# Patient Record
Sex: FEMALE | Race: WHITE | NOT HISPANIC OR LATINO | Employment: FULL TIME | URBAN - METROPOLITAN AREA
[De-identification: names, ages, dates, MRNs, and addresses within clinical notes are randomized per-mention and may not be internally consistent; named-entity substitution may affect disease eponyms.]

---

## 2022-07-26 DIAGNOSIS — E87.6 HYPOKALEMIA: Primary | ICD-10-CM

## 2022-07-26 DIAGNOSIS — R09.89 LABILE HYPERTENSION: ICD-10-CM

## 2022-07-26 DIAGNOSIS — I10 ESSENTIAL HYPERTENSION, BENIGN: ICD-10-CM

## 2022-08-10 PROBLEM — I77.4 CELIAC ARTERY STENOSIS (HCC): Status: ACTIVE | Noted: 2022-08-10

## 2022-10-13 DIAGNOSIS — M79.642 PAIN IN BOTH HANDS: ICD-10-CM

## 2022-10-13 DIAGNOSIS — M45.0 ANKYLOSING SPONDYLITIS OF MULTIPLE SITES IN SPINE (HCC): Primary | ICD-10-CM

## 2022-10-13 DIAGNOSIS — M79.641 PAIN IN BOTH HANDS: ICD-10-CM

## 2022-12-02 PROBLEM — M79.89 MASS OF SOFT TISSUE OF LOWER LEG: Status: ACTIVE | Noted: 2022-12-02

## 2022-12-20 DIAGNOSIS — M25.571 CHRONIC PAIN OF BOTH ANKLES: Primary | ICD-10-CM

## 2022-12-20 DIAGNOSIS — M25.562 CHRONIC PAIN OF BOTH KNEES: ICD-10-CM

## 2022-12-20 DIAGNOSIS — M25.561 CHRONIC PAIN OF BOTH KNEES: ICD-10-CM

## 2022-12-20 DIAGNOSIS — G89.29 CHRONIC PAIN OF BOTH ANKLES: Primary | ICD-10-CM

## 2022-12-20 DIAGNOSIS — G89.29 CHRONIC PAIN OF BOTH KNEES: ICD-10-CM

## 2022-12-20 DIAGNOSIS — M25.572 CHRONIC PAIN OF BOTH ANKLES: Primary | ICD-10-CM

## 2022-12-30 PROBLEM — M25.572 CHRONIC PAIN OF BOTH ANKLES: Status: ACTIVE | Noted: 2022-12-30

## 2022-12-30 PROBLEM — Q68.2 PATELLA ALTA: Status: ACTIVE | Noted: 2022-12-30

## 2022-12-30 PROBLEM — M22.2X1 PATELLOFEMORAL DISORDER OF RIGHT KNEE: Status: ACTIVE | Noted: 2022-12-30

## 2022-12-30 PROBLEM — G89.29 CHRONIC PAIN OF BOTH ANKLES: Status: ACTIVE | Noted: 2022-12-30

## 2022-12-30 PROBLEM — M25.571 CHRONIC PAIN OF BOTH ANKLES: Status: ACTIVE | Noted: 2022-12-30

## 2023-02-06 ENCOUNTER — OFFICE VISIT (OUTPATIENT)
Dept: PHYSICAL THERAPY | Facility: REHABILITATION | Age: 28
End: 2023-02-06

## 2023-02-06 DIAGNOSIS — S83.004D PATELLAR DISLOCATION, RIGHT, SUBSEQUENT ENCOUNTER: Primary | ICD-10-CM

## 2023-02-06 DIAGNOSIS — M22.2X1 PATELLOFEMORAL DISORDER OF RIGHT KNEE: ICD-10-CM

## 2023-02-06 DIAGNOSIS — Q68.2 PATELLA ALTA: ICD-10-CM

## 2023-02-06 NOTE — PROGRESS NOTES
Daily Note     Today's date: 2023  Patient name: Leonard Fields  : 1995  MRN: [id number]  Referring provider: Marisa Lopes  Dx:   Encounter Diagnosis     ICD-10-CM    1  Patellar dislocation, right, subsequent encounter  [id number] 004D       2  Patellofemoral disorder of right knee  M22 2X1       3  Patella ayesha  Q68 2           Start Time: 5420  Stop Time: 7869  Total time in clinic (min): 50 minutes    Subjective: Pt reports R knee pain, edema, and bruising that has worsened since last visit  Pt reports pain increases in extension and is back to having pain with ambulation, sit to stands, and pivoting type movement        Objective: See treatment diary below    Precautions: hx POTS, anxiety, HTN,         Manuals 1/3 1/6 1/9 1/13 1/16 1/20 1/23 1/27  1/30  2/6   theragun R quad               5 min       Smith taping patella    15 min 15 min 15 min 10 min 5 min patellar tendon only    5 min patellar tendon only    5 min patellar tendon only     5 min patellar tendon only  np   Measurements/RE         15 min/ MC    IASTM distal quad           5 min 5 min 5 min  5 min  np   Neuro Re-Ed                       SLS foam   3x30" R 30"x3 R Foam 30"x2 ea Foam 30"x3 ea Foam 3x 30" kevin  30"x3 bilat 3x30" kevin    np   Elevated sit to stand   15x chair and foam x15 chair and foam x15 chair and foam x15 chair and foam 15x chair and foam 15x chair and foam np    np   Lateral step up/down         0R x10 ea 0R 10x kevin 0R 10x kevin 0R 10x kevin    np                                                                                                   Ther Ex                       treadmill   6 min  3 0 mph  7 min 3 0 mph  7 min 2 5 mph  7 min 2 mph 7 min 2 5 mph 7 min 2 5 mph 10 min  2 5 mph 11 min  np   SLR   5" 15x R 5"x15 R 5"x15 R 5"x15 R np 5"x15 R np    np   sidelying hip abduction   5" 15x R 5"x15 R 5"x15 R 5"x15 R np 5"x15 R np   5"x10   SL press (seat 3, plate 6)   NV 17# H15 R 45# x15 R 45# x20 R 55 lbs 20x R 55 lbs 20x R 65 lbs 15x R    np   bridge   5" 15x 5"x15 5"x15 np          5"x10   Retro-rocking   NV 5"x10 5"x15 10" x10             Prone quad stretch           3x30"R 3x30"R 3x30" R  3x 30" R  30"x3 R   Quad set          5"x10   SAQ          5"x10   Heel slide          x15                           Ther Activity                                                                       Gait Training                                                                       Modalities                       PRN                                                      Assessment: Pt presents to PT with increased edema, tenderness to palpation and bruising along medial and lateral joint lines and patellar tendon  Edema and bruising also present along anterior lower leg with  Patient was assessed by Ham Díaz, DPT, negative ant/post drawer testing, negative Thessaly, and negative varus/valgus testing  TE performed for ROM and basic strengthening within pain free tolerance  Pt is most uncomfortable in TKE  Held manual therapy and taping this visit and was educated in reaching out to physician regarding change of status  Tolerated treatment fair  Patient would benefit from continued PT      Plan: Continue per plan of care

## 2023-02-07 DIAGNOSIS — M22.2X1 PATELLOFEMORAL DISORDER OF RIGHT KNEE: Primary | ICD-10-CM

## 2023-02-08 DIAGNOSIS — S83.004S PATELLAR DISLOCATION, RIGHT, SEQUELA: Primary | ICD-10-CM

## 2023-02-08 RX ORDER — METHYLPREDNISOLONE 4 MG/1
TABLET ORAL
Qty: 21 TABLET | Refills: 0 | Status: SHIPPED | OUTPATIENT
Start: 2023-02-08 | End: 2023-02-08

## 2023-02-09 ENCOUNTER — TELEPHONE (OUTPATIENT)
Dept: OBGYN CLINIC | Facility: HOSPITAL | Age: 28
End: 2023-02-09

## 2023-02-09 ENCOUNTER — OFFICE VISIT (OUTPATIENT)
Dept: OBGYN CLINIC | Facility: CLINIC | Age: 28
End: 2023-02-09

## 2023-02-09 ENCOUNTER — APPOINTMENT (OUTPATIENT)
Dept: PHYSICAL THERAPY | Facility: REHABILITATION | Age: 28
End: 2023-02-09

## 2023-02-09 VITALS
SYSTOLIC BLOOD PRESSURE: 143 MMHG | HEART RATE: 132 BPM | DIASTOLIC BLOOD PRESSURE: 88 MMHG | HEIGHT: 65 IN | BODY MASS INDEX: 26.99 KG/M2 | WEIGHT: 162 LBS

## 2023-02-09 DIAGNOSIS — G89.29 CHRONIC PAIN OF RIGHT KNEE: ICD-10-CM

## 2023-02-09 DIAGNOSIS — M22.2X1 PATELLOFEMORAL DISORDER OF RIGHT KNEE: Primary | ICD-10-CM

## 2023-02-09 DIAGNOSIS — M70.50 PES ANSERINE BURSITIS: ICD-10-CM

## 2023-02-09 DIAGNOSIS — M25.561 CHRONIC PAIN OF RIGHT KNEE: ICD-10-CM

## 2023-02-09 NOTE — PROGRESS NOTES
Assessment:  1  Patellofemoral disorder of right knee        2  Pes anserine bursitis        3  Chronic pain of right knee          Patient Active Problem List   Diagnosis   • Essential hypertension, benign   • POTS (postural orthostatic tachycardia syndrome)   • Hypokalemia   • Hypomagnesemia   • Vitamin D deficiency   • Anxiety   • Acid reflux   • Ankylosing spondylitis (HCC)   • Gastroparesis   • Irritable bowel syndrome with diarrhea   • Histone antibody positive   • Dysmenorrhea   • Eczema   • Tremor   • Undifferentiated connective tissue disease (HCC)   • RIYA (generalized anxiety disorder)   • Pre-conception counseling   • Heart palpitations   • Hypophosphatemia   • Celiac artery stenosis (HCC)   • Mass of soft tissue of lower leg   • Patellar dislocation, right, sequela   • Patellofemoral disorder of right knee   • Patella ayesha   • Chronic pain of both ankles   • Pes anserine bursitis           Plan      Patient will get a J brace help with the knee Temporarily until she is able to get the MRI  She will follow-up after the MRI and possibly be referred to [facility] for a second opinion and possible surgical invention regarding patellofemoral dysfunction  Whether or not MPFL can be reconstructed or imbricated  As for her pes anserine bursitis is most likely due to the rehab program that she is doing trying to emphasize the VMO putting more stress on the medial side the Medrol Dosepak that was prescribed should help but she has not started that yet  I also recommend that she use Voltaren gel to help with this as well  We did offer her cortisone injection of the pes bursa but she declined  Subjective:     Patient ID:    Chief Complaint:Julita ANTONIO Berry 28 y o  female      HPI    Patient presents today for follow-up evaluation of chronic right knee pain  Patient had a patellar dislocation in 2013 for which she notes chronic pain   She has been attending physical therapy to strengthen the knee and address this chronic pain  She reports a recent injury when her dog ran into her knee causing an increase in pain, she denies repeat dislocation  DOI 1/29/2023  She has not been able to perform physical therapy with out significant pain since time of recent injury  She indicates the medial joint space as the primary location of her pain  The following portions of the patient's history were reviewed and updated as appropriate: allergies, current medications, past family history, past social history, past surgical history and problem list         Social History     Socioeconomic History   • Marital status: /Civil Union     Spouse name: Not on file   • Number of children: Not on file   • Years of education: Not on file   • Highest education level: Not on file   Occupational History   • Occupation: medical assistant with nephrology    Tobacco Use   • Smoking status: Never   • Smokeless tobacco: Never   Vaping Use   • Vaping Use: Never used   Substance and Sexual Activity   • Alcohol use:  Yes     Alcohol/week: 1 0 standard drink     Types: 1 Standard drinks or equivalent per week     Comment: socially   • Drug use: No   • Sexual activity: Not Currently     Partners: Male     Birth control/protection: Condom Male   Other Topics Concern   • Not on file   Social History Narrative    Activities - horseback riding    Caffeine use    Drinks coffee- 1 a wk    Exercise - walking    Exercising regularly     Social Determinants of Health     Financial Resource Strain: Not on file   Food Insecurity: Not on file   Transportation Needs: Not on file   Physical Activity: Not on file   Stress: Not on file   Social Connections: Not on file   Intimate Partner Violence: Not on file   Housing Stability: Not on file     Past Medical History:   Diagnosis Date   • Abnormal blood chemistry    • Anxiety    • Connective tissue disease ([facility] [address] )    • Gastroparesis    • Hypertension     last assessed 3/12/14   • Hypokalemia    • Lymphadenopathy     last assessed 10/8/14   • Mass of right breast    • Myalgia     last assessed 10/8/14   • Myositis     last assessed 10/8/14   • Nerve pain     arms legs   • Palpitations    • POTS (postural orthostatic tachycardia syndrome)    • Rheumatoid arthritis (HCC)    • Sinus tachycardia     related to dehydration   • Tachycardia     last assessed 6/9/17   • Vitamin D deficiency      Past Surgical History:   Procedure Laterality Date   • PA EGD TRANSORAL BIOPSY SINGLE/MULTIPLE N/A 5/4/2016    Procedure: ESOPHAGOGASTRODUODENOSCOPY (EGD); Surgeon: Rylee Peacock MD;  Location: [facility];   Service: Gastroenterology   • UPPER GASTROINTESTINAL ENDOSCOPY     • WISDOM TOOTH EXTRACTION       Allergies   Allergen Reactions   • Prednisone Hyperactivity     Medrol dose samantha only   • Serotonin Reuptake Inhibitors (Ssris) Anaphylaxis and Hives   • Augmentin [Amoxicillin-Pot Clavulanate] Hives and Rash   • Clindamycin Rash   • Penicillins Rash   • Sulfa Antibiotics Rash   • Azithromycin Rash     Current Outpatient Medications on File Prior to Visit   Medication Sig Dispense Refill   • AMILoride 5 mg tablet Take 2 tablets (10 mg total) by mouth 2 (two) times a day 180 tablet 3   • busPIRone (BUSPAR) 5 mg tablet Take 1 tablet (5 mg total) by mouth 2 (two) times a day (Patient taking differently: Take 5 mg by mouth if needed) 180 tablet 3   • clobetasol (TEMOVATE) 0 05 % ointment Apply topically 2 (two) times a day 60 g 2   • dicyclomine (BENTYL) 20 mg tablet Take 1 tablet (20 mg total) by mouth every 6 (six) hours (Patient taking differently: Take 20 mg by mouth as needed) 120 tablet 4   • hydroxychloroquine (PLAQUENIL) 200 mg tablet Take 1 5 tablets (300 mg total) by mouth in the morning 45 tablet 6   • hydrOXYzine HCL (ATARAX) 10 mg tablet Take 1 tablet (10 mg total) by mouth 2 (two) times a day as needed for anxiety 60 tablet 1   • methylPREDNISolone 4 MG tablet therapy pack Use as directed on package 1 each 0   • Multiple Vitamins-Minerals (MULTIVITAL) CHEW Chew 1 tablet daily     • potassium chloride (K-DUR,KLOR-CON) 20 mEq tablet Take 1 tablet (20 mEq total) by mouth 3 (three) times a day 270 tablet 3   • propranolol (INDERAL LA) 120 mg 24 hr capsule Take 1 capsule (120 mg total) by mouth daily 90 capsule 3   • propranolol (INDERAL) 20 mg tablet Take 1 tablet (20 mg total) by mouth every 8 (eight) hours (Patient taking differently: Take 20 mg by mouth as needed UP to 3 times in a day) 90 tablet 2   • tacrolimus (PROTOPIC) 0 1 % ointment Apply topically 2 (two) times a day 100 g 0   • triamcinolone (KENALOG) 0 025 % ointment Apply topically twice day for 14 days straight as needed for flares to thinner skin 80 g 3   • triamcinolone (KENALOG) 0 1 % ointment Apply topically twice day for 14 days straight as needed for flares 453 6 g 3     No current facility-administered medications on file prior to visit  Objective:    Review of Systems   Constitutional: Negative for chills and fever  HENT: Negative for ear pain and sore throat  Eyes: Negative for pain and visual disturbance  Respiratory: Negative for cough and shortness of breath  Cardiovascular: Negative for chest pain and palpitations  Gastrointestinal: Negative for abdominal pain and vomiting  Genitourinary: Negative for dysuria and hematuria  Musculoskeletal: Negative for arthralgias and back pain  Skin: Negative for color change and rash  Neurological: Negative for seizures and syncope  All other systems reviewed and are negative  Left Knee Exam     Tenderness   The patient is experiencing tenderness in the pes anserinus and patella  Range of Motion   Extension: 0   Flexion: 120     Tests   Varus: negative Valgus: negative    Other   Erythema: absent  Sensation: normal  Pulse: present  Swelling: none  Effusion: no effusion present            Physical Exam  Vitals and nursing note reviewed  HENT:      Head: Normocephalic  Eyes:      Extraocular Movements: Extraocular movements intact  Cardiovascular:      Rate and Rhythm: Normal rate  Pulses: Normal pulses  Pulmonary:      Effort: Pulmonary effort is normal    Musculoskeletal:         General: Normal range of motion  Cervical back: Normal range of motion  Left knee: No effusion  Skin:     General: Skin is warm and dry  Neurological:      General: No focal deficit present  Mental Status: She is alert  Psychiatric:         Behavior: Behavior normal          Procedures  No Procedures performed today    I have personally reviewed pertinent films in PACS  Scribe Attestation    I,:   am acting as a scribe while in the presence of the attending physician :       I,:   personally performed the services described in this documentation    as scribed in my presence :           Portions of the record may have been created with voice recognition software   Occasional wrong word or "sound a like" substitutions may have occurred due to the inherent limitations of voice recognition software   Read the chart carefully and recognize, using context, where substitutions have occurred

## 2023-02-09 NOTE — TELEPHONE ENCOUNTER
DR Cayla Reno called stating   MRI does not require prior authorization via [facility]  [facility]AR is unaware if prior is needed from patients insurance   [facility]ANKAR recommenced our team call patients insurance directly       Tracking #- [id number]   # [phone] ([facility])

## 2023-02-10 ENCOUNTER — TELEPHONE (OUTPATIENT)
Dept: OBGYN CLINIC | Facility: HOSPITAL | Age: 28
End: 2023-02-10

## 2023-02-10 DIAGNOSIS — M22.2X1 PATELLOFEMORAL DISORDER OF RIGHT KNEE: Primary | ICD-10-CM

## 2023-02-10 NOTE — TELEPHONE ENCOUNTER
Caller: Patient     Doctor: Desean Da Silva    Reason for call: Patient would like to schedule a fitting for a J brace       Call back#: [phone]

## 2023-02-13 ENCOUNTER — APPOINTMENT (OUTPATIENT)
Dept: PHYSICAL THERAPY | Facility: REHABILITATION | Age: 28
End: 2023-02-13

## 2023-02-13 DIAGNOSIS — M22.2X1 PATELLOFEMORAL DISORDER OF RIGHT KNEE: Primary | ICD-10-CM

## 2023-02-14 ENCOUNTER — TELEPHONE (OUTPATIENT)
Dept: OBGYN CLINIC | Facility: CLINIC | Age: 28
End: 2023-02-14

## 2023-02-15 ENCOUNTER — HOSPITAL ENCOUNTER (OUTPATIENT)
Dept: MRI IMAGING | Facility: HOSPITAL | Age: 28
Discharge: HOME | End: 2023-02-15
Attending: ORTHOPAEDIC SURGERY

## 2023-02-15 DIAGNOSIS — M22.2X1 PATELLOFEMORAL DISORDER OF RIGHT KNEE: ICD-10-CM

## 2023-02-16 ENCOUNTER — APPOINTMENT (OUTPATIENT)
Dept: PHYSICAL THERAPY | Facility: REHABILITATION | Age: 28
End: 2023-02-16

## 2023-02-20 ENCOUNTER — OFFICE VISIT (OUTPATIENT)
Dept: OBGYN CLINIC | Facility: MEDICAL CENTER | Age: 28
End: 2023-02-20

## 2023-02-20 VITALS
SYSTOLIC BLOOD PRESSURE: 167 MMHG | DIASTOLIC BLOOD PRESSURE: 84 MMHG | HEIGHT: 65 IN | WEIGHT: 162 LBS | BODY MASS INDEX: 26.99 KG/M2 | HEART RATE: 58 BPM

## 2023-02-20 DIAGNOSIS — M22.2X1 PATELLOFEMORAL DISORDER OF RIGHT KNEE: ICD-10-CM

## 2023-02-20 DIAGNOSIS — S83.004A PATELLAR DISLOCATION, RIGHT, INITIAL ENCOUNTER: ICD-10-CM

## 2023-02-20 DIAGNOSIS — Z01.818 PRE-OP TESTING: ICD-10-CM

## 2023-02-20 DIAGNOSIS — M25.361 PATELLAR INSTABILITY OF RIGHT KNEE: Primary | ICD-10-CM

## 2023-02-20 RX ORDER — CEFAZOLIN SODIUM 2 G/50ML
2000 SOLUTION INTRAVENOUS ONCE
OUTPATIENT
Start: 2023-02-20 | End: 2023-02-20

## 2023-02-20 RX ORDER — TRAMADOL HYDROCHLORIDE 50 MG/1
50 TABLET ORAL EVERY 6 HOURS PRN
OUTPATIENT
Start: 2023-02-20

## 2023-02-20 RX ORDER — ACETAMINOPHEN 325 MG/1
650 TABLET ORAL EVERY 6 HOURS PRN
OUTPATIENT
Start: 2023-02-20

## 2023-02-20 NOTE — PROGRESS NOTES
Ortho Sports Medicine Knee New Patient Visit     Assesment:   28 y o  female right knee 1 patellar dislocation in 2013 with continued patellar instability/ subluxations    Plan:    Conservative treatment:    Ice to knee for 20 minutes at least 1-2 times daily  Post-op PT written  Crutches given  TROM fitted       Imaging: All imaging from today was reviewed by myself and explained to the patient  Injection:    No Injection planned at this time  Surgery: All of the risks and benefits of operative treatment were explained to the patient, as well as the risks and benefits of any alternative treatment options, including nonoperative care  The risks of surgical treatement include, but are not limited to, infection, bleeding, blood clot, neurovascular damage, need for further surgery, continued pain, cardiovascular risk, and anesthesia risk  The patient understood this and elects to proceed forward with surgical intervention  We will proceed forward with surgical arthroscopy of the knee with lateral release and open MPFL reconstruction with allograft  Patient Denies history of blood clot  Patient denies personal or family history of bleeding or clotting disorder  Patient does not take any blood thinners  Patient has cardiac history including HTN and POTS, this is managed by her Cardiologist  Patient Denies  current history of diabetes  Patient has allergies to antibiotics, penicillin, clindamycin, sulfa, and azithromycin, Patient will receive Ancef intra-op  Patient denies history of MRSA infection  Patient Denies current tobacco use  Discussed with patient use of narcotics for post-op pain  Patient denies history of opioid abuse  Patient does have history of RA and on plaquenil, she will stop plaquenil 2 weeks prior to surgery due to increased risk of infection at time of surgery  Cardiac and Nephrology clearance will be obtained  Bloodwork will be obtained   EKG will be obtained  Follow up:    Return for 1 week post-op   Chief Complaint   Patient presents with   • Right Knee - Pain       History of Present Illness: The patient is a 28 y o  female whose occupation is [employer], referred to me by [facility] regarding Right knee previous patellar dislocation with continued pain and patellar instability  Pain is located anterior, posterior, medial   The patient rates the pain as a 9/10  The pain has been present for 9 years but worse over the last few months  The patient initially sustained an injury in 2013 while horseback riding  Patient states that during the event her foot got caught in the stirrup she was dragged across the ground and eventually was able to get her foot out of the stirrup  Upon landing on the ground patient dislocated her kneecap out laterally  Patient states that paramedics came and had to relocate the kneecap at that time  Patient states that following that injury she immediately did bracing and physical therapy for treatment  Patient denies ever having full stability back to the kneecap following this  Patient states that since then until current she continues to have sensations of her kneecap slipping out laterally  She denies another true full dislocation  But she states that daily she will get sensations as though the patella is going to dislocate or at least looks to the side  Patient states whenever she gets a sensation as when she is in a slightly flexed position and goes to quick turn or pivot  Most recently in January of this year the patient's dog ran into her and had a direct impact with the right knee  Patient states that since this injury she has had significant increased pain about the kneecap  Patient states that she has pain in the anterior posterior and medial aspect of the knee    After patient sustained her injury from her great Tony she initially followed up with Dr Harjinder Keating prescribed bracing and physical therapy  Patient completed over 6 weeks of physical therapy without improvement and stabilization of the knee Or resolution of pain  Patient was then referred to Dr Denzel Alvarado for consideration of MPFL reconstruction  The pain is characterized as sharp, stabbing, dull, achy  The pain is present at all times  Pain is improved by rest   Pain is aggravated by stairs, squatting, weight bearing, walking and pivoting on a planted foot  Symptoms include clicking, catching, popping and swelling  The patient has tried rest, ice, NSAIDS, physical therapy and bracing  Knee Surgical History:  None    Past Medical, Social and Family History:  Past Medical History:   Diagnosis Date   • Abnormal blood chemistry    • Anxiety    • Connective tissue disease ([facility] [address] )    • Gastroparesis    • Hypertension     last assessed 3/12/14   • Hypokalemia    • Lymphadenopathy     last assessed 10/8/14   • Mass of right breast    • Myalgia     last assessed 10/8/14   • Myositis     last assessed 10/8/14   • Nerve pain     arms legs   • Palpitations    • POTS (postural orthostatic tachycardia syndrome)    • Rheumatoid arthritis (HCC)    • Sinus tachycardia     related to dehydration   • Tachycardia     last assessed 6/9/17   • Vitamin D deficiency      Past Surgical History:   Procedure Laterality Date   • WA EGD TRANSORAL BIOPSY SINGLE/MULTIPLE N/A 5/4/2016    Procedure: ESOPHAGOGASTRODUODENOSCOPY (EGD); Surgeon: Ramakrishna Melendez MD;  Location: [facility];   Service: Gastroenterology   • UPPER GASTROINTESTINAL ENDOSCOPY     • WISDOM TOOTH EXTRACTION       Allergies   Allergen Reactions   • Prednisone Hyperactivity     Medrol dose samantha only   • Serotonin Reuptake Inhibitors (Ssris) Anaphylaxis and Hives   • Augmentin [Amoxicillin-Pot Clavulanate] Hives and Rash   • Clindamycin Rash   • Penicillins Rash   • Sulfa Antibiotics Rash   • Azithromycin Rash     Current Outpatient Medications on File Prior to Visit   Medication Sig Dispense Refill   • AMILoride 5 mg tablet Take 2 tablets (10 mg total) by mouth 2 (two) times a day 180 tablet 3   • busPIRone (BUSPAR) 5 mg tablet Take 1 tablet (5 mg total) by mouth 2 (two) times a day (Patient taking differently: Take 5 mg by mouth if needed) 180 tablet 3   • clobetasol (TEMOVATE) 0 05 % ointment Apply topically 2 (two) times a day 60 g 2   • dicyclomine (BENTYL) 20 mg tablet Take 1 tablet (20 mg total) by mouth every 6 (six) hours (Patient taking differently: Take 20 mg by mouth as needed) 120 tablet 4   • hydroxychloroquine (PLAQUENIL) 200 mg tablet Take 1 5 tablets (300 mg total) by mouth in the morning 45 tablet 6   • hydrOXYzine HCL (ATARAX) 10 mg tablet Take 1 tablet (10 mg total) by mouth 2 (two) times a day as needed for anxiety 60 tablet 1   • Multiple Vitamins-Minerals (MULTIVITAL) CHEW Chew 1 tablet daily     • potassium chloride (K-DUR,KLOR-CON) 20 mEq tablet Take 1 tablet (20 mEq total) by mouth 3 (three) times a day 270 tablet 3   • propranolol (INDERAL LA) 120 mg 24 hr capsule Take 1 capsule (120 mg total) by mouth daily 90 capsule 3   • propranolol (INDERAL) 20 mg tablet Take 1 tablet (20 mg total) by mouth every 8 (eight) hours (Patient taking differently: Take 20 mg by mouth as needed UP to 3 times in a day) 90 tablet 2   • tacrolimus (PROTOPIC) 0 1 % ointment Apply topically 2 (two) times a day 100 g 0   • triamcinolone (KENALOG) 0 025 % ointment Apply topically twice day for 14 days straight as needed for flares to thinner skin 80 g 3   • triamcinolone (KENALOG) 0 1 % ointment Apply topically twice day for 14 days straight as needed for flares 453 6 g 3   • [DISCONTINUED] methylPREDNISolone 4 MG tablet therapy pack Use as directed on package 1 each 0     No current facility-administered medications on file prior to visit       Social History     Socioeconomic History   • Marital status: /Civil Union     Spouse name: Not on file   • Number of children: Not on file   • Years of education: Not on file   • Highest education level: Not on file   Occupational History   • Occupation: medical assistant with nephrology    Tobacco Use   • Smoking status: Never   • Smokeless tobacco: Never   Vaping Use   • Vaping Use: Never used   Substance and Sexual Activity   • Alcohol use: Yes     Alcohol/week: 1 0 standard drink     Types: 1 Standard drinks or equivalent per week     Comment: socially   • Drug use: No   • Sexual activity: Not Currently     Partners: Male     Birth control/protection: Condom Male   Other Topics Concern   • Not on file   Social History Narrative    Activities - horseback riding    Caffeine use    Drinks coffee- 1 a wk    Exercise - walking    Exercising regularly     Social Determinants of Health     Financial Resource Strain: Not on file   Food Insecurity: Not on file   Transportation Needs: Not on file   Physical Activity: Not on file   Stress: Not on file   Social Connections: Not on file   Intimate Partner Violence: Not on file   Housing Stability: Not on file         I have reviewed the past medical, surgical, social and family history, medications and allergies as documented in the EMR  Review of systems: ROS is negative other than that noted in the HPI  Constitutional: Negative for fatigue and fever  HENT: Negative for sore throat  Respiratory: Negative for shortness of breath  Cardiovascular: Negative for chest pain  Gastrointestinal: Negative for abdominal pain  Endocrine: Negative for cold intolerance and heat intolerance  Genitourinary: Negative for flank pain  Musculoskeletal: Negative for back pain  Skin: Negative for rash  Allergic/Immunologic: Negative for immunocompromised state  Neurological: Negative for dizziness  Psychiatric/Behavioral: Negative for agitation  Physical Exam:    Blood pressure 167/84, pulse 58, height 5' 5" (1 651 m), weight 73 5 kg (162 lb)      General/Constitutional: NAD, well developed, well nourished  HENT: Normocephalic, atraumatic  CV: Intact distal pulses, regular rate  Resp: No respiratory distress or labored breathing  GI: Soft and non-tender   Lymphatic: No lymphadenopathy palpated  Neuro: Alert and Oriented x 3, no focal deficits  Psych: Normal mood, normal affect, normal judgement, normal behavior  Skin: Warm, dry, no rashes, no erythema      Knee Exam (focused): RIGHT LEFT   ROM:   0-130 0-130   Palpation: Effusion minimal negative     MJL tenderness Positive Negative     LJL tenderness Positive Negative   Meniscus: Bobbi Negative Negative    Apley's Compression Negative Negative   Instability: Varus stable stable     Valgus stable stable   Special Tests: Lachman Negative Negative     Posterior drawer Negative Negative     Anterior drawer Negative Negative     Pivot shift not tested not tested     Dial not tested not tested   Patella: Palpation medial facet ttp no tenderness     Mobility 3/4 1/4     Apprehension Positive Negative   Other: Single leg 1/4 squat not tested not tested      LE NV Exam: +2 DP/PT pulses bilaterally  Sensation intact to light touch L2-S1 bilaterally     Bilateral hip ROM demonstrates no pain actively or passively    No calf tenderness to palpation bilaterally    Knee Imaging    X-rays of the right knee were reviewed, which demonstrate no acute fracture with effusion  I have reviewed the radiology report and agree with their impression  MRI of the right knee were reviewed, which demonstrate a normal MRI  There is no ligamentous or meniscus injury  No bone confusion pattern to suggest recently patellar dislocation   I have reviewed the radiology report and agree with their impression

## 2023-02-23 ENCOUNTER — TELEPHONE (OUTPATIENT)
Dept: NEPHROLOGY | Facility: CLINIC | Age: 28
End: 2023-02-23

## 2023-02-23 NOTE — TELEPHONE ENCOUNTER
Call from Elliott from [facility]  Patient scheduled for surgery 4/13/13 and needs surgery clearence  Is patient able to be cleared for surgery or does she need a follow up visit

## 2023-03-03 ENCOUNTER — APPOINTMENT (OUTPATIENT)
Dept: LAB | Facility: CLINIC | Age: 28
End: 2023-03-03

## 2023-03-03 DIAGNOSIS — Z01.818 PRE-OP TESTING: ICD-10-CM

## 2023-03-03 DIAGNOSIS — M25.361 PATELLAR INSTABILITY OF RIGHT KNEE: ICD-10-CM

## 2023-03-03 DIAGNOSIS — E87.1 HYPONATREMIA: ICD-10-CM

## 2023-03-03 DIAGNOSIS — E87.6 HYPOKALEMIA: Chronic | ICD-10-CM

## 2023-03-03 DIAGNOSIS — E55.9 VITAMIN D DEFICIENCY: ICD-10-CM

## 2023-03-03 DIAGNOSIS — S83.004A PATELLAR DISLOCATION, RIGHT, INITIAL ENCOUNTER: ICD-10-CM

## 2023-03-03 DIAGNOSIS — E83.39 HYPOPHOSPHATEMIA: ICD-10-CM

## 2023-03-03 DIAGNOSIS — I77.1 CELIAC ARTERY STENOSIS (HCC): ICD-10-CM

## 2023-03-03 DIAGNOSIS — G90.A POTS (POSTURAL ORTHOSTATIC TACHYCARDIA SYNDROME): ICD-10-CM

## 2023-03-03 DIAGNOSIS — I10 ESSENTIAL HYPERTENSION, BENIGN: ICD-10-CM

## 2023-03-03 DIAGNOSIS — E83.42 HYPOMAGNESEMIA: ICD-10-CM

## 2023-03-03 LAB
ANION GAP SERPL CALCULATED.3IONS-SCNC: 3 MMOL/L (ref 4–13)
BUN SERPL-MCNC: 13 MG/DL (ref 5–25)
CALCIUM SERPL-MCNC: 10.1 MG/DL (ref 8.3–10.1)
CHLORIDE SERPL-SCNC: 104 MMOL/L (ref 96–108)
CO2 SERPL-SCNC: 28 MMOL/L (ref 21–32)
CREAT SERPL-MCNC: 0.82 MG/DL (ref 0.6–1.3)
ERYTHROCYTE [DISTWIDTH] IN BLOOD BY AUTOMATED COUNT: 12 % (ref 11.6–15.1)
GFR SERPL CREATININE-BSD FRML MDRD: 97 ML/MIN/1.73SQ M
GLUCOSE P FAST SERPL-MCNC: 81 MG/DL (ref 65–99)
HCT VFR BLD AUTO: 43.3 % (ref 34.8–46.1)
HGB BLD-MCNC: 15 G/DL (ref 11.5–15.4)
MAGNESIUM SERPL-MCNC: 2.6 MG/DL (ref 1.6–2.6)
MCH RBC QN AUTO: 30.2 PG (ref 26.8–34.3)
MCHC RBC AUTO-ENTMCNC: 34.6 G/DL (ref 31.4–37.4)
MCV RBC AUTO: 87 FL (ref 82–98)
PLATELET # BLD AUTO: 257 THOUSANDS/UL (ref 149–390)
PMV BLD AUTO: 11.7 FL (ref 8.9–12.7)
POTASSIUM SERPL-SCNC: 3.8 MMOL/L (ref 3.5–5.3)
RBC # BLD AUTO: 4.96 MILLION/UL (ref 3.81–5.12)
SODIUM SERPL-SCNC: 135 MMOL/L (ref 135–147)
WBC # BLD AUTO: 6.64 THOUSAND/UL (ref 4.31–10.16)

## 2023-03-10 ENCOUNTER — OFFICE VISIT (OUTPATIENT)
Dept: LAB | Facility: CLINIC | Age: 28
End: 2023-03-10

## 2023-03-10 DIAGNOSIS — S83.004A PATELLAR DISLOCATION, RIGHT, INITIAL ENCOUNTER: ICD-10-CM

## 2023-03-10 DIAGNOSIS — M25.361 PATELLAR INSTABILITY OF RIGHT KNEE: ICD-10-CM

## 2023-03-10 DIAGNOSIS — Z01.818 PRE-OP TESTING: ICD-10-CM

## 2023-03-10 LAB
ATRIAL RATE: 83 BPM
P AXIS: 65 DEGREES
PR INTERVAL: 138 MS
QRS AXIS: 60 DEGREES
QRSD INTERVAL: 78 MS
QT INTERVAL: 386 MS
QTC INTERVAL: 453 MS
T WAVE AXIS: 51 DEGREES
VENTRICULAR RATE: 83 BPM

## 2023-03-17 ENCOUNTER — OFFICE VISIT (OUTPATIENT)
Dept: NEPHROLOGY | Facility: CLINIC | Age: 28
End: 2023-03-17

## 2023-03-17 VITALS
SYSTOLIC BLOOD PRESSURE: 140 MMHG | HEIGHT: 65 IN | BODY MASS INDEX: 27.39 KG/M2 | DIASTOLIC BLOOD PRESSURE: 80 MMHG | WEIGHT: 164.4 LBS | HEART RATE: 128 BPM

## 2023-03-17 DIAGNOSIS — I10 ESSENTIAL HYPERTENSION, BENIGN: Primary | Chronic | ICD-10-CM

## 2023-03-17 DIAGNOSIS — G90.A POTS (POSTURAL ORTHOSTATIC TACHYCARDIA SYNDROME): Chronic | ICD-10-CM

## 2023-03-17 NOTE — PROGRESS NOTES
NEPHROLOGY OFFICE VISIT   Desean Benito 33 y o  female MRN: [id number]  3/17/2023    Reason for Visit: Follow up    INTERVAL HISTORY and SUBJECTIVE:    I had the pleasure of seeing Karey Patiño today in the renal clinic  She is a 19-year-old female who follows with Dr Bon Gauthier for management of hypertension and electrolyte disorders: Hypokalemia and hyponatremia  Scheduled for arthroscopic realignment of patella 4/13/2023  Presents for renal clearance  Karey Patiño has been doing quite well since propanolol dose was adjusted  She has occasional POTS symptoms which resolved with rest/recumbent position  She remains well-hydrated and uses liquid IV in her water  If she requires additional fluids cardiology provides IV isotonic saline  ASSESSMENT AND PLAN:    Hypertension/hypokalemia/hyponatremia:  · Evaluated and investigated by Dr Bon Gauthier and was also seen by Dr Roni Ríos as a second opinion  · Secondary work-up negative, genetic work-up negative  · Goal blood pressure less than 135/80  · Home readings averaged: AM sitting 126/79, standing 131/75, p m  sitting 123/77, standing 132/77  Pulse sitting 77, standing 121  · Home readings similar to office evaluation today  · Potassium supplement: K  Dur 20 mill equivalents 3 times a day  · High potassium diet  · Potassium acceptable, 3 8  · Magnesium at the high end of normal, 2 6  · In association with POTS metoprolol or propranolol could be used  She is using propanolol 20 mg 3 times a day in addition to 120 mg daily (doing much better on higher dose of propanolol)  · Blood pressure acceptable  No changes      POTS: Questionable POTS syndrome  · Occasional symptoms with position change  · Follows with cardiology  · Good hydration, recommendations for 3 L/day  · Adding one packet of liquid IV to one liter of water 500 mg sodium  · Adequate salt intake recommendations for 8 g daily: Sources table salt, sports tablets, sports beverages, oral hydration salts, canned soup  · Exercise encouraged/prevent deconditioning  Recumbent/semirecumbent exercises to focus on lower extremity resistance strength training to help reduce venous pooling  · Leg crosses lower extremity muscle tensing to improve venous return  · Lower extremity compressive garments may be helpful -discussed using a light compression such as Dr  Motion stockings    Mixed connective tissue disease:   · Follows with rheumatology on Plaquenil  No longer on ibuprofen    GI: GERD's/IBS/idiopathic gastroparesis followed by Dr Ashanti Dejesus (GI)    Celiac artery stenosis:   · Detected on arterial duplex for hypertension work-up  Asymptomatic  Monitor  No intervention at this time unless symptoms occur after eating    Prior injury to knee: Thrown from her horse  Scheduled for arthroscopic realignment of patella 4/13/2023  I have personally discussed the risks and benefits of the surgery from a renal standpoint with the patient in depth, and advised the patient about the risk of KRISHNA and the course of KRISHNA if it occurs with the small probability of the need for renal replacement therapy in the worse case scenario  Patient voiced understanding  From a renal standpoint the patient is renally optimized  for surgery with the following recommendations:  Fluids to administer: Other - IV fluids per protocol  Recommend adequate hydration pre-, intraoperatively and postoperatively  Medication Recommendations:  Minimize any IV contrast use (If IV contrast is used, please check BMP POD # 1)  Hold NSAIDs for at least 10 days prior to surgery  Hemodynamic Recommendations:  Ideally, target MAPs greater than 65 mmHg in the perioperative period, and minimize operative time with MAPs < 65 mmHg  Avoid intraop hemodynamic instability to decrease risk for KRISHNA occurrence  Other Recommendations:  Please see above for issues with POTS    Monitor closely for  Positional tachycardia which at times is symptomatic with dizziness and weakness  Please call with any questions or concerns      PATIENT INSTRUCTIONS:    Patient Instructions   You were seen today for preoperative clearance  At this time stable cleared for procedure  Plan/recommendations:  • Follow-up in 4 to 6 months  • Blood work prior to the next appointment  • Continue with leg exercises prior to standing as discussed  • Salt intake 8 g    • If blood pressure remains consistently greater than 135/80  • Continue with good hydration, liquid IV        Orders Placed This Encounter   Procedures   • Comprehensive metabolic panel     This is a patient instruction: Patient fasting for 8 hours or longer recommended  Standing Status:   Future     Standing Expiration Date:   12/1/2023   • Magnesium     Standing Status:   Future     Standing Expiration Date:   12/1/2023       OBJECTIVE:  Current Weight: Weight - Scale: 74 6 kg (164 lb 6 4 oz)  Vitals:    03/17/23 1323 03/17/23 1358 03/17/23 1400   BP:  120/78 140/80   BP Location:  Left arm Left arm   Patient Position:  Sitting Standing   Cuff Size:  Standard Standard   Pulse:  88 (!) 128   Weight: 74 6 kg (164 lb 6 4 oz)     Height: 5' 5" (1 651 m)      Body mass index is 27 36 kg/m²  REVIEW OF SYSTEMS:    Review of Systems   Constitutional: Negative for activity change, appetite change, chills, fever and unexpected weight change  HENT: Negative for congestion, ear pain and sore throat  Eyes: Negative for pain and visual disturbance  Respiratory: Negative for cough and shortness of breath  Cardiovascular: Negative for chest pain, palpitations and leg swelling  Gastrointestinal: Negative for abdominal pain, constipation, diarrhea, nausea and vomiting  Genitourinary: Negative for difficulty urinating, dysuria and hematuria  Musculoskeletal: Positive for arthralgias and gait problem  Negative for back pain  Skin: Negative for color change and rash  Neurological: Positive for dizziness and weakness   Negative for seizures and syncope  Hematological: Does not bruise/bleed easily  Psychiatric/Behavioral: The patient is not nervous/anxious  All other systems reviewed and are negative  PHYSICAL EXAM:      Physical Exam  Constitutional:       General: She is not in acute distress  Appearance: Normal appearance  She is well-developed  She is not ill-appearing, toxic-appearing or diaphoretic  HENT:      Head: Normocephalic and atraumatic  Nose: No congestion  Mouth/Throat:      Mouth: Mucous membranes are moist       Pharynx: No oropharyngeal exudate  Eyes:      General: No scleral icterus  Right eye: No discharge  Left eye: No discharge  Extraocular Movements: Extraocular movements intact  Conjunctiva/sclera: Conjunctivae normal    Neck:      Thyroid: No thyromegaly  Vascular: No carotid bruit or JVD  Trachea: No tracheal deviation  Cardiovascular:      Rate and Rhythm: Regular rhythm  Tachycardia present  Heart sounds: No murmur heard  No friction rub  No gallop  Comments: Tachycardia only with standing  Normal rate with sitting  Pulmonary:      Effort: Pulmonary effort is normal       Breath sounds: Normal breath sounds  Abdominal:      General: Bowel sounds are normal  There is no distension  Palpations: Abdomen is soft  There is no mass  Tenderness: There is no abdominal tenderness  There is no guarding or rebound  Musculoskeletal:         General: No swelling, tenderness or signs of injury  Normal range of motion  Cervical back: Normal range of motion and neck supple  No rigidity or tenderness  Right lower leg: No edema  Left lower leg: No edema  Skin:     General: Skin is warm and dry  Capillary Refill: Capillary refill takes less than 2 seconds  Coloration: Skin is not jaundiced or pale  Findings: No erythema or rash  Neurological:      General: No focal deficit present        Mental Status: She is alert and oriented to person, place, and time  Psychiatric:         Mood and Affect: Mood normal          Behavior: Behavior normal          Thought Content:  Thought content normal          Judgment: Judgment normal          Medications:    Current Outpatient Medications:   •  AMILoride 5 mg tablet, Take 2 tablets (10 mg total) by mouth 2 (two) times a day, Disp: 180 tablet, Rfl: 3  •  busPIRone (BUSPAR) 5 mg tablet, Take 1 tablet (5 mg total) by mouth 2 (two) times a day (Patient taking differently: Take 5 mg by mouth if needed), Disp: 180 tablet, Rfl: 3  •  clobetasol (TEMOVATE) 0 05 % ointment, Apply topically 2 (two) times a day, Disp: 60 g, Rfl: 2  •  dicyclomine (BENTYL) 20 mg tablet, Take 1 tablet (20 mg total) by mouth every 6 (six) hours (Patient taking differently: Take 20 mg by mouth as needed), Disp: 120 tablet, Rfl: 4  •  hydroxychloroquine (PLAQUENIL) 200 mg tablet, Take 1 5 tablets (300 mg total) by mouth in the morning, Disp: 45 tablet, Rfl: 6  •  hydrOXYzine HCL (ATARAX) 10 mg tablet, Take 1 tablet (10 mg total) by mouth 2 (two) times a day as needed for anxiety, Disp: 60 tablet, Rfl: 1  •  Multiple Vitamins-Minerals (MULTIVITAL) CHEW, Chew 1 tablet daily, Disp: , Rfl:   •  potassium chloride (K-DUR,KLOR-CON) 20 mEq tablet, Take 1 tablet (20 mEq total) by mouth 3 (three) times a day, Disp: 270 tablet, Rfl: 3  •  propranolol (INDERAL LA) 120 mg 24 hr capsule, Take 1 capsule (120 mg total) by mouth daily, Disp: 90 capsule, Rfl: 3  •  propranolol (INDERAL) 20 mg tablet, Take 1 tablet (20 mg total) by mouth every 8 (eight) hours (Patient taking differently: Take 20 mg by mouth as needed UP to 3 times in a day), Disp: 90 tablet, Rfl: 2  •  tacrolimus (PROTOPIC) 0 1 % ointment, Apply topically 2 (two) times a day, Disp: 100 g, Rfl: 0  •  triamcinolone (KENALOG) 0 025 % ointment, Apply topically twice day for 14 days straight as needed for flares to thinner skin, Disp: 80 g, Rfl: 3  • triamcinolone (KENALOG) 0 1 % ointment, Apply topically twice day for 14 days straight as needed for flares, Disp: 453 6 g, Rfl: 3    Laboratory Results:        Invalid input(s): ALBUMIN    Results for orders placed or performed in visit on 03/10/23   ECG 12 lead   Result Value Ref Range    Ventricular Rate 83 BPM    Atrial Rate 83 BPM    NJ Interval 138 ms    QRSD Interval 78 ms    QT Interval 386 ms    QTC Interval 453 ms    P Axis 65 degrees    QRS Axis 60 degrees    T Wave Axis 51 degrees

## 2023-03-17 NOTE — PATIENT INSTRUCTIONS
You were seen today for preoperative clearance  At this time stable cleared for procedure  Plan/recommendations: Follow-up in 4 to 6 months  Blood work prior to the next appointment  Continue with leg exercises prior to standing as discussed  Salt intake 8 g     If blood pressure remains consistently greater than 135/80  Continue with good hydration, liquid IV

## 2023-04-03 ENCOUNTER — DOCUMENTATION (OUTPATIENT)
Dept: NEPHROLOGY | Facility: CLINIC | Age: 28
End: 2023-04-03

## 2023-04-03 DIAGNOSIS — E87.6 HYPOKALEMIA: Primary | Chronic | ICD-10-CM

## 2023-04-03 NOTE — PROGRESS NOTES
Per conversation with Dr Aydee Saleh, hydration recommendations for knee surgery is isolyte 75-100ml/hour as needed  Patient also to have a BMP done about a week before and after surgery to watch for hypokalemia  Patient aware of recommendations and understands instructions

## 2023-04-03 NOTE — PROGRESS NOTES
Electrophysiology Follow Up    [address] 1939  [place] [address]  : 1995  MRN: [id number]    Date of Visit: 2023       Assessment/Plan     1  Heart palpitations  POCT ECG      2  POTS (postural orthostatic tachycardia syndrome)            ASSESSMENT:  1  POTS, currently maintained on propanolol   A )  Initially diagnosed 2016    - symptoms initially improved with increased hydration, exercise regimen, compression stockings    - metoprolol changed to propranolol   B )  Propanolol initially discontinued in  after symptomatic improvement, more recently restarted 3/2022 due to recurrent symptoms  2  Normal LV systolic function with EF 55% per echo in   3  Hypertension with prior hypokalemia and hyponatremia, managed by nephrology as an outpatient  4  Mixed connective tissue disorder, followed by rheumatology  5  Right patellar dislocation, scheduled for upcoming surgery       DISCUSSION/SUMMARY:  She is doing well from a POTS standpoint, and is currently maintained on propranolol 120 mg daily  She also has short acting propranolol which she takes as needed, but has only needed this several times in the recent past   She understands her triggers, including dehydration and low potassium, and is very regimented about adequate daily hydration and routine exercise  She has been utilizing Liquid IV daily as per Dr Erwin Farr prior and recommendations, which seems to have helped with her symptoms  At this time, she will continue her current regimen with no changes made  Ongoing lifestyle modifications were reinforced  Based on [facility] [place] Criteria), she is at low cardiovascular risk for her upcoming surgery and thus she is cleared to proceed  Recommend that she continue propranolol in the perioperative setting  No need for further testing at this time      She will return in 6 months for routine follow-up, but she knows to contact us sooner with any questions or concerns  I explained that stress from her upcoming surgery may be a trigger for recurrent symptoms, and she (or her orthopedic team) can reach out to us with for any assistance  History of Present Illness     CHIEF COMPLAINT: Cardiac clearance, 6 month follow up    HPI/INTERVAL HISTORY: Carrie Soriano is a 33 y o  female with known history of POTS currently maintained on propranolol, preserved LV systolic function, hypertension with hypokalemia and hyponatremia followed by nephrology, and mixed connective tissue disorder followed by rheumatology  She has been seen by Dr Shagufta Freeman in the past, and more recently has been followed by Dr Basilio Dacosta for her history of POTS  She was initially diagnosed back in 2016, however responded very well to increase hydration, a specific exercise regimen, and compression stockings  She had already been on metoprolol, and this was changed to propranolol  She had a subsequent tilt table study, and per reports this was already negative with the help of this regimen  Her propranolol was eventually discontinued sometime in 2018, as she was doing well from that standpoint  It was restarted 3/2022 due to worsening symptoms  She was last seen by Dr Basilio Dacosta 11/2022, at which time her propranolol was increased to 120 mg daily after she had a recent ER visit for recurrent symptoms earlier that month  Increase fluid hydration as well as continuous compression stocking use was also recommended  She is scheduled to undergo arthroscopic realignment of her patella 4/13/2023, and is being seen today for cardiac clearance prior to this procedure  She is currently very busy, as she is working and is finishing up nursing school  Despite this, she remains relatively stable from a POTS standpoint    She acknowledges that she has good days and bad, but she is very regimented about maintaining adequate daily hydration and undergoing physical exercise every day  She has been utilizing liquid IV daily since her last office visit, and she reports that this has helped  She knows her triggers are dehydration and low potassium, and she works hard to avoid the situations  She has had no issues on propranolol, and has only needed several doses of short acting propranolol due to recurrent symptoms  She has not had recent visits to the emergency room  She already has accepted a position in 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 nursing school  Review of Systems   Constitutional: Negative for malaise/fatigue and night sweats  Cardiovascular: Positive for palpitations  Negative for chest pain, dyspnea on exertion, orthopnea, leg swelling, syncope, PND and near-syncope  Neurological: Positive for dizziness (Occasionally in the setting of POTS symptoms/tachycardia) and light-headedness (Occasionally in the setting of POTS symptoms/tachycardia)  ROS as noted above, otherwise 12 point review of systems was performed and is negative  Historical Information  - I have personally reviewed and updated this information, including social history, medical history, and family history  Social History     Socioeconomic History   • Marital status: /Civil Union     Spouse name: Not on file   • Number of children: Not on file   • Years of education: Not on file   • Highest education level: Not on file   Occupational History   • Occupation: medical assistant with nephrology    Tobacco Use   • Smoking status: Never   • Smokeless tobacco: Never   Vaping Use   • Vaping Use: Never used   Substance and Sexual Activity   • Alcohol use:  Yes     Alcohol/week: 1 0 standard drink     Types: 1 Standard drinks or equivalent per week     Comment: socially   • Drug use: No   • Sexual activity: Not Currently     Partners: Male     Birth control/protection: Condom Male   Other Topics Concern   • Not on file   Social History Narrative    Activities - horseback riding    Caffeine use    Drinks coffee- 1 a wk    Exercise - walking    Exercising regularly     Social Determinants of Health     Financial Resource Strain: Not on file   Food Insecurity: Not on file   Transportation Needs: Not on file   Physical Activity: Not on file   Stress: Not on file   Social Connections: Not on file   Intimate Partner Violence: Not on file   Housing Stability: Not on file     Past Medical History:   Diagnosis Date   • Abnormal blood chemistry    • Anxiety    • Connective tissue disease ([facility] [address] )    • Gastroparesis    • Hypertension     last assessed 3/12/14   • Hypokalemia    • Lymphadenopathy     last assessed 10/8/14   • Mass of right breast    • Myalgia     last assessed 10/8/14   • Myositis     last assessed 10/8/14   • Nerve pain     arms legs   • Palpitations    • POTS (postural orthostatic tachycardia syndrome)    • Rheumatoid arthritis (HCC)    • Sinus tachycardia     related to dehydration   • Tachycardia     last assessed 6/9/17   • Vitamin D deficiency      Past Surgical History:   Procedure Laterality Date   • MO EGD TRANSORAL BIOPSY SINGLE/MULTIPLE N/A 5/4/2016    Procedure: ESOPHAGOGASTRODUODENOSCOPY (EGD); Surgeon: Xavier Raza MD;  Location: [facility];   Service: Gastroenterology   • UPPER GASTROINTESTINAL ENDOSCOPY     • WISDOM TOOTH EXTRACTION       Social History     Substance and Sexual Activity   Alcohol Use Yes   • Alcohol/week: 1 0 standard drink   • Types: 1 Standard drinks or equivalent per week    Comment: socially     Social History     Substance and Sexual Activity   Drug Use No     Social History     Tobacco Use   Smoking Status Never   Smokeless Tobacco Never     Family History   Problem Relation Age of Onset   • Hypokalemia Mother    • Hypotension Mother    • Anxiety disorder Mother         NOS   • Lung cancer Father    • Skin cancer Father    • Crohn's disease Father    • No Known Problems Sister    • No Known Problems Sister • No Known Problems Brother    • Coronary artery disease Maternal Grandmother    • Heart disease Maternal Grandmother    • Alzheimer's disease Maternal Grandmother    • Arthritis Paternal Grandmother    • Rheum arthritis Paternal Grandmother    • Hypertension Paternal Grandmother    • COPD Paternal Grandfather        Meds/Allergies       Current Outpatient Medications:   •  AMILoride 5 mg tablet, Take 2 tablets (10 mg total) by mouth 2 (two) times a day, Disp: 180 tablet, Rfl: 3  •  busPIRone (BUSPAR) 5 mg tablet, Take 1 tablet (5 mg total) by mouth 2 (two) times a day (Patient taking differently: Take 5 mg by mouth if needed), Disp: 180 tablet, Rfl: 3  •  clobetasol (TEMOVATE) 0 05 % ointment, Apply topically 2 (two) times a day, Disp: 60 g, Rfl: 2  •  dicyclomine (BENTYL) 20 mg tablet, Take 1 tablet (20 mg total) by mouth every 6 (six) hours (Patient taking differently: Take 20 mg by mouth as needed), Disp: 120 tablet, Rfl: 4  •  hydrOXYzine HCL (ATARAX) 10 mg tablet, Take 1 tablet (10 mg total) by mouth 2 (two) times a day as needed for anxiety, Disp: 60 tablet, Rfl: 1  •  Multiple Vitamins-Minerals (MULTIVITAL) CHEW, Chew 1 tablet daily, Disp: , Rfl:   •  potassium chloride (K-DUR,KLOR-CON) 20 mEq tablet, Take 1 tablet (20 mEq total) by mouth 3 (three) times a day, Disp: 270 tablet, Rfl: 3  •  propranolol (INDERAL LA) 120 mg 24 hr capsule, Take 1 capsule (120 mg total) by mouth daily, Disp: 90 capsule, Rfl: 3  •  propranolol (INDERAL) 20 mg tablet, Take 1 tablet (20 mg total) by mouth every 8 (eight) hours (Patient taking differently: Take 20 mg by mouth as needed UP to 3 times in a day), Disp: 90 tablet, Rfl: 2  •  tacrolimus (PROTOPIC) 0 1 % ointment, Apply topically 2 (two) times a day, Disp: 100 g, Rfl: 0  •  triamcinolone (KENALOG) 0 025 % ointment, Apply topically twice day for 14 days straight as needed for flares to thinner skin, Disp: 80 g, Rfl: 3  •  triamcinolone (KENALOG) 0 1 % ointment, Apply "topically twice day for 14 days straight as needed for flares, Disp: 453 6 g, Rfl: 3  •  hydroxychloroquine (PLAQUENIL) 200 mg tablet, Take 1 5 tablets (300 mg total) by mouth in the morning (Patient not taking: Reported on 4/5/2023), Disp: 45 tablet, Rfl: 6    Allergies   Allergen Reactions   • Prednisone Hyperactivity     Medrol dose samantha only   • Serotonin Reuptake Inhibitors (Ssris) Anaphylaxis and Hives   • Augmentin [Amoxicillin-Pot Clavulanate] Hives and Rash   • Clindamycin Rash   • Penicillins Rash   • Sulfa Antibiotics Rash   • Azithromycin Rash       Objective   Vitals: Blood pressure 126/78, pulse 82, height 5' 5\" (1 651 m), weight 72 7 kg (160 lb 4 8 oz)  Review of Systems   Constitutional: Negative for malaise/fatigue and night sweats  Cardiovascular: Positive for palpitations  Negative for chest pain, dyspnea on exertion, irregular heartbeat, leg swelling, near-syncope, orthopnea, paroxysmal nocturnal dyspnea and syncope  Neurological: Positive for dizziness (Occasionally in the setting of POTS symptoms/tachycardia) and light-headedness (Occasionally in the setting of POTS symptoms/tachycardia)             Physical Exam  GEN: NAD, alert and oriented x 3, well appearing  SKIN: dry without significant lesions or rashes  HEENT: NCAT, PERRL, EOMs intact  NECK: No JVD appreciated  CARDIOVASCULAR: RRR, normal S1, S2 without murmurs, rubs, or gallops appreciated  LUNGS: Clear to auscultation bilaterally without wheezes, rhonchi, or rales  ABDOMEN: Soft, nontender, nondistended  EXTREMITIES/VASCULAR: perfused without clubbing, cyanosis, or LE edema b/l  PSYCH: Normal mood and affect  NEURO: CN ll-Xll grossly intact        Labs:  Office Visit on 03/10/2023   Component Date Value   • Ventricular Rate 03/10/2023 83    • Atrial Rate 03/10/2023 83    • IA Interval 03/10/2023 138    • QRSD Interval 03/10/2023 78    • QT Interval 03/10/2023 386    • QTC Interval 03/10/2023 453    • P Axis 03/10/2023 65    • " QRS Axis 03/10/2023 60    • T Wave [place] 03/10/2023 51    Appointment on 03/03/2023   Component Date Value   • Sodium 03/03/2023 135    • Potassium 03/03/2023 3 8    • Chloride 03/03/2023 104    • CO2 03/03/2023 28    • ANION GAP 03/03/2023 3 (L)    • BUN 03/03/2023 13    • Creatinine 03/03/2023 0 82    • Glucose, Fasting 03/03/2023 81    • Calcium 03/03/2023 10 1    • eGFR 03/03/2023 97    • Magnesium 03/03/2023 2 6    • WBC 03/03/2023 6 64    • RBC 03/03/2023 4 96    • Hemoglobin 03/03/2023 15 0    • Hematocrit 03/03/2023 43 3    • MCV 03/03/2023 87    • MCH 03/03/2023 30 2    • MCHC 03/03/2023 34 6    • RDW 03/03/2023 12 0    • Platelets 17/82/4719 257    • MPV 03/03/2023 11 7    Appointment on 01/25/2023   Component Date Value   • Potassium 01/25/2023 3 8    • HCG, Quant 01/25/2023 <2    • ABO Grouping 01/25/2023 O    • Rh Factor 01/25/2023 Positive    • Antibody Screen 01/25/2023 Negative    Office Visit on 12/02/2022   Component Date Value   • URINE HCG 12/04/2022 neg    Admission on 11/08/2022, Discharged on 11/08/2022   Component Date Value   • Ventricular Rate 11/08/2022 90    • Atrial Rate 11/08/2022 90    • TX Interval 11/08/2022 148    • QRSD Interval 11/08/2022 86    • QT Interval 11/08/2022 390    • QTC Interval 11/08/2022 477    • P Axis 11/08/2022 72    • QRS Axis 11/08/2022 69    • T Wave Axis 11/08/2022 18    • EXT PREG TEST UR (Ref: N* 11/08/2022 Negative    • Control 11/08/2022 Valid    • WBC 11/08/2022 13 23 (H)    • RBC 11/08/2022 5 01    • Hemoglobin 11/08/2022 15 6 (H)    • Hematocrit 11/08/2022 43 8    • MCV 11/08/2022 87    • MCH 11/08/2022 31 1    • MCHC 11/08/2022 35 6    • RDW 11/08/2022 12 0    • MPV 11/08/2022 11 6    • Platelets 07/66/7094 240    • nRBC 11/08/2022 0    • Neutrophils Relative 11/08/2022 80 (H)    • Immat GRANS % 11/08/2022 0    • Lymphocytes Relative 11/08/2022 14    • Monocytes Relative 11/08/2022 6    • Eosinophils Relative 11/08/2022 0    • Basophils Relative 11/08/2022 0    • Neutrophils Absolute 11/08/2022 10 46 (H)    • Immature Grans Absolute 11/08/2022 0 04    • Lymphocytes Absolute 11/08/2022 1 90    • Monocytes Absolute 11/08/2022 0 76    • Eosinophils Absolute 11/08/2022 0 02    • Basophils Absolute 11/08/2022 0 05    • Sodium 11/08/2022 141    • Potassium 11/08/2022 3 8    • Chloride 11/08/2022 100    • CO2 11/08/2022 33 (H)    • ANION GAP 11/08/2022 8    • BUN 11/08/2022 11    • Creatinine 11/08/2022 0 66    • Glucose 11/08/2022 88    • Calcium 11/08/2022 9 8    • AST 11/08/2022     • ALT 11/08/2022 21    • Alkaline Phosphatase 11/08/2022 113    • Total Protein 11/08/2022 8 8 (H)    • Albumin 11/08/2022 4 3    • Total Bilirubin 11/08/2022 0 43    • eGFR 11/08/2022 120    • TSH 3RD GENERATON 11/08/2022 3 519    • Phosphorus 11/08/2022 3 5    • Magnesium 11/08/2022 1 9    • Ventricular Rate 11/08/2022 126    • Atrial Rate 11/08/2022 126    • IA Interval 11/08/2022 138    • QRSD Interval 11/08/2022 76    • QT Interval 11/08/2022 306    • QTC Interval 11/08/2022 443    • P Axis 11/08/2022 72    • QRS Axis 11/08/2022 66    • T Wave Milam 11/08/2022 13    Appointment on 11/07/2022   Component Date Value   • Sodium 11/07/2022 136    • Potassium 11/07/2022 3 6    • Chloride 11/07/2022 103    • CO2 11/07/2022 27    • ANION GAP 11/07/2022 6    • BUN 11/07/2022 10    • Creatinine 11/07/2022 0 75    • Glucose 11/07/2022 155 (H)    • Calcium 11/07/2022 9 3    • eGFR 11/07/2022 108    • Magnesium 11/07/2022 1 5 (L)    Orders Only on 10/19/2022   Component Date Value   • Hepatitis B Surface Ag 10/21/2022 Non-reactive    • Hepatitis C Ab 10/21/2022 Non-reactive    • Hep B C IgM 10/21/2022 Non-reactive    • Hep B Core Total Ab 10/21/2022 Non-reactive    • C3 Complement 10/21/2022 108 0    • C4, COMPLEMENT 10/21/2022 31 0    • Cryoglobulin, Ql, Serum,* 10/21/2022 Comment    Office Visit on 10/14/2022   Component Date Value   • Sed Rate 87/01/3845 12    • Cyclic Citrullinated Pep* 10/14/2022 1 1    • Antinuclear Antibodies, * 10/14/2022 Negative    • Lyme Total Antibodies 10/14/2022 <0 2    • Rheumatoid Factor 10/14/2022 Positive (A)    • SS-A (RO) Ab 10/14/2022 <0 2    • SS-B (LA) Ab 10/14/2022 <0 2    • RF Quantitation 10/14/2022 1024 IU/mL (A)    Appointment on 10/12/2022   Component Date Value   • Magnesium 10/12/2022 1 9    • Phosphorus 10/12/2022 2 9    • Vit D, 25-Hydroxy 10/12/2022 38 8    • Cholesterol 10/12/2022 175    • Triglycerides 10/12/2022 100    • HDL, Direct 10/12/2022 60    • LDL Calculated 10/12/2022 95    • Sodium 10/12/2022 136    • Potassium 10/12/2022 3 5    • Chloride 10/12/2022 105    • CO2 10/12/2022 26    • ANION GAP 10/12/2022 5    • BUN 10/12/2022 14    • Creatinine 10/12/2022 0 81    • Glucose, Fasting 10/12/2022 110 (H)    • Calcium 10/12/2022 9 4    • AST 10/12/2022 11    • ALT 10/12/2022 21    • Alkaline Phosphatase 10/12/2022 92    • Total Protein 10/12/2022 7 8    • Albumin 10/12/2022 3 7    • Total Bilirubin 10/12/2022 0 69    • eGFR 10/12/2022 99    • WBC 10/12/2022 5 56    • RBC 10/12/2022 4 95    • Hemoglobin 10/12/2022 14 6    • Hematocrit 10/12/2022 43 6    • MCV 10/12/2022 88    • MCH 10/12/2022 29 5    • MCHC 10/12/2022 33 5    • RDW 10/12/2022 11 7    • Platelets 95/93/4570 210    • MPV 10/12/2022 11 9    • Creatinine, Ur 10/12/2022 151 0    • Protein Urine Random 10/12/2022 9    • Prot/Creat Ratio, Ur 10/12/2022 0 06    • CRP 10/12/2022 <3 0          Imaging: I have personally reviewed pertinent reports        ECHO: From 2010-                CATH/STRESS TEST: no recent studies noted      EKG: Normal sinus rhythm, heart rate 82 bpm, no significant abnormalities noted, rhythm strip shows normal sinus rhythm without significant arrhythmias or ectopy noted

## 2023-04-05 ENCOUNTER — OFFICE VISIT (OUTPATIENT)
Dept: CARDIOLOGY CLINIC | Facility: CLINIC | Age: 28
End: 2023-04-05

## 2023-04-05 VITALS
HEIGHT: 65 IN | DIASTOLIC BLOOD PRESSURE: 78 MMHG | WEIGHT: 160.3 LBS | HEART RATE: 82 BPM | BODY MASS INDEX: 26.71 KG/M2 | SYSTOLIC BLOOD PRESSURE: 126 MMHG

## 2023-04-05 DIAGNOSIS — R00.2 HEART PALPITATIONS: Primary | ICD-10-CM

## 2023-04-05 DIAGNOSIS — G90.A POTS (POSTURAL ORTHOSTATIC TACHYCARDIA SYNDROME): Chronic | ICD-10-CM

## 2023-04-07 ENCOUNTER — APPOINTMENT (OUTPATIENT)
Dept: LAB | Facility: CLINIC | Age: 28
End: 2023-04-07

## 2023-04-07 DIAGNOSIS — E87.6 HYPOKALEMIA: Chronic | ICD-10-CM

## 2023-04-07 LAB
ANION GAP SERPL CALCULATED.3IONS-SCNC: 6 MMOL/L (ref 4–13)
BUN SERPL-MCNC: 12 MG/DL (ref 5–25)
CALCIUM SERPL-MCNC: 9.8 MG/DL (ref 8.3–10.1)
CHLORIDE SERPL-SCNC: 106 MMOL/L (ref 96–108)
CO2 SERPL-SCNC: 25 MMOL/L (ref 21–32)
CREAT SERPL-MCNC: 0.82 MG/DL (ref 0.6–1.3)
GFR SERPL CREATININE-BSD FRML MDRD: 97 ML/MIN/1.73SQ M
GLUCOSE P FAST SERPL-MCNC: 74 MG/DL (ref 65–99)
POTASSIUM SERPL-SCNC: 3.7 MMOL/L (ref 3.5–5.3)
SODIUM SERPL-SCNC: 137 MMOL/L (ref 135–147)

## 2023-04-25 ENCOUNTER — EVALUATION (OUTPATIENT)
Dept: PHYSICAL THERAPY | Facility: REHABILITATION | Age: 28
End: 2023-04-25

## 2023-04-25 DIAGNOSIS — Z98.890 S/P RIGHT KNEE SURGERY: ICD-10-CM

## 2023-04-25 DIAGNOSIS — M25.561 RIGHT KNEE PAIN, UNSPECIFIED CHRONICITY: Primary | ICD-10-CM
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
## 2023-04-27 ENCOUNTER — OFFICE VISIT (OUTPATIENT)
Dept: PHYSICAL THERAPY | Facility: REHABILITATION | Age: 28
End: 2023-04-27

## 2023-04-27 DIAGNOSIS — Z98.890 S/P RIGHT KNEE SURGERY: Primary | ICD-10-CM

## 2023-04-27 DIAGNOSIS — M25.561 RIGHT KNEE PAIN, UNSPECIFIED CHRONICITY: ICD-10-CM

## 2023-04-27 NOTE — PROGRESS NOTES
"Daily Note     Today's date: 2023  Patient name: Segundo Fuentes  : 1995  MRN: [id number]  Referring provider: Alexsandra Grant DO  Dx:   Encounter Diagnosis     ICD-10-CM    1  S/P right knee surgery  Z98 890       2  Right knee pain, unspecified chronicity  M25 561                      Subjective: Pt reports good compliance with HEP  Reports pain with movement but not when sitting or laying down  Objective: See treatment diary below      Assessment: Tolerated treatment fair  Moderate fear avoidance noted within allowed ROM  Improved stretch on bike this session with assistance to 42 degrees flexion   Patient demonstrated fatigue post treatment and would benefit from continued PT      Plan: Continue per plan of care  Progress treament per protocol        Precautions: hx POTS, HTN, follow protocol       Manuals            Knee PROM 5 min 5 min           Light patellar mobility                                       Neuro Re-Ed             Quad set issued W/ NMES           Weight shifting bar brace locked in ext issued x10                                                                            Ther Ex             Recumbent bike (AROM 0-90)  5 min           Heel slide knee flexion (0-90) issued 5\"x15           LLLD knee extension stretch issued 5 min           Ankle pump issued 2x10           Ankle 4 way band  RTB  X15 ea                                                  Ther Activity                                       Gait Training                                       Modalities             Ice PRN             NMES  10 min                "

## 2023-04-28 ENCOUNTER — TELEPHONE (OUTPATIENT)
Dept: OBGYN CLINIC | Facility: HOSPITAL | Age: 28
End: 2023-04-28

## 2023-04-28 NOTE — TELEPHONE ENCOUNTER
"Pt had arthroscopic realignment of right patella on 4/13/23  Pt states that started w/a low grade fever this am of 99 9  She feels fine  States incision looks fine-no redness or drainage, still \"poofy\"  Pt has a little increase in pain after starting PT this week  Pt states her heart rate is up a bit and she hx of POTS  She will send picture of incision through [facility]  Still has steri strips on     "

## 2023-04-28 NOTE — TELEPHONE ENCOUNTER
Caller: Patient     Doctor: Keyona Hyde    Reason for call: Patient is calling stating she feels very hot today and has a low grade fever of 99 9  She stated she doesn't feel sick at all so she was concerned that this could be from surgery   Please advise    Call back#: [phone]

## 2023-04-28 NOTE — TELEPHONE ENCOUNTER
I called and spoke with patient  Patient states that today she did feel a little hot and sweaty and took her temperature and it was 99 9 °F   Patient states that she has been feeling a little tacky today compared to previous days, she has known history of POTS  Patient denies having a sore throat, cough, nausea vomiting or diarrhea  Patient does not believe that she is sick and therefore is concerned about the right knee  Patient denies the right knee being red hot and swollen  She states that the incisions are well-healed  She denies any drainage  Patient states that she can tolerate gentle range of motion without significant increase of pain  I counseled the patient that I do not believe that she has infection within the right knee  I instructed that if at any point over the weekend if she develops significant redness, warmth, pain and increased fever that she should call us  I instructed that I will follow-up with her next week to see how she is feeling

## 2023-05-01 ENCOUNTER — OFFICE VISIT (OUTPATIENT)
Dept: PHYSICAL THERAPY | Facility: REHABILITATION | Age: 28
End: 2023-05-01

## 2023-05-01 DIAGNOSIS — M25.561 RIGHT KNEE PAIN, UNSPECIFIED CHRONICITY: ICD-10-CM

## 2023-05-01 DIAGNOSIS — Z98.890 S/P RIGHT KNEE SURGERY: Primary | ICD-10-CM

## 2023-05-01 NOTE — PROGRESS NOTES
"Daily Note     Today's date: 2023  Patient name: Larry Mccarthy  : 1995  MRN: [id number]  Referring provider: Devaughn Hughes DO  Dx:   Encounter Diagnosis     ICD-10-CM    1  S/P right knee surgery  Z98 890       2  Right knee pain, unspecified chronicity  M25 561           Start Time: 174  Stop Time: 0  Total time in clinic (min): 55 minutes    Subjective: Pt reports pain that is primarily localized to medial aspect of R knee  Pt notes compliance with HEP regularly multiple times a day  Pt notes sleeping is difficult at this time  Objective: See treatment diary below    Precautions: hx POTS, HTN, follow protocol       Manuals           Knee PROM 5 min 5 min 5 min          Light patellar mobility   5 min                                    Neuro Re-Ed             Quad set issued W/ NMES W/ NMES          Weight shifting bar brace locked in ext issued x10 x10                                                                           Ther Ex             Recumbent bike (AROM 0-90)  5 min 6 min          Heel slide knee flexion (0-90) issued 5\"x15 5\"x15          LLLD knee extension stretch issued 5 min 5 min          Ankle pump issued 2x10           Ankle 4 way band  RTB  X15 ea Red x15 ea                                                 Ther Activity                                       Gait Training                                       Modalities             Ice PRN             NMES  10 min 10 min              Assessment: Tolerated treatment fair  Patient achieves 62 degrees of knee flex on bike  Pt is challenged with quad control and is unable to obtain quad set independently and fatigues with 4-way ankle  Concluded session with NMES to improve quad strength  Pt reaches 3 weeks post op on Thursday  Assess response to treatment NV  Patient demonstrated fatigue post treatment and would benefit from continued PT      Plan: Continue per plan of care    Progress treament per " protocol

## 2023-05-02 NOTE — TELEPHONE ENCOUNTER
I called the patient to follow-up on how her knee has been feeling over the last few days given her recent elevated temperature on 4/28/23  Patient states that she hasn't had a fever or elevated temp since that one time occurrence  She states that the knee has been feeling fine as well  She denies any concerns with the knee at this time  I instructed that she should give us a call if anything pops up with the knee such as fevers, redness, or warmth  She verbalized understanding      Nila Holter, [place]

## 2023-05-05 ENCOUNTER — OFFICE VISIT (OUTPATIENT)
Dept: PHYSICAL THERAPY | Facility: REHABILITATION | Age: 28
End: 2023-05-05

## 2023-05-05 DIAGNOSIS — Z98.890 S/P RIGHT KNEE SURGERY: Primary | ICD-10-CM

## 2023-05-05 DIAGNOSIS — M25.561 RIGHT KNEE PAIN, UNSPECIFIED CHRONICITY: ICD-10-CM

## 2023-05-05 NOTE — PROGRESS NOTES
"Daily Note     Today's date: 2023  Patient name: Moris Adler  : 1995  MRN: [id number]  Referring provider: Elvis Kraft DO  Dx:   Encounter Diagnosis     ICD-10-CM    1  S/P right knee surgery  Z98 890       2  Right knee pain, unspecified chronicity  M25 561           Start Time: 0900  Stop Time: 0955  Total time in clinic (min): 55 minutes    Subjective: Pt reports sleeping without brace for the first time last night per protocol and notes sleeping better  Pt reports stiffness in the morning that improves with exercises and notes stiffness continues to improve throughout the day  Pt notes compliance with HEP and has been attempting to place more weight through R LE when ambulating  Objective: See treatment diary below    Precautions: hx POTS, HTN, follow protocol       Manuals  5         Knee PROM 5 min 5 min 5 min 5 min         Light patellar mobility   5 min 5 min                                   Neuro Re-Ed             Quad set issued W/ NMES W/ NMES 5\"x10         Weight shifting bar brace locked in ext issued x10 x10 x10                                                                          Ther Ex             Recumbent bike (AROM 0-90)  5 min 6 min 6 min         Heel slide knee flexion (0-90) issued 5\"x15 5\"x15 5\"x15         LLLD knee extension stretch issued 5 min 5 min 5 min         Ankle pump issued 2x10           Ankle 4 way band  RTB  X15 ea Red x15 ea Red x15 ea                                                Ther Activity                                       Gait Training                                       Modalities             Ice PRN             NMES  10 min 10 min 10 min             Assessment: Tolerated treatment well  Pt improves knee flex ROM to 91 degrees following bike w/u and heel slides  Pt cont to present with poor quad control, however is improved from last session    Pt demonstrates improved gait with less reliance on axillary " crutches  Assess response to treatment NV  Patient demonstrated fatigue post treatment and would benefit from continued PT      Plan: Continue per plan of care  Progress treament per protocol  Pt was supervised 1:1 by Iqra Carranza DPT from 9:45 - 9:55 am and was 1:1 with treating clinician for rest of session

## 2023-05-08 ENCOUNTER — OFFICE VISIT (OUTPATIENT)
Dept: PHYSICAL THERAPY | Facility: REHABILITATION | Age: 28
End: 2023-05-08

## 2023-05-08 DIAGNOSIS — M25.561 RIGHT KNEE PAIN, UNSPECIFIED CHRONICITY: ICD-10-CM

## 2023-05-08 DIAGNOSIS — Z98.890 S/P RIGHT KNEE SURGERY: Primary | ICD-10-CM

## 2023-05-08 NOTE — PROGRESS NOTES
"Daily Note     Today's date: 2023  Patient name: Henri Haas  : 1995  MRN: [id number]  Referring provider: Kayla Miller DO  Dx:   Encounter Diagnosis     ICD-10-CM    1  S/P right knee surgery  Z98 890       2  Right knee pain, unspecified chronicity  M25 561           Start Time: 1700  Stop Time: 1755  Total time in clinic (min): 55 minutes    Subjective: Patient reports that she has been pain free  Patient reports continued compliance with HEP  She has been trying to increase WB with ambulation and will walk at times with brace locked without crutches  Objective: See treatment diary below    Precautions: hx POTS, HTN, follow protocol       Manuals         Knee PROM 5 min 5 min 5 min 5 min 5 min        Light patellar mobility   5 min 5 min 5 min                                  Neuro Re-Ed             Quad set issued W/ NMES W/ NMES 5\"x10 5\" 10x with NMES        Weight shifting bar brace locked in ext issued x10 x10 x10 np        SAQ with PT     5\" 10x with PT                                                            Ther Ex             Recumbent bike (AROM 0-90)  5 min 6 min 6 min 10 min AROM        Heel slide knee flexion issued 5\"x15 5\"x15 5\"x15 5\" 10x R        LLLD knee extension stretch issued 5 min 5 min 5 min 3 min        Ankle pump issued 2x10           Ankle 4 way band  RTB  X15 ea Red x15 ea Red x15 ea np        sidelying hip abduction     5\" 10x R        Prone hip extension     5\" 10x R                     Ther Activity                                       Gait Training                                       Modalities             Ice PRN             NMES  10 min 10 min 10 min 10 min            Assessment: Tolerated treatment well  Patient presents with knee flexion PROM to 108 degrees and extension WNL  Significant quad lag with SAQ  Unable to initiate SLR with brace locked in full extension    Will reduce to one crutch on left with brace locked " this week as she has significant quad lag at this time  Will look to unlock brace if able at next visit  Educated on importance of continued improvement with quad control  Patient demonstrated fatigue post treatment and would benefit from continued PT      Plan: Continue per plan of care  Progress treament per protocol

## 2023-05-12 ENCOUNTER — OFFICE VISIT (OUTPATIENT)
Dept: PHYSICAL THERAPY | Facility: REHABILITATION | Age: 28
End: 2023-05-12

## 2023-05-12 DIAGNOSIS — Z98.890 S/P RIGHT KNEE SURGERY: Primary | ICD-10-CM

## 2023-05-12 DIAGNOSIS — M25.561 RIGHT KNEE PAIN, UNSPECIFIED CHRONICITY: ICD-10-CM

## 2023-05-12 NOTE — PROGRESS NOTES
"Daily Note     Today's date: 2023  Patient name: Natalee Marks  : 1995  MRN: [id number]  Referring provider: Andres Manuel DO  Dx:   Encounter Diagnosis     ICD-10-CM    1  S/P right knee surgery  Z98 890       2  Right knee pain, unspecified chronicity  M25 561           Start Time: 0900  Stop Time: 09  Total time in clinic (min): 55 minutes    Subjective: Patient reports going to a baseball game yesterday which she notes was first major outing since surgery, but experienced increased soreness and edema after the fact  Pt reports ambulating with brace locked without crutch around the home, but notes ambulating with 1 axillary crutch when out in the community  Objective: See treatment diary below    Precautions: hx POTS, HTN, follow protocol       Manuals        Knee PROM 5 min 5 min 5 min 5 min 5 min 5 min       Light patellar mobility   5 min 5 min 5 min 5 min                                 Neuro Re-Ed             Quad set issued W/ NMES W/ NMES 5\"x10 5\" 10x with NMES 5\"x10 w/ NMES       Weight shifting bar brace locked in ext issued x10 x10 x10 np np       SAQ with PT     5\" 10x with PT 5\"x10 w/ PT                                                           Ther Ex             Recumbent bike (AROM 0-90)  5 min 6 min 6 min 10 min AROM 10 min        Heel slide knee flexion issued 5\"x15 5\"x15 5\"x15 5\" 10x R 5\"x15        LLLD knee extension stretch issued 5 min 5 min 5 min 3 min 3 min       Ankle pump issued 2x10           Ankle 4 way band  RTB  X15 ea Red x15 ea Red x15 ea np        sidelying hip abduction     5\" 10x R 5\"x12 R       Prone hip extension     5\" 10x R 5\"x10 R                    Ther Activity                                       Gait Training                                       Modalities             Ice PRN             NMES  10 min 10 min 10 min 10 min 10 min           Assessment: Tolerated treatment well    Patient demonstrates improved " strength with SAQ requiring assistance through last few degrees of ext, but remaining portion of exercise was performed independently  Patients brace was unlocked and was educated in ambulation with one axillary crutch on L with brace unlocked  Provided cueing for heel strike and push off to assist in normalizing gait cycle  Assess NV  Patient demonstrated fatigue post treatment and would benefit from continued PT      Plan: Continue per plan of care  Progress treament per protocol

## 2023-05-15 ENCOUNTER — OFFICE VISIT (OUTPATIENT)
Dept: PHYSICAL THERAPY | Facility: REHABILITATION | Age: 28
End: 2023-05-15

## 2023-05-15 DIAGNOSIS — Z98.890 S/P RIGHT KNEE SURGERY: Primary | ICD-10-CM

## 2023-05-15 DIAGNOSIS — M25.561 RIGHT KNEE PAIN, UNSPECIFIED CHRONICITY: ICD-10-CM

## 2023-05-15 NOTE — PROGRESS NOTES
"Daily Note     Today's date: 5/15/2023  Patient name: Ulysses Snow  : 1995  MRN: [id number]  Referring provider: Keila Caro DO  Dx:   Encounter Diagnosis     ICD-10-CM    1  S/P right knee surgery  Z98 890       2  Right knee pain, unspecified chronicity  M25 561           Start Time: 1700  Stop Time: 1755  Total time in clinic (min): 55 minutes    Subjective: Patient reports she is doing well  Pt notes ambulating with brace unlocked with one crutch around the community and when fatigued, but without AD to get around her home  Pt reports compliance with HEP  Objective: See treatment diary below    Precautions: hx POTS, HTN, follow protocol       Manuals 4/25 4/27 5/1 5/5 5/8 5/12 5/15      Knee PROM 5 min 5 min 5 min 5 min 5 min 5 min 5 min      Light patellar mobility   5 min 5 min 5 min 5 min 5 min                                Neuro Re-Ed             Quad set issued W/ NMES W/ NMES 5\"x10 5\" 10x with NMES 5\"x10 w/ NMES 5\"x10 w/ NMES      Weight shifting bar brace locked in ext issued x10 x10 x10 np np       SAQ with PT     5\" 10x with PT 5\"x10 w/ PT 5\"x12                                                          Ther Ex             Recumbent bike (AROM 0-90)  5 min 6 min 6 min 10 min AROM 10 min  10 min      Heel slide knee flexion issued 5\"x15 5\"x15 5\"x15 5\" 10x R 5\"x15  5\"x15       LLLD knee extension stretch issued 5 min 5 min 5 min 3 min 3 min 3 min      Ankle pump issued 2x10           Ankle 4 way band  RTB  X15 ea Red x15 ea Red x15 ea np        sidelying hip abduction     5\" 10x R 5\"x12 R 5\"x12 R      Prone hip extension     5\" 10x R 5\"x10 R 5\"x12 R                   Ther Activity                                       Gait Training                                       Modalities             Ice PRN             NMES  10 min 10 min 10 min 10 min 10 min 10 min          Assessment: Tolerated treatment well   Pt is able to perform full retro revolution on bike for the first time this " visit  Improved quad strength demonstrated with ability to perform SAQ independently, is most challenged through last few degrees of ext  Patient is able to safely ambulate with good heel strike and gait mechanics with brace unlocked without AD  Patient demonstrated fatigue post treatment and would benefit from continued PT      Plan: Continue per plan of care  Progress treament per protocol

## 2023-05-19 ENCOUNTER — OFFICE VISIT (OUTPATIENT)
Dept: PHYSICAL THERAPY | Facility: REHABILITATION | Age: 28
End: 2023-05-19

## 2023-05-19 DIAGNOSIS — Z98.890 S/P RIGHT KNEE SURGERY: Primary | ICD-10-CM

## 2023-05-19 DIAGNOSIS — M25.561 RIGHT KNEE PAIN, UNSPECIFIED CHRONICITY: ICD-10-CM

## 2023-05-19 NOTE — PROGRESS NOTES
"Daily Note     Today's date: 2023  Patient name: Nanette Cordova  : 1995  MRN: [id number]  Referring provider: Morelia Peralta DO  Dx:   Encounter Diagnosis     ICD-10-CM    1  S/P right knee surgery  Z98 890       2  Right knee pain, unspecified chronicity  M25 561           Start Time: 0900  Stop Time: 09  Total time in clinic (min): 55 minutes    Subjective: Patient denies soreness following last visit and reports continued improvement in strength and function  Pt reports ambulating with brace unlocked without AD most of the time and notes compliance with HEP          Objective: See treatment diary below    Precautions: hx POTS, HTN, follow protocol       Manuals 4/25 4/27 5/1 5/5 5/8 5/12 5/15 5/19     Knee PROM 5 min 5 min 5 min 5 min 5 min 5 min 5 min 5 min     Light patellar mobility   5 min 5 min 5 min 5 min 5 min 5 min                               Neuro Re-Ed             Quad set issued W/ NMES W/ NMES 5\"x10 5\" 10x with NMES 5\"x10 w/ NMES 5\"x10 w/ NMES 5\"x10     Weight shifting bar brace locked in ext issued x10 x10 x10 np np       SAQ with PT     5\" 10x with PT 5\"x10 w/ PT 5\"x12 5\"x12                                                         Ther Ex             Recumbent bike (AROM 0-90)  5 min 6 min 6 min 10 min AROM 10 min  10 min 10 min     Heel slide knee flexion issued 5\"x15 5\"x15 5\"x15 5\" 10x R 5\"x15  5\"x15  5\"x15     LLLD knee extension stretch issued 5 min 5 min 5 min 3 min 3 min 3 min 2 min     Ankle pump issued 2x10           Ankle 4 way band  RTB  X15 ea Red x15 ea Red x15 ea np        sidelying hip abduction     5\" 10x R 5\"x12 R 5\"x12 R 5\"x15 R     Prone hip extension     5\" 10x R 5\"x10 R 5\"x12 R 5\"x15 R     SLR        Brace locked 5\"x10     Ther Activity                                       Gait Training                                       Modalities             Ice PRN             NMES  10 min 10 min 10 min 10 min 10 min 10 min 10 min         Assessment: Tolerated " treatment well  Visible quad contraction achieved this visit, however fatigues quickly  SLR performed with brace locked secondary to moderate lag without  Pt is progressing well with knee flex/ext ROM at this time  Pt reaches 6 weeks post-op in one week  Pt was educated to add SLR into home program   Patient demonstrated fatigue post treatment and would benefit from continued PT      Plan: Progress treament per protocol

## 2023-05-22 ENCOUNTER — OFFICE VISIT (OUTPATIENT)
Dept: PHYSICAL THERAPY | Facility: REHABILITATION | Age: 28
End: 2023-05-22

## 2023-05-22 DIAGNOSIS — Z98.890 S/P RIGHT KNEE SURGERY: Primary | ICD-10-CM

## 2023-05-22 DIAGNOSIS — M25.561 RIGHT KNEE PAIN, UNSPECIFIED CHRONICITY: ICD-10-CM

## 2023-05-22 NOTE — PROGRESS NOTES
"Daily Note     Today's date: 2023  Patient name: Filemon Aragon  : 1995  MRN: [id number]  Referring provider: Alysha Mckeon DO  Dx:   Encounter Diagnosis     ICD-10-CM    1  S/P right knee surgery  Z98 890       2  Right knee pain, unspecified chronicity  M25 561           Start Time: 1700  Stop Time: 1755  Total time in clinic (min): 55 minutes    Subjective: Patient reports continued improvement in ambulation and is no longer utilizing axillary crutch  Pt notes quad strength feels like it is improving and notes compliance with HEP            Objective: See treatment diary below    Precautions: hx POTS, HTN, follow protocol       Manuals 4/25 4/27 5/1 5/5 5/8 5/12 5/15 5/19 5/22    Knee PROM 5 min 5 min 5 min 5 min 5 min 5 min 5 min 5 min 5 min    Light patellar mobility   5 min 5 min 5 min 5 min 5 min 5 min 5 min                              Neuro Re-Ed             Quad set issued W/ NMES W/ NMES 5\"x10 5\" 10x with NMES 5\"x10 w/ NMES 5\"x10 w/ NMES 5\"x10 5\"x10    Weight shifting bar brace locked in ext issued x10 x10 x10 np np       SAQ with PT     5\" 10x with PT 5\"x10 w/ PT 5\"x12 5\"x12 5\"x12                                                        Ther Ex             Recumbent bike (AROM 0-90)  5 min 6 min 6 min 10 min AROM 10 min  10 min 10 min 10 min    Heel slide knee flexion issued 5\"x15 5\"x15 5\"x15 5\" 10x R 5\"x15  5\"x15  5\"x15 5\"x15    LLLD knee extension stretch issued 5 min 5 min 5 min 3 min 3 min 3 min 2 min     Ankle pump issued 2x10           Ankle 4 way band  RTB  X15 ea Red x15 ea Red x15 ea np        sidelying hip abduction     5\" 10x R 5\"x12 R 5\"x12 R 5\"x15 R 5\"x15 R    Prone hip extension     5\" 10x R 5\"x10 R 5\"x12 R 5\"x15 R 5\"x15 R    SLR        Brace locked 5\"x10 Brace locked 5\"x10    Ther Activity                                       Gait Training                                       Modalities             Ice PRN             NMES  10 min 10 min 10 min 10 min 10 min 10 min 10 " min 10 min        Assessment: Tolerated treatment well  Good patellar mobility in all direct and is progressing well with knee flex/ext ROM  Full revolution performed on bike immediately upon beginning w/u  Mild lag with SLR, however demonstrates improved quad control compared to last session  Patient demonstrated fatigue post treatment and would benefit from continued PT  Plan: Progress treament per protocol  Plan to RE next visit  Pt was supervised 1:1 by Yeimy Arita DPT from 5:50 - 5:55 pm and was 1:1 with treating clinician for rest of session

## 2023-05-26 ENCOUNTER — EVALUATION (OUTPATIENT)
Dept: PHYSICAL THERAPY | Facility: REHABILITATION | Age: 28
End: 2023-05-26

## 2023-05-26 DIAGNOSIS — Z98.890 S/P RIGHT KNEE SURGERY: Primary | ICD-10-CM

## 2023-05-26 DIAGNOSIS — M25.561 RIGHT KNEE PAIN, UNSPECIFIED CHRONICITY: ICD-10-CM
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
## 2023-06-02 ENCOUNTER — OFFICE VISIT (OUTPATIENT)
Dept: OBGYN CLINIC | Facility: MEDICAL CENTER | Age: 28
End: 2023-06-02

## 2023-06-02 ENCOUNTER — OFFICE VISIT (OUTPATIENT)
Dept: PHYSICAL THERAPY | Facility: REHABILITATION | Age: 28
End: 2023-06-02

## 2023-06-02 VITALS
DIASTOLIC BLOOD PRESSURE: 86 MMHG | HEART RATE: 89 BPM | HEIGHT: 64 IN | SYSTOLIC BLOOD PRESSURE: 130 MMHG | BODY MASS INDEX: 27.66 KG/M2 | WEIGHT: 162 LBS

## 2023-06-02 DIAGNOSIS — M22.2X1 PATELLOFEMORAL DISORDER OF RIGHT KNEE: Primary | ICD-10-CM

## 2023-06-02 DIAGNOSIS — Z98.890 S/P RIGHT KNEE SURGERY: Primary | ICD-10-CM

## 2023-06-02 DIAGNOSIS — M25.561 RIGHT KNEE PAIN, UNSPECIFIED CHRONICITY: ICD-10-CM

## 2023-06-02 NOTE — PROGRESS NOTES
"Knee Post Operative Visit     Assesment:     33 y o  female 7 weeks s/p surgical arthroscopy of the right knee with patellar realignment and retinacular release, DOS: 4/13/2023  Plan:    Post-Operative treatment:    Ice to knee for 20 minutes at least 1-2 times daily  PT for ROM/strengthening to knee, hip and core  OTC NSAIDS prn for pain  Tylenol for pain  Let pain guide gradual return activities  Imaging:    No images reviewed    Weight bearing:  as tolerated     Brace: May D/C brace    DVT Prophylaxis:  Ambulation    Follow up:   8 weeks    Patient was advised that if they have any fevers, chills, chest pain, shortness of breath, redness or drainage from the incision, please let our office know immediately  Chief Complaint   Patient presents with   • Right Knee - Follow-up       History of Present Illness: The patient is a 33 y o  female who is being evaluated post operatively 7 weeks s/p surgical arthroscopy of the right knee with patellar realignment and retinacular release, DOS: 4/13/2023  Since the prior visit, She reports significant improvement  Pain is well controlled  The patient is using ice to control swelling  They have started physical therapy  The patient has been ambulating with a brace  The patient denies any fevers, chills, calf pain, chest pain/shortness of breath, redness or drainage from the incision  I have reviewed the past medical, surgical, social and family history, medications and allergies as documented in the EMR  Review of systems: ROS is negative other than that noted in the HPI  Constitutional: Negative for fatigue and fever  Physical Exam:    Blood pressure 130/86, pulse 89, height 5' 4\" (1 626 m), weight 73 5 kg (162 lb), not currently breastfeeding      General/Constitutional: NAD, well developed, well nourished  HENT: Normocephalic, atraumatic  CV: Intact distal pulses, regular rate  Resp: No respiratory distress or labored " breathing  Lymphatic: No lymphadenopathy palpated  Neuro: Alert and Oriented x 3, no focal deficits  Psych: Normal mood, normal affect, normal judgement, normal behavior  Skin: Warm, dry, no rashes, no erythema      Knee Exam (focused):                   RIGHT LEFT   ROM:   0-130 0-130   Palpation: Effusion mild negative     MJL tenderness Negative Negative     LJL tenderness Negative Negative   Instability: Varus stable stable     Valgus stable stable   Special Tests: Lachman Negative Negative     Posterior drawer Negative Negative     Anterior drawer Negative Negative     Pivot shift not tested not tested     Dial not tested not tested   Patella: Palpation no tenderness no tenderness     Mobility 1/4 1/4     Apprehension Negative Negative   Other: Single leg 1/4 squat not tested not tested      Incisions show no erythema, no drainage    LE NV Exam: +2 DP/PT pulses bilaterally  Sensation intact to light touch L2-S1 bilaterally     Bilateral hip ROM demonstrates no pain actively or passively    No calf tenderness to palpation bilaterally    Scribe Attestation    I,:  Clerance Apley am acting as a scribe while in the presence of the attending physician :       I,:  Moreno Brizuela DO personally performed the services described in this documentation    as scribed in my presence :

## 2023-06-02 NOTE — LETTER
June 2, 2023     Patient: Janae Mercer  YOB: 1995  Date of Visit: 6/2/2023      To Whom it May Concern:    Beatriz Paredes is under my professional care  Radha Varghese was seen in my office on 6/2/2023  Radha Varghese may return to work with the following restrictions: She may perform 4 hours of full duty and 4 hours of light duty     If you have any questions or concerns, please don't hesitate to call           Sincerely,          Brea Gruber, DO

## 2023-06-02 NOTE — PROGRESS NOTES
"Daily Note     Today's date: 2023  Patient name: Janae Mercer  : 1995  MRN: [id number]  Referring provider: Krista Galicia DO  Dx:   Encounter Diagnosis     ICD-10-CM    1  S/P right knee surgery  Z98 890       2  Right knee pain, unspecified chronicity  M25 561           Start Time: 09  Stop Time: 0950  Total time in clinic (min): 45 minutes    Subjective: Pt reports having a difficult time with prone quad stretch issued last visit noting a deep pull at distal quad  Pt reports all other exercises have been going well and feels she is progressing appropriately  Pt would like to be cleared to return to work and follows up with surgeon later today        Objective: See treatment diary below    Precautions: hx POTS, HTN, follow protocol         Manuals 6/2 4/27 5/1 5/5 5/8 5/12 5/15 5/19 5/22  5/26   Knee PROM 5 min 5 min 5 min 5 min 5 min 5 min 5 min 5 min 5 min  5 min   Light patellar mobility 5 min   5 min 5 min 5 min 5 min 5 min 5 min 5 min  5 min   Measurements/ RE                    15 min/ MC                           Neuro Re-Ed                       Quad set 5\"x15 W/ NMES W/ NMES 5\"x10 5\" 10x with NMES 5\"x10 w/ NMES 5\"x10 w/ NMES 5\"x10 5\"x10     Weight shifting bar brace locked in ext  x10 x10 x10 np np           SAQ with PT  5\"x15        5\" 10x with PT 5\"x10 w/ PT 5\"x12 8\"A58 5\"x12                                                                                                     Ther Ex                       Recumbent bike (ROM) 10 min bike on 5 min 6 min 6 min 10 min AROM 10 min  10 min 10 min 10 min  10 min (5 min bike on)   Heel slide knee flexion 5\"x15 5\"x15 5\"x15 5\"x15 5\" 10x R 5\"x15  5\"x15  5\"x15 5\"x15  5\" 15x   LLLD knee extension stretch  5 min 5 min 5 min 3 min 3 min 3 min 2 min    d/c   Prone quad stretch 15\"x5                    Ankle 4 way band   RTB  X15 ea Red x15 ea Red x15 ea np             sidelying hip abduction  5\"x17 R       5\" 10x R 5\"x12 R 5\"x12 R 5\"x15 R 5\"x15 R " "    Prone hip extension  5\"x15 R       5\" 10x R 5\"x10 R 5\"x12 R 5\"x15 R 5\"x15 R     Heel raise  x10            SLR  5\"x10 R             Brace locked 5\"x10 Brace locked 5\"x10  5\" 5x   Ther Activity                                                                       Gait Training                                                                       Modalities                       Ice PRN                       NMES   10 min 10 min 10 min 10 min 10 min 10 min 10 min 10 min  np        Assessment: Tolerated treatment well  Patient demonstrates improved quad strength with ability to perform SAQ through available ROM and SLR without quad lag, however fatigues quickly with SLR  Pt is limited to about 90 deg of knee flex in prone  Good patellar mobility in all directions and knee ROM in supine wfl's  Patient demonstrated fatigue post treatment and would benefit from continued PT      Plan: Progress treatment as tolerated                "

## 2023-06-05 ENCOUNTER — APPOINTMENT (OUTPATIENT)
Dept: LAB | Facility: CLINIC | Age: 28
End: 2023-06-05
Payer: COMMERCIAL

## 2023-06-05 ENCOUNTER — OFFICE VISIT (OUTPATIENT)
Dept: PHYSICAL THERAPY | Facility: REHABILITATION | Age: 28
End: 2023-06-05
Payer: COMMERCIAL

## 2023-06-05 ENCOUNTER — TELEPHONE (OUTPATIENT)
Dept: NEPHROLOGY | Facility: CLINIC | Age: 28
End: 2023-06-05

## 2023-06-05 DIAGNOSIS — Z98.890 S/P RIGHT KNEE SURGERY: Primary | ICD-10-CM

## 2023-06-05 DIAGNOSIS — I10 ESSENTIAL HYPERTENSION, BENIGN: Chronic | ICD-10-CM

## 2023-06-05 DIAGNOSIS — Z00.00 ENCOUNTER FOR GENERAL HEALTH EXAMINATION: ICD-10-CM

## 2023-06-05 DIAGNOSIS — E87.6 HYPOKALEMIA: Chronic | ICD-10-CM

## 2023-06-05 DIAGNOSIS — M25.561 RIGHT KNEE PAIN, UNSPECIFIED CHRONICITY: ICD-10-CM

## 2023-06-05 DIAGNOSIS — G90.A POTS (POSTURAL ORTHOSTATIC TACHYCARDIA SYNDROME): Chronic | ICD-10-CM

## 2023-06-05 LAB
ANION GAP SERPL CALCULATED.3IONS-SCNC: 6 MMOL/L (ref 4–13)
BUN SERPL-MCNC: 12 MG/DL (ref 5–25)
CALCIUM SERPL-MCNC: 9.2 MG/DL (ref 8.4–10.2)
CHLORIDE SERPL-SCNC: 105 MMOL/L (ref 96–108)
CHOLEST SERPL-MCNC: 165 MG/DL
CO2 SERPL-SCNC: 27 MMOL/L (ref 21–32)
CREAT SERPL-MCNC: 0.72 MG/DL (ref 0.6–1.3)
EST. AVERAGE GLUCOSE BLD GHB EST-MCNC: 80 MG/DL
GFR SERPL CREATININE-BSD FRML MDRD: 113 ML/MIN/1.73SQ M
GLUCOSE P FAST SERPL-MCNC: 86 MG/DL (ref 65–99)
HBA1C MFR BLD: 4.4 %
HDLC SERPL-MCNC: 52 MG/DL
LDLC SERPL CALC-MCNC: 89 MG/DL (ref 0–100)
MAGNESIUM SERPL-MCNC: 1.8 MG/DL (ref 1.9–2.7)
NONHDLC SERPL-MCNC: 113 MG/DL
POTASSIUM SERPL-SCNC: 3.6 MMOL/L (ref 3.5–5.3)
SODIUM SERPL-SCNC: 138 MMOL/L (ref 135–147)
TRIGL SERPL-MCNC: 122 MG/DL

## 2023-06-05 PROCEDURE — 36415 COLL VENOUS BLD VENIPUNCTURE: CPT

## 2023-06-05 PROCEDURE — 83735 ASSAY OF MAGNESIUM: CPT

## 2023-06-05 PROCEDURE — 97112 NEUROMUSCULAR REEDUCATION: CPT

## 2023-06-05 PROCEDURE — 97110 THERAPEUTIC EXERCISES: CPT

## 2023-06-05 PROCEDURE — 83036 HEMOGLOBIN GLYCOSYLATED A1C: CPT

## 2023-06-05 PROCEDURE — 80048 BASIC METABOLIC PNL TOTAL CA: CPT

## 2023-06-05 PROCEDURE — 80061 LIPID PANEL: CPT

## 2023-06-05 NOTE — TELEPHONE ENCOUNTER
Patient aware     ----- Message from Shaye Cody MD sent at 6/5/2023  9:42 AM EDT -----  [place] could just continue to push high potassium diet  Thank you  ----- Message -----  From: Lab, Background User  Sent: 6/5/2023   9:25 AM EDT  To: Shaye Cody MD

## 2023-06-05 NOTE — PROGRESS NOTES
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, notes experiencing stiffness throughout the day from sitting and presents to PT this visit noting increased fatigue and soreness          Objective: See treatment diary below    Precautions: hx POTS, HTN, follow protocol         Manuals 6/2 6/5 5/1 5/5 5/8 5/12 5/15 5/19 5/22  5/26   Knee PROM 5 min 5 min 5 min 5 min 5 min 5 min 5 min 5 min 5 min  5 min   Light patellar mobility 5 min 5 min 5 min 5 min 5 min 5 min 5 min 5 min 5 min  5 min   Measurements/ RE                    15 min/ MC                           Neuro Re-Ed                       Quad set 5\"x15 5\"x15 W/ NMES 5\"x10 5\" 10x with NMES 5\"x10 w/ NMES 5\"x10 w/ NMES 5\"x10 5\"x10     Weight shifting bar brace locked in ext   x10 x10 np np           SAQ with PT  5\"x15   5\"x15      5\" 10x with PT 5\"x10 w/ PT 5\"x12 5\"x12 5\"x12                                                                                                     Ther Ex                       Recumbent bike (ROM) 10 min bike on 10 min bike on 6 min 6 min 10 min AROM 10 min  10 min 10 min 10 min  10 min (5 min bike on)   Heel slide knee flexion 5\"x15 5\"x15 5\"x15 5\"x15 5\" 10x R 5\"x15  5\"x15  5\"x15 5\"x15  5\" 15x   LLLD knee extension stretch   5 min 5 min 3 min 3 min 3 min 2 min    d/c   Prone quad stretch 15\"x5 30\"x3                 " "  Ankle 4 way band    Red x15 ea Red x15 ea np             sidelying hip abduction  5\"x17 R  5\"x17 R     5\" 10x R 5\"x12 R 5\"x12 R 5\"x15 R 5\"x15 R     Prone hip extension  5\"x15 R  5\"x15 R     5\" 10x R 5\"x10 R 5\"x12 R 5\"x15 R 5\"x15 R     Heel raise  x10 x20           SLR  5\"x10 R  5\"x10 R           Brace locked 5\"x10 Brace locked 5\"x10  5\" 5x   Ther Activity                                                                       Gait Training                                                                       Modalities                       Ice PRN                       NMES    10 min 10 min 10 min 10 min 10 min 10 min 10 min  np        Assessment: Tolerated treatment well  Patient is more challenged with quad strength this visit demonstrating mild lag  Increased fatigue levels likely due to brace dc and return to work today  Improved tolerance to prone flex stretch  Minimal progressions made this visit secondary to fatigue levels  Progress as able NV  Patient demonstrated fatigue post treatment and would benefit from continued PT      Plan: Progress treatment as tolerated                "

## 2023-06-08 ENCOUNTER — TELEPHONE (OUTPATIENT)
Dept: OTHER | Facility: HOSPITAL | Age: 28
End: 2023-06-08

## 2023-06-08 NOTE — TELEPHONE ENCOUNTER
I spoke with Zonia Dunn regarding recent labs  Everything appears stable except magnesium level is slightly low 1 8  Her prior level was 2 6  I instructed her instead of taking an over-the-counter supplement to increase her intake of high magnesium foods which I reviewed with her  Left a detailed message regarding:  Typically women need approximately 300 mg of magnesium per day  Foods high in magnesium are nuts and seeds such as almonds, cashews, flaxseed, peanuts, pumpkin seeds  Legumes are also high in magnesium such as black beans, lima beans, [place],  Other sources of magnesium shredded wheat, milk, yogurt, spinach, dark chocolate

## 2023-06-09 ENCOUNTER — ANNUAL EXAM (OUTPATIENT)
Dept: OBGYN CLINIC | Facility: CLINIC | Age: 28
End: 2023-06-09
Payer: COMMERCIAL

## 2023-06-09 ENCOUNTER — OFFICE VISIT (OUTPATIENT)
Dept: PHYSICAL THERAPY | Facility: REHABILITATION | Age: 28
End: 2023-06-09
Payer: COMMERCIAL

## 2023-06-09 VITALS
BODY MASS INDEX: 27.06 KG/M2 | HEIGHT: 65 IN | SYSTOLIC BLOOD PRESSURE: 150 MMHG | WEIGHT: 162.4 LBS | DIASTOLIC BLOOD PRESSURE: 88 MMHG

## 2023-06-09 DIAGNOSIS — Z01.419 ENCOUNTER FOR GYNECOLOGICAL EXAMINATION WITHOUT ABNORMAL FINDING: Primary | ICD-10-CM

## 2023-06-09 DIAGNOSIS — Z98.890 S/P RIGHT KNEE SURGERY: Primary | ICD-10-CM

## 2023-06-09 DIAGNOSIS — M25.561 RIGHT KNEE PAIN, UNSPECIFIED CHRONICITY: ICD-10-CM

## 2023-06-09 PROCEDURE — S0612 ANNUAL GYNECOLOGICAL EXAMINA: HCPCS | Performed by: OBSTETRICS & GYNECOLOGY

## 2023-06-09 PROCEDURE — 97110 THERAPEUTIC EXERCISES: CPT | Performed by: PHYSICAL THERAPIST

## 2023-06-09 PROCEDURE — 97112 NEUROMUSCULAR REEDUCATION: CPT | Performed by: PHYSICAL THERAPIST

## 2023-06-09 NOTE — PROGRESS NOTES
Cliff Cline  1995    CC:  Yearly exam    S:  33 y o  female here for yearly exam  Her cycles are regular, not heavy or crampy  She is now   She is finishing nursing school and then will start trying for pregnancy in August   Already on a gummy PNV  Encourage iron or an alternate PNV with iron if she can tolerate it  She is s/p preconception consultation with MFM last year  Sexual activity: She is sexually active without pain, bleeding or dryness  Contraception:  She uses condoms for contraception  We reviewed [facility] guidelines for Pap testing       Last Pap 2022 - normal      Current Outpatient Medications:   •  AMILoride 5 mg tablet, Take 2 tablets (10 mg total) by mouth 2 (two) times a day, Disp: 180 tablet, Rfl: 3  •  busPIRone (BUSPAR) 5 mg tablet, Take 1 tablet (5 mg total) by mouth 2 (two) times a day, Disp: 180 tablet, Rfl: 3  •  clobetasol (TEMOVATE) 0 05 % ointment, Apply topically 2 (two) times a day, Disp: 60 g, Rfl: 2  •  dicyclomine (BENTYL) 20 mg tablet, Take 1 tablet (20 mg total) by mouth every 6 (six) hours, Disp: 120 tablet, Rfl: 4  •  hydroxychloroquine (PLAQUENIL) 200 mg tablet, Take 1 5 tablets (300 mg total) by mouth in the morning, Disp: 45 tablet, Rfl: 6  •  hydrOXYzine HCL (ATARAX) 10 mg tablet, Take 1 tablet (10 mg total) by mouth 2 (two) times a day as needed for anxiety, Disp: 60 tablet, Rfl: 1  •  potassium chloride (K-DUR,KLOR-CON) 20 mEq tablet, Take 1 tablet (20 mEq total) by mouth 3 (three) times a day, Disp: 270 tablet, Rfl: 3  •  Prenatal Multivit-Min-Fe-FA (PRE-GORDON PO), Take by mouth, Disp: , Rfl:   •  propranolol (INDERAL LA) 120 mg 24 hr capsule, Take 1 capsule (120 mg total) by mouth daily, Disp: 90 capsule, Rfl: 3  •  propranolol (INDERAL) 20 mg tablet, Take 1 tablet (20 mg total) by mouth every 8 (eight) hours, Disp: 90 tablet, Rfl: 2  •  tacrolimus (PROTOPIC) 0 1 % ointment, Apply topically 2 (two) times a day, Disp: 100 g, Rfl: 0  •  triamcinolone (KENALOG) 0 025 % ointment, Apply topically twice day for 14 days straight as needed for flares to thinner skin, Disp: 80 g, Rfl: 3  •  triamcinolone (KENALOG) 0 1 % ointment, Apply topically twice day for 14 days straight as needed for flares, Disp: 453 6 g, Rfl: 3  •  aspirin (ECOTRIN LOW STRENGTH) 81 mg EC tablet, Take 1 tablet (81 mg total) by mouth 2 (two) times a day, Disp: 84 tablet, Rfl: 0  •  Multiple Vitamins-Minerals (MULTIVITAL) CHEW, Chew 1 tablet daily (Patient not taking: Reported on 6/2/2023), Disp: , Rfl:   •  oxyCODONE (ROXICODONE) 5 immediate release tablet, Take 1 tablet (5 mg total) by mouth every 4 (four) hours as needed for moderate pain for up to 15 doses Max Daily Amount: 30 mg (Patient not taking: Reported on 4/25/2023), Disp: 15 tablet, Rfl: 0  Social History     Socioeconomic History   • Marital status: /Civil Union     Spouse name: Not on file   • Number of children: Not on file   • Years of education: Not on file   • Highest education level: Not on file   Occupational History   • Occupation: medical assistant with nephrology    Tobacco Use   • Smoking status: Never   • Smokeless tobacco: Never   Vaping Use   • Vaping Use: Never used   Substance and Sexual Activity   • Alcohol use:  Yes     Alcohol/week: 1 0 standard drink of alcohol     Types: 1 Standard drinks or equivalent per week     Comment: socially   • Drug use: No   • Sexual activity: Yes     Partners: Male     Birth control/protection: Condom     Comment: defer   Other Topics Concern   • Not on file   Social History Narrative    Activities - horseback riding    Caffeine use    Drinks coffee- 1 a wk    Exercise - walking    Exercising regularly     Social Determinants of Health     Financial Resource Strain: Not on file   Food Insecurity: Not on file   Transportation Needs: Not on file   Physical Activity: Not on file   Stress: Not on file   Social Connections: Not on file   Intimate Partner Violence: "Not on file   Housing Stability: Not on file     Family History   Problem Relation Age of Onset   • Hypokalemia Mother    • Hypotension Mother    • Anxiety disorder Mother         NOS   • Lung cancer Father    • Skin cancer Father    • Crohn's disease Father    • No Known Problems Sister    • No Known Problems Sister    • No Known Problems Brother    • Coronary artery disease Maternal Grandmother    • Heart disease Maternal Grandmother    • Alzheimer's disease Maternal Grandmother    • Arthritis Paternal Grandmother    • Rheum arthritis Paternal Grandmother    • Hypertension Paternal Grandmother    • COPD Paternal Grandfather      Past Medical History:   Diagnosis Date   • Abnormal blood chemistry    • Anesthesia     per pt \" Panic attack right before a procedure-has High preop anxiety and wakes up nasty\"   • Anxiety    • Eczema    • Exercises 5 to 6 times per week     per pt prior to knee issue -enjoyed running and horseback riding   • Family history of reaction to anesthesia     per pt--\"Mom also gets high anxiety with surgeries and wakes up nasty from anesthesia\"   • Gastroparesis    • History of vitamin D deficiency    • Hypertension     last assessed 3/12/14   • Hypokalemia     per pt per doctors possibly related renal problem   • Irritable bowel syndrome     with diarrhea   • Lymphadenopathy     last assessed 10/8/14-no current issue   • Myalgia     last assessed 10/8/14   • Myositis     last assessed 10/8/14   • Palpitations    • POTS (postural orthostatic tachycardia syndrome)    • RA (rheumatoid arthritis) ([facility])    • [facility]    • Sinus tachycardia     related to dehydration   • Tachycardia     last assessed 6/9/17   • Tooth missing     front upper tooth retainer-   • Wears glasses     for computer use       Review of Systems   Respiratory: Negative  Cardiovascular: Negative  Gastrointestinal: Negative for constipation and diarrhea     Genitourinary: Negative for difficulty urinating, " "pelvic pain, vaginal bleeding, vaginal discharge, itching or odor  O:  Blood pressure 150/88, height 5' 5\" (1 651 m), weight 73 7 kg (162 lb 6 4 oz), last menstrual period 05/16/2023, not currently breastfeeding  Patient appears well and is not in distress  Neck is supple without masses  Breasts are symmetrical without mass, tenderness, nipple discharge, skin changes or adenopathy  Abdomen is soft and nontender without masses  External genitals are normal without lesions or rashes  Urethra and urethral meatus are normal  Bladder is normal to palpation  Vagina is normal without discharge or bleeding  Cervix is normal without discharge or lesion  Uterus is normal, mobile, nontender without palpable mass  Adnexa are normal, nontender, without palpable mass  A:   Yearly exam      P:   Pap up to date   Call with pregnancy or PRN  RTO one year for yearly exam or sooner as needed      "

## 2023-06-09 NOTE — PROGRESS NOTES
"Daily Note     Today's date: 2023  Patient name: Trupti Cohn  : 1995  MRN: [id number]  Referring provider: Alva Onofre DO  Dx:   Encounter Diagnosis     ICD-10-CM    1  S/P right knee surgery  Z98 890       2  Right knee pain, unspecified chronicity  M25 561           Start Time: 820  Stop Time: 905  Total time in clinic (min): 45 minutes    Subjective: Patient reports that she is doing well overall  She is now back at work and has been having some increased swelling and discomfort during the day that will dissipate by the next morning  Patient reports medial knee pain that was sharper in nature on Wednesday, but it did dissipate            Objective: See treatment diary below    Precautions: hx POTS, HTN, follow protocol         Manuals 6/2 6/5 6/9   5/12 5/15 5/19 5/22  5/26   Knee PROM 5 min 5 min 5 min   5 min 5 min 5 min 5 min  5 min   Light patellar mobility 5 min 5 min 5 min   5 min 5 min 5 min 5 min  5 min   Measurements/ RE                  15 min/ MC                         Neuro Re-Ed                     Quad set 5\"x15 5\"x15    5\"x10 w/ NMES 5\"x10 w/ NMES 5\"x10 5\"x10     Sit to stand elevated surface   10x sq foam              SAQ with PT  5\"x15   5\"x15      5\"x10 w/ PT 5\"x12 5\"x12 5\"x12      tandem stance     2x30\" kevin                modified tandem stance foam      2x30\" kevin                sidestepping     NV                                      Ther Ex                     Recumbent bike (ROM) 10 min bike on 10 min bike on 10 min bike on   10 min  10 min 10 min 10 min  10 min (5 min bike on)   Heel slide knee flexion 5\"x15 5\"x15 5\" 15x   5\"x15  5\"x15  5\"x15 5\"x15  5\" 15x                Prone quad stretch 15\"x5 30\"x3  3x30\" with PT                                  sidelying hip abduction  5\"x17 R  5\"x17 R  5\" 15x   5\"x12 R 5\"x12 R 5\"x15 R 5\"x15 R     Prone hip extension  5\"x15 R  5\"x15 R  5\" 15x   5\"x10 R 5\"x12 R 5\"x15 R 5\"x15 R     Heel raise  x10 x20 20x          SLR  5\"x10 R " " 5\"x10 R  5\" 10x R       Brace locked 5\"x10 Brace locked 5\"x10  5\" 5x   Ther Activity                                                                 Gait Training                                                                 Modalities                     Ice PRN                     NMES       10 min 10 min 10 min 10 min  np        Assessment: Tolerated treatment well  Patient is progressed with addition of sit to stand from elevated surface  Patient requires cuing for equal weight bearing as she will weight shift to the left with sit to stand and stand to sit  Knee flexion to 137 degrees this visit  Improving prone knee flexion ROM  Progressed with tandem stance with good tolerance  Continue to progress per protocol  Patient demonstrated fatigue post treatment and would benefit from continued PT  Mild quad lag with SLR this visit  Plan: Progress treatment as tolerated                "

## 2023-06-12 ENCOUNTER — OFFICE VISIT (OUTPATIENT)
Dept: PHYSICAL THERAPY | Facility: REHABILITATION | Age: 28
End: 2023-06-12
Payer: COMMERCIAL

## 2023-06-12 DIAGNOSIS — M25.561 RIGHT KNEE PAIN, UNSPECIFIED CHRONICITY: ICD-10-CM

## 2023-06-12 DIAGNOSIS — Z98.890 S/P RIGHT KNEE SURGERY: Primary | ICD-10-CM

## 2023-06-12 PROCEDURE — 97112 NEUROMUSCULAR REEDUCATION: CPT

## 2023-06-12 PROCEDURE — 97110 THERAPEUTIC EXERCISES: CPT

## 2023-06-12 NOTE — PROGRESS NOTES
"Daily Note     Today's date: 2023  Patient name: Michael Wiseman  : 1995  MRN: [id number]  Referring provider: Madelyn Boucher DO  Dx:   Encounter Diagnosis     ICD-10-CM    1  S/P right knee surgery  Z98 890       2  Right knee pain, unspecified chronicity  M25 561           Start Time: 1745  Stop Time: 1830  Total time in clinic (min): 45 minutes    Subjective: Patient denies soreness following addition of new exercises last visit  Pt reports noticing R hip pain while laying in bed at night that reduces utilizing pillows between the knees  Pt reports stair negotiation is tough and is performing with step to gait pattern at this time           Objective: See treatment diary below    Precautions: hx POTS, HTN, follow protocol         Manuals 6/2 6/5 6/9 6/12  5/12 5/15 5/19 5/22  5/26   Knee PROM 5 min 5 min 5 min 5 min  5 min 5 min 5 min 5 min  5 min   Light patellar mobility 5 min 5 min 5 min 5 min  5 min 5 min 5 min 5 min  5 min   Measurements/ RE                  15 min/ MC                         Neuro Re-Ed                     Quad set 5\"x15 5\"x15    5\"x10 w/ NMES 5\"x10 w/ NMES 5\"x10 5\"x10     Sit to stand elevated surface   10x sq foam x10 square foam             SAQ with PT  5\"x15   5\"x15      5\"x10 w/ PT 5\"x12 5\"x12 5\"x12     tandem stance     2x30\" kevin 30\"x2 ea              modified tandem stance foam      2x30\" kevin 30\"x2 ea               sidestepping     NV  3 laps                                    Ther Ex                     Recumbent bike (ROM) 10 min bike on 10 min bike on 10 min bike on 10 min bike on  10 min  10 min 10 min 10 min  10 min (5 min bike on)   Heel slide knee flexion 5\"x15 5\"x15 5\" 15x 5\"x15  5\"x15  5\"x15  5\"x15 5\"x15  5\" 15x                Prone quad stretch 15\"x5 30\"x3  3x30\" with PT 30\"x3 w/ PT                                 sidelying hip abduction  5\"x17 R  5\"x17 R  5\" 15x 5\"x15  5\"x12 R 5\"x12 R 5\"x15 R 5\"x15 R     Prone hip extension  5\"x15 R  5\"x15 R  5\" 15x " "5\"x15  5\"x10 R 5\"x12 R 5\"x15 R 5\"x15 R     Heel raise  x10 x20 20x x20         SLR  5\"x10 R  5\"x10 R  5\" 10x R 5\"x10      Brace locked 5\"x10 Brace locked 5\"x10  5\" 5x   Ther Activity                                                                 Gait Training                                                                 Modalities                     Ice PRN                     NMES       10 min 10 min 10 min 10 min  np        Assessment: Tolerated treatment well  Pt demonstrates improved form with sit to stands with more equal weight bearing between LE's  Good balance in tandem stance on hard surface, but presents with more instability and swaying in modified tandem stance on foam   Mild quad lag with onset of fatigue during SLR  Patient demonstrated fatigue post treatment and would benefit from continued PT  Plan: Progress treatment as tolerated                "

## 2023-06-14 ENCOUNTER — TELEPHONE (OUTPATIENT)
Dept: NEPHROLOGY | Facility: CLINIC | Age: 28
End: 2023-06-14

## 2023-06-14 DIAGNOSIS — E87.6 HYPOKALEMIA: Primary | Chronic | ICD-10-CM

## 2023-06-14 NOTE — TELEPHONE ENCOUNTER
Patient reached out because she feels like her potassium is a little low  She is wondering if she can take an extra potassium chloride  Patient's blood pressure is also elevated at 158/88

## 2023-06-14 NOTE — TELEPHONE ENCOUNTER
Patient expressed understanding of instructions  She is hesitant to start amlodipine as she is trying to get pregnant and she's worried how it will effect the baby    She will send in blood pressure readings and if we need to add a new medication, she just asks that it is safe for pregnancy:    1   I would start her on amlodipine 5 mg daily for blood pressure   2   She can take 1 extra potassium pill but I would recommend obtaining a potassium level in a couple of days at the latest   3   In 2 to 3 weeks have her check a week of blood pressure readings

## 2023-06-16 ENCOUNTER — OFFICE VISIT (OUTPATIENT)
Dept: PHYSICAL THERAPY | Facility: REHABILITATION | Age: 28
End: 2023-06-16
Payer: COMMERCIAL

## 2023-06-16 ENCOUNTER — APPOINTMENT (OUTPATIENT)
Dept: LAB | Facility: CLINIC | Age: 28
End: 2023-06-16
Payer: COMMERCIAL

## 2023-06-16 DIAGNOSIS — G90.A POTS (POSTURAL ORTHOSTATIC TACHYCARDIA SYNDROME): Chronic | ICD-10-CM

## 2023-06-16 DIAGNOSIS — I10 ESSENTIAL HYPERTENSION, BENIGN: Chronic | ICD-10-CM

## 2023-06-16 DIAGNOSIS — M25.561 RIGHT KNEE PAIN, UNSPECIFIED CHRONICITY: ICD-10-CM

## 2023-06-16 DIAGNOSIS — Z98.890 S/P RIGHT KNEE SURGERY: Primary | ICD-10-CM

## 2023-06-16 DIAGNOSIS — E87.6 HYPOKALEMIA: Chronic | ICD-10-CM

## 2023-06-16 LAB
ALBUMIN SERPL BCP-MCNC: 4.1 G/DL (ref 3.5–5)
ALP SERPL-CCNC: 104 U/L (ref 46–116)
ALT SERPL W P-5'-P-CCNC: 29 U/L (ref 12–78)
ANION GAP SERPL CALCULATED.3IONS-SCNC: 6 MMOL/L (ref 4–13)
AST SERPL W P-5'-P-CCNC: 23 U/L (ref 5–45)
BILIRUB SERPL-MCNC: 0.54 MG/DL (ref 0.2–1)
BUN SERPL-MCNC: 13 MG/DL (ref 5–25)
CALCIUM SERPL-MCNC: 9.6 MG/DL (ref 8.3–10.1)
CHLORIDE SERPL-SCNC: 106 MMOL/L (ref 96–108)
CO2 SERPL-SCNC: 26 MMOL/L (ref 21–32)
CREAT SERPL-MCNC: 0.81 MG/DL (ref 0.6–1.3)
GFR SERPL CREATININE-BSD FRML MDRD: 98 ML/MIN/1.73SQ M
GLUCOSE SERPL-MCNC: 73 MG/DL (ref 65–140)
POTASSIUM SERPL-SCNC: 3.6 MMOL/L (ref 3.5–5.3)
PROT SERPL-MCNC: 8 G/DL (ref 6.4–8.4)
SODIUM SERPL-SCNC: 138 MMOL/L (ref 135–147)

## 2023-06-16 PROCEDURE — 97110 THERAPEUTIC EXERCISES: CPT

## 2023-06-16 PROCEDURE — 80053 COMPREHEN METABOLIC PANEL: CPT

## 2023-06-16 PROCEDURE — 36415 COLL VENOUS BLD VENIPUNCTURE: CPT

## 2023-06-16 PROCEDURE — 97112 NEUROMUSCULAR REEDUCATION: CPT

## 2023-06-16 NOTE — PROGRESS NOTES
"Daily Note     Today's date: 2023  Patient name: Marko Manuel  : 1995  MRN: [id number]  Referring provider: Bry Lagos DO  Dx:   Encounter Diagnosis     ICD-10-CM    1  S/P right knee surgery  Z98 890       2  Right knee pain, unspecified chronicity  M25 561           Start Time: 0900  Stop Time: 0945  Total time in clinic (min): 45 minutes    Subjective: Patient denies soreness after last visit  Pt reports minimal limitations, but reports having tonegotiate stairs with step to gait pattern  Pt questions use of treadmill at home              Objective: See treatment diary below    Precautions: hx POTS, HTN, follow protocol         Manuals 6/2 6/5 6/9 6/12 6/16 5/12 5/15 5/19 5/22  5/26   Knee PROM 5 min 5 min 5 min 5 min 5 min 5 min 5 min 5 min 5 min  5 min   Light patellar mobility 5 min 5 min 5 min 5 min 5 min 5 min 5 min 5 min 5 min  5 min   Measurements/ RE                  15 min/ MC                         Neuro Re-Ed                     Quad set 5\"x15 5\"x15    5\"x10 w/ NMES 5\"x10 w/ NMES 5\"x10 5\"x10     Sit to stand elevated surface   10x sq foam x10 square foam x12 round foam            SAQ with PT  5\"x15   5\"x15      5\"x10 w/ PT 5\"x12 5\"x12 5\"x12     tandem stance     2x30\" kevin 30\"x2 ea 30\"x2 ea             modified tandem stance foam      2x30\" kevin 30\"x2 ea 30\"x2 ea              sidestepping     NV  3 laps 3 laps                                   Ther Ex                     Recumbent bike (ROM) 10 min bike on 10 min bike on 10 min bike on 10 min bike on 5 min bike on 10 min  10 min 10 min 10 min  10 min (5 min bike on)   Heel slide knee flexion 5\"x15 5\"x15 5\" 15x 5\"x15  5\"x15  5\"x15  5\"x15 5\"x15  5\" 15x   Treadmill     1 7 mph 5 min        Prone quad stretch 15\"x5 30\"x3  3x30\" with PT 30\"x3 w/ PT 30\"x3 w/ PT                                sidelying hip abduction  5\"x17 R  5\"x17 R  5\" 15x 5\"x15 5\"x15 5\"x12 R 5\"x12 R 5\"x15 R 5\"x15 R     Prone hip extension  5\"x15 R  5\"x15 R  5\" 15x " "5\"x15 5\"x15 5\"x10 R 5\"x12 R 5\"x15 R 5\"x15 R     Heel raise  x10 x20 20x x20 x20        SLR  5\"x10 R  5\"x10 R  5\" 10x R 5\"x10 5\"x12     Brace locked 5\"x10 Brace locked 5\"x10  5\" 5x   Ther Activity                                                                 Gait Training                                                                 Modalities                     Ice PRN                     NMES       10 min 10 min 10 min 10 min  np        Assessment: Tolerated treatment well  Initiated treadmill walking with good step length and mechanics demonstrated  Sit to stands performed from round foam secondary to square being unavailable  Pt fatigues with SLR and has difficulty maintaining TKE with onset of fatigue  Improved ROM demonstrated with prone knee flex stretch  Patient demonstrated fatigue post treatment and would benefit from continued PT  Plan: Progress treatment as tolerated                "

## 2023-06-19 ENCOUNTER — TELEPHONE (OUTPATIENT)
Dept: NEPHROLOGY | Facility: CLINIC | Age: 28
End: 2023-06-19

## 2023-06-19 ENCOUNTER — APPOINTMENT (OUTPATIENT)
Dept: PHYSICAL THERAPY | Facility: REHABILITATION | Age: 28
End: 2023-06-19
Payer: COMMERCIAL

## 2023-06-19 NOTE — TELEPHONE ENCOUNTER
Patient aware     ----- Message from Jamie Hill MD sent at 6/18/2023  9:13 AM EDT -----  Labs look good continue with a high potassium diet  ----- Message -----  From: Lab, Background User  Sent: 6/16/2023   9:40 PM EDT  To: Jamie Hill MD

## 2023-06-20 ENCOUNTER — APPOINTMENT (OUTPATIENT)
Dept: PHYSICAL THERAPY | Facility: REHABILITATION | Age: 28
End: 2023-06-20
Payer: COMMERCIAL

## 2023-06-23 ENCOUNTER — APPOINTMENT (OUTPATIENT)
Dept: PHYSICAL THERAPY | Facility: REHABILITATION | Age: 28
End: 2023-06-23
Payer: COMMERCIAL

## 2023-06-26 ENCOUNTER — OFFICE VISIT (OUTPATIENT)
Dept: PHYSICAL THERAPY | Facility: REHABILITATION | Age: 28
End: 2023-06-26
Payer: COMMERCIAL

## 2023-06-26 DIAGNOSIS — Z98.890 S/P RIGHT KNEE SURGERY: Primary | ICD-10-CM

## 2023-06-26 DIAGNOSIS — M25.561 RIGHT KNEE PAIN, UNSPECIFIED CHRONICITY: ICD-10-CM

## 2023-06-26 PROCEDURE — 97140 MANUAL THERAPY 1/> REGIONS: CPT | Performed by: PHYSICAL THERAPIST

## 2023-06-26 PROCEDURE — 97112 NEUROMUSCULAR REEDUCATION: CPT | Performed by: PHYSICAL THERAPIST

## 2023-06-26 PROCEDURE — 97110 THERAPEUTIC EXERCISES: CPT | Performed by: PHYSICAL THERAPIST

## 2023-06-26 NOTE — PROGRESS NOTES
"Daily Note     Today's date: 2023  Patient name: Yunior Dean  : 1995  MRN: [id number]  Referring provider: Saint Golas, DO  Dx:   Encounter Diagnosis     ICD-10-CM    1  S/P right knee surgery  Z98 890       2  Right knee pain, unspecified chronicity  M25 561                      Subjective: Patient stated no significant pain prior to treatment session          Objective: See treatment diary below    Precautions: hx POTS, HTN, follow protocol         Manuals 6/2 6/5 6/9 6/12 6/16 6/26 5/15 5/19 5/22  5/26   Knee PROM 5 min 5 min 5 min 5 min 5 min KK 5 min 5 min 5 min  5 min   Light patellar mobility 5 min 5 min 5 min 5 min 5 min KK 5 min 5 min 5 min  5 min   Measurements/ RE                  15 min/ MC                         Neuro Re-Ed                     Quad set 5\"x15 5\"x15     5\"x10 w/ NMES 5\"x10 5\"x10     Sit to stand elevated surface   10x sq foam x10 square foam x12 round foam x15 round foam           SAQ with PT  5\"x15   5\"x15       5\"x12 5\"x12 5\"x12     tandem stance     2x30\" kevin 30\"x2 ea 30\"x2 ea  30\"x2 ea           modified tandem stance foam      2x30\" kevin 30\"x2 ea 30\"x2 ea  30\"x2 ea           sidestepping     NV  3 laps 3 laps  3 laps                                 Ther Ex                     Recumbent bike (ROM) 10 min bike on 10 min bike on 10 min bike on 10 min bike on 5 min bike on  10 min 10 min 10 min  10 min (5 min bike on)   Heel slide knee flexion 5\"x15 5\"x15 5\" 15x 5\"x15   5\"x15  5\"x15 5\"x15  5\" 15x   Treadmill     1 7 mph 5 min 1 7 mph        Prone quad stretch 15\"x5 30\"x3  3x30\" with PT 30\"x3 w/ PT 30\"x3 w/ PT 30\"x3 w/ PT                              sidelying hip abduction  5\"x17 R  5\"x17 R  5\" 15x 5\"x15 5\"x15 5\"x15 5\"x12 R 5\"x15 R 5\"x15 R     Prone hip extension  5\"x15 R  5\"x15 R  5\" 15x 5\"x15 5\"x15 5\"x15 5\"x12 R 5\"x15 R 5\"x15 R     Heel raise  x10 x20 20x x20 x20 x20       SLR  5\"x10 R  5\"x10 R  5\" 10x R 5\"x10 5\"x12  5\"x15   Brace locked 5\"x10 Brace locked 5\"x10 " " 5\" 5x   Ther Activity                                                                 Gait Training                                                                 Modalities                     Ice PRN                     NMES        10 min 10 min 10 min  np        Assessment: Patient demonstrated minimal pain with prone quad stretch; demonstrated moderate challenge with tandem stance on foam       Plan: Progress treatment as tolerated                "

## 2023-06-30 ENCOUNTER — APPOINTMENT (OUTPATIENT)
Dept: PHYSICAL THERAPY | Facility: REHABILITATION | Age: 28
End: 2023-06-30
Payer: COMMERCIAL

## 2023-07-03 ENCOUNTER — APPOINTMENT (OUTPATIENT)
Dept: PHYSICAL THERAPY | Facility: REHABILITATION | Age: 28
End: 2023-07-03
Payer: COMMERCIAL

## 2023-07-11 ENCOUNTER — EVALUATION (OUTPATIENT)
Dept: PHYSICAL THERAPY | Facility: REHABILITATION | Age: 28
End: 2023-07-11
Payer: COMMERCIAL

## 2023-07-11 DIAGNOSIS — M25.561 RIGHT KNEE PAIN, UNSPECIFIED CHRONICITY: ICD-10-CM

## 2023-07-11 DIAGNOSIS — Z98.890 S/P RIGHT KNEE SURGERY: Primary | ICD-10-CM

## 2023-07-11 PROCEDURE — 97112 NEUROMUSCULAR REEDUCATION: CPT

## 2023-07-11 PROCEDURE — 97140 MANUAL THERAPY 1/> REGIONS: CPT | Performed by: PHYSICAL THERAPIST

## 2023-07-11 PROCEDURE — 97110 THERAPEUTIC EXERCISES: CPT

## 2023-07-11 PROCEDURE — 97164 PT RE-EVAL EST PLAN CARE: CPT | Performed by: PHYSICAL THERAPIST
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
## 2023-07-13 ENCOUNTER — APPOINTMENT (EMERGENCY)
Dept: RADIOLOGY | Facility: HOSPITAL | Age: 28
End: 2023-07-13
Payer: COMMERCIAL

## 2023-07-13 ENCOUNTER — HOSPITAL ENCOUNTER (EMERGENCY)
Facility: HOSPITAL | Age: 28
Discharge: HOME | End: 2023-07-13
Attending: EMERGENCY MEDICINE
Payer: COMMERCIAL

## 2023-07-13 VITALS
DIASTOLIC BLOOD PRESSURE: 74 MMHG | TEMPERATURE: 98.4 F | OXYGEN SATURATION: 99 % | HEART RATE: 133 BPM | SYSTOLIC BLOOD PRESSURE: 151 MMHG | RESPIRATION RATE: 18 BRPM

## 2023-07-13 DIAGNOSIS — R00.2 PALPITATIONS: ICD-10-CM

## 2023-07-13 DIAGNOSIS — I47.1 SVT (SUPRAVENTRICULAR TACHYCARDIA) (HCC): Primary | ICD-10-CM

## 2023-07-13 LAB
ALBUMIN SERPL BCP-MCNC: 4 G/DL (ref 3.5–5)
ALP SERPL-CCNC: 118 U/L (ref 46–116)
ALT SERPL W P-5'-P-CCNC: 22 U/L (ref 12–78)
ANION GAP SERPL CALCULATED.3IONS-SCNC: 9 MMOL/L
AST SERPL W P-5'-P-CCNC: 15 U/L (ref 5–45)
BASOPHILS # BLD AUTO: 0.07 THOUSANDS/ÂΜL (ref 0–0.1)
BASOPHILS NFR BLD AUTO: 1 % (ref 0–1)
BILIRUB SERPL-MCNC: 0.31 MG/DL (ref 0.2–1)
BUN SERPL-MCNC: 13 MG/DL (ref 5–25)
CALCIUM SERPL-MCNC: 9.5 MG/DL (ref 8.3–10.1)
CARDIAC TROPONIN I PNL SERPL HS: <2 NG/L
CHLORIDE SERPL-SCNC: 107 MMOL/L (ref 96–108)
CO2 SERPL-SCNC: 24 MMOL/L (ref 21–32)
CREAT SERPL-MCNC: 0.98 MG/DL (ref 0.6–1.3)
EOSINOPHIL # BLD AUTO: 0.06 THOUSAND/ÂΜL (ref 0–0.61)
EOSINOPHIL NFR BLD AUTO: 1 % (ref 0–6)
ERYTHROCYTE [DISTWIDTH] IN BLOOD BY AUTOMATED COUNT: 11.9 % (ref 11.6–15.1)
EXT PREGNANCY TEST URINE: NEGATIVE
EXT. CONTROL: NORMAL
GFR SERPL CREATININE-BSD FRML MDRD: 78 ML/MIN/1.73SQ M
GLUCOSE SERPL-MCNC: 138 MG/DL (ref 65–140)
HCT VFR BLD AUTO: 40.7 % (ref 34.8–46.1)
HGB BLD-MCNC: 14.3 G/DL (ref 11.5–15.4)
IMM GRANULOCYTES # BLD AUTO: 0.02 THOUSAND/UL (ref 0–0.2)
IMM GRANULOCYTES NFR BLD AUTO: 0 % (ref 0–2)
LYMPHOCYTES # BLD AUTO: 1.92 THOUSANDS/ÂΜL (ref 0.6–4.47)
LYMPHOCYTES NFR BLD AUTO: 20 % (ref 14–44)
MAGNESIUM SERPL-MCNC: 2 MG/DL (ref 1.6–2.6)
MCH RBC QN AUTO: 30.6 PG (ref 26.8–34.3)
MCHC RBC AUTO-ENTMCNC: 35.1 G/DL (ref 31.4–37.4)
MCV RBC AUTO: 87 FL (ref 82–98)
MONOCYTES # BLD AUTO: 0.85 THOUSAND/ÂΜL (ref 0.17–1.22)
MONOCYTES NFR BLD AUTO: 9 % (ref 4–12)
NEUTROPHILS # BLD AUTO: 6.92 THOUSANDS/ÂΜL (ref 1.85–7.62)
NEUTS SEG NFR BLD AUTO: 69 % (ref 43–75)
NRBC BLD AUTO-RTO: 0 /100 WBCS
PLATELET # BLD AUTO: 221 THOUSANDS/UL (ref 149–390)
PMV BLD AUTO: 11.4 FL (ref 8.9–12.7)
POTASSIUM SERPL-SCNC: 3.2 MMOL/L (ref 3.5–5.3)
PROT SERPL-MCNC: 7.8 G/DL (ref 6.4–8.4)
RBC # BLD AUTO: 4.68 MILLION/UL (ref 3.81–5.12)
SODIUM SERPL-SCNC: 140 MMOL/L (ref 135–147)
TSH SERPL DL<=0.05 MIU/L-ACNC: 3.54 UIU/ML (ref 0.45–4.5)
WBC # BLD AUTO: 9.84 THOUSAND/UL (ref 4.31–10.16)

## 2023-07-13 PROCEDURE — 84484 ASSAY OF TROPONIN QUANT: CPT

## 2023-07-13 PROCEDURE — 81025 URINE PREGNANCY TEST: CPT

## 2023-07-13 PROCEDURE — 80053 COMPREHEN METABOLIC PANEL: CPT

## 2023-07-13 PROCEDURE — 36415 COLL VENOUS BLD VENIPUNCTURE: CPT

## 2023-07-13 PROCEDURE — 96375 TX/PRO/DX INJ NEW DRUG ADDON: CPT

## 2023-07-13 PROCEDURE — 99285 EMERGENCY DEPT VISIT HI MDM: CPT

## 2023-07-13 PROCEDURE — 96361 HYDRATE IV INFUSION ADD-ON: CPT

## 2023-07-13 PROCEDURE — 83735 ASSAY OF MAGNESIUM: CPT

## 2023-07-13 PROCEDURE — 85025 COMPLETE CBC W/AUTO DIFF WBC: CPT

## 2023-07-13 PROCEDURE — 71045 X-RAY EXAM CHEST 1 VIEW: CPT

## 2023-07-13 PROCEDURE — 99291 CRITICAL CARE FIRST HOUR: CPT | Performed by: EMERGENCY MEDICINE

## 2023-07-13 PROCEDURE — 96374 THER/PROPH/DIAG INJ IV PUSH: CPT

## 2023-07-13 PROCEDURE — 84443 ASSAY THYROID STIM HORMONE: CPT

## 2023-07-13 RX ORDER — ADENOSINE 3 MG/ML
12 INJECTION INTRAVENOUS ONCE
Status: COMPLETED | OUTPATIENT
Start: 2023-07-13 | End: 2023-07-13

## 2023-07-13 RX ORDER — METOPROLOL TARTRATE 5 MG/5ML
5 INJECTION INTRAVENOUS ONCE
Status: COMPLETED | OUTPATIENT
Start: 2023-07-13 | End: 2023-07-13

## 2023-07-13 RX ORDER — DIAZEPAM 5 MG/ML
2.5 INJECTION, SOLUTION INTRAMUSCULAR; INTRAVENOUS ONCE
Status: COMPLETED | OUTPATIENT
Start: 2023-07-13 | End: 2023-07-13

## 2023-07-13 RX ORDER — ADENOSINE 3 MG/ML
INJECTION, SOLUTION INTRAVENOUS
Status: COMPLETED
Start: 2023-07-13 | End: 2023-07-13

## 2023-07-13 RX ORDER — ADENOSINE 3 MG/ML
6 INJECTION INTRAVENOUS ONCE
Status: COMPLETED | OUTPATIENT
Start: 2023-07-13 | End: 2023-07-13

## 2023-07-13 RX ADMIN — METOROPROLOL TARTRATE 5 MG: 5 INJECTION, SOLUTION INTRAVENOUS at 22:01

## 2023-07-13 RX ADMIN — DIAZEPAM 2.5 MG: 10 INJECTION, SOLUTION INTRAMUSCULAR; INTRAVENOUS at 20:52

## 2023-07-13 RX ADMIN — ADENOSINE 6 MG: 3 INJECTION INTRAVENOUS at 20:21

## 2023-07-13 RX ADMIN — ADENOSINE 12 MG: 3 INJECTION INTRAVENOUS at 20:29

## 2023-07-13 RX ADMIN — ADENOSINE 12 MG: 3 INJECTION INTRAVENOUS at 20:30

## 2023-07-13 RX ADMIN — SODIUM CHLORIDE 1000 ML: 0.9 INJECTION, SOLUTION INTRAVENOUS at 20:51

## 2023-07-13 NOTE — ED PROVIDER NOTES
History  Chief Complaint   Patient presents with   • Palpitations     Pt has been feeling chest tightness with palpitations since 1300, pt claims she feels this way when her potassium is low and has cardiac hx      Patient is a 58-year-old female presenting to the emergency department for evaluation of palpitations. Patient has past medical history significant for POTS as well as hypokalemia and has experienced palpitations in the past but never this consistent. Patient states that occurs at rest as well. Started last week. She denies any viral illnesses. She denies fevers, chills, chest pain, headache dizziness tinnitus vision change neck pain back pain numbness tingling down her extremities she denies any nausea vomiting diarrhea or constipation denies any dysuria hematuria. Prior to Admission Medications   Prescriptions Last Dose Informant Patient Reported? Taking?    AMILoride 5 mg tablet  Self No No   Sig: Take 2 tablets (10 mg total) by mouth 2 (two) times a day   Multiple Vitamins-Minerals (MULTIVITAL) CHEW  Self Yes No   Sig: Chew 1 tablet daily   Patient not taking: Reported on 2023   Prenatal Multivit-Min-Fe-FA (PRE-GORDON PO)   Yes No   Sig: Take by mouth   aspirin (ECOTRIN LOW STRENGTH) 81 mg EC tablet   No No   Sig: Take 1 tablet (81 mg total) by mouth 2 (two) times a day   busPIRone (BUSPAR) 5 mg tablet  Self No No   Sig: Take 1 tablet (5 mg total) by mouth 2 (two) times a day   clobetasol (TEMOVATE) 0.05 % ointment  Self No No   Sig: Apply topically 2 (two) times a day   dicyclomine (BENTYL) 20 mg tablet  Self No No   Sig: Take 1 tablet (20 mg total) by mouth every 6 (six) hours   hydrOXYzine HCL (ATARAX) 10 mg tablet  Self No No   Sig: Take 1 tablet (10 mg total) by mouth 2 (two) times a day as needed for anxiety   hydroxychloroquine (PLAQUENIL) 200 mg tablet  Self No No   Sig: Take 1.5 tablets (300 mg total) by mouth in the morning   oxyCODONE (ROXICODONE) 5 immediate release tablet No No   Sig: Take 1 tablet (5 mg total) by mouth every 4 (four) hours as needed for moderate pain for up to 15 doses Max Daily Amount: 30 mg   Patient not taking: Reported on 4/25/2023   potassium chloride (K-DUR,KLOR-CON) 20 mEq tablet  Self No No   Sig: Take 1 tablet (20 mEq total) by mouth 3 (three) times a day   propranolol (INDERAL LA) 120 mg 24 hr capsule  Self No No   Sig: Take 1 capsule (120 mg total) by mouth daily   propranolol (INDERAL) 20 mg tablet  Self No No   Sig: Take 1 tablet (20 mg total) by mouth every 8 (eight) hours   tacrolimus (PROTOPIC) 0.1 % ointment  Self No No   Sig: Apply topically 2 (two) times a day   triamcinolone (KENALOG) 0.025 % ointment  Self No No   Sig: Apply topically twice day for 14 days straight as needed for flares to thinner skin   triamcinolone (KENALOG) 0.1 % ointment  Self No No   Sig: Apply topically twice day for 14 days straight as needed for flares      Facility-Administered Medications: None       Past Medical History:   Diagnosis Date   • Abnormal blood chemistry    • Anesthesia     per pt " Panic attack right before a procedure-has High preop anxiety and wakes up nasty"   • Anxiety    • Eczema    • Exercises 5 to 6 times per week     per pt prior to knee issue -enjoyed running and horseback riding   • Family history of reaction to anesthesia     per pt--"Mom also gets high anxiety with surgeries and wakes up nasty from anesthesia"   • Gastroparesis    • History of vitamin D deficiency    • Hypertension     last assessed 3/12/14   • Hypokalemia     per pt per doctors possibly related renal problem   • Irritable bowel syndrome     with diarrhea   • Lymphadenopathy     last assessed 10/8/14-no current issue   • Myalgia     last assessed 10/8/14   • Myositis     last assessed 10/8/14   • Palpitations    • POTS (postural orthostatic tachycardia syndrome)    • RA (rheumatoid arthritis) ([facility])    • Rheumatoid arthritis ([facility])    • Sinus tachycardia     related to dehydration   • Tachycardia     last assessed 6/9/17   • Tooth missing     front upper tooth retainer-   • Wears glasses     for computer use       Past Surgical History:   Procedure Laterality Date   • NV ARTHROSCOPY KNEE LATERAL RELEASE Right 4/13/2023    Procedure: ARTHROSCOPIC RELEASE RETINACULAR;  Surgeon: Elliot Sandy DO;  Location: [address] [place] [address];  Service: Orthopedics   • NV EGD TRANSORAL BIOPSY SINGLE/MULTIPLE N/A 5/4/2016    Procedure: ESOPHAGOGASTRODUODENOSCOPY (EGD); Surgeon: Felicia Adams MD;  Location:  GI LAB; Service: Gastroenterology   • NV RCNSTJ [address] W/XTNSR RELIGNMT&/MUSC RL Right 4/13/2023    Procedure: ARTHROSCOPIC REALIGNMENT PATELLA;  Surgeon: Elliot Sandy DO;  Location: [address] [place] [address];  Service: Orthopedics   • UPPER GASTROINTESTINAL ENDOSCOPY     • WISDOM TOOTH EXTRACTION         Family History   Problem Relation Age of Onset   • Hypokalemia Mother    • Hypotension Mother    • Anxiety disorder Mother         NOS   • Lung cancer Father    • Skin cancer Father    • Crohn's disease Father    • No Known Problems Sister    • No Known Problems Sister    • No Known Problems Brother    • Coronary artery disease Maternal Grandmother    • Heart disease Maternal Grandmother    • Alzheimer's disease Maternal Grandmother    • Arthritis Paternal Grandmother    • Rheum arthritis Paternal Grandmother    • Hypertension Paternal Grandmother    • COPD Paternal Grandfather      I have reviewed and agree with the history as documented. E-Cigarette/Vaping   • E-Cigarette Use Never User      E-Cigarette/Vaping Substances   • Nicotine No    • THC No    • CBD No    • Flavoring No    • Other No    • Unknown No      Social History     Tobacco Use   • Smoking status: Never   • Smokeless tobacco: Never   Vaping Use   • Vaping Use: Never used   Substance Use Topics   • Alcohol use:  Yes     Alcohol/week: 1.0 standard drink of alcohol     Types: 1 Standard drinks or equivalent per week     Comment: socially • Drug use: No        Review of Systems   Constitutional: Positive for activity change. Negative for chills, fatigue and fever. HENT: Negative for congestion, ear pain and sore throat. Eyes: Negative for pain and visual disturbance. Respiratory: Positive for chest tightness. Negative for cough and shortness of breath. Cardiovascular: Positive for chest pain. Negative for palpitations. Gastrointestinal: Negative for abdominal pain, constipation, diarrhea, nausea and vomiting. Genitourinary: Negative for dysuria and hematuria. Musculoskeletal: Negative for arthralgias, back pain and neck pain. Skin: Negative for color change and rash. Neurological: Negative for dizziness, seizures, syncope, weakness, light-headedness, numbness and headaches. Psychiatric/Behavioral: Negative for agitation and behavioral problems. The patient is nervous/anxious. All other systems reviewed and are negative. Physical Exam  ED Triage Vitals [07/13/23 1953]   Temperature Pulse Respirations Blood Pressure SpO2   98.4 °F (36.9 °C) (!) 133 18 151/74 99 %      Temp Source Heart Rate Source Patient Position - Orthostatic VS BP Location FiO2 (%)   Oral Monitor Lying Right arm --      Pain Score       7             Orthostatic Vital Signs  Vitals:    07/13/23 1953   BP: 151/74   Pulse: (!) 133   Patient Position - Orthostatic VS: Lying       Physical Exam  Vitals and nursing note reviewed. Constitutional:       General: She is not in acute distress. Appearance: She is well-developed. HENT:      Head: Normocephalic and atraumatic. Right Ear: External ear normal.      Left Ear: External ear normal.      Nose: Nose normal.      Mouth/Throat:      Mouth: Mucous membranes are moist.   Eyes:      Extraocular Movements: Extraocular movements intact. Conjunctiva/sclera: Conjunctivae normal.   Cardiovascular:      Rate and Rhythm: Regular rhythm. Tachycardia present.       Heart sounds: No murmur heard.  Pulmonary:      Effort: Pulmonary effort is normal. No respiratory distress. Breath sounds: Normal breath sounds. Abdominal:      General: Abdomen is flat. Palpations: Abdomen is soft. Tenderness: There is no abdominal tenderness. Musculoskeletal:         General: No swelling. Cervical back: Normal range of motion and neck supple. Skin:     General: Skin is warm and dry. Capillary Refill: Capillary refill takes less than 2 seconds. Neurological:      General: No focal deficit present. Mental Status: She is alert and oriented to person, place, and time.    Psychiatric:         Mood and Affect: Mood normal.         Behavior: Behavior normal.         ED Medications  Medications   metoprolol (LOPRESSOR) injection 5 mg (has no administration in time range)   sodium chloride 0.9 % bolus 1,000 mL (has no administration in time range)   adenosine (ADENOCARD) injection 6 mg (6 mg Intravenous Given 7/13/23 2021)   adenosine (ADENOCARD) injection 12 mg (12 mg Intravenous Given 7/13/23 2030)   diazepam (VALIUM) injection 2.5 mg (2.5 mg Intravenous Given 7/13/23 2052)   sodium chloride 0.9 % bolus 1,000 mL (1,000 mL Intravenous New Bag 7/13/23 2051)       Diagnostic Studies  Results Reviewed     Procedure Component Value Units Date/Time    TSH, 3rd generation with Free T4 reflex [[id number]]  (Normal) Collected: 07/13/23 2010    Lab Status: Final result Specimen: Blood from Arm, Right Updated: 07/13/23 2104     TSH 3RD [place] 3.540 uIU/mL     POCT pregnancy, urine [[id number]]  (Normal) Resulted: 07/13/23 2058    Lab Status: Final result Updated: 07/13/23 2058     EXT Preg Test, Ur Negative     Control Valid    HS Troponin 0hr (reflex protocol) [[id number]]  (Normal) Collected: 07/13/23 2010    Lab Status: Final result Specimen: Blood from Arm, Right Updated: 07/13/23 2057     hs TnI 0hr <2 ng/L     CBC and differential [[id number]] Collected: 07/13/23 2010    Lab Status: Final result Specimen: Blood from Arm, Right Updated: 07/13/23 2027     WBC 9.84 Thousand/uL      RBC 4.68 Million/uL      Hemoglobin 14.3 g/dL      Hematocrit 40.7 %      MCV 87 fL      MCH 30.6 pg      MCHC 35.1 g/dL      RDW 11.9 %      MPV 11.4 fL      Platelets 246 Thousands/uL      nRBC 0 /100 WBCs      Neutrophils Relative 69 %      Immat GRANS % 0 %      Lymphocytes Relative 20 %      Monocytes Relative 9 %      Eosinophils Relative 1 %      Basophils Relative 1 %      Neutrophils Absolute 6.92 Thousands/µL      Immature Grans Absolute 0.02 Thousand/uL      Lymphocytes Absolute 1.92 Thousands/µL      Monocytes Absolute 0.85 Thousand/µL      Eosinophils Absolute 0.06 Thousand/µL      Basophils Absolute 0.07 Thousands/µL     Comprehensive metabolic panel [[id number]] Collected: 07/13/23 2010    Lab Status: In process Specimen: Blood from Arm, Right Updated: 07/13/23 2019    Magnesium [[id number]] Collected: 07/13/23 2010    Lab Status: In process Specimen: Blood from Arm, Right Updated: 07/13/23 2019                 XR chest 1 view portable    (Results Pending)         Procedures  Procedures      ED Course  ED Course as of 07/13/23 2137   Thu Jul 13, 2023 2057 hs TnI 0hr: <2   2058 PREGNANCY TEST URINE: Negative   2130 Patient signed out prior to disposition to Dr. Meri Espana. Pending labs and second liter fluid. Medical Decision Making  Patient is a 25-year-old female presenting to the emergency department for evaluation of palpitations. EKG showed SVT; patient with with heart rates in the 130-150's, evaluated patient for electrolyte abnormalities with a CMP evaluated for thyroid issue with TSH with reflex T4 evaluate for ACS with troponin as well as EKG. Evaluated patient for pregnancy with point-of-care pregnancy. Patient was cardioverted using 6 of adenosine hooked up to the crash cart as well, followed by a 12 bolus of adenosine patient slowed down to sinus tach in the 120s. Treated patient's anxiety with Valium and will give Lopressor. Will give patient 2 L of fluids. Patient ultimately signed out to Dr. Ana Paula Roman prior to disposition. I spoke to patient about her laboratory findings so far and that she is to follow-up with cardiology patient is aware she has no questions or concerns. Amount and/or Complexity of Data Reviewed  Labs: ordered. Decision-making details documented in ED Course. Radiology: ordered. Risk  Prescription drug management.             Disposition  Final diagnoses:   SVT (supraventricular tachycardia) ([address])   Palpitations     Time reflects when diagnosis was documented in both MDM as applicable and the Disposition within this note     Time User Action Codes Description Comment    7/13/2023  9:36 PM Palladino, Zeb Grist Add [I47.1] SVT (supraventricular tachycardia) ([address])     7/13/2023  9:36 PM Yelitza Rodrigez Add [R00.2] Palpitations       ED Disposition     None      Follow-up Information     Follow up With Specialties Details Why Contact Info Additional Information    Lori Robertson, [address] [place] [address], Internal Medicine, Nurse Practitioner Schedule an appointment as soon as possible for a visit  for follow up [address] [facility] [id number]       [address] [place] [address] Emergency Department Emergency Medicine Go to  As needed, If symptoms worsen [address] [phone]  [facility] Emergency Department, [address] Cardiology Schedule an appointment as soon as possible for a visit  for follow up [facility] [address] [facility] Alyx Salas Cardiology [place], [address], [place], [address] [place] [address]          Patient's Medications   Discharge Prescriptions    No medications on file     No discharge procedures on file. PDMP Review     None           ED Provider  Attending physically available and evaluated Hector. I managed the patient along with the ED Attending.     Electronically Signed by         Jessica Barrios DO  07/13/23 9275

## 2023-07-14 ENCOUNTER — TELEPHONE (OUTPATIENT)
Dept: CARDIOLOGY CLINIC | Facility: CLINIC | Age: 28
End: 2023-07-14

## 2023-07-14 ENCOUNTER — TELEPHONE (OUTPATIENT)
Dept: NEPHROLOGY | Facility: CLINIC | Age: 28
End: 2023-07-14

## 2023-07-14 DIAGNOSIS — E83.42 HYPOMAGNESEMIA: ICD-10-CM

## 2023-07-14 DIAGNOSIS — E87.6 HYPOKALEMIA: Primary | Chronic | ICD-10-CM

## 2023-07-14 NOTE — TELEPHONE ENCOUNTER
Patient aware of the following:    I would have her take Kdur 20 mill equivalents 4 times a day for the next 3 days then go back to Kdur 20 mill equivalents 3 times a day which I believe is her usual regimen then have her go for labs on Monday statin the morning BMP and magnesium

## 2023-07-14 NOTE — TELEPHONE ENCOUNTER
Patient left message to follow-up with you. Wanted to discuss with you as no available appointments. She presented to ER yesterday and found to have SVT which required Adenosine. Her K was low at 3.2. Her nephrologist did increase her K and is rechecking labs on Monday. Could the low K have caused her SVT? Do you want her to wear a Zio or do you need a PA to see her sooner?

## 2023-07-14 NOTE — TELEPHONE ENCOUNTER
Patient called because she was in the ER last night for POTS related issues but she is reaching out due to her potassium was 3.2. Please advise.

## 2023-07-17 NOTE — ED ATTENDING ATTESTATION
7/13/2023  Mack LEON MD, saw and evaluated the patient. I have discussed the patient with the resident and agree with the resident's findings, Plan of Care, and MDM as documented in the resident's note, except where noted. All available labs and Radiology studies were reviewed. I was present for key portions of any procedure(s) performed by the resident and I was immediately available to provide assistance. At this point I agree with the current assessment done in the Emergency Department. I have conducted an independent evaluation of this patient a history and physical is as follows:    34 yo female with a history of HTN, POTS, rheumatoid arthritis, eczema, anxiety, and IBS presents to the ED complaining of chest tightness and palpitations x 1 week. The patient reports occasional palpitations x "years" but has never had them last this long before. She usually attributes the palpitations to hypokalemia. No recent illnesses. She denies shortness of breath, dizziness, headache, and visual disturbance. No nausea, vomiting, or diarrhea. No back pain. No other specific complaints. ROS: per resident physician note    Gen: anxious appearing, AA&Ox3  HEENT: PERRL, EOMI  Neck: supple  CV: (+) tachycardia  Lungs: CTA B/L  Abdomen: soft, NT/ND  Ext: no swelling or deformity  Neuro: 5/5 strength all extremities, sensation grossly intact  Skin: no rash    ED Course  The patient is obviously anxious but otherwise well appearing. Initial rhythm was consistent with SVT. She converted to a sinus tachycardia after vagal maneuvers and Adenosine x 2. Will check CXR, basic labs, TSH, and troponin. Lopressor, Valium, and IVFs administered. Will continue to monitor closely in the ED. Dispostin per workup and reassessment.     Critical Care Time  CriticalCare Time    Date/Time: 7/13/2023 9:34 PM    Performed by: Mack Wilson MD  Authorized by: Mack Wilson MD    Critical care provider statement:     Critical care time (minutes):  35    Critical care start time:  7/13/2023 8:20 PM    Critical care end time:  7/13/2023 8:55 PM    Critical care time was exclusive of:  Separately billable procedures and treating other patients and teaching time    Critical care was necessary to treat or prevent imminent or life-threatening deterioration of the following conditions:  Cardiac failure and circulatory failure    Critical care was time spent personally by me on the following activities:  Obtaining history from patient or surrogate, development of treatment plan with patient or surrogate, discussions with consultants, discussions with primary provider, evaluation of patient's response to treatment, examination of patient, interpretation of cardiac output measurements, ordering and performing treatments and interventions, ordering and review of laboratory studies, ordering and review of radiographic studies, re-evaluation of patient's condition and review of old charts    I assumed direction of critical care for this patient from another provider in my specialty: no

## 2023-07-19 ENCOUNTER — OFFICE VISIT (OUTPATIENT)
Dept: URGENT CARE | Facility: CLINIC | Age: 28
End: 2023-07-19
Payer: COMMERCIAL

## 2023-07-19 ENCOUNTER — HOSPITAL ENCOUNTER (INPATIENT)
Facility: HOSPITAL | Age: 28
LOS: 2 days | Discharge: HOME | DRG: 310 | End: 2023-07-21
Attending: EMERGENCY MEDICINE | Admitting: INTERNAL MEDICINE
Payer: COMMERCIAL

## 2023-07-19 VITALS
BODY MASS INDEX: 27.66 KG/M2 | HEART RATE: 142 BPM | OXYGEN SATURATION: 98 % | SYSTOLIC BLOOD PRESSURE: 153 MMHG | HEIGHT: 64 IN | WEIGHT: 162 LBS | DIASTOLIC BLOOD PRESSURE: 106 MMHG

## 2023-07-19 DIAGNOSIS — R94.31 ABNORMAL EKG: ICD-10-CM

## 2023-07-19 DIAGNOSIS — G90.A POTS (POSTURAL ORTHOSTATIC TACHYCARDIA SYNDROME): Chronic | ICD-10-CM

## 2023-07-19 DIAGNOSIS — I47.1 SVT (SUPRAVENTRICULAR TACHYCARDIA) (HCC): ICD-10-CM

## 2023-07-19 DIAGNOSIS — E87.6 HYPOKALEMIA: Chronic | ICD-10-CM

## 2023-07-19 DIAGNOSIS — R00.2 PALPITATIONS: Primary | ICD-10-CM

## 2023-07-19 DIAGNOSIS — R06.02 SHORTNESS OF BREATH: Primary | ICD-10-CM

## 2023-07-19 PROBLEM — I47.10 SVT (SUPRAVENTRICULAR TACHYCARDIA): Status: ACTIVE | Noted: 2023-07-19

## 2023-07-19 LAB
2HR DELTA HS TROPONIN: >1 NG/L
ALBUMIN SERPL BCP-MCNC: 4.4 G/DL (ref 3.5–5)
ALP SERPL-CCNC: 97 U/L (ref 34–104)
ALT SERPL W P-5'-P-CCNC: 12 U/L (ref 7–52)
ANION GAP SERPL CALCULATED.3IONS-SCNC: 10 MMOL/L
AST SERPL W P-5'-P-CCNC: 14 U/L (ref 13–39)
ATRIAL RATE: 104 BPM
ATRIAL RATE: 107 BPM
ATRIAL RATE: 150 BPM
BASOPHILS # BLD AUTO: 0.05 THOUSANDS/ÂΜL (ref 0–0.1)
BASOPHILS NFR BLD AUTO: 1 % (ref 0–1)
BILIRUB SERPL-MCNC: 0.58 MG/DL (ref 0.2–1)
BUN SERPL-MCNC: 9 MG/DL (ref 5–25)
CALCIUM SERPL-MCNC: 9.6 MG/DL (ref 8.4–10.2)
CARDIAC TROPONIN I PNL SERPL HS: 3 NG/L
CARDIAC TROPONIN I PNL SERPL HS: <2 NG/L
CHLORIDE SERPL-SCNC: 104 MMOL/L (ref 96–108)
CO2 SERPL-SCNC: 23 MMOL/L (ref 21–32)
CREAT SERPL-MCNC: 0.83 MG/DL (ref 0.6–1.3)
EOSINOPHIL # BLD AUTO: 0.02 THOUSAND/ÂΜL (ref 0–0.61)
EOSINOPHIL NFR BLD AUTO: 0 % (ref 0–6)
ERYTHROCYTE [DISTWIDTH] IN BLOOD BY AUTOMATED COUNT: 11.7 % (ref 11.6–15.1)
GFR SERPL CREATININE-BSD FRML MDRD: 95 ML/MIN/1.73SQ M
GLUCOSE SERPL-MCNC: 104 MG/DL (ref 65–140)
HCT VFR BLD AUTO: 42.8 % (ref 34.8–46.1)
HGB BLD-MCNC: 14.9 G/DL (ref 11.5–15.4)
IMM GRANULOCYTES # BLD AUTO: 0.01 THOUSAND/UL (ref 0–0.2)
IMM GRANULOCYTES NFR BLD AUTO: 0 % (ref 0–2)
LYMPHOCYTES # BLD AUTO: 1.38 THOUSANDS/ÂΜL (ref 0.6–4.47)
LYMPHOCYTES NFR BLD AUTO: 18 % (ref 14–44)
MAGNESIUM SERPL-MCNC: 1.8 MG/DL (ref 1.9–2.7)
MCH RBC QN AUTO: 30.1 PG (ref 26.8–34.3)
MCHC RBC AUTO-ENTMCNC: 34.8 G/DL (ref 31.4–37.4)
MCV RBC AUTO: 87 FL (ref 82–98)
MONOCYTES # BLD AUTO: 0.61 THOUSAND/ÂΜL (ref 0.17–1.22)
MONOCYTES NFR BLD AUTO: 8 % (ref 4–12)
NEUTROPHILS # BLD AUTO: 5.79 THOUSANDS/ÂΜL (ref 1.85–7.62)
NEUTS SEG NFR BLD AUTO: 73 % (ref 43–75)
NRBC BLD AUTO-RTO: 0 /100 WBCS
P AXIS: 64 DEGREES
P AXIS: 65 DEGREES
P AXIS: 74 DEGREES
PLATELET # BLD AUTO: 236 THOUSANDS/UL (ref 149–390)
PMV BLD AUTO: 11.3 FL (ref 8.9–12.7)
POTASSIUM SERPL-SCNC: 3.2 MMOL/L (ref 3.5–5.3)
PR INTERVAL: 130 MS
PR INTERVAL: 138 MS
PR INTERVAL: 150 MS
PROT SERPL-MCNC: 7.5 G/DL (ref 6.4–8.4)
QRS AXIS: 48 DEGREES
QRS AXIS: 48 DEGREES
QRS AXIS: 62 DEGREES
QRSD INTERVAL: 68 MS
QRSD INTERVAL: 72 MS
QRSD INTERVAL: 74 MS
QT INTERVAL: 338 MS
QT INTERVAL: 348 MS
QT INTERVAL: 386 MS
QTC INTERVAL: 464 MS
QTC INTERVAL: 507 MS
QTC INTERVAL: 534 MS
RBC # BLD AUTO: 4.95 MILLION/UL (ref 3.81–5.12)
SODIUM SERPL-SCNC: 137 MMOL/L (ref 135–147)
T WAVE AXIS: 38 DEGREES
T WAVE AXIS: 38 DEGREES
T WAVE AXIS: 63 DEGREES
VENTRICULAR RATE: 104 BPM
VENTRICULAR RATE: 107 BPM
VENTRICULAR RATE: 150 BPM
WBC # BLD AUTO: 7.86 THOUSAND/UL (ref 4.31–10.16)

## 2023-07-19 PROCEDURE — 84484 ASSAY OF TROPONIN QUANT: CPT

## 2023-07-19 PROCEDURE — 93005 ELECTROCARDIOGRAM TRACING: CPT

## 2023-07-19 PROCEDURE — 83735 ASSAY OF MAGNESIUM: CPT

## 2023-07-19 PROCEDURE — 85025 COMPLETE CBC W/AUTO DIFF WBC: CPT

## 2023-07-19 PROCEDURE — 99223 1ST HOSP IP/OBS HIGH 75: CPT

## 2023-07-19 PROCEDURE — 99285 EMERGENCY DEPT VISIT HI MDM: CPT

## 2023-07-19 PROCEDURE — 99213 OFFICE O/P EST LOW 20 MIN: CPT

## 2023-07-19 PROCEDURE — 93010 ELECTROCARDIOGRAM REPORT: CPT

## 2023-07-19 PROCEDURE — 93010 ELECTROCARDIOGRAM REPORT: CPT | Performed by: INTERNAL MEDICINE

## 2023-07-19 PROCEDURE — 80053 COMPREHEN METABOLIC PANEL: CPT

## 2023-07-19 PROCEDURE — 36415 COLL VENOUS BLD VENIPUNCTURE: CPT

## 2023-07-19 PROCEDURE — 99223 1ST HOSP IP/OBS HIGH 75: CPT | Performed by: INTERNAL MEDICINE

## 2023-07-19 PROCEDURE — 96365 THER/PROPH/DIAG IV INF INIT: CPT

## 2023-07-19 PROCEDURE — 96366 THER/PROPH/DIAG IV INF ADDON: CPT

## 2023-07-19 PROCEDURE — 96361 HYDRATE IV INFUSION ADD-ON: CPT

## 2023-07-19 RX ORDER — FERROUS SULFATE 325(65) MG
325 TABLET ORAL
Status: DISCONTINUED | OUTPATIENT
Start: 2023-07-20 | End: 2023-07-21 | Stop reason: HOSPADM

## 2023-07-19 RX ORDER — AMILORIDE HYDROCHLORIDE 5 MG/1
10 TABLET ORAL
Status: DISCONTINUED | OUTPATIENT
Start: 2023-07-19 | End: 2023-07-21 | Stop reason: HOSPADM

## 2023-07-19 RX ORDER — HYDROXYZINE HYDROCHLORIDE 10 MG/1
10 TABLET, FILM COATED ORAL 2 TIMES DAILY PRN
Status: DISCONTINUED | OUTPATIENT
Start: 2023-07-19 | End: 2023-07-21 | Stop reason: HOSPADM

## 2023-07-19 RX ORDER — FERROUS SULFATE 325(65) MG
325 TABLET ORAL
COMMUNITY

## 2023-07-19 RX ORDER — POTASSIUM CHLORIDE 20 MEQ/1
40 TABLET, EXTENDED RELEASE ORAL ONCE
Status: COMPLETED | OUTPATIENT
Start: 2023-07-19 | End: 2023-07-19

## 2023-07-19 RX ORDER — HYDROXYCHLOROQUINE SULFATE 200 MG/1
300 TABLET, FILM COATED ORAL
Status: DISCONTINUED | OUTPATIENT
Start: 2023-07-20 | End: 2023-07-21 | Stop reason: HOSPADM

## 2023-07-19 RX ORDER — MAGNESIUM SULFATE HEPTAHYDRATE 40 MG/ML
2 INJECTION, SOLUTION INTRAVENOUS ONCE
Status: COMPLETED | OUTPATIENT
Start: 2023-07-19 | End: 2023-07-19

## 2023-07-19 RX ORDER — PROPRANOLOL HCL 60 MG
120 CAPSULE, EXTENDED RELEASE 24HR ORAL ONCE
Status: COMPLETED | OUTPATIENT
Start: 2023-07-19 | End: 2023-07-19

## 2023-07-19 RX ORDER — POTASSIUM CHLORIDE 20 MEQ/1
40 TABLET, EXTENDED RELEASE ORAL 2 TIMES DAILY
Status: DISCONTINUED | OUTPATIENT
Start: 2023-07-19 | End: 2023-07-21 | Stop reason: HOSPADM

## 2023-07-19 RX ORDER — DIPHENOXYLATE HYDROCHLORIDE AND ATROPINE SULFATE 2.5; .025 MG/1; MG/1
1 TABLET ORAL DAILY
COMMUNITY
End: 2023-07-21

## 2023-07-19 RX ORDER — ACETAMINOPHEN 325 MG/1
650 TABLET ORAL EVERY 6 HOURS PRN
Status: DISCONTINUED | OUTPATIENT
Start: 2023-07-19 | End: 2023-07-21 | Stop reason: HOSPADM

## 2023-07-19 RX ORDER — PROPRANOLOL HYDROCHLORIDE 80 MG/1
240 CAPSULE, EXTENDED RELEASE ORAL DAILY
Status: DISCONTINUED | OUTPATIENT
Start: 2023-07-20 | End: 2023-07-20

## 2023-07-19 RX ORDER — PROPRANOLOL HYDROCHLORIDE 10 MG/1
20 TABLET ORAL EVERY 8 HOURS PRN
Status: DISCONTINUED | OUTPATIENT
Start: 2023-07-19 | End: 2023-07-21 | Stop reason: HOSPADM

## 2023-07-19 RX ADMIN — POTASSIUM CHLORIDE 40 MEQ: 1500 TABLET, EXTENDED RELEASE ORAL at 11:17

## 2023-07-19 RX ADMIN — AMILORIDE HYDROCLORIDE 10 MG: 5 TABLET ORAL at 20:13

## 2023-07-19 RX ADMIN — SODIUM CHLORIDE 1000 ML: 0.9 INJECTION, SOLUTION INTRAVENOUS at 10:43

## 2023-07-19 RX ADMIN — MAGNESIUM SULFATE IN WATER 2 G: 40 INJECTION, SOLUTION INTRAVENOUS at 11:42

## 2023-07-19 RX ADMIN — PROPRANOLOL HYDROCHLORIDE 120 MG: 60 CAPSULE, EXTENDED RELEASE ORAL at 20:13

## 2023-07-19 RX ADMIN — POTASSIUM CHLORIDE 40 MEQ: 1500 TABLET, EXTENDED RELEASE ORAL at 20:13

## 2023-07-19 NOTE — ED PROVIDER NOTES
History  Chief Complaint   Patient presents with   • Chest Pain     Chest tightness - seen at urgent care for chest tightness and palpitations. Had abnormal EKG. Seen at [facility] last week given adenosine. This is a 76-year-old female with past medical history of hypokalemia and POTS presents today with chest tightness and palpitations that started yesterday. Patient reports that last week she had an episode of sustained palpitations and went to the ER on  and found to be in SVT. According to the note she was given 6 of adenosine followed by 12 of adenosine which slowed patient down to sinus tach in the 120s. Was then given Valium and Lopressor with an additional 2 L of fluids and discharged with cardiology follow-up. Patient reports that she has been trying to figure out a plan with cardiology however has not heard back. States that yesterday she was just sitting at work and started to feel the chest tightness and the palpitations again. Episodes have started to become longer and closer together. States that she went to urgent care today and they did an EKG which showed patient in sinus tachycardia with PVCs and they recommended that she come to the ER. Reports that she is on propranolol for POTS and there was no medication change recently. After the discharge from ER last week her potassium supplementation was increased for a few days. She denies any viral illnesses. Denies any fever, chills, headache, dizziness, changes in vision, nausea, vomiting or diarrhea. Prior to Admission Medications   Prescriptions Last Dose Informant Patient Reported? Taking?    AMILoride 5 mg tablet 2023 Self No Yes   Sig: Take 2 tablets (10 mg total) by mouth 2 (two) times a day   Prenatal Multivit-Min-Fe-FA (PRE- PO)   Yes No   Sig: Take by mouth   aspirin (ECOTRIN LOW STRENGTH) 81 mg EC tablet   No No   Sig: Take 1 tablet (81 mg total) by mouth 2 (two) times a day   busPIRone (BUSPAR) 5 mg tablet Past Month Self No Yes   Sig: Take 1 tablet (5 mg total) by mouth 2 (two) times a day   clobetasol (TEMOVATE) 0.05 % ointment  Self No No   Sig: Apply topically 2 (two) times a day   dicyclomine (BENTYL) 20 mg tablet Past Month Self No Yes   Sig: Take 1 tablet (20 mg total) by mouth every 6 (six) hours   ferrous sulfate 325 (65 Fe) mg tablet  Self Yes Yes   Sig: Take 325 mg by mouth daily with breakfast   hydrOXYzine HCL (ATARAX) 10 mg tablet Past Week Self No Yes   Sig: Take 1 tablet (10 mg total) by mouth 2 (two) times a day as needed for anxiety   hydroxychloroquine (PLAQUENIL) 200 mg tablet  Self No No   Sig: Take 1.5 tablets (300 mg total) by mouth in the morning   multivitamin (THERAGRAN) TABS  Self Yes Yes   Sig: Take 1 tablet by mouth daily   potassium chloride (K-DUR,KLOR-CON) 20 mEq tablet 7/19/2023 Self No Yes   Sig: Take 1 tablet (20 mEq total) by mouth 3 (three) times a day   propranolol (INDERAL LA) 120 mg 24 hr capsule 7/19/2023 Self No Yes   Sig: Take 1 capsule (120 mg total) by mouth daily   propranolol (INDERAL) 20 mg tablet 7/18/2023 Self No Yes   Sig: Take 1 tablet (20 mg total) by mouth every 8 (eight) hours   tacrolimus (PROTOPIC) 0.1 % ointment  Self No No   Sig: Apply topically 2 (two) times a day   triamcinolone (KENALOG) 0.025 % ointment  Self No No   Sig: Apply topically twice day for 14 days straight as needed for flares to thinner skin   triamcinolone (KENALOG) 0.1 % ointment  Self No No   Sig: Apply topically twice day for 14 days straight as needed for flares      Facility-Administered Medications: None       Past Medical History:   Diagnosis Date   • Abnormal blood chemistry    • Anesthesia     per pt " Panic attack right before a procedure-has High preop anxiety and wakes up nasty"   • Anxiety    • Eczema    • Exercises 5 to 6 times per week     per pt prior to knee issue -enjoyed running and horseback riding   • Family history of reaction to anesthesia     per pt--"Mom also gets high anxiety with surgeries and wakes up nasty from anesthesia"   • Gastroparesis    • History of vitamin D deficiency    • Hypertension     last assessed 3/12/14   • Hypokalemia     per pt per doctors possibly related renal problem   • Irritable bowel syndrome     with diarrhea   • Lymphadenopathy     last assessed 10/8/14-no current issue   • Myalgia     last assessed 10/8/14   • Myositis     last assessed 10/8/14   • Palpitations    • POTS (postural orthostatic tachycardia syndrome)    • RA (rheumatoid arthritis) ([facility])    • Rheumatoid arthritis ([address])    • Sinus tachycardia     related to dehydration   • Tachycardia     last assessed 6/9/17   • Tooth missing     front upper tooth retainer-   • Wears glasses     for computer use       Past Surgical History:   Procedure Laterality Date   • NV ARTHROSCOPY KNEE LATERAL RELEASE Right 4/13/2023    Procedure: ARTHROSCOPIC RELEASE RETINACULAR;  Surgeon: Scarlet Teixeira DO;  Location: [address] [place] [address];  Service: Orthopedics   • NV EGD TRANSORAL BIOPSY SINGLE/MULTIPLE N/A 5/4/2016    Procedure: ESOPHAGOGASTRODUODENOSCOPY (EGD); Surgeon: Leo Zimmerman MD;  Location: [facility];   Service: Gastroenterology   • NV RCNSTJ [address] W/XTNSR RELIGNMT&/MUSC RL Right 4/13/2023    Procedure: ARTHROSCOPIC REALIGNMENT PATELLA;  Surgeon: Scarlet Teixeira DO;  Location: [address] [place] [address];  Service: Orthopedics   • UPPER GASTROINTESTINAL ENDOSCOPY     • WISDOM TOOTH EXTRACTION         Family History   Problem Relation Age of Onset   • Hypokalemia Mother    • Hypotension Mother    • Anxiety disorder Mother         NOS   • Lung cancer Father    • Skin cancer Father    • Crohn's disease Father    • No Known Problems Sister    • No Known Problems Sister    • No Known Problems Brother    • Coronary artery disease Maternal Grandmother    • Heart disease Maternal Grandmother    • Alzheimer's disease Maternal Grandmother    • Arthritis Paternal Grandmother    • Rheum arthritis Paternal Grandmother    • Hypertension Paternal Grandmother    • COPD Paternal Grandfather      I have reviewed and agree with the history as documented. E-Cigarette/Vaping   • E-Cigarette Use Never User      E-Cigarette/Vaping Substances   • Nicotine No    • THC No    • CBD No    • Flavoring No    • Other No    • Unknown No      Social History     Tobacco Use   • Smoking status: Never   • Smokeless tobacco: Never   Vaping Use   • Vaping Use: Never used   Substance Use Topics   • Alcohol use: Yes     Alcohol/week: 1.0 standard drink of alcohol     Types: 1 Standard drinks or equivalent per week     Comment: socially   • Drug use: No       Review of Systems   Constitutional: Negative for chills and fever. Respiratory: Positive for chest tightness. Cardiovascular: Positive for palpitations. All other systems reviewed and are negative. Physical Exam  Physical Exam  Vitals and nursing note reviewed. Constitutional:       General: She is not in acute distress. Appearance: She is well-developed. She is not ill-appearing. HENT:      Head: Normocephalic and atraumatic. Eyes:      Conjunctiva/sclera: Conjunctivae normal.   Cardiovascular:      Rate and Rhythm: Regular rhythm. Tachycardia present. Heart sounds: No murmur heard. Pulmonary:      Effort: Pulmonary effort is normal. No respiratory distress. Breath sounds: Normal breath sounds. Abdominal:      Palpations: Abdomen is soft. Tenderness: There is no abdominal tenderness. Musculoskeletal:         General: No swelling. Cervical back: Neck supple. Skin:     General: Skin is warm and dry. Capillary Refill: Capillary refill takes less than 2 seconds. Neurological:      Mental Status: She is alert.    Psychiatric:         Mood and Affect: Mood normal.         Vital Signs  ED Triage Vitals   Temperature Pulse Respirations Blood Pressure SpO2   07/19/23 1034 07/19/23 1034 07/19/23 1034 07/19/23 1034 07/19/23 1034   98.9 °F (37.2 °C) (!) 108 20 156/76 100 %      Temp Source Heart Rate Source Patient Position - Orthostatic VS BP Location FiO2 (%)   07/19/23 1034 07/19/23 1034 07/19/23 1034 07/19/23 1034 --   Oral Monitor Sitting Right arm       Pain Score       07/19/23 1330       No Pain           Vitals:    07/19/23 1315 07/19/23 1329 07/19/23 1330 07/19/23 1345   BP:  161/80 149/70    Pulse: (!) 107 (!) 125 (!) 125 (!) 106   Patient Position - Orthostatic VS:             Visual Acuity      ED Medications  Medications   sodium chloride 0.9 % bolus 1,000 mL (0 mL Intravenous Stopped 7/19/23 1228)   potassium chloride (K-DUR,KLOR-CON) CR tablet 40 mEq (40 mEq Oral Given 7/19/23 1117)   magnesium sulfate 2 g/50 mL IVPB (premix) 2 g (2 g Intravenous New Bag 7/19/23 1142)       Diagnostic Studies  Results Reviewed     Procedure Component Value Units Date/Time    HS Troponin I 2hr [[id number]]  (Normal) Collected: 07/19/23 1231    Lab Status: Final result Specimen: Blood from Arm, Right Updated: 07/19/23 1301     hs TnI 2hr 3 ng/L      Delta 2hr hsTnI >1 ng/L     HS Troponin 0hr (reflex protocol) [[id number]]  (Normal) Collected: 07/19/23 1038    Lab Status: Final result Specimen: Blood from Arm, Right Updated: 07/19/23 1116     hs TnI 0hr <2 ng/L     Magnesium [[id number]]  (Abnormal) Collected: 07/19/23 1038    Lab Status: Final result Specimen: Blood from Arm, Right Updated: 07/19/23 1108     Magnesium 1.8 mg/dL     Comprehensive metabolic panel [[id number]]  (Abnormal) Collected: 07/19/23 1038    Lab Status: Final result Specimen: Blood from Arm, Right Updated: 07/19/23 1107     Sodium 137 mmol/L      Potassium 3.2 mmol/L      Chloride 104 mmol/L      CO2 23 mmol/L      ANION GAP 10 mmol/L      BUN 9 mg/dL      Creatinine 0.83 mg/dL      Glucose 104 mg/dL      Calcium 9.6 mg/dL      AST 14 U/L      ALT 12 U/L      Alkaline Phosphatase 97 U/L      Total Protein 7.5 g/dL      Albumin 4.4 g/dL      Total Bilirubin 0.58 mg/dL      eGFR 95 ml/min/1.73sq m Narrative:      National Kidney Disease Foundation guidelines for Chronic Kidney Disease (CKD):   •  Stage 1 with normal or high GFR (GFR > 90 mL/min/1.73 square meters)  •  Stage 2 Mild CKD (GFR = 60-89 mL/min/1.73 square meters)  •  Stage 3A Moderate CKD (GFR = 45-59 mL/min/1.73 square meters)  •  Stage 3B Moderate CKD (GFR = 30-44 mL/min/1.73 square meters)  •  Stage 4 Severe CKD (GFR = 15-29 mL/min/1.73 square meters)  •  Stage 5 End Stage CKD (GFR <15 mL/min/1.73 square meters)  Note: GFR calculation is accurate only with a steady state creatinine    CBC and differential [[id number]] Collected: 07/19/23 1038    Lab Status: Final result Specimen: Blood from Arm, Right Updated: 07/19/23 1051     WBC 7.86 Thousand/uL      RBC 4.95 Million/uL      Hemoglobin 14.9 g/dL      Hematocrit 42.8 %      MCV 87 fL      MCH 30.1 pg      MCHC 34.8 g/dL      RDW 11.7 %      MPV 11.3 fL      Platelets 444 Thousands/uL      nRBC 0 /100 WBCs      Neutrophils Relative 73 %      Immat GRANS % 0 %      Lymphocytes Relative 18 %      Monocytes Relative 8 %      Eosinophils Relative 0 %      Basophils Relative 1 %      Neutrophils Absolute 5.79 Thousands/µL      Immature Grans Absolute 0.01 Thousand/uL      Lymphocytes Absolute 1.38 Thousands/µL      Monocytes Absolute 0.61 Thousand/µL      Eosinophils Absolute 0.02 Thousand/µL      Basophils Absolute 0.05 Thousands/µL                  No orders to display              Procedures  ECG 12 Lead Documentation Only    Date/Time: 7/19/2023 11:12 AM    Performed by: Lonnie Bennett PA-C  Authorized by: Lonnie Bennett PA-C    Indications / Diagnosis:  Palpitations, chest tightness  ECG reviewed by me, the ED Provider: yes    Patient location:  ED  Previous ECG:     Previous ECG:  Compared to current (3/10/23)    Comparison ECG info:  3/10/23    Similarity:  Changes noted  Interpretation:     Interpretation: abnormal    Rate:     ECG rate:  107    ECG rate assessment: tachycardic    Rhythm:     Rhythm: sinus rhythm    Ectopy:     Ectopy: PVCs    QRS:     QRS axis:  Normal    QRS intervals:  Normal  Conduction:     Conduction: normal    ST segments:     ST segments:  Normal  T waves:     T waves: normal               ED Course  ED Course as of 07/19/23 1417   Wed Jul 19, 2023   1108 Magnesium(!): 1.8   1109 Potassium(!): 3.2   1110 Review of chart- no ECGs from [facility] visit. None documented in the note, in media, cardiology tab or MUSE.    1209 ECG 12 lead  Sinus tachycardia with Premature ventricular complexes  Possible Left atrial enlargement  Borderline ECG  When compared with ECG of 10-MAR-2023 08:15,  Premature supraventricular complexes are now Present   1239 ECG 12 lead  Sinus tachycardia  Possible Left atrial enlargement  Borderline ECG  When compared with ECG of 19-JUL-2023 10:38,  Premature ventricular complexes are no longer Present   1304 Delta 2hr hsTnI: >1   1331 Dr. Glenis Sharpe (cardiology fellow) saw and evaluated pt and [facility]   1332 Informed by RN that pt's HR went up to 160s and pt felt flushed and light headed, otherwise stable. By the time ECG was performed pt's HR went back down to 100s. ECG showed 150 BPM with PVCs. 1341 Discussed recommendation for admission with pt- she is agreeable    1358 TT to [facility] for admission                               SBIRT 20yo+    Flowsheet Row Most Recent Value   Initial Alcohol Screen: US AUDIT-C     1. How often do you have a drink containing alcohol? 0 Filed at: 07/19/2023 1035   2. How many drinks containing alcohol do you have on a typical day you are drinking? 0 Filed at: 07/19/2023 1035   3a. Male UNDER 65: How often do you have five or more drinks on one occasion? 0 Filed at: 07/19/2023 1035   3b. FEMALE Any Age, or MALE 65+: How often do you have 4 or more drinks on one occassion?  0 Filed at: 07/19/2023 1035   Audit-C Score 0 Filed at: 07/19/2023 1035   RAZ: How many times in the past year have you. .. Used an illegal drug or used a prescription medication for non-medical reasons? Never Filed at: 07/19/2023 1035                    Medical Decision Making  This is a 19-year-old female with past medical history of hypokalemia and POTS with propranolol presents today with chest tightness and palpitations that started yesterday. Patient was seen at [facility] last week and apparently had SVT and underwent cardioversion with 2 rounds of adenosine- no ECGs were recorded for this encounter (reviewed notes, MUSE, media and cardiology tabs). Patient went to have repeat labs at urgent care and EKG was performed and was told that she was having sinus tachycardia with frequent PVCs and was at risk of going back into SVT so she presented here. On arrival patient was tachycardic 100-110s with frequent PVCs. Blood work revealed some mild hypokalemia and hypomagnesemia. Discussion had with cardiology who did see the pt as a consult and recommended pt be admitted to [facility] and beta blocker be uptitrated. Pt agreed to this. Was admitted to [facility]. Remained stable while under my care. Hypokalemia: acute illness or injury  Palpitations: acute illness or injury  POTS (postural orthostatic tachycardia syndrome): chronic illness or injury  SVT (supraventricular tachycardia) ([address]): acute illness or injury  Amount and/or Complexity of Data Reviewed  Labs: ordered. Decision-making details documented in ED Course. ECG/medicine tests: ordered. Decision-making details documented in ED Course. Risk  Prescription drug management. Decision regarding hospitalization.           Disposition  Final diagnoses:   POTS (postural orthostatic tachycardia syndrome)   Hypokalemia   Palpitations   SVT (supraventricular tachycardia) ([address])     Time reflects when diagnosis was documented in both MDM as applicable and the Disposition within this note     Time User Action Codes Description Comment    7/19/2023 11:10 AM Rosa Octavio Add [G90. A] POTS (postural orthostatic tachycardia syndrome)     7/19/2023 11:10 AM Rosa Octavio Add [E87.6] Hypokalemia     7/19/2023 11:10 AM Rosa Octavio Add [R00.2] Palpitations     7/19/2023 11:10 AM Rosa Octavio Add [I47.1] SVT (supraventricular tachycardia) ([address])     7/19/2023  2:04 PM Rosa Octavio Modify [G90. A] POTS (postural orthostatic tachycardia syndrome)     7/19/2023  2:04 PM Rosa Octavio Modify [R00.2] Palpitations       ED Disposition     ED Disposition   Admit    Condition   Stable    Date/Time   Wed Jul 19, 2023  2:05 PM    Comment   Case was discussed with [facility] and the patient's admission status was agreed to be Admission Status: inpatient status to the service of Dr. Waylon Anders . Follow-up Information    None         Patient's Medications   Discharge Prescriptions    No medications on file       No discharge procedures on file.     PDMP Review     None          ED Provider  Electronically Signed by           Trish Vera PA-C  07/19/23 7527

## 2023-07-19 NOTE — CONSULTS
Consultation - Cardiology   Linn Apley 33 y.o. female MRN: [id number]  Unit/Bed#: ED-27 Encounter: [id number]    Inpatient consult to Cardiology  Consult performed by: Jaja Hercules MD  Consult ordered by: Cayla Castañeda PA-C          History of Present Illness   Physician Requesting Consult: [employer], DO  Reason for Consult / Principal Problem: Palpitations, tachycardia, history of SVT, POTS      Assessment:  Active Problems: There are no active Hospital Problems. Assessment/ Plan:    Sinus tachycardia  Currently sinus tachycardia with PVCs, however with significant variation of rate  ?atrial tachycardia, closer to lennie terminalis given normal P axis  TSH within normal limit  Does not appear dehydrated, creatinine normal limits, keeping well-hydrated  No fever/chills/leukocytosis  Potassium 3.2, magnesium 1.8  On propanolol 120 mg long-acting daily    History of SVT  Noted on 7/13/2023, however EKGs available documenting SVT  Status post adenosine 6, 12, 12    POTS  Diagnosed in 2016 with tilt table test  On propranolol 120 mg long-acting  Follows with Dr. 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 Discussed with Dr. Cale Acevedo on further management, recommend admitting patient given this is his second ED visit  2. Increase long-acting propranolol to 240 mg daily (was on metoprolol in the past, transitioned to propanolol as she is planning a pregnancy in the near future per patient)  3. Continue short acting propranolol 20 mg as needed every 8 hours   4. Maintain potassium above 4, magnesium above 2. May need higher maintenance potassium on discharge  5. Amiloride 5 mg daily for hypokalemia  6.  Outpatient Zio patch  7. Will need follow-up with electrophysiology on discharge    HPI: Linn Apley is a 33y.o. 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 states that this is different from her POTS symptoms, and has been persistent with no change with position. She had a similar episode on 7/13/2023 with an ED visit. Per chart review, she was in SVT with return to normal sinus rhythm post IV adenosine 6, 12, 12. No EKGs available from 7/13. She had another ED visit November 2022 which retrospectively again, feels like she had SVT. She was seen at [facility] now, with an EKG showing sinus tachycardia with PACs, she was sent to the emergency department for further evaluation. On presentation to the emergency department, she was in sinus tachycardia with a heart rate ranging between 100-120. Blood pressure has been stable. EKG shows sinus tachycardia. Labs are significant for a potassium of 3.2, magnesium 1.8. Normal renal functions. No anemia. TSH within normal limits. No fever. She stays well-hydrated due to her POTS. Review of Systems   Constitutional: Negative. HENT: Negative. Eyes: Negative. Cardiovascular: Positive for irregular heartbeat and palpitations. Negative for orthopnea and paroxysmal nocturnal dyspnea. Respiratory: Negative for cough, shortness of breath, sleep disturbances due to breathing and snoring. Endocrine: Negative. Skin: Negative. Musculoskeletal: Negative. Genitourinary: Negative. Negative for dysuria and frequency. Neurological: Negative. Psychiatric/Behavioral: Negative. Allergic/Immunologic: Negative. All other systems reviewed and are negative.     Historical Information   Past Medical History:   Diagnosis Date   • Abnormal blood chemistry    • Anesthesia per pt " Panic attack right before a procedure-has High preop anxiety and wakes up nasty"   • Anxiety    • Eczema    • Exercises 5 to 6 times per week     per pt prior to knee issue -enjoyed running and horseback riding   • Family history of reaction to anesthesia     per pt--"Mom also gets high anxiety with surgeries and wakes up nasty from anesthesia"   • Gastroparesis    • History of vitamin D deficiency    • Hypertension     last assessed 3/12/14   • Hypokalemia     per pt per doctors possibly related renal problem   • Irritable bowel syndrome     with diarrhea   • Lymphadenopathy     last assessed 10/8/14-no current issue   • Myalgia     last assessed 10/8/14   • Myositis     last assessed 10/8/14   • Palpitations    • POTS (postural orthostatic tachycardia syndrome)    • RA (rheumatoid arthritis) ([facility])    • Rheumatoid arthritis ([address])    • Sinus tachycardia     related to dehydration   • Tachycardia     last assessed 6/9/17   • Tooth missing     front upper tooth retainer-   • Wears glasses     for computer use     Past Surgical History:   Procedure Laterality Date   • NE ARTHROSCOPY KNEE LATERAL RELEASE Right 4/13/2023    Procedure: ARTHROSCOPIC RELEASE RETINACULAR;  Surgeon: Celine An DO;  Location: [facility] MAIN OR;  Service: Orthopedics   • NE EGD TRANSORAL BIOPSY SINGLE/MULTIPLE N/A 5/4/2016    Procedure: ESOPHAGOGASTRODUODENOSCOPY (EGD); Surgeon: Joseph Saucedo MD;  Location:  GI LAB;   Service: Gastroenterology   • NE RCNSTJ [address] W/XTNSR RELIGNMT&/MUSC RL Right 4/13/2023    Procedure: ARTHROSCOPIC REALIGNMENT PATELLA;  Surgeon: Celine An DO;  Location: [facility] MAIN OR;  Service: Orthopedics   • UPPER GASTROINTESTINAL ENDOSCOPY     • WISDOM TOOTH EXTRACTION       Social History     Substance and Sexual Activity   Alcohol Use Yes   • Alcohol/week: 1.0 standard drink of alcohol   • Types: 1 Standard drinks or equivalent per week    Comment: socially     Social History     Substance and Sexual Activity   Drug Use No     Social History     Tobacco Use   Smoking Status Never   Smokeless Tobacco Never     Family History:   Family History   Problem Relation Age of Onset   • Hypokalemia Mother    • Hypotension Mother    • Anxiety disorder Mother         NOS   • Lung cancer Father    • Skin cancer Father    • Crohn's disease Father    • No Known Problems Sister    • No Known Problems Sister    • No Known Problems Brother    • Coronary artery disease Maternal Grandmother    • Heart disease Maternal Grandmother    • Alzheimer's disease Maternal Grandmother    • Arthritis Paternal Grandmother    • Rheum arthritis Paternal Grandmother    • Hypertension Paternal Grandmother    • COPD Paternal Grandfather        Meds/Allergies   all current active meds have been reviewed, current meds:   No current facility-administered medications for this encounter. and PTA meds:   Prior to Admission Medications   Prescriptions Last Dose Informant Patient Reported? Taking?    AMILoride 5 mg tablet 2023 Self No Yes   Sig: Take 2 tablets (10 mg total) by mouth 2 (two) times a day   Prenatal Multivit-Min-Fe-FA (PRE- PO)   Yes No   Sig: Take by mouth   aspirin (ECOTRIN LOW STRENGTH) 81 mg EC tablet   No No   Sig: Take 1 tablet (81 mg total) by mouth 2 (two) times a day   busPIRone (BUSPAR) 5 mg tablet Past Month Self No Yes   Sig: Take 1 tablet (5 mg total) by mouth 2 (two) times a day   clobetasol (TEMOVATE) 0.05 % ointment  Self No No   Sig: Apply topically 2 (two) times a day   dicyclomine (BENTYL) 20 mg tablet Past Month Self No Yes   Sig: Take 1 tablet (20 mg total) by mouth every 6 (six) hours   ferrous sulfate 325 (65 Fe) mg tablet  Self Yes Yes   Sig: Take 325 mg by mouth daily with breakfast   hydrOXYzine HCL (ATARAX) 10 mg tablet Past Week Self No Yes   Sig: Take 1 tablet (10 mg total) by mouth 2 (two) times a day as needed for anxiety   hydroxychloroquine (PLAQUENIL) 200 mg tablet  Self No No   Sig: Take 1.5 tablets (300 mg total) by mouth in the morning   multivitamin (THERAGRAN) TABS  Self Yes Yes   Sig: Take 1 tablet by mouth daily   potassium chloride (K-DUR,KLOR-CON) 20 mEq tablet 7/19/2023 Self No Yes   Sig: Take 1 tablet (20 mEq total) by mouth 3 (three) times a day   propranolol (INDERAL LA) 120 mg 24 hr capsule 7/19/2023 Self No Yes   Sig: Take 1 capsule (120 mg total) by mouth daily   propranolol (INDERAL) 20 mg tablet 7/18/2023 Self No Yes   Sig: Take 1 tablet (20 mg total) by mouth every 8 (eight) hours   tacrolimus (PROTOPIC) 0.1 % ointment  Self No No   Sig: Apply topically 2 (two) times a day   triamcinolone (KENALOG) 0.025 % ointment  Self No No   Sig: Apply topically twice day for 14 days straight as needed for flares to thinner skin   triamcinolone (KENALOG) 0.1 % ointment  Self No No   Sig: Apply topically twice day for 14 days straight as needed for flares      Facility-Administered Medications: None          Allergies   Allergen Reactions   • Prednisone Hyperactivity     Medrol dose samantha only   • Serotonin Reuptake Inhibitors (Ssris) Anaphylaxis and Hives   • Augmentin [Amoxicillin-Pot Clavulanate] Hives and Rash   • Clindamycin Rash   • Penicillins Rash   • Sulfa Antibiotics Rash   • Azithromycin Rash       Objective   Vitals: Blood pressure 124/63, pulse 97, temperature 98.9 °F (37.2 °C), temperature source Oral, resp. rate 20, SpO2 99 %, not currently breastfeeding. , There is no height or weight on file to calculate BMI.,   Orthostatic Blood Pressures    Flowsheet Row Most Recent Value   Blood Pressure 124/63 filed at 07/19/2023 1230   Patient Position - Orthostatic VS Sitting filed at 07/19/2023 9011        Systolic (05JFV), FOU:040 , Min:124 , RJD:369     Diastolic (40QBF), GFS:90, Min:63, Max:106      Intake/Output Summary (Last 24 hours) at 7/19/2023 1328  Last data filed at 7/19/2023 1228  Gross per 24 hour   Intake 1150 ml   Output --   Net 1150 ml       Weight (last 2 days)     None Invasive Devices     Peripheral Intravenous Line  Duration           Peripheral IV 07/19/23 Right Antecubital <1 day                    Physical Exam: /70   Pulse (!) 106   Temp 98.9 °F (37.2 °C) (Oral)   Resp 18   SpO2 98%     General Appearance:    Alert, cooperative, no distress, appears stated age   Head:    Normocephalic, without obvious abnormality, atraumatic   Eyes:    PERRL, conjunctiva/corneas clear, EOM's intact, fundi     benign, both eyes   Neck:   Supple, symmetrical, trachea midline, no adenopathy;     thyroid:  no enlargement/tenderness/nodules; no carotid    bruit or JVD   Back:     Symmetric, no curvature, ROM normal, no CVA tenderness   Lungs:     Clear to auscultation bilaterally, respirations unlabored   Chest Wall:    No tenderness or deformity    Heart:    Tachycardia, irregular rhythm, S1 and S2 normal, no murmur, rub   or gallop   Abdomen:     Soft, non-tender, bowel sounds active all four quadrants,     no masses, no organomegaly   Extremities:   Extremities normal, atraumatic, no cyanosis or edema   Pulses:   2+ and symmetric all extremities   Skin:   Skin color, texture, turgor normal, no rashes or lesions   Neurologic:   CNII-XII intact, normal strength, sensation and reflexes     throughout           Laboratory Results:        CBC with diff:   Results from last 7 days   Lab Units 07/19/23  1038 07/13/23 2010   WBC Thousand/uL 7.86 9.84   HEMOGLOBIN g/dL 14.9 14.3   HEMATOCRIT % 42.8 40.7   MCV fL 87 87   PLATELETS Thousands/uL 236 221   RBC Million/uL 4.95 4.68   MCH pg 30.1 30.6   MCHC g/dL 34.8 35.1   RDW % 11.7 11.9   MPV fL 11.3 11.4   NRBC AUTO /100 WBCs 0 0         CMP:  Results from last 7 days   Lab Units 07/19/23  1038 07/13/23 2010   POTASSIUM mmol/L 3.2* 3.2*   CHLORIDE mmol/L 104 107   CO2 mmol/L 23 24   BUN mg/dL 9 13   CREATININE mg/dL 0.83 0.98   CALCIUM mg/dL 9.6 9.5   AST U/L 14 15   ALT U/L 12 22   ALK PHOS U/L 97 118*   EGFR ml/min/1.73sq m 95 78 BMP:  Results from last 7 days   Lab Units 07/19/23  1038 07/13/23 2010   POTASSIUM mmol/L 3.2* 3.2*   CHLORIDE mmol/L 104 107   CO2 mmol/L 23 24   BUN mg/dL 9 13   CREATININE mg/dL 0.83 0.98   CALCIUM mg/dL 9.6 9.5       BNP:  No results for input(s): "BNP" in the last 72 hours. Magnesium:   Results from last 7 days   Lab Units 07/19/23  1038 07/13/23 2010   MAGNESIUM mg/dL 1.8* 2.0       Coags:       TSH:       Hemoglobin A1C       Lipid Profile:         Cardiac testing:   No results found for this or any previous visit. No results found for this or any previous visit. No results found for this or any previous visit. No results found for this or any previous visit. Imaging: I have personally reviewed pertinent reports. XR chest 1 view portable    Result Date: 7/14/2023  Narrative: CHEST INDICATION:   cp. COMPARISON: 12/4/2017 EXAM PERFORMED/VIEWS:  XR CHEST PORTABLE FINDINGS: Cardiomediastinal silhouette appears unremarkable. The lungs are clear. No pneumothorax or pleural effusion. Osseous structures appear within normal limits for patient age. Impression: No acute cardiopulmonary disease. Workstation performed: [id number]     [address] bedside procedure    Result Date: 7/14/2023  Narrative: [id number]. [id number]      EKG reviewed personally: Sinus tachycardia, PVC  Telemetry reviewed personally: Sinus tachycardia, PVCs      Code Status: Prior

## 2023-07-19 NOTE — H&P
[facility]  H&P  Name: Alyse Guzman 33 y.o. female I MRN: [id number]  Unit/Bed#: ED-27 I Date of Admission: 7/19/2023   Date of Service: 7/19/2023 I Hospital Day: 0      Assessment/Plan   * SVT (supraventricular tachycardia) (HCC)  Assessment & Plan  · Patient is a 14-year-old female with a past medical history of POTS, hypokalemia, mixed connective tissue disease who presented to the hospital with palpitation, SVT  · She was seen in the ED 7/13 she was in SVT which broke with adenosine  · She follows EP, she is on long-acting propranolol 120 mg daily and short-acting propranolol 20 mg every 8 as needed  · TSH wnl  · Presented with chest tightness and palpitation today, EKG showed sinus tachycardia rate 150  · Evaluated by cardiology, appreciate recommendations  · Increase long-acting propranolol to 240 mg  · Patient took propranolol 120 mg in the morning we will give an additional dose of 120 mg today  · Continue short acting propranolol 20 mg every 8 hours as needed for persistent heart rate above 120 or if patient is symptomatic  · Magnesium above 2  · Potassium above 4  · Telemetry    POTS (postural orthostatic tachycardia syndrome)  Assessment & Plan  · History of POTS currently maintained on propranolol, initially diagnosed in 2016  · Follows EP    Undifferentiated connective tissue disease ([address])  Assessment & Plan  · Follows rheumatology  · Continue Plaquenil    Anxiety  Assessment & Plan  · On BuSpar and hydroxyzine as needed    Hypomagnesemia  Assessment & Plan  · Magnesium 1.8  · Magnesium sulfate 2 g IV  · Recheck magnesium tomorrow, magnesium level should be above 2    Hypokalemia  Assessment & Plan  · Managed by nephrology  · Continue amiloride 10 mg twice daily  · Potassium is 3.2 today  · Home dose is potassium chloride 20 mEq 3 times daily  · We will give potassium chloride 40 mEq twice daily  · Recheck BMP tomorrow and keep potassium above 4       VTE Pharmacologic Prophylaxis: VTE Score: 1 Low Risk (Score 0-2) - Encourage Ambulation. Code Status: Level 1 - Full Code   Discussion with family: Patient    Anticipated Length of Stay: Patient will be admitted on an inpatient basis with an anticipated length of stay of greater than 2 midnights secondary to SVT. Total Time Spent on Date of Encounter in care of patient: 55 minutes This time was spent on one or more of the following: performing physical exam; counseling and coordination of care; obtaining or reviewing history; documenting in the medical record; reviewing/ordering tests, medications or procedures; communicating with other healthcare professionals and discussing with patient's family/caregivers. Chief Complaint: Palpitation, chest tightness    History of Present Illness:  Mike Ramos is a 33 y.o. female with a PMH of POTS, hypokalemia, hyponatremia, connective tissue disease who presents with palpitation and chest tightness. Patient has history of POTS, follows EP, she is on propranolol 120 mg long-acting daily, and heart acting propranolol 20 mg every 8 hours as needed. She was seen in the ED 7/13, prior to that for few days she had episodes of chest tightness and palpitation. In the ED she was given adenosine for SVT and then it broke. For the past couple of days patient felt palpitations which was more prolonged than it usually is. She took an additional dose of propranolol yesterday. Today she had a prolonged episode and came to the ED. She also has a history of hypokalemia and hyponatremia followed by nephrology     Review of Systems:  Review of Systems   Constitutional: Negative for chills and fever. HENT: Negative. Respiratory: Negative for shortness of breath and wheezing. Cardiovascular: Positive for palpitations. Chest tightness   Gastrointestinal: Negative for abdominal pain, constipation, diarrhea, nausea and vomiting. Genitourinary: Negative for dysuria. Musculoskeletal: Negative. Skin: Negative. Neurological: Negative for dizziness. Hematological: Negative. Psychiatric/Behavioral: Negative. Past Medical and Surgical History:   Past Medical History:   Diagnosis Date   • Abnormal blood chemistry    • Anesthesia     per pt " Panic attack right before a procedure-has High preop anxiety and wakes up nasty"   • Anxiety    • Eczema    • Exercises 5 to 6 times per week     per pt prior to knee issue -enjoyed running and horseback riding   • Family history of reaction to anesthesia     per pt--"Mom also gets high anxiety with surgeries and wakes up nasty from anesthesia"   • Gastroparesis    • History of vitamin D deficiency    • Hypertension     last assessed 3/12/14   • Hypokalemia     per pt per doctors possibly related renal problem   • Irritable bowel syndrome     with diarrhea   • Lymphadenopathy     last assessed 10/8/14-no current issue   • Myalgia     last assessed 10/8/14   • Myositis     last assessed 10/8/14   • Palpitations    • POTS (postural orthostatic tachycardia syndrome)    • RA (rheumatoid arthritis) ([facility])    • Rheumatoid arthritis ([address])    • Sinus tachycardia     related to dehydration   • Tachycardia     last assessed 6/9/17   • Tooth missing     front upper tooth retainer-   • Wears glasses     for computer use       Past Surgical History:   Procedure Laterality Date   • DC ARTHROSCOPY KNEE LATERAL RELEASE Right 4/13/2023    Procedure: ARTHROSCOPIC RELEASE RETINACULAR;  Surgeon: Ines Hernandez DO;  Location: [address] [place] [address] MAIN OR;  Service: Orthopedics   • DC EGD TRANSORAL BIOPSY SINGLE/MULTIPLE N/A 5/4/2016    Procedure: ESOPHAGOGASTRODUODENOSCOPY (EGD); Surgeon: Jassi Stack MD;  Location:  GI LAB;   Service: Gastroenterology   • DC RCNSTJ [address] [place] [address] W/XTNSR RELIGNMT&/MUSC RL Right 4/13/2023    Procedure: ARTHROSCOPIC REALIGNMENT PATELLA;  Surgeon: Ines Hernandez DO;  Location:  MAIN OR;  Service: Orthopedics   • [facility] GASTROINTESTINAL ENDOSCOPY     • WISDOM TOOTH EXTRACTION         Meds/Allergies:  Prior to Admission medications    Medication Sig Start Date End Date Taking?  Authorizing Provider   AMILoride 5 mg tablet Take 2 tablets (10 mg total) by mouth 2 (two) times a day 3/18/22  Yes Lora Britton MD   busPIRone (BUSPAR) 5 mg tablet Take 1 tablet (5 mg total) by mouth 2 (two) times a day 22  Yes ENMA Graham   dicyclomine (BENTYL) 20 mg tablet Take 1 tablet (20 mg total) by mouth every 6 (six) hours 21  Yes [facility]   ferrous sulfate 325 (65 Fe) mg tablet Take 325 mg by mouth daily with breakfast   Yes Historical Provider, MD   hydrOXYzine HCL (ATARAX) 10 mg tablet Take 1 tablet (10 mg total) by mouth 2 (two) times a day as needed for anxiety 21  Yes Edna Sandoval MD   multivitamin (THERAGRAN) TABS Take 1 tablet by mouth daily   Yes Historical Provider, MD   potassium chloride (K-DUR,KLOR-CON) 20 mEq tablet Take 1 tablet (20 mEq total) by mouth 3 (three) times a day 22  Yes Lora Britton MD   propranolol (INDERAL LA) 120 mg 24 hr capsule Take 1 capsule (120 mg total) by mouth daily 11/10/22  Yes [facility] DO Roz   propranolol (INDERAL) 20 mg tablet Take 1 tablet (20 mg total) by mouth every 8 (eight) hours 3/11/22  Yes Nayana Oneill MD   aspirin (ECOTRIN LOW STRENGTH) 81 mg EC tablet Take 1 tablet (81 mg total) by mouth 2 (two) times a day 23  Drea Almeida PA-C   clobetasol (TEMOVATE) 0.05 % ointment Apply topically 2 (two) times a day 5/10/22   Windy Livingston MD   hydroxychloroquine (PLAQUENIL) 200 mg tablet Take 1.5 tablets (300 mg total) by mouth in the morning 22  Aston Griffin MD   Prenatal Multivit-Min-Fe-FA (PRE-GORDON PO) Take by mouth    Historical Provider, MD   tacrolimus (PROTOPIC) 0.1 % ointment Apply topically 2 (two) times a day 10/5/22   Mora Mendoza MD   triamcinolone (KENALOG) 0.025 % ointment Apply topically twice day for 14 days straight as needed for flares to thinner skin 12/29/20   Liya Sher MD   triamcinolone (KENALOG) 0.1 % ointment Apply topically twice day for 14 days straight as needed for flares 12/29/20   Liya Sher MD   Multiple Vitamins-Minerals (MULTIVITAL) CHEW Chew 1 tablet daily  Patient not taking: Reported on 6/2/2023 7/19/23  Historical Provider, MD   oxyCODONE (ROXICODONE) 5 immediate release tablet Take 1 tablet (5 mg total) by mouth every 4 (four) hours as needed for moderate pain for up to 15 doses Max Daily Amount: 30 mg  Patient not taking: Reported on 4/25/2023 4/13/23 7/19/23  Leonard Thakkar PA-C     I have reviewed home medications with patient personally. Allergies:    Allergies   Allergen Reactions   • Prednisone Hyperactivity     Medrol dose samantha only   • Serotonin Reuptake Inhibitors (Ssris) Anaphylaxis and Hives   • Augmentin [Amoxicillin-Pot Clavulanate] Hives and Rash   • Clindamycin Rash   • Penicillins Rash   • Sulfa Antibiotics Rash   • Azithromycin Rash       Social History:  Marital Status: /Civil Union   Substance Use History:   Social History     Substance and Sexual Activity   Alcohol Use Yes   • Alcohol/week: 1.0 standard drink of alcohol   • Types: 1 Standard drinks or equivalent per week    Comment: socially     Social History     Tobacco Use   Smoking Status Never   Smokeless Tobacco Never     Social History     Substance and Sexual Activity   Drug Use No       Family History:  Family History   Problem Relation Age of Onset   • Hypokalemia Mother    • Hypotension Mother    • Anxiety disorder Mother         NOS   • Lung cancer Father    • Skin cancer Father    • Crohn's disease Father    • No Known Problems Sister    • No Known Problems Sister    • No Known Problems Brother    • Coronary artery disease Maternal Grandmother    • Heart disease Maternal Grandmother    • Alzheimer's disease Maternal Grandmother    • Arthritis Paternal Grandmother    • Rheum arthritis Paternal Grandmother    • Hypertension Paternal Grandmother    • COPD Paternal Grandfather        Physical Exam:     Vitals:   Blood Pressure: 149/70 (07/19/23 1330)  Pulse: (!) 106 (07/19/23 1345)  Temperature: 98.9 °F (37.2 °C) (07/19/23 1034)  Temp Source: Oral (07/19/23 1034)  Respirations: 18 (07/19/23 1345)  SpO2: 98 % (07/19/23 1345)    Physical Exam  Constitutional:       General: She is not in acute distress. HENT:      Head: Atraumatic. Cardiovascular:      Rate and Rhythm: Regular rhythm. Tachycardia present. Heart sounds: No murmur heard. No friction rub. No gallop. Pulmonary:      Effort: Pulmonary effort is normal. No respiratory distress. Breath sounds: Normal breath sounds. No wheezing. Abdominal:      General: Bowel sounds are normal. There is no distension. Palpations: Abdomen is soft. Tenderness: There is no abdominal tenderness. Musculoskeletal:         General: No swelling. Cervical back: Neck supple. Skin:     General: Skin is warm and dry. Neurological:      General: No focal deficit present. Mental Status: She is alert.    Psychiatric:         Mood and Affect: Mood normal.          Additional Data:     Lab Results:  Results from last 7 days   Lab Units 07/19/23  1038   WBC Thousand/uL 7.86   HEMOGLOBIN g/dL 14.9   HEMATOCRIT % 42.8   PLATELETS Thousands/uL 236   NEUTROS PCT % 73   LYMPHS PCT % 18   MONOS PCT % 8   EOS PCT % 0     Results from last 7 days   Lab Units 07/19/23  1038   SODIUM mmol/L 137   POTASSIUM mmol/L 3.2*   CHLORIDE mmol/L 104   CO2 mmol/L 23   BUN mg/dL 9   CREATININE mg/dL 0.83   ANION GAP mmol/L 10   CALCIUM mg/dL 9.6   ALBUMIN g/dL 4.4   TOTAL BILIRUBIN mg/dL 0.58   ALK PHOS U/L 97   ALT U/L 12   AST U/L 14   GLUCOSE RANDOM mg/dL 104                       Lines/Drains:  Invasive Devices     Peripheral Intravenous Line  Duration           Peripheral IV 07/19/23 Right Antecubital <1 day                    Imaging: no Imaging done  No orders to display       EKG and Other Studies Reviewed on Admission:   · EKG: SVT. Rate  150    ** Please Note: This note has been constructed using a voice recognition system.  **

## 2023-07-19 NOTE — ASSESSMENT & PLAN NOTE
· Patient is a 58-year-old female with a past medical history of POTS, hypokalemia, mixed connective tissue disease who presented to the hospital with palpitation, SVT  · She was seen in the ED 7/13 she was in SVT which broke with adenosine  · She follows EP, she is on long-acting propranolol 120 mg daily and short-acting propranolol 20 mg every 8 as needed  · TSH wnl  · Presented with chest tightness and palpitation today, EKG showed sinus tachycardia rate 150  · Evaluated by cardiology, appreciate recommendations  · Increase long-acting propranolol to 240 mg  · Patient took propranolol 120 mg in the morning we will give an additional dose of 120 mg today  · Continue short acting propranolol 20 mg every 8 hours as needed for persistent heart rate above 120 or if patient is symptomatic  · Magnesium above 2  · Potassium above 4  · Telemetry

## 2023-07-19 NOTE — ASSESSMENT & PLAN NOTE
· Magnesium 1.8  · Magnesium sulfate 2 g IV  · Recheck magnesium tomorrow, magnesium level should be above 2

## 2023-07-19 NOTE — ED NOTES
Pt rang call bell and feeling sensation of lightheaded/palpitations. Pts HR noted to be 160. EKG obtained, along with VS as documented. Provider notified. Pts HR returning to baseline and states sensation resolving.        Rigo Nurse, RN  07/19/23 [id number]

## 2023-07-19 NOTE — PROGRESS NOTES
[facility]        NAME: Estella He is a 33 y.o. female  : 1995    MRN: [id number]  DATE: 2023  TIME: 9:40 AM    Assessment and Plan   Shortness of breath [R06.02]  1. Shortness of breath        2. Abnormal EKG          - First ECG shows sinus tach   - 30 seconds later ECG shows Sinus tach with premature supraventricular complexes. - Pt will report to the ED via EMS for further care    Patient Instructions   - Please report to ED for further eval    Follow up with PCP in 3-5 days. Proceed to  ER if symptoms worsen. Chief Complaint     Chief Complaint   Patient presents with   • Chest Pain     Chest pain, tightness. History of Present Illness       34 y/o F with PMHx of hypokalemia, POTs, and HTN, presents for chest tightness, SOB, and palpatations x 2 days. Pt admits she was seen in the ED  for the same issue. Was in SVT which required cardioversion. Pt admits since Monday symptoms have worsened. Had not heard from Cardiology until checking in to urgent care. Last potasium in ED was 3.2. Review of Systems   Review of Systems   Respiratory: Positive for chest tightness and shortness of breath. Cardiovascular: Positive for palpitations. Negative for chest pain.          Current Medications       Current Outpatient Medications:   •  AMILoride 5 mg tablet, Take 2 tablets (10 mg total) by mouth 2 (two) times a day, Disp: 180 tablet, Rfl: 3  •  aspirin (ECOTRIN LOW STRENGTH) 81 mg EC tablet, Take 1 tablet (81 mg total) by mouth 2 (two) times a day, Disp: 84 tablet, Rfl: 0  •  busPIRone (BUSPAR) 5 mg tablet, Take 1 tablet (5 mg total) by mouth 2 (two) times a day, Disp: 180 tablet, Rfl: 3  •  clobetasol (TEMOVATE) 0.05 % ointment, Apply topically 2 (two) times a day, Disp: 60 g, Rfl: 2  •  dicyclomine (BENTYL) 20 mg tablet, Take 1 tablet (20 mg total) by mouth every 6 (six) hours, Disp: 120 tablet, Rfl: 4  •  hydroxychloroquine (PLAQUENIL) 200 mg tablet, Take 1.5 tablets (300 mg total) by mouth in the morning, Disp: 45 tablet, Rfl: 6  •  hydrOXYzine HCL (ATARAX) 10 mg tablet, Take 1 tablet (10 mg total) by mouth 2 (two) times a day as needed for anxiety, Disp: 60 tablet, Rfl: 1  •  Multiple Vitamins-Minerals (MULTIVITAL) CHEW, Chew 1 tablet daily (Patient not taking: Reported on 2023), Disp: , Rfl:   •  oxyCODONE (ROXICODONE) 5 immediate release tablet, Take 1 tablet (5 mg total) by mouth every 4 (four) hours as needed for moderate pain for up to 15 doses Max Daily Amount: 30 mg (Patient not taking: Reported on 2023), Disp: 15 tablet, Rfl: 0  •  potassium chloride (K-DUR,KLOR-CON) 20 mEq tablet, Take 1 tablet (20 mEq total) by mouth 3 (three) times a day, Disp: 270 tablet, Rfl: 3  •  Prenatal Multivit-Min-Fe-FA (PRE-GORDON PO), Take by mouth, Disp: , Rfl:   •  propranolol (INDERAL LA) 120 mg 24 hr capsule, Take 1 capsule (120 mg total) by mouth daily, Disp: 90 capsule, Rfl: 3  •  propranolol (INDERAL) 20 mg tablet, Take 1 tablet (20 mg total) by mouth every 8 (eight) hours, Disp: 90 tablet, Rfl: 2  •  tacrolimus (PROTOPIC) 0.1 % ointment, Apply topically 2 (two) times a day, Disp: 100 g, Rfl: 0  •  triamcinolone (KENALOG) 0.025 % ointment, Apply topically twice day for 14 days straight as needed for flares to thinner skin, Disp: 80 g, Rfl: 3  •  triamcinolone (KENALOG) 0.1 % ointment, Apply topically twice day for 14 days straight as needed for flares, Disp: 453.6 g, Rfl: 3    Current Allergies     Allergies as of 2023 - Reviewed 2023   Allergen Reaction Noted   • Prednisone Hyperactivity 2016   • Serotonin reuptake inhibitors (ssris) Anaphylaxis and Hives 2016   • Augmentin [amoxicillin-pot clavulanate] Hives and Rash 2016   • Clindamycin Rash 12/15/2016   • Penicillins Rash 2016   • Sulfa antibiotics Rash 2016   • Azithromycin Rash 2018            The following portions of the patient's history were reviewed and updated as appropriate: allergies, current medications, past family history, past medical history, past social history, past surgical history and problem list.     Past Medical History:   Diagnosis Date   • Abnormal blood chemistry    • Anesthesia     per pt " Panic attack right before a procedure-has High preop anxiety and wakes up nasty"   • Anxiety    • Eczema    • Exercises 5 to 6 times per week     per pt prior to knee issue -enjoyed running and horseback riding   • Family history of reaction to anesthesia     per pt--"Mom also gets high anxiety with surgeries and wakes up nasty from anesthesia"   • Gastroparesis    • History of vitamin D deficiency    • Hypertension     last assessed 3/12/14   • Hypokalemia     per pt per doctors possibly related renal problem   • Irritable bowel syndrome     with diarrhea   • Lymphadenopathy     last assessed 10/8/14-no current issue   • Myalgia     last assessed 10/8/14   • Myositis     last assessed 10/8/14   • Palpitations    • POTS (postural orthostatic tachycardia syndrome)    • RA (rheumatoid arthritis) ([address])    • Rheumatoid arthritis ([address])    • Sinus tachycardia     related to dehydration   • Tachycardia     last assessed 6/9/17   • Tooth missing     front upper tooth retainer-   • Wears glasses     for computer use       Past Surgical History:   Procedure Laterality Date   • NH ARTHROSCOPY KNEE LATERAL RELEASE Right 4/13/2023    Procedure: ARTHROSCOPIC RELEASE RETINACULAR;  Surgeon: Joe Leon DO;  Location: [address] [place] [address] MAIN OR;  Service: Orthopedics   • NH EGD TRANSORAL BIOPSY SINGLE/MULTIPLE N/A 5/4/2016    Procedure: ESOPHAGOGASTRODUODENOSCOPY (EGD); Surgeon: Eduardo Chowdary MD;  Location:  GI LAB;   Service: Gastroenterology   • NH RCNSTJ [address] [place] [address] W/XTNSR RELIGNMT&/MUSC RL Right 4/13/2023    Procedure: ARTHROSCOPIC REALIGNMENT PATELLA;  Surgeon: Joe Leon DO;  Location:  MAIN OR;  Service: Orthopedics   • UPPER GASTROINTESTINAL ENDOSCOPY     • WISDOM TOOTH EXTRACTION         Family History   Problem Relation Age of Onset   • Hypokalemia Mother    • Hypotension Mother    • Anxiety disorder Mother         NOS   • Lung cancer Father    • Skin cancer Father    • Crohn's disease Father    • No Known Problems Sister    • No Known Problems Sister    • No Known Problems Brother    • Coronary artery disease Maternal Grandmother    • Heart disease Maternal Grandmother    • Alzheimer's disease Maternal Grandmother    • Arthritis Paternal Grandmother    • Rheum arthritis Paternal Grandmother    • Hypertension Paternal Grandmother    • COPD Paternal Grandfather          Medications have been verified. Objective   BP (!) 153/106   Pulse (!) 142   Ht 5' 4" (1.626 m)   Wt 73.5 kg (162 lb)   SpO2 98%   BMI 27.81 kg/m²   No LMP recorded. Physical Exam     Physical Exam  Vitals and nursing note reviewed. Constitutional:       General: She is not in acute distress. Appearance: She is not toxic-appearing. HENT:      Head: Normocephalic and atraumatic. Cardiovascular:      Rate and Rhythm: Tachycardia present. Rhythm irregular. Pulmonary:      Effort: Pulmonary effort is normal.   Neurological:      Mental Status: She is alert.    Psychiatric:         Mood and Affect: Mood normal.         Behavior: Behavior normal.

## 2023-07-19 NOTE — ASSESSMENT & PLAN NOTE
· Managed by nephrology  · Continue amiloride 10 mg twice daily  · Potassium is 3.2 today  · Home dose is potassium chloride 20 mEq 3 times daily  · We will give potassium chloride 40 mEq twice daily  · Recheck BMP tomorrow and keep potassium above 4

## 2023-07-20 PROBLEM — R19.7 NAUSEA VOMITING AND DIARRHEA: Status: ACTIVE | Noted: 2023-07-20

## 2023-07-20 PROBLEM — R11.2 NAUSEA VOMITING AND DIARRHEA: Status: ACTIVE | Noted: 2023-07-20

## 2023-07-20 LAB
ANION GAP SERPL CALCULATED.3IONS-SCNC: 6 MMOL/L
ANION GAP SERPL CALCULATED.3IONS-SCNC: 6 MMOL/L
ATRIAL RATE: 145 BPM
BUN SERPL-MCNC: 8 MG/DL (ref 5–25)
BUN SERPL-MCNC: 8 MG/DL (ref 5–25)
CALCIUM SERPL-MCNC: 9.3 MG/DL (ref 8.4–10.2)
CALCIUM SERPL-MCNC: 9.9 MG/DL (ref 8.4–10.2)
CHLORIDE SERPL-SCNC: 106 MMOL/L (ref 96–108)
CHLORIDE SERPL-SCNC: 109 MMOL/L (ref 96–108)
CO2 SERPL-SCNC: 22 MMOL/L (ref 21–32)
CO2 SERPL-SCNC: 23 MMOL/L (ref 21–32)
CREAT SERPL-MCNC: 0.68 MG/DL (ref 0.6–1.3)
CREAT SERPL-MCNC: 0.78 MG/DL (ref 0.6–1.3)
GFR SERPL CREATININE-BSD FRML MDRD: 103 ML/MIN/1.73SQ M
GFR SERPL CREATININE-BSD FRML MDRD: 118 ML/MIN/1.73SQ M
GLUCOSE SERPL-MCNC: 121 MG/DL (ref 65–140)
GLUCOSE SERPL-MCNC: 89 MG/DL (ref 65–140)
MAGNESIUM SERPL-MCNC: 2.4 MG/DL (ref 1.9–2.7)
MAGNESIUM SERPL-MCNC: 2.6 MG/DL (ref 1.9–2.7)
P AXIS: -10 DEGREES
POTASSIUM SERPL-SCNC: 4.2 MMOL/L (ref 3.5–5.3)
POTASSIUM SERPL-SCNC: 4.8 MMOL/L (ref 3.5–5.3)
PR INTERVAL: 128 MS
QRS AXIS: 6 DEGREES
QRSD INTERVAL: 72 MS
QT INTERVAL: 274 MS
QTC INTERVAL: 425 MS
SODIUM SERPL-SCNC: 134 MMOL/L (ref 135–147)
SODIUM SERPL-SCNC: 138 MMOL/L (ref 135–147)
T WAVE AXIS: 24 DEGREES
VENTRICULAR RATE: 145 BPM

## 2023-07-20 PROCEDURE — 83735 ASSAY OF MAGNESIUM: CPT | Performed by: INTERNAL MEDICINE

## 2023-07-20 PROCEDURE — 99232 SBSQ HOSP IP/OBS MODERATE 35: CPT | Performed by: STUDENT IN AN ORGANIZED HEALTH CARE EDUCATION/TRAINING PROGRAM

## 2023-07-20 PROCEDURE — 99232 SBSQ HOSP IP/OBS MODERATE 35: CPT | Performed by: INTERNAL MEDICINE

## 2023-07-20 PROCEDURE — 80048 BASIC METABOLIC PNL TOTAL CA: CPT | Performed by: INTERNAL MEDICINE

## 2023-07-20 PROCEDURE — 93010 ELECTROCARDIOGRAM REPORT: CPT | Performed by: INTERNAL MEDICINE

## 2023-07-20 RX ORDER — PROPRANOLOL HCL 60 MG
120 CAPSULE, EXTENDED RELEASE 24HR ORAL EVERY 12 HOURS
Status: DISCONTINUED | OUTPATIENT
Start: 2023-07-21 | End: 2023-07-21

## 2023-07-20 RX ORDER — KETOROLAC TROMETHAMINE 30 MG/ML
30 INJECTION, SOLUTION INTRAMUSCULAR; INTRAVENOUS ONCE
Status: COMPLETED | OUTPATIENT
Start: 2023-07-20 | End: 2023-07-20

## 2023-07-20 RX ORDER — ENOXAPARIN SODIUM 100 MG/ML
40 INJECTION SUBCUTANEOUS
Status: DISCONTINUED | OUTPATIENT
Start: 2023-07-20 | End: 2023-07-21 | Stop reason: HOSPADM

## 2023-07-20 RX ORDER — ONDANSETRON 2 MG/ML
4 INJECTION INTRAMUSCULAR; INTRAVENOUS EVERY 4 HOURS PRN
Status: DISCONTINUED | OUTPATIENT
Start: 2023-07-20 | End: 2023-07-21 | Stop reason: HOSPADM

## 2023-07-20 RX ORDER — IBUPROFEN 600 MG/1
600 TABLET ORAL ONCE
Status: COMPLETED | OUTPATIENT
Start: 2023-07-20 | End: 2023-07-20

## 2023-07-20 RX ORDER — SODIUM CHLORIDE 9 MG/ML
75 INJECTION, SOLUTION INTRAVENOUS CONTINUOUS
Status: DISCONTINUED | OUTPATIENT
Start: 2023-07-20 | End: 2023-07-21 | Stop reason: HOSPADM

## 2023-07-20 RX ORDER — LACTOBACILLUS ACIDOPHILUS / LACTOBACILLUS BULGARICUS 100 MILLION CFU STRENGTH
1 GRANULES ORAL
Status: DISCONTINUED | OUTPATIENT
Start: 2023-07-20 | End: 2023-07-21 | Stop reason: HOSPADM

## 2023-07-20 RX ADMIN — PROPRANOLOL HYDROCHLORIDE 240 MG: 80 CAPSULE, EXTENDED RELEASE ORAL at 09:37

## 2023-07-20 RX ADMIN — ONDANSETRON 4 MG: 2 INJECTION INTRAMUSCULAR; INTRAVENOUS at 09:41

## 2023-07-20 RX ADMIN — Medication 1 PACKET: at 19:25

## 2023-07-20 RX ADMIN — ACETAMINOPHEN 650 MG: 325 TABLET ORAL at 09:35

## 2023-07-20 RX ADMIN — SODIUM CHLORIDE 75 ML/HR: 0.9 INJECTION, SOLUTION INTRAVENOUS at 23:22

## 2023-07-20 RX ADMIN — HYDROXYCHLOROQUINE SULFATE 300 MG: 200 TABLET, FILM COATED ORAL at 09:37

## 2023-07-20 RX ADMIN — POTASSIUM CHLORIDE 40 MEQ: 1500 TABLET, EXTENDED RELEASE ORAL at 09:35

## 2023-07-20 RX ADMIN — KETOROLAC TROMETHAMINE 30 MG: 30 INJECTION, SOLUTION INTRAMUSCULAR; INTRAVENOUS at 23:31

## 2023-07-20 RX ADMIN — AMILORIDE HYDROCLORIDE 10 MG: 5 TABLET ORAL at 09:38

## 2023-07-20 RX ADMIN — IBUPROFEN 600 MG: 600 TABLET ORAL at 13:36

## 2023-07-20 RX ADMIN — FERROUS SULFATE TAB 325 MG (65 MG ELEMENTAL FE) 325 MG: 325 (65 FE) TAB at 09:35

## 2023-07-20 RX ADMIN — POTASSIUM CHLORIDE 40 MEQ: 1500 TABLET, EXTENDED RELEASE ORAL at 17:57

## 2023-07-20 RX ADMIN — IBUPROFEN 600 MG: 600 TABLET, FILM COATED ORAL at 19:25

## 2023-07-20 RX ADMIN — AMILORIDE HYDROCLORIDE 10 MG: 5 TABLET ORAL at 17:57

## 2023-07-20 RX ADMIN — SODIUM CHLORIDE 75 ML/HR: 0.9 INJECTION, SOLUTION INTRAVENOUS at 09:35

## 2023-07-20 NOTE — PROGRESS NOTES
[address] [facility]  Progress Note  Name: Henry Conroy  MRN: [id number]  Unit/Bed#: E4 -01 I Date of Admission: 7/19/2023   Date of Service: 7/20/2023 I Hospital Day: 1    Assessment/Plan   * SVT (supraventricular tachycardia) ([address])  Assessment & Plan  · Patient is a 65-year-old female with a past medical history of POTS, hypokalemia, mixed connective tissue disease who presented to the hospital with palpitation, SVT  ·   · She was seen in the ED 7/13 and diagnosed with SVT, treated with adenosine 6mg, 12mg and iv metoprolol  · She follows EP, she is on long-acting propranolol 120 mg daily and short-acting propranolol 20 mg every 8 as needed  · TSH wnl  · Evaluated by cardiology, appreciate recommendations  · Increased long-acting propranolol to 240 mg:  Pt had subsequent low bp:  Dose changed to 120mg bid which she tolerated day of admission:  Cont this dose and fu with EP as outpt for consideration of ablation. · Continue short acting propranolol 20 mg every 8 hours as needed for persistent heart rate above 120 or if patient is symptomatic  · Magnesium above 2  · Potassium above 4  · No additional episodes since admission  · 2D Echo pending    POTS (postural orthostatic tachycardia syndrome)  Assessment & Plan  · History of POTS, initially diagnosed on tilt table currently maintained on propranolol, hydration, compression stockings and liquid IV  · Follows with EP plus nephrology  · Continue to encourage hydration: recommendation for 3L/d  · Sodium recommendations 8g/d  · Cont exercise regimen  · will give IV fluids due to patient's current nausea and diarrhea    Nausea vomiting and diarrhea  Assessment & Plan  · Patient developed diarrhea after admission  · She attributes this to the magnesium supplement. She notes she has a history of IBS however does not typically have diarrhea  · No current abdominal pain or cramping.   Abdominal exam benign  · Patient had nausea and vomiting this morning: She notes her symptoms improved after Zofran  · Continue IV fluids, antiemetics and monitor    Undifferentiated connective tissue disease ([address])  Assessment & Plan  · Follows rheumatology  · Continue Plaquenil    Irritable bowel syndrome with diarrhea  Assessment & Plan  · Pt has a h/o GERD, IBS, idiopathic gastroparesis  · Follows with GI    Hypomagnesemia  Assessment & Plan  · repleted and normalized  · Keep mag >2.0    Hypokalemia  Assessment & Plan  · Patient has a history of chronic hypokalemia, managed by nephrology  · Continue amiloride 10 mg twice daily  · Was noted to have hypokalemia, and her episodes of SVT in the ER 7/13, and this admission, with potassium of 3.2   · Home dose is potassium chloride 20 mEq 3 times daily  · repleted hypokalemia and normalized  · Cont to monitor with current nausea/vomiting/diarrhea           Family:  Called  Ed Stapleton and gave update    dw pts nurse  amdi   [facility] Cardiology: Dr Rosa Gambino    VTE Pharmacologic Prophylaxis: Enoxaparin (Lovenox)  VTE Mechanical Prophylaxis: sequential compression device        Certification Statement: The patient will continue to require additional inpatient hospital stay due to need for further acute intervention for svt, n/d    Status: inpatient     =======================================================    Subjective:  Patient notes that she has developed a headache. She attributes it to the higher dose of propranolol. Notes her blood pressure is lower since that 240 mg tablet. She denies any dizziness or lightheadedness. Tylenol did not improve her headache, she requests trial of ibuprofen. She denies any chest pain or palpitations. Denies any shortness of breath. Patient notes she had nausea and vomiting earlier today. She also developed diarrhea after arriving in the hospital.  She suspects the diarrhea is secondary to the magnesium supplements.   She denies any abdominal pain or cramping. Notes her nausea has improved and she was able to tolerate lunch. Physical Exam:   Temp:  [96.9 °F (36.1 °C)-98.4 °F (36.9 °C)] 96.9 °F (36.1 °C)  HR:  [] 65  Resp:  [16-20] 16  BP: ()/(57-85) 97/66    Gen:  Pleasant, non-tachypnic, non-dyspnic. Conversant. Sitting up in bed. Smiling and interactive. No lethargy or confusion. Heart: regular rate and rhythm, S1S2 present, no murmur, rub or gallop  Lungs: clear to ausculatation bilaterally. No wheezing, crackles, or rhonchi. No accessory muscle use or respiratory distress. Abd: soft, non-tender, non-distended. NABS, no guarding, rebound or peritoneal signs. Extremities: no clubbing, cyanosis or edema. 2+pedal pulses bilaterally. Full range of motion  Neuro: awake, alert and oriented. Fluent and goal-directed speech. Moving all 4 extremities. Muscles of facial expression intact  Skin: warm and dry: no petechiae, purpura and rash. LABS:   Results from last 7 days   Lab Units 07/19/23  1038 07/13/23 2010   WBC Thousand/uL 7.86 9.84   HEMOGLOBIN g/dL 14.9 14.3   HEMATOCRIT % 42.8 40.7   PLATELETS Thousands/uL 236 221     Results from last 7 days   Lab Units 07/20/23  0842 07/20/23  0430 07/19/23  1038   POTASSIUM mmol/L 4.8 4.2 3.2*   CHLORIDE mmol/L 106 109* 104   CO2 mmol/L 22 23 23   BUN mg/dL 8 8 9   CREATININE mg/dL 0.78 0.68 0.83   CALCIUM mg/dL 9.9 9.3 9.6       Hospital Data:    7/13 CXR: NAD        ---------------------------------------------------------------------------------------------------------------  This note has been constructed using a voice recognition system. Prednisone Pregnancy And Lactation Text: This medication is Pregnancy Category C and it isn't know if it is safe during pregnancy. This medication is excreted in breast milk.

## 2023-07-20 NOTE — UTILIZATION REVIEW
Initial Clinical Review    Admission: Date/Time/Statement:   Admission Orders (From admission, onward)     Ordered        07/19/23 1405  INPATIENT ADMISSION  Once                      Orders Placed This Encounter   Procedures   • INPATIENT ADMISSION     Standing Status:   Standing     Number of Occurrences:   1     Order Specific Question:   Level of Care     Answer:   Med Surg [16]     Order Specific Question:   Estimated length of stay     Answer:   More than 2 Midnights     Order Specific Question:   Certification     Answer:   I certify that inpatient services are medically necessary for this patient for a duration of greater than two midnights. See H&P and MD Progress Notes for additional information about the patient's course of treatment. ED Arrival Information     Expected   -    Arrival   7/19/2023 10:26    Acuity   Emergent            Means of arrival   Ambulance    Escorted by   [address] [place] [address] Ambulance    Service   Hospitalist    Admission type   Emergency            Arrival complaint   rapid heart rate           Chief Complaint   Patient presents with   • Chest Pain     Chest tightness - seen at urgent care for chest tightness and palpitations. Had abnormal EKG. Seen at [facility] last week given adenosine. Initial Presentation: 33 y.o. female presents to the ED via EMS from Urgent care with c/o palpitations x several days and lasting longer w/ chest tightness. Was seen in the ED on 7/13 for chest tightness, palpitation and had SVT  - tx with Adenosine which broke rhythm. She has been taking long-acting Propranolol 120 mg daily PRN Propranolol 20 mg q 8 hr PRN. PMH: POTS, hypokalemia, hyponatremia, connective tissue disease on Plaquenil, anxiety. In the ED she was tachycardic. ECG - ST w/ occas PVCs, poss LA enlargement. Labs - low K, low Mag. Treated with IV fluids, PO KDur, IV Mag. On exam she was tachycardic.   She is admitted to INPATIENT status with SVT - increase long acting Propranolol to 240 mg daily, continue short acting Propranolol 20 mg q 8 hr PRN with parameters, keep electrolytes stable, tele. Hypomagnesemia , Hypokalemia - replete and trend. 7/19 Cardio Consult - ST, SVT, POTS, anxiety, electrolyte imbalance (Mag, K) - recurrent palpitations in ST, increase Propanolol 240 mg daily and PRN Propranolol q 8 hr, IV fluids, water intake of 3-4 L daily, aggressive repletion of lytes, Continue Amiloride, check OP Zio patch. Date: 7/20   Day 2:   No c/o palpitations, tachycardia since yesterday. Did have diarrhea, no c/o CP, orthopnea, N/V. Will get TTE. Mag and K WNL, monitor d/t diarrhea. Remains on IV fluids. HR has been SR overnight. K and Mag replenished and stable today. Used 1 dose IV Zofran today.       ED Triage Vitals   Temperature Pulse Respirations Blood Pressure SpO2   07/19/23 1034 07/19/23 1034 07/19/23 1034 07/19/23 1034 07/19/23 1034   98.9 °F (37.2 °C) (!) 108 20 156/76 100 %      Temp Source Heart Rate Source Patient Position - Orthostatic VS BP Location FiO2 (%)   07/19/23 1034 07/19/23 1034 07/19/23 1034 07/19/23 1034 --   Oral Monitor Sitting Right arm       Pain Score       07/19/23 1330       No Pain          Wt Readings from Last 1 Encounters:   07/19/23 74.4 kg (164 lb 0.4 oz)     Additional Vital Signs:   07/20/23 0733 97.2 °F (36.2 °C) Abnormal  73 18 127/85 92 100 % None (Room air) Lying   07/20/23 0340 97.5 °F (36.4 °C) 67 18 117/82 99 99 % -- Sitting   07/19/23 2314 98.4 °F (36.9 °C) 74 18 109/57 76 98 % None (Room air) Lying   07/19/23 1949 97.6 °F (36.4 °C) 92 18 134/77 100 100 % None (Room air) Lying   07/19/23 1626 97.3 °F (36.3 °C) Abnormal  93 18 127/83 95 97 % None (Room air) Lying   07/19/23 1345 -- 106 Abnormal  18 -- -- 98 % -- --   07/19/23 1330 -- 125 Abnormal  18 149/70 102 100 % None (Room air) --   07/19/23 1329 -- 125 Abnormal  18 161/80 -- 100 % None (Room air) --   07/19/23 1315 -- 107 Abnormal  20 -- -- 99 % None (Room air) -- 07/19/23 1230 -- 97 20 124/63 88 99 % None (Room air) Sitting   07/19/23 1130 -- 99 18 128/65 92 99 % -- --   07/19/23 1100 -- 110 Abnormal  19 124/65 89 100 % None (Room air) Sitting     Pertinent Labs/Diagnostic Test Results:     7/20 Echo - P     7/19 ECG - Sinus tachycardia with Premature ventricular complexes  Possible Left atrial enlargement  Borderline ECG  7/19 ECG - Sinus tachycardia  Possible Left atrial enlargement  Borderline ECG  7/19 ECG - Sinus tachycardia with occasional Premature ventricular complexes  Possible Left atrial enlargement  Borderline ECG      Results from last 7 days   Lab Units 07/19/23  1038 07/13/23 2010   WBC Thousand/uL 7.86 9.84   HEMOGLOBIN g/dL 14.9 14.3   HEMATOCRIT % 42.8 40.7   PLATELETS Thousands/uL 236 221   NEUTROS ABS Thousands/µL 5.79 6.92         Results from last 7 days   Lab Units 07/20/23  0842 07/20/23  0430 07/19/23 1038 07/13/23 2010   SODIUM mmol/L 134* 138 137 140   POTASSIUM mmol/L 4.8 4.2 3.2* 3.2*   CHLORIDE mmol/L 106 109* 104 107   CO2 mmol/L 22 23 23 24   ANION GAP mmol/L 6 6 10 9   BUN mg/dL 8 8 9 13   CREATININE mg/dL 0.78 0.68 0.83 0.98   EGFR ml/min/1.73sq m 103 118 95 78   CALCIUM mg/dL 9.9 9.3 9.6 9.5   MAGNESIUM mg/dL 2.6 2.4 1.8* 2.0     Results from last 7 days   Lab Units 07/19/23 1038 07/13/23 2010   AST U/L 14 15   ALT U/L 12 22   ALK PHOS U/L 97 118*   TOTAL PROTEIN g/dL 7.5 7.8   ALBUMIN g/dL 4.4 4.0   TOTAL BILIRUBIN mg/dL 0.58 0.31         Results from last 7 days   Lab Units 07/20/23  0842 07/20/23  0430 07/19/23  1038 07/13/23 2010   GLUCOSE RANDOM mg/dL 121 89 104 138     Results from last 7 days   Lab Units 07/19/23  1231 07/19/23  1038 07/13/23 2010   HS TNI 0HR ng/L  --  <2 <2   HS TNI 2HR ng/L 3  --   --    HSTNI D2 ng/L >1  --   --              Results from last 7 days   Lab Units 07/13/23 2010   TSH 3RD GENERATON uIU/mL 3.540     ED Treatment:   Medication Administration from 07/19/2023 1026 to 07/19/2023 2888 Date/Time Order Dose Route Action     07/19/2023 1043 EDT sodium chloride 0.9 % bolus 1,000 mL 1,000 mL Intravenous New Bag     07/19/2023 1117 EDT potassium chloride (K-DUR,KLOR-CON) CR tablet 40 mEq 40 mEq Oral Given     07/19/2023 1142 EDT magnesium sulfate 2 g/50 mL IVPB (premix) 2 g 2 g Intravenous New Bag        Past Medical History:   Diagnosis Date   • Abnormal blood chemistry    • Anesthesia     per pt " Panic attack right before a procedure-has High preop anxiety and wakes up nasty"   • Anxiety    • Eczema    • Exercises 5 to 6 times per week     per pt prior to knee issue -enjoyed running and horseback riding   • Family history of reaction to anesthesia     per pt--"Mom also gets high anxiety with surgeries and wakes up nasty from anesthesia"   • Gastroparesis    • History of vitamin D deficiency    • Hypertension     last assessed 3/12/14   • Hypokalemia     per pt per doctors possibly related renal problem   • Irritable bowel syndrome     with diarrhea   • Lymphadenopathy     last assessed 10/8/14-no current issue   • Myalgia     last assessed 10/8/14   • Myositis     last assessed 10/8/14   • Palpitations    • POTS (postural orthostatic tachycardia syndrome)    • RA (rheumatoid arthritis) ([facility])    • Rheumatoid arthritis ([address])    • Sinus tachycardia     related to dehydration   • Tachycardia     last assessed 6/9/17   • Tooth missing     front upper tooth retainer-   • Wears glasses     for computer use     Present on Admission:  • Hypokalemia  • POTS (postural orthostatic tachycardia syndrome)  • Hypomagnesemia  • Anxiety  • Undifferentiated connective tissue disease ([address])  • Irritable bowel syndrome with diarrhea      Admitting Diagnosis: Palpitations [R00.2]  Hypokalemia [E87.6]  SVT (supraventricular tachycardia) ([facility]) [I47.1]  Rapid heart rate [R00.0]  POTS (postural orthostatic tachycardia syndrome) [G90. A]  Age/Sex: 33 y.o. female  Admission Orders:  Scheduled Medications:  AMILoride, 10 mg, Oral, BID (diuretic)  enoxaparin, 40 mg, Subcutaneous, Q24H LUCA  ferrous sulfate, 325 mg, Oral, Daily With Breakfast  hydroxychloroquine, 300 mg, Oral, Daily With Breakfast  potassium chloride, 40 mEq, Oral, BID  [START ON 7/21/2023] propranolol, 120 mg, Oral, Q12H      Continuous IV Infusions:  sodium chloride, 75 mL/hr, Intravenous, Continuous      PRN Meds:  acetaminophen, 650 mg, Oral, Q6H PRN - x 1 7/20  hydrOXYzine HCL, 10 mg, Oral, BID PRN  propranolol, 20 mg, Oral, Q8H PRN  IV Zofran 4 mg PRN q 4 hr - x 1 7/20    Mag  Tele  Activity as zahra   IP CONSULT TO CARDIOLOGY    Network Utilization Review Department  ATTENTION: Please call with any questions or concerns to [phone] and carefully listen to the prompts so that you are directed to the right person. All voicemails are confidential.  Maryan Gitelman all requests for admission clinical reviews, approved or denied determinations and any other requests to dedicated fax number below belonging to the campus where the patient is receiving treatment.  List of dedicated fax numbers for the Facilities:  [place] DENIALS (Administrative/Medical Necessity) [phone]   [address] (Maternity/NICU/Pediatrics) [phone]   [address] [place] [address] [phone]   [facility] [address] [facility] [phone] [address] [facility] [address] [facility] [id number] [place] [address] [phone]   [address] [address] [facility] [phone]

## 2023-07-20 NOTE — PROGRESS NOTES
Cardiology Progress Note - Amalia Quigley 33 y.o. 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  - Continue with amiloride 10 mg twice daily and potassium 40 mEq twice daily    Hypomagnesemia    Anxiety    Undifferentiated connective tissue disease ([address])  - On hydroxychloroquine    Summary:  - No recurrent episodes of SVT overnight on higher dose of propranolol, continue 240 mg daily  - No prior echo in the system, will check a TTE to evaluate LV structure and function  - Otherwise stable from a cardiac standpoint  - She is having diarrhea this morning and given her electrolyte problems, reasonable to watch 1 more day in the hospital to replete electrolytes-> repeat BMP with magnesium in the morning    Subjective:   No significant events overnight. Patient reports having 1 slight episodes of palpitations yesterday afternoon with heart rates up to the 120s. Otherwise she reports feeling well. No recurrent palpitations this morning. She did start having diarrhea this morning though. Denies chest pain, shortness of breath, orthopnea, abdominal pain, nausea, vomiting, fever, or chills. Objective:     Vitals: Blood pressure 127/85, pulse 73, temperature (!) 97.2 °F (36.2 °C), temperature source Temporal, resp.  rate 18, height 5' 4" (1.626 m), weight 74.4 kg (164 lb 0.4 oz), last menstrual period 07/09/2023, SpO2 100 %, not currently breastfeeding., Body mass index is 28.15 kg/m².,   Orthostatic Blood Pressures    Flowsheet Row Most Recent Value   Blood Pressure 127/85 filed at 07/20/2023 2834   Patient Position - Orthostatic VS Lying filed at 07/20/2023 1299            Intake/Output Summary (Last 24 hours) at 7/20/2023 1007  Last data filed at 7/19/2023 2000  Gross per 24 hour   Intake 2160 ml   Output --   Net 2160 ml           Physical Exam:    GEN: Rivka Oleary appears well, alert and oriented x 3, pleasant and cooperative   HEENT: Mucous membranes moist, no scleral icterus, no conjunctival pallor  NECK: No elevated JVD  HEART: Regular rate and rhythm, normal S1 and S2, no murmurs or rubs   LUNGS: clear to auscultation bilaterally; no wheezes, rales, or rhonchi   ABDOMEN: normal bowel sounds, soft, no tenderness, no distention  EXTREMITIES: peripheral pulses normal; no lower extremity edema   NEURO: no focal findings   SKIN: No lesions or rashes on exposed skin        Current Facility-Administered Medications:   •  acetaminophen (TYLENOL) tablet 650 mg, 650 mg, Oral, Q6H PRN, Mario Lockwood MD, 650 mg at 07/20/23 0935  •  AMILoride tablet 10 mg, 10 mg, Oral, BID (diuretic), Mario Lockwood MD, 10 mg at 07/20/23 9623  •  ferrous sulfate tablet 325 mg, 325 mg, Oral, Daily With Breakfast, Mario Lockwood MD, 325 mg at 07/20/23 0935  •  hydroxychloroquine (PLAQUENIL) tablet 300 mg, 300 mg, Oral, Daily With Breakfast, Mario Lockwood MD, 300 mg at 07/20/23 7820  •  hydrOXYzine HCL (ATARAX) tablet 10 mg, 10 mg, Oral, BID PRN, Mario Lockwood MD  •  ondansetron (ZOFRAN) injection 4 mg, 4 mg, Intravenous, Q4H PRN, Leila Goldman MD, 4 mg at 07/20/23 0941  •  potassium chloride (K-DUR,KLOR-CON) CR tablet 40 mEq, 40 mEq, Oral, BID, Akilah Blackwood MD, 40 mEq at 07/20/23 0935  •  propranolol (INDERAL LA) 24 hr capsule 240 mg, 240 mg, Oral, Daily, Akilah Blackwood MD, 240 mg at 07/20/23 7274  •  propranolol (INDERAL) tablet 20 mg, 20 mg, Oral, Q8H PRN, Akilah Gómez Workman MD  •  sodium chloride 0.9 % infusion, 75 mL/hr, Intravenous, Continuous, Carol Esquivel MD, Last Rate: 75 mL/hr at 07/20/23 0935, 75 mL/hr at 07/20/23 0935    Labs & Results:    Lab Results   Component Value Date    CKTOTAL 67 07/29/2015       Lab Results   Component Value Date    GLUCOSE 96 01/07/2016    CALCIUM 9.9 07/20/2023     (L) 01/07/2016    K 4.8 07/20/2023    CO2 22 07/20/2023     07/20/2023    BUN 8 07/20/2023    CREATININE 0.78 07/20/2023       Lab Results   Component Value Date    WBC 7.86 07/19/2023    HGB 14.9 07/19/2023    HCT 42.8 07/19/2023    MCV 87 07/19/2023     07/19/2023           Lab Results   Component Value Date    CHOL 195 08/27/2015    CHOL 176 06/05/2015     Lab Results   Component Value Date    HDL 52 06/05/2023    HDL 60 10/12/2022     Lab Results   Component Value Date    LDLCALC 89 06/05/2023    [address] 95 10/12/2022     Lab Results   Component Value Date    TRIG 122 06/05/2023    TRIG 100 10/12/2022       Lab Results   Component Value Date    ALT 12 07/19/2023    AST 14 07/19/2023    ALKPHOS 97 07/19/2023         EKG personally reviewed by )Beatrice Roberto MD. No acute changes   TELE: No significant arrhythmias seen on telemetry review.

## 2023-07-20 NOTE — ASSESSMENT & PLAN NOTE
· History of POTS, initially diagnosed on tilt table currently maintained on propranolol, hydration, compression stockings and liquid IV  · Follows with EP plus nephrology  · Continue to encourage hydration: recommendation for 3L/d  · Sodium recommendations 8g/d  · Cont exercise regimen  · will give IV fluids due to patient's current nausea and diarrhea

## 2023-07-20 NOTE — PLAN OF CARE
Problem: Potential for Falls  Goal: Patient will remain free of falls  Description: INTERVENTIONS:  - Educate patient/family on patient safety including physical limitations  - Instruct patient to call for assistance with activity   - Consult OT/PT to assist with strengthening/mobility   - Keep Call bell within reach  - Keep bed low and locked with side rails adjusted as appropriate  - Keep care items and personal belongings within reach  - Initiate and maintain comfort rounds  Outcome: Progressing     Problem: PAIN - ADULT  Goal: Verbalizes/displays adequate comfort level or baseline comfort level  Description: Interventions:  - Encourage patient to monitor pain and request assistance  - Assess pain using appropriate pain scale  - Administer analgesics based on type and severity of pain and evaluate response  - Implement non-pharmacological measures as appropriate and evaluate response  - Consider cultural and social influences on pain and pain management  - Notify physician/advanced practitioner if interventions unsuccessful or patient reports new pain  Outcome: Progressing     Problem: INFECTION - ADULT  Goal: Absence or prevention of progression during hospitalization  Description: INTERVENTIONS:  - Assess and monitor for signs and symptoms of infection  - Monitor lab/diagnostic results  - Monitor all insertion sites, i.e. indwelling lines, tubes, and drains  - Monitor endotracheal if appropriate and nasal secretions for changes in amount and color  - [place] appropriate cooling/warming therapies per order  - Administer medications as ordered  - Instruct and encourage patient and family to use good hand hygiene technique  - Identify and instruct in appropriate isolation precautions for identified infection/condition  Outcome: Progressing  Goal: Absence of fever/infection during neutropenic period  Description: INTERVENTIONS:  - Monitor WBC    Outcome: Progressing     Problem: SAFETY ADULT  Goal: Maintain or return to baseline ADL function  Description: INTERVENTIONS:  -  Assess patient's ability to carry out ADLs; assess patient's baseline for ADL function and identify physical deficits which impact ability to perform ADLs (bathing, care of mouth/teeth, toileting, grooming, dressing, etc.)  - Assess/evaluate cause of self-care deficits   - Assess range of motion  - Assess patient's mobility; develop plan if impaired  - Assess patient's need for assistive devices and provide as appropriate  - Encourage maximum independence but intervene and supervise when necessary  - Involve family in performance of ADLs  - Assess for home care needs following discharge   - Consider OT consult to assist with ADL evaluation and planning for discharge  - Provide patient education as appropriate  Outcome: Progressing  Goal: Maintains/Returns to pre admission functional level  Description: INTERVENTIONS:  - Perform BMAT or MOVE assessment daily.   - Set and communicate daily mobility goal to care team and patient/family/caregiver.    - Collaborate with rehabilitation services on mobility goals if consulted  - Out of bed for toileting  - Record patient progress and toleration of activity level   Outcome: Progressing     Problem: DISCHARGE PLANNING  Goal: Discharge to home or other facility with appropriate resources  Description: INTERVENTIONS:  - Identify barriers to discharge w/patient and caregiver  - Arrange for needed discharge resources and transportation as appropriate  - Identify discharge learning needs (meds, wound care, etc.)  - Arrange for interpretive services to assist at discharge as needed  - Refer to Case Management Department for coordinating discharge planning if the patient needs post-hospital services based on physician/advanced practitioner order or complex needs related to functional status, cognitive ability, or social support system  Outcome: Progressing     Problem: Knowledge Deficit  Goal: Patient/family/caregiver demonstrates understanding of disease process, treatment plan, medications, and discharge instructions  Description: Complete learning assessment and assess knowledge base.   Interventions:  - Provide teaching at level of understanding  - Provide teaching via preferred learning methods  Outcome: Progressing     Problem: CARDIOVASCULAR - ADULT  Goal: Maintains optimal cardiac output and hemodynamic stability  Description: INTERVENTIONS:  - Monitor I/O, vital signs and rhythm  - Monitor for S/S and trends of decreased cardiac output  - Administer and titrate ordered vasoactive medications to optimize hemodynamic stability  - Assess quality of pulses, skin color and temperature  - Assess for signs of decreased coronary artery perfusion  - Instruct patient to report change in severity of symptoms  Outcome: Progressing  Goal: Absence of cardiac dysrhythmias or at baseline rhythm  Description: INTERVENTIONS:  - Continuous cardiac monitoring, vital signs, obtain 12 lead EKG if ordered  - Administer antiarrhythmic and heart rate control medications as ordered  - Monitor electrolytes and administer replacement therapy as ordered  Outcome: Progressing

## 2023-07-20 NOTE — ASSESSMENT & PLAN NOTE
· Patient is a 29-year-old female with a past medical history of POTS, hypokalemia, mixed connective tissue disease who presented to the hospital with palpitation, SVT  ·   · She was seen in the ED 7/13 and diagnosed with SVT, treated with adenosine 6mg, 12mg and iv metoprolol  · She follows EP, she is on long-acting propranolol 120 mg daily and short-acting propranolol 20 mg every 8 as needed  · TSH wnl  · Evaluated by cardiology, appreciate recommendations  · Increased long-acting propranolol to 240 mg:  Pt had subsequent low bp:  Dose changed to 120mg bid which she tolerated day of admission:  Cont this dose and fu with EP as outpt for consideration of ablation.   · Continue short acting propranolol 20 mg every 8 hours as needed for persistent heart rate above 120 or if patient is symptomatic  · Magnesium above 2  · Potassium above 4  · No additional episodes since admission  · 2D Echo pending

## 2023-07-20 NOTE — ASSESSMENT & PLAN NOTE
· Patient developed diarrhea after admission  · She attributes this to the magnesium supplement. She notes she has a history of IBS however does not typically have diarrhea  · No current abdominal pain or cramping.   Abdominal exam benign  · Patient had nausea and vomiting this morning: She notes her symptoms improved after Zofran  · Continue IV fluids, antiemetics and monitor

## 2023-07-20 NOTE — ASSESSMENT & PLAN NOTE
· Patient has a history of chronic hypokalemia, managed by nephrology  · Continue amiloride 10 mg twice daily  · Was noted to have hypokalemia, and her episodes of SVT in the ER 7/13, and this admission, with potassium of 3.2   · Home dose is potassium chloride 20 mEq 3 times daily  · repleted hypokalemia and normalized  · Cont to monitor with current nausea/vomiting/diarrhea

## 2023-07-21 ENCOUNTER — APPOINTMENT (INPATIENT)
Dept: NON INVASIVE DIAGNOSTICS | Facility: HOSPITAL | Age: 28
DRG: 310 | End: 2023-07-21
Payer: COMMERCIAL

## 2023-07-21 ENCOUNTER — TRANSITIONAL CARE MANAGEMENT (OUTPATIENT)
Dept: FAMILY MEDICINE CLINIC | Facility: CLINIC | Age: 28
End: 2023-07-21

## 2023-07-21 VITALS
RESPIRATION RATE: 18 BRPM | HEIGHT: 64 IN | SYSTOLIC BLOOD PRESSURE: 101 MMHG | OXYGEN SATURATION: 100 % | BODY MASS INDEX: 28 KG/M2 | TEMPERATURE: 97 F | WEIGHT: 164 LBS | DIASTOLIC BLOOD PRESSURE: 65 MMHG | HEART RATE: 58 BPM

## 2023-07-21 PROBLEM — E83.42 HYPOMAGNESEMIA: Status: RESOLVED | Noted: 2018-01-22 | Resolved: 2023-07-21

## 2023-07-21 PROBLEM — E87.6 HYPOKALEMIA: Chronic | Status: RESOLVED | Noted: 2017-04-27 | Resolved: 2023-07-21

## 2023-07-21 LAB
ANION GAP SERPL CALCULATED.3IONS-SCNC: 5 MMOL/L
AORTIC ROOT: 2.3 CM
APICAL FOUR CHAMBER EJECTION FRACTION: 54 %
ASCENDING AORTA: 2.4 CM
BASOPHILS # BLD AUTO: 0.07 THOUSANDS/ÂΜL (ref 0–0.1)
BASOPHILS NFR BLD AUTO: 1 % (ref 0–1)
BUN SERPL-MCNC: 12 MG/DL (ref 5–25)
CALCIUM SERPL-MCNC: 9.2 MG/DL (ref 8.4–10.2)
CHLORIDE SERPL-SCNC: 107 MMOL/L (ref 96–108)
CO2 SERPL-SCNC: 23 MMOL/L (ref 21–32)
CREAT SERPL-MCNC: 0.76 MG/DL (ref 0.6–1.3)
E WAVE DECELERATION TIME: 245 MS
EOSINOPHIL # BLD AUTO: 0.18 THOUSAND/ÂΜL (ref 0–0.61)
EOSINOPHIL NFR BLD AUTO: 2 % (ref 0–6)
ERYTHROCYTE [DISTWIDTH] IN BLOOD BY AUTOMATED COUNT: 12.3 % (ref 11.6–15.1)
FRACTIONAL SHORTENING: 33 (ref 28–44)
GFR SERPL CREATININE-BSD FRML MDRD: 106 ML/MIN/1.73SQ M
GLUCOSE SERPL-MCNC: 85 MG/DL (ref 65–140)
HCT VFR BLD AUTO: 44.3 % (ref 34.8–46.1)
HGB BLD-MCNC: 14.8 G/DL (ref 11.5–15.4)
IMM GRANULOCYTES # BLD AUTO: 0.04 THOUSAND/UL (ref 0–0.2)
IMM GRANULOCYTES NFR BLD AUTO: 0 % (ref 0–2)
INTERVENTRICULAR SEPTUM IN DIASTOLE (PARASTERNAL SHORT AXIS VIEW): 0.5 CM
INTERVENTRICULAR SEPTUM: 0.5 CM (ref 0.6–1.1)
LAAS-AP2: 18.1 CM2
LAAS-AP4: 16.1 CM2
LEFT ATRIUM SIZE: 3.3 CM
LEFT ATRIUM VOLUME (MOD BIPLANE): 47 ML
LEFT INTERNAL DIMENSION IN SYSTOLE: 3.1 CM (ref 2.1–4)
LEFT VENTRICULAR INTERNAL DIMENSION IN DIASTOLE: 4.6 CM (ref 3.5–6)
LEFT VENTRICULAR POSTERIOR WALL IN END DIASTOLE: 0.6 CM
LEFT VENTRICULAR STROKE VOLUME: 60 ML
LVSV (TEICH): 60 ML
LYMPHOCYTES # BLD AUTO: 2.39 THOUSANDS/ÂΜL (ref 0.6–4.47)
LYMPHOCYTES NFR BLD AUTO: 27 % (ref 14–44)
MAGNESIUM SERPL-MCNC: 2 MG/DL (ref 1.9–2.7)
MCH RBC QN AUTO: 30.3 PG (ref 26.8–34.3)
MCHC RBC AUTO-ENTMCNC: 33.4 G/DL (ref 31.4–37.4)
MCV RBC AUTO: 91 FL (ref 82–98)
MONOCYTES # BLD AUTO: 0.83 THOUSAND/ÂΜL (ref 0.17–1.22)
MONOCYTES NFR BLD AUTO: 9 % (ref 4–12)
MV E'TISSUE VEL-SEP: 16 CM/S
MV PEAK A VEL: 0.36 M/S
MV PEAK E VEL: 77 CM/S
MV STENOSIS PRESSURE HALF TIME: 71 MS
MV VALVE AREA P 1/2 METHOD: 3.1
NEUTROPHILS # BLD AUTO: 5.48 THOUSANDS/ÂΜL (ref 1.85–7.62)
NEUTS SEG NFR BLD AUTO: 61 % (ref 43–75)
NRBC BLD AUTO-RTO: 0 /100 WBCS
PLATELET # BLD AUTO: 229 THOUSANDS/UL (ref 149–390)
PMV BLD AUTO: 11.5 FL (ref 8.9–12.7)
POTASSIUM SERPL-SCNC: 4.2 MMOL/L (ref 3.5–5.3)
RA PRESSURE ESTIMATED: 5 MMHG
RBC # BLD AUTO: 4.89 MILLION/UL (ref 3.81–5.12)
RIGHT ATRIUM AREA SYSTOLE A4C: 13.5 CM2
RIGHT VENTRICLE ID DIMENSION: 3.7 CM
RV PSP: 22 MMHG
SL CV LEFT ATRIUM LENGTH A2C: 4.8 CM
SL CV LV EF: 55
SL CV PED ECHO LEFT VENTRICLE DIASTOLIC VOLUME (MOD BIPLANE) 2D: 98 ML
SL CV PED ECHO LEFT VENTRICLE SYSTOLIC VOLUME (MOD BIPLANE) 2D: 38 ML
SODIUM SERPL-SCNC: 135 MMOL/L (ref 135–147)
TR MAX PG: 17 MMHG
TR PEAK VELOCITY: 2.1 M/S
TRICUSPID ANNULAR PLANE SYSTOLIC EXCURSION: 2.3 CM
TRICUSPID VALVE PEAK REGURGITATION VELOCITY: 2.05 M/S
WBC # BLD AUTO: 8.99 THOUSAND/UL (ref 4.31–10.16)

## 2023-07-21 PROCEDURE — 93306 TTE W/DOPPLER COMPLETE: CPT

## 2023-07-21 PROCEDURE — 83735 ASSAY OF MAGNESIUM: CPT | Performed by: INTERNAL MEDICINE

## 2023-07-21 PROCEDURE — 99232 SBSQ HOSP IP/OBS MODERATE 35: CPT | Performed by: STUDENT IN AN ORGANIZED HEALTH CARE EDUCATION/TRAINING PROGRAM

## 2023-07-21 PROCEDURE — 99239 HOSP IP/OBS DSCHRG MGMT >30: CPT | Performed by: PHYSICIAN ASSISTANT

## 2023-07-21 PROCEDURE — 85025 COMPLETE CBC W/AUTO DIFF WBC: CPT | Performed by: INTERNAL MEDICINE

## 2023-07-21 PROCEDURE — 80048 BASIC METABOLIC PNL TOTAL CA: CPT | Performed by: INTERNAL MEDICINE

## 2023-07-21 PROCEDURE — 93306 TTE W/DOPPLER COMPLETE: CPT | Performed by: STUDENT IN AN ORGANIZED HEALTH CARE EDUCATION/TRAINING PROGRAM

## 2023-07-21 RX ORDER — PROPRANOLOL HCL 60 MG
120 CAPSULE, EXTENDED RELEASE 24HR ORAL EVERY 12 HOURS
Status: DISCONTINUED | OUTPATIENT
Start: 2023-07-21 | End: 2023-07-21

## 2023-07-21 RX ORDER — IBUPROFEN 400 MG/1
400 TABLET ORAL EVERY 6 HOURS PRN
Status: DISCONTINUED | OUTPATIENT
Start: 2023-07-21 | End: 2023-07-21 | Stop reason: HOSPADM

## 2023-07-21 RX ORDER — PROPRANOLOL HCL 60 MG
120 CAPSULE, EXTENDED RELEASE 24HR ORAL DAILY
Status: DISCONTINUED | OUTPATIENT
Start: 2023-07-22 | End: 2023-07-21 | Stop reason: HOSPADM

## 2023-07-21 RX ADMIN — Medication 1 PACKET: at 08:10

## 2023-07-21 RX ADMIN — IBUPROFEN 400 MG: 400 TABLET, FILM COATED ORAL at 08:33

## 2023-07-21 RX ADMIN — FERROUS SULFATE TAB 325 MG (65 MG ELEMENTAL FE) 325 MG: 325 (65 FE) TAB at 08:06

## 2023-07-21 RX ADMIN — AMILORIDE HYDROCLORIDE 10 MG: 5 TABLET ORAL at 08:09

## 2023-07-21 RX ADMIN — POTASSIUM CHLORIDE 40 MEQ: 1500 TABLET, EXTENDED RELEASE ORAL at 08:06

## 2023-07-21 RX ADMIN — HYDROXYCHLOROQUINE SULFATE 300 MG: 200 TABLET, FILM COATED ORAL at 08:08

## 2023-07-21 NOTE — DISCHARGE SUMMARY
[facility]  Discharge- Dejah Pace 1995, 33 y.o. female MRN: [id number]  Unit/Bed#: E4 -01 Encounter: [id number]  Primary Care Provider: ENMA Vega   Date and time admitted to hospital: 7/19/2023 10:26 AM    * SVT (supraventricular tachycardia) [facility])  Assessment & Plan  Patient is a 68-year-old female with a past medical history of POTS, hypokalemia, mixed connective tissue disease who presented to the hospital with palpitation, SVT  · She was seen in the ED 7/13 and diagnosed with SVT, treated with adenosine 6mg, 12mg and iv metoprolol  · She follows EP, she is on long-acting propranolol 120 mg daily and short-acting propranolol 20 mg every 8 as needed  · TSH wnl  · Evaluated by cardiology, appreciate recommendations  · Increased long-acting propranolol to 240 mg  · Pt had subsequent low bp, switched dose back to 120 mg daily  · Cont this dose and fu with EP as outpt for consideration of ablation. · Continue short acting propranolol 20 mg every 8 hours as needed for persistent heart rate above 120 or if patient is symptomatic  · Magnesium above 2  · Potassium above 4  · No additional episodes since admission  · 2D Echo obtained with EF of 27%, normal systolic and diastolic function noted, trace to mild tricuspid regurgitation    Nausea vomiting and diarrhea  Assessment & Plan  Patient developed diarrhea after admission  · She attributes this to the magnesium supplement. She notes she has a history of IBS however does not typically have diarrhea  · No current abdominal pain or cramping.   Abdominal exam benign  · Patient had nausea and vomiting this morning: She notes her symptoms improved after Zofran  · Now resolved    Undifferentiated connective tissue disease ([address])  Assessment & Plan  Follows rheumatology  · Continue Plaquenil    Irritable bowel syndrome with diarrhea  Assessment & Plan  Pt has a h/o GERD, IBS, idiopathic gastroparesis  · Follows with GI    Anxiety  Assessment & Plan  · On BuSpar and hydroxyzine as needed    POTS (postural orthostatic tachycardia syndrome)  Assessment & Plan  History of POTS, initially diagnosed on tilt table currently maintained on propranolol, hydration, compression stockings and liquid IV  · Follows with EP plus nephrology  · Continue to encourage hydration: recommendation for 3L/d  · Sodium recommendations 8g/d  · Cont exercise regimen    Hypomagnesemia-resolved as of 7/21/2023  Assessment & Plan  repleted and normalized  · Keep mag >2.0    Hypokalemia-resolved as of 7/21/2023  Assessment & Plan  Patient has a history of chronic hypokalemia, managed by nephrology  · Continue amiloride 10 mg twice daily  · Was noted to have hypokalemia, and her episodes of SVT in the ER 7/13, and this admission, with potassium of 3.2   · Home dose is potassium chloride 20 mEq 3 times daily  · repleted hypokalemia and normalized  · Cont to monitor with current nausea/vomiting/diarrhea  · resolved    Medical Problems     Resolved Problems  Date Reviewed: 7/21/2023          Resolved    Hypokalemia 7/21/2023     Resolved by  Harley Ibarra PA-C    Hypomagnesemia 7/21/2023     Resolved by  Harley Ibarra PA-C        Discharging Physician / Practitioner: Harley Ibarra PA-C  PCP: Theresa Page, [address] [place] [address]  Admission Date:   Admission Orders (From admission, onward)     Ordered        07/19/23 1405  INPATIENT ADMISSION  Once                      Discharge Date: 07/21/23    Consultations During Hospital Stay:  · cardiology    Procedures Performed:   · none    Significant Findings / Test Results:   Echo complete w/ contrast if indicated  Result Date: 7/21/2023    Narrative: •  Left Ventricle: Left ventricular cavity size is normal. Wall thickness is normal. The left ventricular ejection fraction is 55%.  Systolic function is normal. Wall motion is normal. Diastolic function is normal. •  Right Ventricle: Right ventricular cavity size is normal. Systolic function is normal. •  Tricuspid Valve: There is trace to mild regurgitation. XR chest 1 view portable  Result Date: 7/14/2023    Impression: No acute cardiopulmonary disease. Workstation performed: [id number]     Incidental Findings:   · none     Test Results Pending at Discharge (will require follow up):   · none     Outpatient Tests Requested:  · none    Complications:  none    Reason for Admission: SVT    Hospital Course:   Mike Ramos is a 33 y.o. female patient with a past medical history of SVT, POTS, IBS, anxiety who originally presented to the hospital on 7/19/2023 due to SVT. According to the patient the day of admission, she was seen on 7/13 initially secondary to chest tightness and palpitations, at that time she was administered adenosine for SVT which broke following the administration. She then was discharged home in stable condition, unfortunately over the course of the few days leading up to admission, she was feeling more prolonged palpitations than normal and took an additional dose of propanolol however despite this continued to have symptoms of SVT. Upon arrival to the ED, she was found to only be in sinus tachycardia. Her propanolol was increased by the cardiology service to 240 mg daily. After this increase, she did have some hypotension and therefore her propanolol was then decreased back to her home dose of 120 mg daily. The case was discussed with Dr. Tristin Steele, her electrophysiologist who recommended close outpatient follow-up with him at discharge for possible discussions about ablation. She underwent echocardiogram during her hospitalization which was within normal limits. Please see above list of diagnoses and related plan for additional information. Condition at Discharge: stable    Discharge Day Visit / Exam:   Subjective: The patient is seen resting comfortably in bed. She offers no complaints at this time.   She denies any nausea, vomiting, diarrhea, constipation, palpitations, chest pain. She did report a headache however she attributes this to her low blood pressure. Vitals: Blood Pressure: 101/65 (07/21/23 1113)  Pulse: 58 (07/21/23 1113)  Temperature: (!) 97 °F (36.1 °C) (07/21/23 1113)  Temp Source: Temporal (07/21/23 1113)  Respirations: 18 (07/21/23 1113)  Height: 5' 4" (162.6 cm) (07/21/23 1029)  Weight - Scale: 74.4 kg (164 lb) (07/21/23 1029)  SpO2: 100 % (07/21/23 1113)  Exam:   Physical Exam  Vitals and nursing note reviewed. Constitutional:       General: She is not in acute distress. Appearance: Normal appearance. She is not ill-appearing, toxic-appearing or diaphoretic. HENT:      Head: Normocephalic and atraumatic. Cardiovascular:      Rate and Rhythm: Normal rate and regular rhythm. Heart sounds: No murmur heard. No friction rub. No gallop. Pulmonary:      Effort: Pulmonary effort is normal. No respiratory distress. Breath sounds: Normal breath sounds. No wheezing, rhonchi or rales. Abdominal:      General: Abdomen is flat. Bowel sounds are normal. There is no distension. Palpations: Abdomen is soft. Tenderness: There is no abdominal tenderness. Musculoskeletal:      Right lower leg: No edema. Left lower leg: No edema. Skin:     General: Skin is warm and dry. Coloration: Skin is not jaundiced or pale. Neurological:      General: No focal deficit present. Mental Status: She is alert. Mental status is at baseline. Discussion with Family: Patient declined call to . Discharge instructions/Information to patient and family:   See after visit summary for information provided to patient and family. Provisions for Follow-Up Care:  See after visit summary for information related to follow-up care and any pertinent home health orders. Disposition:   Home    Planned Readmission: n/a     Discharge Statement:  I spent 40 minutes discharging the patient.  This time was spent on the day of discharge. I had direct contact with the patient on the day of discharge. Greater than 50% of the total time was spent examining patient, answering all patient questions, arranging and discussing plan of care with patient as well as directly providing post-discharge instructions. Additional time then spent on discharge activities. Discharge Medications:  See after visit summary for reconciled discharge medications provided to patient and/or family.       **Please Note: This note may have been constructed using a voice recognition system**

## 2023-07-21 NOTE — ASSESSMENT & PLAN NOTE
History of POTS, initially diagnosed on tilt table currently maintained on propranolol, hydration, compression stockings and liquid IV  · Follows with EP plus nephrology  · Continue to encourage hydration: recommendation for 3L/d  · Sodium recommendations 8g/d  · Cont exercise regimen

## 2023-07-21 NOTE — ASSESSMENT & PLAN NOTE
Patient is a 41-year-old female with a past medical history of POTS, hypokalemia, mixed connective tissue disease who presented to the hospital with palpitation, SVT  · She was seen in the ED 7/13 and diagnosed with SVT, treated with adenosine 6mg, 12mg and iv metoprolol  · She follows EP, she is on long-acting propranolol 120 mg daily and short-acting propranolol 20 mg every 8 as needed  · TSH wnl  · Evaluated by cardiology, appreciate recommendations  · Increased long-acting propranolol to 240 mg  · Pt had subsequent low bp, switched dose back to 120 mg daily  · Cont this dose and fu with EP as outpt for consideration of ablation.   · Continue short acting propranolol 20 mg every 8 hours as needed for persistent heart rate above 120 or if patient is symptomatic  · Magnesium above 2  · Potassium above 4  · No additional episodes since admission  · 2D Echo obtained with EF of 56%, normal systolic and diastolic function noted, trace to mild tricuspid regurgitation

## 2023-07-21 NOTE — NURSING NOTE
Assuming patient care at 0300 AM. This RN agrees with previous RN's assessments. Pt is resting in bed. Call bell is within reach. Will continue to monitor patient.

## 2023-07-21 NOTE — PLAN OF CARE
Problem: Potential for Falls  Goal: Patient will remain free of falls  Description: INTERVENTIONS:  - Educate patient/family on patient safety including physical limitations  - Instruct patient to call for assistance with activity   - Consult OT/PT to assist with strengthening/mobility   - Keep Call bell within reach  - Keep bed low and locked with side rails adjusted as appropriate  - Keep care items and personal belongings within reach  - Initiate and maintain comfort rounds  Outcome: Progressing     Problem: PAIN - ADULT  Goal: Verbalizes/displays adequate comfort level or baseline comfort level  Description: Interventions:  - Encourage patient to monitor pain and request assistance  - Assess pain using appropriate pain scale  - Administer analgesics based on type and severity of pain and evaluate response  - Implement non-pharmacological measures as appropriate and evaluate response  - Consider cultural and social influences on pain and pain management  - Notify physician/advanced practitioner if interventions unsuccessful or patient reports new pain  Outcome: Progressing     Problem: INFECTION - ADULT  Goal: Absence or prevention of progression during hospitalization  Description: INTERVENTIONS:  - Assess and monitor for signs and symptoms of infection  - Monitor lab/diagnostic results  - Monitor all insertion sites, i.e. indwelling lines, tubes, and drains  - Monitor endotracheal if appropriate and nasal secretions for changes in amount and color  - [place] appropriate cooling/warming therapies per order  - Administer medications as ordered  - Instruct and encourage patient and family to use good hand hygiene technique  - Identify and instruct in appropriate isolation precautions for identified infection/condition  Outcome: Progressing  Goal: Absence of fever/infection during neutropenic period  Description: INTERVENTIONS:  - Monitor WBC    Outcome: Progressing     Problem: SAFETY ADULT  Goal: Maintain or return to baseline ADL function  Description: INTERVENTIONS:  -  Assess patient's ability to carry out ADLs; assess patient's baseline for ADL function and identify physical deficits which impact ability to perform ADLs (bathing, care of mouth/teeth, toileting, grooming, dressing, etc.)  - Assess/evaluate cause of self-care deficits   - Assess range of motion  - Assess patient's mobility; develop plan if impaired  - Assess patient's need for assistive devices and provide as appropriate  - Encourage maximum independence but intervene and supervise when necessary  - Involve family in performance of ADLs  - Assess for home care needs following discharge   - Consider OT consult to assist with ADL evaluation and planning for discharge  - Provide patient education as appropriate  Outcome: Progressing  Goal: Maintains/Returns to pre admission functional level  Description: INTERVENTIONS:  - Perform BMAT or MOVE assessment daily.   - Set and communicate daily mobility goal to care team and patient/family/caregiver.    - Collaborate with rehabilitation services on mobility goals if consulted  - Out of bed for toileting  - Record patient progress and toleration of activity level   Outcome: Progressing     Problem: DISCHARGE PLANNING  Goal: Discharge to home or other facility with appropriate resources  Description: INTERVENTIONS:  - Identify barriers to discharge w/patient and caregiver  - Arrange for needed discharge resources and transportation as appropriate  - Identify discharge learning needs (meds, wound care, etc.)  - Arrange for interpretive services to assist at discharge as needed  - Refer to Case Management Department for coordinating discharge planning if the patient needs post-hospital services based on physician/advanced practitioner order or complex needs related to functional status, cognitive ability, or social support system  Outcome: Progressing     Problem: Knowledge Deficit  Goal: Patient/family/caregiver demonstrates understanding of disease process, treatment plan, medications, and discharge instructions  Description: Complete learning assessment and assess knowledge base.   Interventions:  - Provide teaching at level of understanding  - Provide teaching via preferred learning methods  Outcome: Progressing     Problem: CARDIOVASCULAR - ADULT  Goal: Maintains optimal cardiac output and hemodynamic stability  Description: INTERVENTIONS:  - Monitor I/O, vital signs and rhythm  - Monitor for S/S and trends of decreased cardiac output  - Administer and titrate ordered vasoactive medications to optimize hemodynamic stability  - Assess quality of pulses, skin color and temperature  - Assess for signs of decreased coronary artery perfusion  - Instruct patient to report change in severity of symptoms  Outcome: Progressing  Goal: Absence of cardiac dysrhythmias or at baseline rhythm  Description: INTERVENTIONS:  - Continuous cardiac monitoring, vital signs, obtain 12 lead EKG if ordered  - Administer antiarrhythmic and heart rate control medications as ordered  - Monitor electrolytes and administer replacement therapy as ordered  Outcome: Progressing

## 2023-07-21 NOTE — ASSESSMENT & PLAN NOTE
Patient has a history of chronic hypokalemia, managed by nephrology  · Continue amiloride 10 mg twice daily  · Was noted to have hypokalemia, and her episodes of SVT in the ER 7/13, and this admission, with potassium of 3.2   · Home dose is potassium chloride 20 mEq 3 times daily  · repleted hypokalemia and normalized  · Cont to monitor with current nausea/vomiting/diarrhea  · resolved

## 2023-07-21 NOTE — ASSESSMENT & PLAN NOTE
Patient developed diarrhea after admission  · She attributes this to the magnesium supplement. She notes she has a history of IBS however does not typically have diarrhea  · No current abdominal pain or cramping.   Abdominal exam benign  · Patient had nausea and vomiting this morning: She notes her symptoms improved after Zofran  · Now resolved

## 2023-07-21 NOTE — PROGRESS NOTES
Cardiology Progress Note - Dejah Raza 33 y.o. female MRN: [id number]    Unit/Bed#: E4 -01 Encounter: [id number]      Assessment & Plan:    SVT (supraventricular tachycardia) ([address])  -Known history of SVT  - episode of SVT on 7/13 which broke with adenosine during the ED visit to [facility]  -Now presenting with sinus tachycardia with rates up to 150 bpm, no clear evidence of recurrent SVT  -No events on telemetry overnight  -Currently on long-acting propranolol 120 mg twice daily  - Also has short acting propranolol 20 mg every 8 hours as needed for heart rate greater than 120  - TTE 7/21/2023 showed EF 55%, trace to mild TR    POTS (postural orthostatic tachycardia syndrome)  - Diagnosed in 2016 with tilt table test  - Encourage patient to stay well-hydrated    Hypokalemia  - 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 Ira Johnson on September 19, will message the EP office to try to get her a sooner appointment    Subjective:   No significant events overnight. She has continued headache, but otherwise is feeling well today. Denies any recurrent episodes of palpitations overnight. Denies chest pain, shortness of breath, orthopnea, abdominal pain, nausea, vomiting, fever, or chills. Objective:     Vitals: Blood pressure 101/65, pulse 58, temperature (!) 97 °F (36.1 °C), temperature source Temporal, resp.  rate 18, height 5' 4" (1.626 m), weight 74.4 kg (164 lb), last menstrual period 07/09/2023, SpO2 100 %, not currently breastfeeding., Body mass index is 28.15 kg/m².,   Orthostatic Blood Pressures    Flowsheet Row Most Recent Value   Blood Pressure 101/65 filed at 07/21/2023 1113   Patient Position - Orthostatic VS Lying filed at 07/21/2023 1113            Intake/Output Summary (Last 24 hours) at 7/21/2023 1121  Last data filed at 7/21/2023 0201  Gross per 24 hour   Intake 1232.5 ml   Output --   Net 1232.5 ml           Physical Exam:    GEN: Jozef Cavanaugh appears well, alert and oriented x 3, pleasant and cooperative   HEENT: Mucous membranes moist, no scleral icterus, no conjunctival pallor  NECK: No elevated JVD  HEART: Regular rate and rhythm, normal S1 and S2, no murmurs or rubs   LUNGS: clear to auscultation bilaterally; no wheezes, rales, or rhonchi   ABDOMEN: normal bowel sounds, soft, no tenderness, no distention  EXTREMITIES: peripheral pulses normal; no lower extremity edema   NEURO: no focal findings   SKIN: No lesions or rashes on exposed skin        Current Facility-Administered Medications:   •  acetaminophen (TYLENOL) tablet 650 mg, 650 mg, Oral, Q6H PRN, Joshua Hoffmann MD, 650 mg at 07/20/23 0935  •  AMILoride tablet 10 mg, 10 mg, Oral, BID (diuretic), Joshua Hoffmann MD, 10 mg at 07/21/23 0809  •  enoxaparin (LOVENOX) subcutaneous injection 40 mg, 40 mg, Subcutaneous, Q24H [facility], Lashell Garcia MD  •  ferrous sulfate tablet 325 mg, 325 mg, Oral, Daily With Breakfast, Joshua Hoffmann MD, 325 mg at 07/21/23 6262  •  hydroxychloroquine (PLAQUENIL) tablet 300 mg, 300 mg, Oral, Daily With Breakfast, Joshua Hoffmann MD, 300 mg at 07/21/23 0808  •  hydrOXYzine HCL (ATARAX) tablet 10 mg, 10 mg, Oral, BID PRN, Joshua Hoffmann MD  •  ibuprofen (MOTRIN) tablet 400 mg, 400 mg, Oral, Q6H PRN, Kristian Finley PA-C, 400 mg at 07/21/23 1815  •  lactobacillus acidophilus-bulgaricus [facility]) packet 1 packet, 1 packet, Oral, TID With Meals, Lashell Garcia MD, 1 packet at 07/21/23 0810  •  ondansetron (ZOFRAN) injection 4 mg, 4 mg, Intravenous, Q4H PRN, Trina Saenz MD, 4 mg at 07/20/23 0941  •  potassium chloride (K-DUR,KLOR-CON) CR tablet 40 mEq, 40 mEq, Oral, BID, Akilah Blackwood MD, 40 mEq at 07/21/23 3692  •  propranolol (INDERAL LA) 24 hr capsule 120 mg, 120 mg, Oral, Q12H, Maikel Kim MD  •  propranolol (INDERAL) tablet 20 mg, 20 mg, Oral, Q8H PRN, Mariza Dumont MD  •  sodium chloride 0.9 % infusion, 75 mL/hr, Intravenous, Continuous, Trina Saenz MD, Last Rate: 75 mL/hr at 07/20/23 2322, 75 mL/hr at 07/20/23 2322    Labs & Results:    Lab Results   Component Value Date    CKTOTAL 67 07/29/2015       Lab Results   Component Value Date    GLUCOSE 96 01/07/2016    CALCIUM 9.2 07/21/2023     (L) 01/07/2016    K 4.2 07/21/2023    CO2 23 07/21/2023     07/21/2023    BUN 12 07/21/2023    CREATININE 0.76 07/21/2023       Lab Results   Component Value Date    WBC 8.99 07/21/2023    HGB 14.8 07/21/2023    HCT 44.3 07/21/2023    MCV 91 07/21/2023     07/21/2023           Lab Results   Component Value Date    CHOL 195 08/27/2015    CHOL 176 06/05/2015     Lab Results   Component Value Date    HDL 52 06/05/2023    HDL 60 10/12/2022     Lab Results   Component Value Date    LDLCALC 89 06/05/2023    [address] 10/12/2022     Lab Results   Component Value Date    TRIG 122 06/05/2023    TRIG 100 10/12/2022       Lab Results   Component Value Date    ALT 12 07/19/2023    AST 14 07/19/2023    ALKPHOS 97 07/19/2023         EKG personally reviewed by )Maikel Kim MD. No acute changes   TELE: No significant arrhythmias seen on telemetry review.

## 2023-07-21 NOTE — UTILIZATION REVIEW
NOTIFICATION OF INPATIENT ADMISSION   AUTHORIZATION REQUEST   SERVICING FACILITY:   [address] [place] [address]  Tax ID: [id number]  NPI: [id number] ATTENDING PROVIDER:  Attending Name and NPI#: Clint John [[id number]]  Address: [address]  Phone: [phone]     ADMISSION INFORMATION:  Place of Service: Inpatient [address]  Place of Service Code: 21  Inpatient Admission Date/Time: 7/19/23  2:05 PM  Discharge Date/Time: No discharge date for patient encounter. Admitting Diagnosis Code/Description:  Palpitations [R00.2]  Hypokalemia [E87.6]  SVT (supraventricular tachycardia) (HCC) [I47.1]  Rapid heart rate [R00.0]  POTS (postural orthostatic tachycardia syndrome) [G90. A]     UTILIZATION REVIEW CONTACT:  Hood Seth, Utilization   Network Utilization Review Department  Phone: [phone]  Fax [phone]  Email: [email or web address]  Contact for approvals/pending authorizations, clinical reviews, and discharge. PHYSICIAN ADVISORY SERVICES:  Medical Necessity Denial & [employer] Review  Phone: [phone]  Fax: [phone]  Email: [email or web address]

## 2023-07-24 ENCOUNTER — OFFICE VISIT (OUTPATIENT)
Dept: PHYSICAL THERAPY | Facility: REHABILITATION | Age: 28
End: 2023-07-24
Payer: COMMERCIAL

## 2023-07-24 DIAGNOSIS — M25.561 RIGHT KNEE PAIN, UNSPECIFIED CHRONICITY: ICD-10-CM

## 2023-07-24 DIAGNOSIS — Z98.890 S/P RIGHT KNEE SURGERY: Primary | ICD-10-CM

## 2023-07-24 PROCEDURE — 97112 NEUROMUSCULAR REEDUCATION: CPT

## 2023-07-24 PROCEDURE — 97110 THERAPEUTIC EXERCISES: CPT

## 2023-07-24 NOTE — PROGRESS NOTES
Daily Note     Today's date: 2023  Patient name: Linn Apley  : 1995  MRN: [id number]  Referring provider: Mindy Roa DO  Dx:   Encounter Diagnosis     ICD-10-CM    1. S/P right knee surgery  Z98.890       2. Right knee pain, unspecified chronicity  M25.561           Start Time: 1747  Stop Time: 1830  Total time in clinic (min): 43 minutes    Subjective: Pt reports R knee has been doing well overall. Pt notes pain at times if moving too fast and is not confident in stair negotiation. Despite confidence patient reports ability to negotiate stairs reciprocally.       Objective: See treatment diary below     Precautions: hx POTS, HTN, follow protocol         Manuals      Knee PROM 5 min 5 min 5 min 5 min 5 min KK D/c      Light patellar mobility 5 min 5 min 5 min 5 min 5 min KK D/c      Measurements/ RE              15 min/                            Neuro Re-Ed                    Quad set 1"E74 5"x15               Sit to stand elevated surface     10x sq foam x10 square foam x12 round foam x15 round foam   Chair x10     Lateral step up (SL squat)           5x R step 0R x5     tandem stance     2x30" kevin 30"x2 ea 30"x2 ea  30"x2 ea  2x30" foam 30"x3 ea foam     modified tandem stance foam      2x30" kevin 30"x2 ea 30"x2 ea  30"x2 ea        sidestepping     NV  3 laps 3 laps  3 laps   Red 2 laps      SLS              2x30" kevin 30"x2 ea     Ther Ex                    Recumbent bike (ROM) 10 min bike on 10 min bike on 10 min bike on 10 min bike on 5 min bike on   5 min      Heel slide knee flexion 5"x15 5"x15 5" 15x 5"x15     5"x15       Treadmill         1.7 mph 5 min 1.7 mph   5 min 1.8 mph 10 min     Prone quad stretch 15"x5 30"x3  3x30" with PT 30"x3 w/ PT 30"x3 w/ PT 30"x3 w/ PT  3x30" with PT DC                          sidelying hip abduction  5"x17 R  5"x17 R  5" 15x 5"x15 5"x15 5"x15 5" 15x R 5"x15 R     Prone hip extension  5"x15 R  5"x15 R  5" 15x 5"x15 5"x15 5"x15       Heel raise  x10 x20 20x x20 x20 x20  DC      SLR  5"x10 R  5"x10 R  5" 10x R 5"x10 5"x12  5"x15  5" 15x R 5"x15 R     SL press (0-45*) seat 3, plate 9        49# W59     Ther Activity                                                              Gait Training                                                              Modalities                    Ice PRN                    NMES             10 min             Assessment: Tolerated treatment well. Sit to stands performed from chair with good form and technique demonstrated. Pt is challenged with lateral step down strengthening with medial knee discomfort during eccentric phase of exercise. Trial of SL press this visit from 0-45 deg, is most challenged with quad control through last few deg of ext. Mild lag present with SLR. Cont to progress strengthening as able. Patient demonstrated fatigue post treatment and would benefit from continued PT      Plan: Progress treament per protocol. Pt was treated via unsupervised time from 5:47 - 5:52 pm and was 1:1 with treating clinician for rest of session.

## 2023-07-24 NOTE — UTILIZATION REVIEW
NOTIFICATION OF ADMISSION DISCHARGE   This is a Notification of Discharge from [address] [place] [address]. Please be advised that this patient has been discharge from our facility. Below you will find the admission and discharge date and time including the patient’s disposition. UTILIZATION REVIEW CONTACT:  Abigail Segura MA  Utilization   Network Utilization Review Department  Phone: [phone] x carefully listen to the prompts. All voicemails are confidential.  Email: [email or web address]     ADMISSION INFORMATION  PRESENTATION DATE: 7/19/2023 10:26 AM  OBERVATION ADMISSION DATE:   INPATIENT ADMISSION DATE: 7/19/23  2:05 PM   DISCHARGE DATE: 7/21/2023  2:49 PM   DISPOSITION:Home/Self Care    IMPORTANT INFORMATION:  Send all requests for admission clinical reviews, approved or denied determinations and any other requests to dedicated fax number below belonging to the campus where the patient is receiving treatment.  List of dedicated fax numbers:  [place] DENIALS (Administrative/Medical Necessity) [phone] [address] [facility] (Maternity/NICU/Pediatrics) [phone]   [facility] [phone]   [facility] [phone] [address] [facility] [phone]   [address] [place] [address] [phone]   [facility] [phone]   [address] [place] [address] [address] [place] [address] [phone]   [address] [place] [address] [phone]   [address] [facility] [phone] [address] [facility] [address] [facility] [phone]

## 2023-07-25 NOTE — UTILIZATION REVIEW
Auth: [id number]   Clinical for 7/19/23 and 7/20/23  Please provide update on case     Gabriela Cortes RN  Registered Nurse  Specialty:  Medical Surgical  Utilization Review     Addendum  Date of Service:  7/20/2023  9:04 AM     Expand All Collapse All    Initial Clinical Review     Admission: Date/Time/Statement:       Admission Orders (From admission, onward)       Ordered         07/19/23 1405   INPATIENT ADMISSION  Once                               Orders Placed This Encounter   Procedures   • INPATIENT ADMISSION       Standing Status:   Standing       Number of Occurrences:   1       Order Specific Question:   Level of Care       Answer:   Med Surg [16]       Order Specific Question:   Estimated length of stay       Answer:   More than 2 Midnights       Order Specific Question:   Certification       Answer:   I certify that inpatient services are medically necessary for this patient for a duration of greater than two midnights. See H&P and MD Progress Notes for additional information about the patient's course of treatment.               ED Arrival Information               Expected   -    Arrival   7/19/2023 10:26    Acuity   Emergent              Means of arrival   Ambulance    Escorted by   [facility]    Service   Hospitalist    Admission type   Emergency              Arrival complaint   rapid heart rate                  Chief Complaint   Patient presents with   • Chest Pain       Chest tightness - seen at urgent care for chest tightness and palpitations. Had abnormal EKG. Seen at [facility] last week given adenosine.          Initial Presentation: 33 y.o. female presents to the ED via EMS from Urgent care with c/o palpitations x several days and lasting longer w/ chest tightness. Was seen in the ED on 7/13 for chest tightness, palpitation and had SVT  - tx with Adenosine which broke rhythm. She has been taking long-acting Propranolol 120 mg daily PRN Propranolol 20 mg q 8 hr PRN.   PMH: POTS, hypokalemia, hyponatremia, connective tissue disease on Plaquenil, anxiety. In the ED she was tachycardic. ECG - ST w/ occas PVCs, poss LA enlargement. Labs - low K, low Mag. Treated with IV fluids, PO KDur, IV Mag. On exam she was tachycardic. She is admitted to INPATIENT status with SVT - increase long acting Propranolol to 240 mg daily, continue short acting Propranolol 20 mg q 8 hr PRN with parameters, keep electrolytes stable, tele. Hypomagnesemia , Hypokalemia - replete and trend.      7/19 Cardio Consult - ST, SVT, POTS, anxiety, electrolyte imbalance (Mag, K) - recurrent palpitations in ST, increase Propanolol 240 mg daily and PRN Propranolol q 8 hr, IV fluids, water intake of 3-4 L daily, aggressive repletion of lytes, Continue Amiloride, check OP Zio patch.       Date: 7/20   Day 2:   No c/o palpitations, tachycardia since yesterday. Did have diarrhea, no c/o CP, orthopnea, N/V. Will get TTE. Mag and K WNL, monitor d/t diarrhea. Remains on IV fluids. HR has been SR overnight. K and Mag replenished and stable today.  Used 1 dose IV Zofran today.              ED Triage Vitals   Temperature Pulse Respirations Blood Pressure SpO2   07/19/23 1034 07/19/23 1034 07/19/23 1034 07/19/23 1034 07/19/23 1034   98.9 °F (37.2 °C) (!) 108 20 156/76 100 %       Temp Source Heart Rate Source Patient Position - Orthostatic VS BP Location FiO2 (%)   07/19/23 1034 07/19/23 1034 07/19/23 1034 07/19/23 1034 --   Oral Monitor Sitting Right arm         Pain Score           07/19/23 1330           No Pain                  Wt Readings from Last 1 Encounters:   07/19/23 74.4 kg (164 lb 0.4 oz)      Additional Vital Signs:   07/20/23 0733 97.2 °F (36.2 °C) Abnormal  73 18 127/85 92 100 % None (Room air) Lying   07/20/23 0340 97.5 °F (36.4 °C) 67 18 117/82 99 99 % -- Sitting   07/19/23 2314 98.4 °F (36.9 °C) 74 18 109/57 76 98 % None (Room air) Lying   07/19/23 1949 97.6 °F (36.4 °C) 92 18 134/77 100 100 % None (Room air) Lying   07/19/23 1626 97.3 °F (36.3 °C) Abnormal  93 18 127/83 95 97 % None (Room air) Lying   07/19/23 1345 -- 106 Abnormal  18 -- -- 98 % -- --   07/19/23 1330 -- 125 Abnormal  18 149/70 102 100 % None (Room air) --   07/19/23 1329 -- 125 Abnormal  18 161/80 -- 100 % None (Room air) --   07/19/23 1315 -- 107 Abnormal  20 -- -- 99 % None (Room air) --   07/19/23 1230 -- 97 20 124/63 88 99 % None (Room air) Sitting   07/19/23 1130 -- 99 18 128/65 92 99 % -- --   07/19/23 1100 -- 110 Abnormal  19 124/65 89 100 % None (Room air) Sitting      Pertinent Labs/Diagnostic Test Results:      7/20 Echo - P      7/19 ECG - Sinus tachycardia with Premature ventricular complexes  Possible Left atrial enlargement  Borderline ECG  7/19 ECG - Sinus tachycardia  Possible Left atrial enlargement  Borderline ECG  7/19 ECG - Sinus tachycardia with occasional Premature ventricular complexes  Possible Left atrial enlargement  Borderline ECG            Results from last 7 days   Lab Units 07/19/23  1038 07/13/23 2010   WBC Thousand/uL 7.86 9.84   HEMOGLOBIN g/dL 14.9 14.3   HEMATOCRIT % 42.8 40.7   PLATELETS Thousands/uL 236 221   NEUTROS ABS Thousands/µL 5.79 6.92                  Results from last 7 days   Lab Units 07/20/23  0842 07/20/23  0430 07/19/23 1038 07/13/23 2010   SODIUM mmol/L 134* 138 137 140   POTASSIUM mmol/L 4.8 4.2 3.2* 3.2*   CHLORIDE mmol/L 106 109* 104 107   CO2 mmol/L 22 23 23 24   ANION GAP mmol/L 6 6 10 9   BUN mg/dL 8 8 9 13   CREATININE mg/dL 0.78 0.68 0.83 0.98   EGFR ml/min/1.73sq m 103 118 95 78   CALCIUM mg/dL 9.9 9.3 9.6 9.5   MAGNESIUM mg/dL 2.6 2.4 1.8* 2.0            Results from last 7 days   Lab Units 07/19/23 1038 07/13/23 2010   AST U/L 14 15   ALT U/L 12 22   ALK PHOS U/L 97 118*   TOTAL PROTEIN g/dL 7.5 7.8   ALBUMIN g/dL 4.4 4.0   TOTAL BILIRUBIN mg/dL 0.58 0.31                  Results from last 7 days   Lab Units 07/20/23  0842 07/20/23  0430 07/19/23  1038 07/13/23 2010   GLUCOSE RANDOM mg/dL 121 89 104 138             Results from last 7 days   Lab Units 07/19/23  1231 07/19/23  1038 07/13/23 2010   HS TNI 0HR ng/L  --  <2 <2   HS TNI 2HR ng/L 3  --   --    HSTNI D2 ng/L >1  --   --                    Results from last 7 days   Lab Units 07/13/23 2010   TSH 3RD GENERATON uIU/mL 3.540      ED Treatment:             Medication Administration from 07/19/2023 1026 to 07/19/2023 1624        Date/Time Order Dose Route Action       07/19/2023 1043 EDT sodium chloride 0.9 % bolus 1,000 mL 1,000 mL Intravenous New Bag       07/19/2023 1117 EDT potassium chloride (K-DUR,KLOR-CON) CR tablet 40 mEq 40 mEq Oral Given       07/19/2023 1142 EDT magnesium sulfate 2 g/50 mL IVPB (premix) 2 g 2 g Intravenous New Bag          Medical History        Past Medical History:   Diagnosis Date   • Abnormal blood chemistry     • Anesthesia       per pt " Panic attack right before a procedure-has High preop anxiety and wakes up nasty"   • Anxiety     • Eczema     • Exercises 5 to 6 times per week       per pt prior to knee issue -enjoyed running and horseback riding   • Family history of reaction to anesthesia       per pt--"Mom also gets high anxiety with surgeries and wakes up nasty from anesthesia"   • Gastroparesis     • History of vitamin D deficiency     • Hypertension       last assessed 3/12/14   • Hypokalemia       per pt per doctors possibly related renal problem   • Irritable bowel syndrome       with diarrhea   • Lymphadenopathy       last assessed 10/8/14-no current issue   • Myalgia       last assessed 10/8/14   • Myositis       last assessed 10/8/14   • Palpitations     • POTS (postural orthostatic tachycardia syndrome)     • RA (rheumatoid arthritis) ([facility])     • Rheumatoid arthritis (HCC)     • Sinus tachycardia       related to dehydration   • Tachycardia       last assessed 6/9/17   • Tooth missing       front upper tooth retainer-   • Wears glasses       for computer use         Present on Admission:  • Hypokalemia  • POTS (postural orthostatic tachycardia syndrome)  • Hypomagnesemia  • Anxiety  • Undifferentiated connective tissue disease (HCC)  • Irritable bowel syndrome with diarrhea        Admitting Diagnosis: Palpitations [R00.2]  Hypokalemia [E87.6]  SVT (supraventricular tachycardia) (HCC) [I47.1]  Rapid heart rate [R00.0]  POTS (postural orthostatic tachycardia syndrome) [G90. A]  Age/Sex: 33 y.o. female  Admission Orders:  Scheduled Medications:  AMILoride, 10 mg, Oral, BID (diuretic)  enoxaparin, 40 mg, Subcutaneous, Q24H [address]  ferrous sulfate, 325 mg, Oral, Daily With Breakfast  hydroxychloroquine, 300 mg, Oral, Daily With Breakfast  potassium chloride, 40 mEq, Oral, BID  [START ON 7/21/2023] propranolol, 120 mg, Oral, Q12H        Continuous IV Infusions:  sodium chloride, 75 mL/hr, Intravenous, Continuous        PRN Meds:  acetaminophen, 650 mg, Oral, Q6H PRN - x 1 7/20  hydrOXYzine HCL, 10 mg, Oral, BID PRN  propranolol, 20 mg, Oral, Q8H PRN  IV Zofran 4 mg PRN q 4 hr - x 1 7/20     Mag  Tele  Activity as zahra   IP CONSULT TO CARDIOLOGY     Network Utilization Review Department  ATTENTION: Please call with any questions or concerns to [phone] and carefully listen to the prompts so that you are directed to the right person. All voicemails are confidential.  Kate Croft all requests for admission clinical reviews, approved or denied determinations and any other requests to dedicated fax number below belonging to the campus where the patient is receiving treatment.  List of dedicated fax numbers for the Facilities:  [place] DENIALS (Administrative/Medical Necessity) [phone] [address] [facility] (Maternity/NICU/Pediatrics) [phone]   [address] [place] [address] Drive [phone]   [address] [phone]   [address] [place] [address] [address] [phone] [address]  [facility] [phone]                    Revision History

## 2023-07-26 ENCOUNTER — CLINICAL SUPPORT (OUTPATIENT)
Dept: CARDIOLOGY CLINIC | Facility: CLINIC | Age: 28
End: 2023-07-26
Payer: COMMERCIAL

## 2023-07-26 ENCOUNTER — TELEPHONE (OUTPATIENT)
Dept: CARDIOLOGY CLINIC | Facility: CLINIC | Age: 28
End: 2023-07-26

## 2023-07-26 DIAGNOSIS — R00.2 HEART PALPITATIONS: Primary | ICD-10-CM

## 2023-07-26 PROCEDURE — 99211 OFF/OP EST MAY X REQ PHY/QHP: CPT | Performed by: INTERNAL MEDICINE

## 2023-07-26 NOTE — PROGRESS NOTES
Pt presents to the office under the direction of Dr. Silva Manual for a 1 week Zio At. Patch applied and all instructions given. Pt verbalized understanding.

## 2023-07-26 NOTE — TELEPHONE ENCOUNTER
Precert request form faxed to [facility] of [address] for auth request of 1[address] KLEBER [id number].

## 2023-07-27 ENCOUNTER — OFFICE VISIT (OUTPATIENT)
Dept: FAMILY MEDICINE CLINIC | Facility: CLINIC | Age: 28
End: 2023-07-27
Payer: COMMERCIAL

## 2023-07-27 VITALS — SYSTOLIC BLOOD PRESSURE: 118 MMHG | HEART RATE: 90 BPM | DIASTOLIC BLOOD PRESSURE: 86 MMHG | TEMPERATURE: 98.6 F

## 2023-07-27 DIAGNOSIS — I47.1 SVT (SUPRAVENTRICULAR TACHYCARDIA) (HCC): ICD-10-CM

## 2023-07-27 DIAGNOSIS — I10 ESSENTIAL HYPERTENSION, BENIGN: Chronic | ICD-10-CM

## 2023-07-27 DIAGNOSIS — G90.A POTS (POSTURAL ORTHOSTATIC TACHYCARDIA SYNDROME): Primary | Chronic | ICD-10-CM

## 2023-07-27 PROBLEM — S83.004S: Status: RESOLVED | Noted: 2022-12-30 | Resolved: 2023-07-27

## 2023-07-27 PROBLEM — R11.2 NAUSEA VOMITING AND DIARRHEA: Status: RESOLVED | Noted: 2023-07-20 | Resolved: 2023-07-27

## 2023-07-27 PROBLEM — R19.7 NAUSEA VOMITING AND DIARRHEA: Status: RESOLVED | Noted: 2023-07-20 | Resolved: 2023-07-27

## 2023-07-27 PROCEDURE — 99496 TRANSJ CARE MGMT HIGH F2F 7D: CPT | Performed by: NURSE PRACTITIONER

## 2023-07-27 NOTE — PROGRESS NOTES
Assessment & Plan     1. POTS (postural orthostatic tachycardia syndrome)  Assessment & Plan:  Cont current meds        2. Essential hypertension, benign  Assessment & Plan:  Stable  Cont current meds        3. SVT (supraventricular tachycardia) ([facility])  Assessment & Plan:  zio patch  F/u with cardiology        BMI Counseling: There is no height or weight on file to calculate BMI. The BMI is above normal. Nutrition recommendations include decreasing portion sizes, encouraging healthy choices of fruits and vegetables, decreasing fast food intake, consuming healthier snacks, limiting drinks that contain sugar, moderation in carbohydrate intake, increasing intake of lean protein, reducing intake of saturated and trans fat and reducing intake of cholesterol. Exercise recommendations include moderate physical activity 150 minutes/week, exercising 3-5 times per week and strength training exercises. No pharmacotherapy was ordered. Rationale for BMI follow-up plan is due to patient being overweight or obese. Depression Screening and Follow-up Plan: Patient was screened for depression during today's encounter. They screened negative with a PHQ-2 score of 0. Subjective     Transitional Care Management Review:   Rossy Barraza is a 33 y.o. female here for TCM follow up. During the TCM phone call patient stated:  TCM Call     Date and time call was made  7/21/2023  3:58 PM    Hospital care reviewed  Records not available    Patient was hospitialized at  [facility]    Date of Admission  07/19/23    Date of discharge  07/21/23    Diagnosis  SVT    Disposition  Home    Were the patients medications reviewed and updated  No      TCM Call     Should patient be enrolled in anticoag monitoring? No    Scheduled for follow up?   Yes    Did you obtain your prescribed medications  Yes    Do you need help managing your prescriptions or medications  No    Is transportation to your appointment needed  No    I have advised the patient to call PCP with any new or worsening symptoms  MR Radha    Living Arrangements  Spouse or Significiant other    Are you recieving any outpatient services  No    Are you recieving home care services  No    Are you using any community resources  No    Current waiver services  No    Have you fallen in the last 12 months  No    Interperter language line needed  No        Here for hospital follow up for rapid heart beat  Has zio patch on currently  Continues to have rapid heart beat  To have follow up visit with cardiology next week  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 According to the patient the day of admission, she was seen on 7/13 initially secondary to chest tightness and palpitations, at that time she was administered adenosine for SVT which broke following the administration. She then was discharged home in stable condition, unfortunately over the course of the few days leading up to admission, she was feeling more prolonged palpitations than normal and took an additional dose of propanolol however despite this continued to have symptoms of SVT.     Upon arrival to the ED, she was found to only be in sinus tachycardia. Her propanolol was increased by the cardiology service to 240 mg daily. After this increase, she did have some hypotension and therefore her propanolol was then decreased back to her home dose of 120 mg daily. The case was discussed with Dr. Virginia Xiong her electrophysiologist who recommended close outpatient follow-up with him at discharge for possible discussions about ablation. echocardiogram during her hospitalization which was within normal limits. Review of Systems   Constitutional: Negative for fatigue and fever. HENT: Negative for congestion, postnasal drip and rhinorrhea.     Respiratory: Negative for cough, shortness of breath and wheezing. Cardiovascular: Positive for palpitations. Negative for chest pain. Gastrointestinal: Negative for constipation, diarrhea, nausea and vomiting. Musculoskeletal: Negative for arthralgias and myalgias. Skin: Negative for rash. Neurological: Negative for dizziness, light-headedness and headaches. Hematological: Negative for adenopathy. Psychiatric/Behavioral: Negative for dysphoric mood and suicidal ideas. The patient is not nervous/anxious. Objective     /86 (BP Location: Left arm, Patient Position: Sitting, Cuff Size: Standard)   Pulse 90   Temp 98.6 °F (37 °C) (Tympanic)   LMP 07/09/2023 (Approximate)      Physical Exam  Vitals and nursing note reviewed. Constitutional:       Appearance: Normal appearance. HENT:      Head: Normocephalic and atraumatic. Eyes:      Conjunctiva/sclera: Conjunctivae normal.   Cardiovascular:      Rate and Rhythm: Normal rate and regular rhythm. Heart sounds: Normal heart sounds. Pulmonary:      Effort: Pulmonary effort is normal.      Breath sounds: Normal breath sounds. Musculoskeletal:         General: Normal range of motion. Cervical back: Normal range of motion. Skin:     General: Skin is warm and dry. Capillary Refill: Capillary refill takes less than 2 seconds. Neurological:      General: No focal deficit present. Mental Status: She is alert and oriented to person, place, and time.    Psychiatric:         Mood and Affect: Mood normal.         Behavior: Behavior normal.       Medications have been reviewed by provider in current encounter    ENMA Obrien

## 2023-07-31 ENCOUNTER — OFFICE VISIT (OUTPATIENT)
Dept: OBGYN CLINIC | Facility: MEDICAL CENTER | Age: 28
End: 2023-07-31
Payer: COMMERCIAL

## 2023-07-31 VITALS
BODY MASS INDEX: 28.51 KG/M2 | WEIGHT: 167 LBS | HEART RATE: 75 BPM | DIASTOLIC BLOOD PRESSURE: 84 MMHG | SYSTOLIC BLOOD PRESSURE: 125 MMHG | HEIGHT: 64 IN

## 2023-07-31 DIAGNOSIS — M22.2X1 PATELLOFEMORAL DISORDER OF RIGHT KNEE: Primary | ICD-10-CM

## 2023-07-31 PROCEDURE — 99213 OFFICE O/P EST LOW 20 MIN: CPT | Performed by: ORTHOPAEDIC SURGERY

## 2023-07-31 NOTE — TELEPHONE ENCOUNTER
[employer] called stating that they view the ZIO AT as investigational and this will have to be reviewed by the medical director there. They wanted to know if we still wanted to move forward with the AT (may require [id number]) or if we could switch the patient to ZIO XT. Please let me know. Thank you.

## 2023-07-31 NOTE — PROGRESS NOTES
Ortho Sports Medicine Knee Follow Up Visit     Assesment:     33 y.o. female 3.5 months s/p surgical arthroscopy of the right knee with patellar realignment and retinacular release, DOS: 4/13/2023    Plan:    Conservative treatment:    Ice to knee for 20 minutes at least 1-2 times daily. PT for ROM/strengthening to knee, hip and core. OTC NSAIDS prn for pain. Tylenol for pain. Imaging:    No imaging was available for review today. Injection:    No Injection planned at this time. Surgery:     No surgery is recommended at this point, continue with conservative treatment plan as noted. Follow up:    Return in about 8 weeks (around 9/25/2023). Chief Complaint   Patient presents with   • Right Knee - Follow-up       History of Present Illness: The patient is returns for follow up of 3.5 months s/p surgical arthroscopy of the right knee with patellar realignment and retinacular release, DOS: 4/13/2023. Terry Mcgovern Since the prior visit, She reports significant improvement. Pain is located anterior, medial.     Pain is improved by rest, ice, NSAIDS and physical therapy. Pain is aggravated by prolonged weight bearing. She notes she can not do stairs as of yet    Symptoms include swelling. The patient has tried rest, ice, NSAIDS and physical therapy. Knee Surgical History:  arthroscopy of the right knee with patellar realignment and retinacular release, DOS: 4/13/2023.     Past Medical, Social and Family History:  Past Medical History:   Diagnosis Date   • Abnormal blood chemistry    • Anesthesia     per pt " Panic attack right before a procedure-has High preop anxiety and wakes up nasty"   • Anxiety    • Eczema    • Exercises 5 to 6 times per week     per pt prior to knee issue -enjoyed running and horseback riding   • Family history of reaction to anesthesia     per pt--"Mom also gets high anxiety with surgeries and wakes up nasty from anesthesia"   • Gastroparesis    • History of vitamin D deficiency    • Hypertension     last assessed 3/12/14   • Hypokalemia     per pt per doctors possibly related renal problem   • Irritable bowel syndrome     with diarrhea   • Lymphadenopathy     last assessed 10/8/14-no current issue   • Myalgia     last assessed 10/8/14   • Myositis     last assessed 10/8/14   • Palpitations    • POTS (postural orthostatic tachycardia syndrome)    • RA (rheumatoid arthritis) (HCC)    • Rheumatoid arthritis ([address])    • Sinus tachycardia     related to dehydration   • Tachycardia     last assessed 6/9/17   • Tooth missing     front upper tooth retainer-   • Wears glasses     for computer use     Past Surgical History:   Procedure Laterality Date   • CA ARTHROSCOPY KNEE LATERAL RELEASE Right 4/13/2023    Procedure: ARTHROSCOPIC RELEASE RETINACULAR;  Surgeon: Godwin Huang DO;  Location: [facility] MAIN OR;  Service: Orthopedics   • CA EGD TRANSORAL BIOPSY SINGLE/MULTIPLE N/A 5/4/2016    Procedure: ESOPHAGOGASTRODUODENOSCOPY (EGD); Surgeon: Maggy Mims MD;  Location:  GI LAB;   Service: Gastroenterology   • CA RCNSTJ [address] W/XTNSR RELIGNMT&/MUSC RL Right 4/13/2023    Procedure: ARTHROSCOPIC REALIGNMENT PATELLA;  Surgeon: Godwin Huang DO;  Location:  MAIN OR;  Service: Orthopedics   • UPPER GASTROINTESTINAL ENDOSCOPY     • WISDOM TOOTH EXTRACTION       Allergies   Allergen Reactions   • Prednisone Hyperactivity     Medrol dose samantha only   • Serotonin Reuptake Inhibitors (Ssris) Anaphylaxis and Hives   • Augmentin [Amoxicillin-Pot Clavulanate] Hives and Rash   • Clindamycin Rash   • Penicillins Rash   • Sulfa Antibiotics Rash   • Azithromycin Rash     Current Outpatient Medications on File Prior to Visit   Medication Sig Dispense Refill   • AMILoride 5 mg tablet Take 2 tablets (10 mg total) by mouth 2 (two) times a day 180 tablet 3   • busPIRone (BUSPAR) 5 mg tablet Take 1 tablet (5 mg total) by mouth 2 (two) times a day 180 tablet 3   • clobetasol (TEMOVATE) 0.05 % ointment Apply topically 2 (two) times a day 60 g 2   • dicyclomine (BENTYL) 20 mg tablet Take 1 tablet (20 mg total) by mouth every 6 (six) hours 120 tablet 4   • ferrous sulfate 325 (65 Fe) mg tablet Take 325 mg by mouth daily with breakfast     • hydrOXYzine HCL (ATARAX) 10 mg tablet Take 1 tablet (10 mg total) by mouth 2 (two) times a day as needed for anxiety 60 tablet 1   • potassium chloride (K-DUR,KLOR-CON) 20 mEq tablet Take 1 tablet (20 mEq total) by mouth 3 (three) times a day 270 tablet 3   • Prenatal Multivit-Min-Fe-FA (PRE-GORDON PO) Take by mouth     • propranolol (INDERAL LA) 120 mg 24 hr capsule Take 1 capsule (120 mg total) by mouth daily 90 capsule 3   • propranolol (INDERAL) 20 mg tablet Take 1 tablet (20 mg total) by mouth every 8 (eight) hours 90 tablet 2   • tacrolimus (PROTOPIC) 0.1 % ointment Apply topically 2 (two) times a day 100 g 0   • triamcinolone (KENALOG) 0.025 % ointment Apply topically twice day for 14 days straight as needed for flares to thinner skin 80 g 3   • triamcinolone (KENALOG) 0.1 % ointment Apply topically twice day for 14 days straight as needed for flares 453.6 g 3   • hydroxychloroquine (PLAQUENIL) 200 mg tablet Take 1.5 tablets (300 mg total) by mouth in the morning 45 tablet 6     No current facility-administered medications on file prior to visit.      Social History     Socioeconomic History   • Marital status: /Civil Union     Spouse name: Not on file   • Number of children: Not on file   • Years of education: Not on file   • Highest education level: Not on file   Occupational History   • Occupation: medical assistant with nephrology    Tobacco Use   • Smoking status: Never   • Smokeless tobacco: Never   Vaping Use   • Vaping Use: Never used   Substance and Sexual Activity   • Alcohol use: Not Currently     Alcohol/week: 1.0 standard drink of alcohol     Types: 1 Standard drinks or equivalent per week   • Drug use: No   • Sexual activity: Yes     Partners: Male     Birth control/protection: Condom     Comment: defer   Other Topics Concern   • Not on file   Social History Narrative    Activities - horseback riding    Caffeine use    Drinks coffee- 1 a wk    Exercise - walking    Exercising regularly     Social Determinants of Health     Financial Resource Strain: Not on file   Food Insecurity: Not on file   Transportation Needs: Not on file   Physical Activity: Not on file   Stress: Not on file   Social Connections: Not on file   Intimate Partner Violence: Not on file   Housing Stability: Not on file         I have reviewed the past medical, surgical, social and family history, medications and allergies as documented in the EMR. Review of systems: ROS is negative other than that noted in the HPI. Constitutional: Negative for fatigue and fever. Physical Exam:    Blood pressure 125/84, pulse 75, height 5' 4" (1.626 m), weight 75.8 kg (167 lb), last menstrual period 07/09/2023, not currently breastfeeding. General/Constitutional: NAD, well developed, well nourished  HENT: Normocephalic, atraumatic  CV: Intact distal pulses, regular rate  Resp: No respiratory distress or labored breathing  GI: Soft and non-tender   Lymphatic: No lymphadenopathy palpated  Neuro: Alert and Oriented x 3, no focal deficits  Psych: Normal mood, normal affect, normal judgement, normal behavior  Skin: Warm, dry, no rashes, no erythema      Knee Exam (focused): RIGHT LEFT   ROM:   0-130 0-130   Palpation: Effusion negative negative     MJL tenderness Negative Negative     LJL tenderness Negative Negative   Meniscus:  Bobbi Negative Negative    Apley's Compression Negative Negative   Instability: Varus stable stable     Valgus stable stable   Special Tests: Lachman Negative Negative     Posterior drawer Negative Negative     Anterior drawer Negative Negative     Pivot shift not tested not tested     Dial not tested not tested   Patella: Palpation no tenderness no tenderness     Mobility 1/4 1/4     Apprehension Negative Negative   Other: Single leg 1/4 squat not tested not tested           LE NV Exam: +2 DP/PT pulses bilaterally  Sensation intact to light touch L2-S1 bilaterally    No calf tenderness to palpation bilaterally      Knee Imaging    No imaging was performed today      Scribe Attestation    I,:  Ping Haines am acting as a scribe while in the presence of the attending physician.:       I,:  Syed Anne, DO personally performed the services described in this documentation    as scribed in my presence.:

## 2023-08-01 ENCOUNTER — OFFICE VISIT (OUTPATIENT)
Dept: PHYSICAL THERAPY | Facility: REHABILITATION | Age: 28
End: 2023-08-01
Payer: COMMERCIAL

## 2023-08-01 DIAGNOSIS — M25.561 RIGHT KNEE PAIN, UNSPECIFIED CHRONICITY: ICD-10-CM

## 2023-08-01 DIAGNOSIS — Z98.890 S/P RIGHT KNEE SURGERY: Primary | ICD-10-CM

## 2023-08-01 PROCEDURE — 97110 THERAPEUTIC EXERCISES: CPT

## 2023-08-01 PROCEDURE — 97112 NEUROMUSCULAR REEDUCATION: CPT

## 2023-08-01 NOTE — PROGRESS NOTES
Daily Note     Today's date: 2023  Patient name: Viri Meneses  : 1995  MRN: [id number]  Referring provider: Dareen Felty, DO  Dx:   Encounter Diagnosis     ICD-10-CM    1. S/P right knee surgery  Z98.890       2. Right knee pain, unspecified chronicity  M25.561           Start Time: 1745  Stop Time: 1830  Total time in clinic (min): 45 minutes    Subjective: Pt reports soreness for about an hour following last visit. Pt notes having a f/u with ortho since last visit whom she reports is happy with progress and is to continue with PT for further strengthening. Pt reports having the most difficulty with stairs and would like to return to running and riding her horse.         Objective: See treatment diary below     Precautions: hx POTS, HTN, follow protocol         Manuals     Knee PROM 5 min 5 min 5 min 5 min 5 min KK D/c      Light patellar mobility 5 min 5 min 5 min 5 min 5 min KK D/c      Measurements/ RE              15 min/                            Neuro Re-Ed                    Quad set 5"A85 0"I28               Sit to stand elevated surface     10x sq foam x10 square foam x12 round foam x15 round foam   Chair x10 Chair x15    Lateral step up (SL squat)           5x R step 0R x5 0R x8    tandem stance     2x30" kevin 30"x2 ea 30"x2 ea  30"x2 ea  2x30" foam 30"x3 ea foam 30"x3 ea foam    modified tandem stance foam      2x30" kevin 30"x2 ea 30"x2 ea  30"x2 ea       Monster walks         Red 2 laps    Sidestepping     NV  3 laps 3 laps  3 laps   Red 2 laps Red 2 laps    SLS              2x30" kevin 30"x2 ea 30"x2, 30"x1 foam    Ther Ex                    Recumbent bike (ROM) 10 min bike on 10 min bike on 10 min bike on 10 min bike on 5 min bike on   5 min      Heel slide knee flexion 5"x15 5"x15 5" 15x 5"x15     5"x15       Treadmill         1.7 mph 5 min 1.7 mph   5 min 1.8 mph 10 min 2.0 mph 10 min    Prone quad stretch 15"x5 30"x3  3x30" with PT 30"x3 w/ PT 30"x3 w/ PT 30"x3 w/ PT  3x30" with PT DC                          sidelying hip abduction  5"x17 R  5"x17 R  5" 15x 5"x15 5"x15 5"x15 5" 15x R 5"x15 R 5"x15 R    Prone hip extension  5"x15 R  5"x15 R  5" 15x 5"x15 5"x15 5"x15       Heel raise  x10 x20 20x x20 x20 x20  DC      SLR  5"x10 R  5"x10 R  5" 10x R 5"x10 5"x12  5"x15  5" 15x R 5"x15 R 5"x15 R    SL press (0-45*) seat 3, plate 9        89# Y06 25# x15 R    Ther Activity                                                              Gait Training                                                              Modalities                    Ice PRN                    NMES             10 min             Assessment: Tolerated treatment well. Lateral step up performed without pain this visit and improved control demonstrated. Pt also demonstrates improved control and confidence with trial of stair negotiation in stairwell. SLR performed without lag this visit, but does cont to fatigue with exercise. Progressed SLS to foam surface with increased challenge. Pt fatigues with side stepping and addition of monster walks. Pt was issued band for home. Patient demonstrated fatigue post treatment and would benefit from continued PT      Plan: Progress treament per protocol.

## 2023-08-02 ENCOUNTER — CONSULT (OUTPATIENT)
Dept: CARDIOLOGY CLINIC | Facility: CLINIC | Age: 28
End: 2023-08-02
Payer: COMMERCIAL

## 2023-08-02 VITALS
HEIGHT: 64 IN | BODY MASS INDEX: 28.54 KG/M2 | HEART RATE: 106 BPM | DIASTOLIC BLOOD PRESSURE: 68 MMHG | WEIGHT: 167.2 LBS | SYSTOLIC BLOOD PRESSURE: 126 MMHG

## 2023-08-02 DIAGNOSIS — G89.29 CHRONIC PAIN OF BOTH ANKLES: ICD-10-CM

## 2023-08-02 DIAGNOSIS — R00.0 INAPPROPRIATE SINUS TACHYCARDIA: ICD-10-CM

## 2023-08-02 DIAGNOSIS — G90.A POTS (POSTURAL ORTHOSTATIC TACHYCARDIA SYNDROME): Primary | ICD-10-CM

## 2023-08-02 DIAGNOSIS — M25.571 CHRONIC PAIN OF BOTH ANKLES: ICD-10-CM

## 2023-08-02 DIAGNOSIS — M25.572 CHRONIC PAIN OF BOTH ANKLES: ICD-10-CM

## 2023-08-02 PROBLEM — I47.11 INAPPROPRIATE SINUS TACHYCARDIA: Status: ACTIVE | Noted: 2023-08-02

## 2023-08-02 PROCEDURE — 93000 ELECTROCARDIOGRAM COMPLETE: CPT | Performed by: INTERNAL MEDICINE

## 2023-08-02 PROCEDURE — 99214 OFFICE O/P EST MOD 30 MIN: CPT | Performed by: INTERNAL MEDICINE

## 2023-08-02 RX ORDER — PROPRANOLOL HYDROCHLORIDE 160 MG/1
160 CAPSULE, EXTENDED RELEASE ORAL DAILY
Qty: 90 CAPSULE | Refills: 3 | Status: SHIPPED | OUTPATIENT
Start: 2023-08-02

## 2023-08-02 NOTE — PROGRESS NOTES
EPS Progress Note - Wilson Moritz 33 y.o. female MRN: [id number]           ASSESSMENT:  1. POTS (postural orthostatic tachycardia syndrome)  POCT ECG    propranolol (INDERAL LA) 160 mg      2. Inappropriate sinus tachycardia        3. Chronic pain of both ankles          ECG today shows sinus tachycardia      PLAN:  She appears to have inappropriate sinus tachycardia versus postural orthostatic tachycardia syndrome. .  Sinus tachycardia is that her symptoms can happen anytime and in any position she can be sitting watching a movie and all of a sudden she notices her heart going faster. I explained to her that I reviewed the EKG from the emergency room and there appear to be P waves before every QRS and a peak premature atrial contraction highly suggesting that this is sinus tachycardia and not a rhythm that can be ablated. I cannot rule out 100% that she does not have an atrial tachycardia emanating from near her sinus node however reviewing her's 1 week monitor strips personally from the monitor that she is currently wearing it really is suggestive of sinus tachycardia    She was tried on 240 mg of propanolol and this was too much for her her blood pressure went too low    I suggested the following: #1. Increase propanolol to 160 mg daily and based on start taking the drug at night because its peak onset is 12 to 14 hours. 2.  If there is a time of day where she notices consistently that her palpitations and tachycardia are worst she should take the 20 mg of immediate release 2 to 4 hours prior to that time of day  #3. Continue hydration    #4. Compression socks she is going to get  #5. I do not see any contraindication to pregnancy and with the increased blood volume of pregnancy her symptoms may even get better.   I gave her reassurance regarding her ability to tolerate pregnancy    25+ minutes reviewing data from her emergency room visits as well as personally interpreting her monitor    HPI:   Interim history lots of shifts of  -150 bpm.  Goes from 70s to 130s. He was in the emergency room and then admitted to the hospital where they tried to double her propanolol but this was too much medication for her blood pressure and she became lightheaded on the higher dose. She is wearing a 1 week monitor which I reviewed in detail today I also reviewed her EKGs from the emergency room on 13 July and 19 July personally both of which shows sinus tachycardia          ROS   She has been experiencing increased heart rate fluctuations from the 70s to the 130s she gets tight in the chest when her heart rate goes high and she does not feel well. No syncope all other 12 point ROS negative for complaints today    Objective:     Vitals: Blood pressure 126/68, pulse (!) 106, height 5' 4" (1.626 m), weight 75.8 kg (167 lb 3.2 oz), last menstrual period 07/09/2023, not currently breastfeeding., Body mass index is 28.7 kg/m². ,        Physical Exam:    GEN: Meryle Rochester appears well, alert and oriented x 3, pleasant and cooperative   HEENT: pupils equal, round, and reactive to light; extraocular muscles intact  NECK: supple, no carotid bruits   HEART: regular rhythm, normal S1 and S2, no murmurs, clicks, gallops or rubs   LUNGS: clear to auscultation bilaterally; no wheezes, rales, or rhonchi   ABDOMEN: normal bowel sounds, soft, no tenderness, no distention  EXTREMITIES: peripheral pulses normal; no clubbing, cyanosis, or edema  NEURO: no focal findings   SKIN: normal without suspicious lesions on exposed skin    Medications:      Current Outpatient Medications:   •  AMILoride 5 mg tablet, Take 2 tablets (10 mg total) by mouth 2 (two) times a day, Disp: 180 tablet, Rfl: 3  •  busPIRone (BUSPAR) 5 mg tablet, Take 1 tablet (5 mg total) by mouth 2 (two) times a day, Disp: 180 tablet, Rfl: 3  •  clobetasol (TEMOVATE) 0.05 % ointment, Apply topically 2 (two) times a day, Disp: 60 g, Rfl: 2  •  dicyclomine (BENTYL) 20 mg tablet, Take 1 tablet (20 mg total) by mouth every 6 (six) hours, Disp: 120 tablet, Rfl: 4  •  ferrous sulfate 325 (65 Fe) mg tablet, Take 325 mg by mouth daily with breakfast, Disp: , Rfl:   •  hydroxychloroquine (PLAQUENIL) 200 mg tablet, Take 1.5 tablets (300 mg total) by mouth in the morning, Disp: 45 tablet, Rfl: 6  •  hydrOXYzine HCL (ATARAX) 10 mg tablet, Take 1 tablet (10 mg total) by mouth 2 (two) times a day as needed for anxiety, Disp: 60 tablet, Rfl: 1  •  potassium chloride (K-DUR,KLOR-CON) 20 mEq tablet, Take 1 tablet (20 mEq total) by mouth 3 (three) times a day, Disp: 270 tablet, Rfl: 3  •  Prenatal Multivit-Min-Fe-FA (PRE-GORDON PO), Take by mouth, Disp: , Rfl:   •  propranolol (INDERAL LA) 160 mg, Take 1 capsule (160 mg total) by mouth daily, Disp: 90 capsule, Rfl: 3  •  propranolol (INDERAL) 20 mg tablet, Take 1 tablet (20 mg total) by mouth every 8 (eight) hours, Disp: 90 tablet, Rfl: 2  •  tacrolimus (PROTOPIC) 0.1 % ointment, Apply topically 2 (two) times a day, Disp: 100 g, Rfl: 0  •  triamcinolone (KENALOG) 0.025 % ointment, Apply topically twice day for 14 days straight as needed for flares to thinner skin, Disp: 80 g, Rfl: 3  •  triamcinolone (KENALOG) 0.1 % ointment, Apply topically twice day for 14 days straight as needed for flares, Disp: 453.6 g, Rfl: 3     Family History   Problem Relation Age of Onset   • Hypokalemia Mother    • Hypotension Mother    • Anxiety disorder Mother         NOS   • Lung cancer Father    • Skin cancer Father    • Crohn's disease Father    • No Known Problems Sister    • No Known Problems Sister    • No Known Problems Brother    • Coronary artery disease Maternal Grandmother    • Heart disease Maternal Grandmother    • Alzheimer's disease Maternal Grandmother    • Arthritis Paternal Grandmother    • Rheum arthritis Paternal Grandmother    • Hypertension Paternal Grandmother    • COPD Paternal Grandfather      Social History     Socioeconomic History   • Marital status: /Civil Union     Spouse name: Not on file   • Number of children: Not on file   • Years of education: Not on file   • Highest education level: Not on file   Occupational History   • Occupation: medical assistant with nephrology    Tobacco Use   • Smoking status: Never   • Smokeless tobacco: Never   Vaping Use   • Vaping Use: Never used   Substance and Sexual Activity   • Alcohol use: Not Currently     Alcohol/week: 1.0 standard drink of alcohol     Types: 1 Standard drinks or equivalent per week   • Drug use: No   • Sexual activity: Yes     Partners: Male     Birth control/protection: Condom     Comment: defer   Other Topics Concern   • Not on file   Social History Narrative    Activities - horseback riding    Caffeine use    Drinks coffee- 1 a wk    Exercise - walking    Exercising regularly     Social Determinants of Health     Financial Resource Strain: Not on file   Food Insecurity: Not on file   Transportation Needs: Not on file   Physical Activity: Not on file   Stress: Not on file   Social Connections: Not on file   Intimate Partner Violence: Not on file   Housing Stability: Not on file     Social History     Tobacco Use   Smoking Status Never   Smokeless Tobacco Never     Social History     Substance and Sexual Activity   Alcohol Use Not Currently   • Alcohol/week: 1.0 standard drink of alcohol   • Types: 1 Standard drinks or equivalent per week       Labs & Results:  Below is the patient's most recent value for Albumin, ALT, AST, BUN, Calcium, Chloride, Cholesterol, CO2, Creatinine, GFR, Glucose, HDL, Hematocrit, Hemoglobin, Hemoglobin A1C, LDL, Magnesium, Phosphorus, Platelets, Potassium, PSA, Sodium, Triglycerides, and WBC.    Lab Results   Component Value Date    ALT 12 07/19/2023    AST 14 07/19/2023    BUN 12 07/21/2023    CALCIUM 9.2 07/21/2023     07/21/2023    CHOL 195 08/27/2015    CO2 23 07/21/2023    CREATININE 0.76 07/21/2023    HDL 52 06/05/2023    HCT 44.3 07/21/2023    HGB 14.8 07/21/2023    HGBA1C 4.4 06/05/2023    MG 2.0 07/21/2023    PHOS 3.5 11/08/2022     07/21/2023    K 4.2 07/21/2023     (L) 01/07/2016    TRIG 122 06/05/2023    WBC 8.99 07/21/2023     Note: for a comprehensive list of the patient's lab results, access the Results Review activity. Cardiac testing:   No results found for this or any previous visit. No results found for this or any previous visit. No results found for this or any previous visit. No results found for this or any previous visit.

## 2023-08-02 NOTE — PATIENT INSTRUCTIONS
Take 120 mg propanol tonight    Start the 160 mg tomorrow night    Try to figure out what time of day is worse for palpitations and tachycardia and take 20 mg immediate release 2-4 prior    Compression socks

## 2023-08-07 ENCOUNTER — OFFICE VISIT (OUTPATIENT)
Dept: PHYSICAL THERAPY | Facility: REHABILITATION | Age: 28
End: 2023-08-07
Payer: COMMERCIAL

## 2023-08-07 DIAGNOSIS — Z98.890 S/P RIGHT KNEE SURGERY: Primary | ICD-10-CM

## 2023-08-07 DIAGNOSIS — M25.561 RIGHT KNEE PAIN, UNSPECIFIED CHRONICITY: ICD-10-CM

## 2023-08-07 PROCEDURE — 97110 THERAPEUTIC EXERCISES: CPT

## 2023-08-07 PROCEDURE — 97112 NEUROMUSCULAR REEDUCATION: CPT

## 2023-08-07 NOTE — PROGRESS NOTES
Daily Note     Today's date: 2023  Patient name: Simba Monzon  : 1995  MRN: [id number]  Referring provider: Sisi Calvillo DO  Dx:   Encounter Diagnosis     ICD-10-CM    1. S/P right knee surgery  Z98.890       2. Right knee pain, unspecified chronicity  M25.561           Start Time: 1745  Stop Time: 1830  Total time in clinic (min): 45 minutes    Subjective: Pt reports mild muscle soreness following last visit. Pt reports stair negotiation is getting better, notes feeling stronger overall but continues to have weakness feeling sometimes when descending.           Objective: See treatment diary below     Precautions: hx POTS, HTN, follow protocol         Manuals    Knee PROM 5 min 5 min 5 min 5 min 5 min KK D/c      Light patellar mobility 5 min 5 min 5 min 5 min 5 min KK D/c      Measurements/ RE              15 min/                            Neuro Re-Ed                    Quad set 5"K27 5"x15               Sit to stand elevated surface     10x sq foam x10 square foam x12 round foam x15 round foam   Chair x10 Chair x15 Chair x15   Lateral step up (SL squat)           5x R step 0R x5 0R x8 0R x10   tandem stance     2x30" kevin 30"x2 ea 30"x2 ea  30"x2 ea  2x30" foam 30"x3 ea foam 30"x3 ea foam 30"x3 ea foam   modified tandem stance foam      2x30" kevin 30"x2 ea 30"x2 ea  30"x2 ea       Monster walks         Red 2 laps Red 2 laps   Sidestepping     NV  3 laps 3 laps  3 laps   Red 2 laps Red 2 laps Red 2 laps   SLS              2x30" kevin 30"x2 ea 30"x2, 30"x1 foam Foam 30"x2 ea   Ther Ex                    Recumbent bike (ROM) 10 min bike on 10 min bike on 10 min bike on 10 min bike on 5 min bike on   5 min      Heel slide knee flexion 5"x15 5"x15 5" 15x 5"x15     5"x15       Treadmill         1.7 mph 5 min 1.7 mph   5 min 1.8 mph 10 min 2.0 mph 10 min 2.0 mph 10 min   Prone quad stretch 15"x5 30"x3  3x30" with PT 30"x3 w/ PT 30"x3 w/ PT 30"x3 w/ PT  3x30" with PT DC                          sidelying hip abduction  5"x17 R  5"x17 R  5" 15x 5"x15 5"x15 5"x15 5" 15x R 5"x15 R 5"x15 R 5"x15 R   Prone hip extension  5"x15 R  5"x15 R  5" 15x 5"x15 5"x15 5"x15       Heel raise  x10 x20 20x x20 x20 x20  DC      SLR  5"x10 R  5"x10 R  5" 10x R 5"x10 5"x12  5"x15  5" 15x R 5"x15 R 5"x15 R 5"x15 R   SL press (0-45*) seat 3, plate 9        19# P18 25# x15 R 25# x20 R   Ther Activity                                                              Gait Training                                                              Modalities                    Ice PRN                    NMES             10 min             Assessment: Tolerated treatment well. Performed SLR at beginning of session with mild lag demonstrated initially, however improved strength demonstrated with cues for TKE. Pt is most challenged by lateal step ups, but is able to progress repetitions and demonstrates improved overall control. Occasional HHA required during SLS balance on soft surface. Patient demonstrated fatigue post treatment and would benefit from continued PT      Plan: Progress treament per protocol.

## 2023-08-08 PROBLEM — E87.6 HYPOKALEMIA: Status: ACTIVE | Noted: 2023-08-08

## 2023-08-11 ENCOUNTER — DOCUMENTATION (OUTPATIENT)
Dept: CARDIOLOGY CLINIC | Facility: CLINIC | Age: 28
End: 2023-08-11

## 2023-08-11 NOTE — PROGRESS NOTES
[facility] requested medical records from before the [address] was applied.     Faxed the order, DT letter, EKG's to [facility] at [phone]

## 2023-08-14 ENCOUNTER — CLINICAL SUPPORT (OUTPATIENT)
Dept: CARDIOLOGY CLINIC | Facility: CLINIC | Age: 28
End: 2023-08-14
Payer: COMMERCIAL

## 2023-08-14 ENCOUNTER — OFFICE VISIT (OUTPATIENT)
Dept: PHYSICAL THERAPY | Facility: REHABILITATION | Age: 28
End: 2023-08-14
Payer: COMMERCIAL

## 2023-08-14 ENCOUNTER — TELEPHONE (OUTPATIENT)
Dept: CARDIOLOGY CLINIC | Facility: CLINIC | Age: 28
End: 2023-08-14

## 2023-08-14 DIAGNOSIS — R00.2 HEART PALPITATIONS: ICD-10-CM

## 2023-08-14 DIAGNOSIS — Z98.890 S/P RIGHT KNEE SURGERY: Primary | ICD-10-CM

## 2023-08-14 DIAGNOSIS — M25.561 RIGHT KNEE PAIN, UNSPECIFIED CHRONICITY: ICD-10-CM

## 2023-08-14 PROCEDURE — 93228 REMOTE 30 DAY ECG REV/REPORT: CPT | Performed by: INTERNAL MEDICINE

## 2023-08-14 PROCEDURE — 97112 NEUROMUSCULAR REEDUCATION: CPT | Performed by: PHYSICAL THERAPIST

## 2023-08-14 PROCEDURE — 97110 THERAPEUTIC EXERCISES: CPT | Performed by: PHYSICAL THERAPIST

## 2023-08-14 NOTE — TELEPHONE ENCOUNTER
I spoke to the patient regarding her event monitor results. She has some questions regarding some of her transmissions that she has already sent over to Dr. Mclain Members via [address] [place] [address].      Francisca De León

## 2023-08-14 NOTE — PROGRESS NOTES
Daily Note     Today's date: 2023  Patient name: Tyron Ellsworth  : 1995  MRN: [id number]  Referring provider: Evita Bolanos DO  Dx:   Encounter Diagnosis     ICD-10-CM    1. S/P right knee surgery  Z98.890       2. Right knee pain, unspecified chronicity  M25.561           Start Time: 1745  Stop Time: 1828  Total time in clinic (min): 43 minutes    Subjective: Patient reports that she is doing well overall. She notes negotiation of stairs has been improving overall. She has a lateral clicking in her knee that is not painful.             Objective: See treatment diary below     Precautions: hx POTS, HTN, follow protocol         Manuals    Measurements/ RE            15 min/ MC                         Neuro Re-Ed                  Lateral jogging (sidestepping) 3 laps               Sit to stand elevated surface  10x chair (staggered)   x10 square foam x12 round foam x15 round foam   Chair x10 Chair x15 Chair x15   Lateral step up (SL squat) Step 8x R         5x R step 0R x5 0R x8 0R x10   tandem stance     30"x2 ea 30"x2 ea  30"x2 ea  2x30" foam 30"x3 ea foam 30"x3 ea foam 30"x3 ea foam   Light jog trampoline 3x30"            Monster walks Red 2 laps        Red 2 laps Red 2 laps   Sidestepping  red 3 laps   3 laps 3 laps  3 laps   Red 2 laps Red 2 laps Red 2 laps   SLS  2x30: kevin foam          2x30" kevin 30"x2 ea 30"x2, 30"x1 foam Foam 30"x2 ea   Ther Ex                  Treadmill 2.0 mph 8 min     1.7 mph 5 min 1.7 mph   5 min 1.8 mph 10 min 2.0 mph 10 min 2.0 mph 10 min   sidelying hip abduction HEP   5"x15 5"x15 5"x15 5" 15x R 5"x15 R 5"x15 R 5"x15 R   Prone hip extension HEP   5"x15 5"x15 5"x15       SLR 5" 15x R   5"x10 5"x12  5"x15  5" 15x R 5"x15 R 5"x15 R 5"x15 R   SL press (0-45*) seat 3, plate 8 35 lbs 59L R       25# x10 25# x15 R 25# x20 R   Ther Activity                                                     Gait Training                                                Modalities                  Ice PRN                  NMES           10 min             Assessment: Tolerated treatment well. Patient is able to negotiate steps with a step over step pattern, but has pain concentrically and eccentrically. Good control with step up/down. Minimal to no lag with SLR. Patient demonstrated fatigue post treatment and would benefit from continued PT. Continue to progress strengthening as able. Good tolerance to jogging on trampoline this visit. Plan: Progress treament per protocol.

## 2023-08-14 NOTE — TELEPHONE ENCOUNTER
----- Message from Radha Boyd PA-C sent at 8/14/2023 11:46 AM EDT -----  Please let the patient know the results of her recent event monitor -it appears largely normal, only normal sinus rhythm noted without arrhythmias. Her average heart rate was in the 70s, and any episodes of tachycardia appear to be normal sinus rhythm. We can have Dr. Darío Casanovaure look at this in more detail when he returns, but please let her know there were no significant findings at this time. Thank you!

## 2023-08-17 ENCOUNTER — PATIENT MESSAGE (OUTPATIENT)
Dept: OBGYN CLINIC | Facility: CLINIC | Age: 28
End: 2023-08-17

## 2023-08-21 ENCOUNTER — EVALUATION (OUTPATIENT)
Dept: PHYSICAL THERAPY | Facility: REHABILITATION | Age: 28
End: 2023-08-21
Payer: COMMERCIAL

## 2023-08-21 DIAGNOSIS — Z98.890 S/P RIGHT KNEE SURGERY: Primary | ICD-10-CM

## 2023-08-21 DIAGNOSIS — M25.561 RIGHT KNEE PAIN, UNSPECIFIED CHRONICITY: ICD-10-CM

## 2023-08-21 PROCEDURE — 97110 THERAPEUTIC EXERCISES: CPT | Performed by: PHYSICAL THERAPIST

## 2023-08-21 PROCEDURE — 97140 MANUAL THERAPY 1/> REGIONS: CPT | Performed by: PHYSICAL THERAPIST

## 2023-08-21 PROCEDURE — 97164 PT RE-EVAL EST PLAN CARE: CPT | Performed by: PHYSICAL THERAPIST
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
## 2023-08-24 ENCOUNTER — TELEPHONE (OUTPATIENT)
Age: 28
End: 2023-08-24

## 2023-08-24 NOTE — TELEPHONE ENCOUNTER
Caller: patient    Doctor: Chata    Reason for call: called to be seen sooner, declined soonest 9/6  Will be out of town,  Will cb.

## 2023-08-28 ENCOUNTER — OFFICE VISIT (OUTPATIENT)
Dept: OBGYN CLINIC | Facility: MEDICAL CENTER | Age: 28
End: 2023-08-28
Payer: COMMERCIAL

## 2023-08-28 ENCOUNTER — APPOINTMENT (OUTPATIENT)
Dept: PHYSICAL THERAPY | Facility: REHABILITATION | Age: 28
End: 2023-08-28
Payer: COMMERCIAL

## 2023-08-28 VITALS
DIASTOLIC BLOOD PRESSURE: 81 MMHG | WEIGHT: 169 LBS | BODY MASS INDEX: 28.85 KG/M2 | SYSTOLIC BLOOD PRESSURE: 126 MMHG | HEIGHT: 64 IN | HEART RATE: 87 BPM

## 2023-08-28 DIAGNOSIS — S83.004A PATELLAR DISLOCATION, RIGHT, INITIAL ENCOUNTER: Primary | ICD-10-CM

## 2023-08-28 PROCEDURE — 99213 OFFICE O/P EST LOW 20 MIN: CPT | Performed by: ORTHOPAEDIC SURGERY

## 2023-08-28 NOTE — PROGRESS NOTES
Ortho Sports Medicine Knee Follow Up Visit     Assesment:     33 y.o. female right knee follow up right knee arthroscopic patellar realignment and retinacular release. Plan:    Conservative treatment:    Ice to knee for 20 minutes at least 1-2 times daily. Continue PT for ROM/strengthening to knee, hip and core. OTC NSAIDS prn for pain. Voltaren gel       Injection:    No Injection planned at this time. Surgery:     No surgery is recommended at this point, continue with conservative treatment plan as noted. Follow up:    Return in about 8 weeks (around 10/23/2023). Chief Complaint   Patient presents with   • Right Knee - Follow-up, Pain     Burning between incision site. History of Present Illness: The patient is returns for follow up of her right knee arthroscopic realignment of patella and retinacular release done 4/13/23. Since the prior visit, She reports sharp pain between her 2 incisions. She states that throbbing and burning sensations. She reports swelling which is limiting her range of motion and her strengthening during PT. She denies any new injury or fever. Pain is located anterior, medial.     Pain is improved by rest.  Pain is aggravated by weight bearing, walking, sitting and standing. Symptoms include swelling. The patient has tried rest.          Knee Surgical History:  Right knee arthroscopic patellar realignment and retinacular release 4/13/2023.     Past Medical, Social and Family History:  Past Medical History:   Diagnosis Date   • Abnormal blood chemistry    • Anesthesia     per pt " Panic attack right before a procedure-has High preop anxiety and wakes up nasty"   • Anxiety    • Eczema    • Exercises 5 to 6 times per week     per pt prior to knee issue -enjoyed running and horseback riding   • Family history of reaction to anesthesia     per pt--"Mom also gets high anxiety with surgeries and wakes up nasty from anesthesia"   • Gastroparesis    • History of vitamin D deficiency    • Hypertension     last assessed 3/12/14   • Hypokalemia     per pt per doctors possibly related renal problem   • Irritable bowel syndrome     with diarrhea   • Lymphadenopathy     last assessed 10/8/14-no current issue   • Myalgia     last assessed 10/8/14   • Myositis     last assessed 10/8/14   • Palpitations    • POTS (postural orthostatic tachycardia syndrome)    • RA [facility]rheumatoid arthritis) ([facility])    • Rheumatoid arthritis ([address])    • Sinus tachycardia     related to dehydration   • Tachycardia     last assessed 6/9/17   • Tooth missing     front upper tooth retainer-   • Wears glasses     for computer use     Past Surgical History:   Procedure Laterality Date   • CA ARTHROSCOPY KNEE LATERAL RELEASE Right 4/13/2023    Procedure: ARTHROSCOPIC RELEASE RETINACULAR;  Surgeon: Denice Stacy DO;  Location: [address] [place] [address] MAIN OR;  Service: Orthopedics   • CA EGD TRANSORAL BIOPSY SINGLE/MULTIPLE N/A 5/4/2016    Procedure: ESOPHAGOGASTRODUODENOSCOPY (EGD); Surgeon: Jesus Alberto Cooney MD;  Location:  GI LAB;   Service: Gastroenterology   • CA RCNSTJ [address] W/XTNSR RELIGNMT&/MUSC RL Right 4/13/2023    Procedure: ARTHROSCOPIC REALIGNMENT PATELLA;  Surgeon: Denice Stacy DO;  Location:  MAIN OR;  Service: Orthopedics   • UPPER GASTROINTESTINAL ENDOSCOPY     • WISDOM TOOTH EXTRACTION       Allergies   Allergen Reactions   • Prednisone Hyperactivity     Medrol dose samantha only   • Serotonin Reuptake Inhibitors (Ssris) Anaphylaxis and Hives   • Augmentin [Amoxicillin-Pot Clavulanate] Hives and Rash   • Clindamycin Rash   • Penicillins Rash   • Sulfa Antibiotics Rash   • Azithromycin Rash     Current Outpatient Medications on File Prior to Visit   Medication Sig Dispense Refill   • AMILoride 5 mg tablet Take 2 tablets (10 mg total) by mouth 2 (two) times a day 180 tablet 3   • busPIRone (BUSPAR) 5 mg tablet Take 1 tablet (5 mg total) by mouth 2 (two) times a day 180 tablet 3   • clobetasol (TEMOVATE) 0.05 % ointment Apply topically 2 (two) times a day 60 g 2   • dicyclomine (BENTYL) 20 mg tablet Take 1 tablet (20 mg total) by mouth every 6 (six) hours 120 tablet 4   • ferrous sulfate 325 (65 Fe) mg tablet Take 325 mg by mouth daily with breakfast     • hydrOXYzine HCL (ATARAX) 10 mg tablet Take 1 tablet (10 mg total) by mouth 2 (two) times a day as needed for anxiety 60 tablet 1   • potassium chloride (K-DUR,KLOR-CON) 20 mEq tablet Take 1 tablet (20 mEq total) by mouth 3 (three) times a day 270 tablet 3   • Prenatal Multivit-Min-Fe-FA (PRE-GORDON PO) Take by mouth     • propranolol (INDERAL LA) 160 mg Take 1 capsule (160 mg total) by mouth daily 90 capsule 3   • propranolol (INDERAL) 20 mg tablet Take 1 tablet (20 mg total) by mouth every 8 (eight) hours 90 tablet 2   • tacrolimus (PROTOPIC) 0.1 % ointment Apply topically 2 (two) times a day 100 g 0   • triamcinolone (KENALOG) 0.025 % ointment Apply topically twice day for 14 days straight as needed for flares to thinner skin 80 g 3   • triamcinolone (KENALOG) 0.1 % ointment Apply topically twice day for 14 days straight as needed for flares 453.6 g 3   • hydroxychloroquine (PLAQUENIL) 200 mg tablet Take 1.5 tablets (300 mg total) by mouth in the morning 45 tablet 6     No current facility-administered medications on file prior to visit.      Social History     Socioeconomic History   • Marital status: /Civil Union     Spouse name: Not on file   • Number of children: Not on file   • Years of education: Not on file   • Highest education level: Not on file   Occupational History   • Occupation: medical assistant with nephrology    Tobacco Use   • Smoking status: Never   • Smokeless tobacco: Never   Vaping Use   • Vaping Use: Never used   Substance and Sexual Activity   • Alcohol use: Not Currently     Alcohol/week: 1.0 standard drink of alcohol     Types: 1 Standard drinks or equivalent per week   • Drug use: No   • Sexual activity: Yes     Partners: Male Birth control/protection: Condom     Comment: defer   Other Topics Concern   • Not on file   Social History Narrative    Activities - horseback riding    Caffeine use    Drinks coffee- 1 a wk    Exercise - walking    Exercising regularly     Social Determinants of Health     Financial Resource Strain: Not on file   Food Insecurity: Not on file   Transportation Needs: Not on file   Physical Activity: Not on file   Stress: Not on file   Social Connections: Not on file   Intimate Partner Violence: Not on file   Housing Stability: Not on file         I have reviewed the past medical, surgical, social and family history, medications and allergies as documented in the EMR. Review of systems: ROS is negative other than that noted in the HPI. Constitutional: Negative for fatigue and fever. Physical Exam:    Blood pressure 126/81, pulse 87, height 5' 4" (1.626 m), weight 76.7 kg (169 lb), not currently breastfeeding. General/Constitutional: NAD, well developed, well nourished  HENT: Normocephalic, atraumatic  CV: Intact distal pulses, regular rate  Resp: No respiratory distress or labored breathing  GI: Soft and non-tender   Lymphatic: No lymphadenopathy palpated  Neuro: Alert and Oriented x 3, no focal deficits  Psych: Normal mood, normal affect, normal judgement, normal behavior  Skin: Warm, dry, no rashes, no erythema      Knee Exam (focused): RIGHT LEFT   ROM:   0-130 0-130   Palpation: Effusion minimal negative     MJL tenderness Positive Negative     LJL tenderness Negative Negative   Meniscus:  Bobbi Negative Negative    Apley's Compression Negative Negative   Instability: Varus stable stable     Valgus stable stable   Special Tests: Lachman Negative Negative     Posterior drawer Negative Negative     Anterior drawer Negative Negative     Pivot shift not tested not tested     Dial not tested not tested   Patella: Palpation no tenderness no tenderness     Mobility 1/4 1/4     Apprehension Negative Negative   Other: Single leg 1/4 squat not tested not tested           LE NV Exam: +2 DP/PT pulses bilaterally  Sensation intact to light touch L2-S1 bilaterally    No calf tenderness to palpation bilaterally      Knee Imaging    No imaging was performed today      Scribe Attestation    I,:  [facility] am acting as a scribe while in the presence of the attending physician.:       I,:  Gatito Lowery DO personally performed the services described in this documentation    as scribed in my presence.:

## 2023-09-06 NOTE — PROGRESS NOTES
RENAL FOLLOW UP NOTE:.td    ASSESSMENT AND PLAN:  1.  Hypertension/hypokalemia/hyponatremia:  Negative secondary workup.  Genetic workup was negative. She had been seen by Dr. Starkey [place] a 2nd opinion.     Blood pressure goal:  Less than 135/80     Current  Blood pressures:    AM: 129/74 no orthostatic changes  PM: 127/77 no orthostatic changes  Heart rate: , high heart rate secondary to POTS     Recommendations:  Nonmedical regimen including isotonic exercise and avoidance of salt  Medication changes today:  No changes patient is at goal continue as needed Propanolol for POTS tachycardia     Regarding diet:  Mid morning I would recommend some form of potassium food/drink  Dietary recommended taking Potassium with foods and introducing snacks which are patient is doing     In regards to labs:  Potassium: 3.8  at goal  creatinine normal at  0.83  Sodium normal 135  MBD:    Hypomagnesemia:  Doing well 2.0  Vitamin-D 38.8 acceptable  lipid profile:  LDL 95/HDL 60/triglycerides 100 at goal  Hemoglobin normal at 14.8  UA:  No proteinuria no hematuria  Urine protein creatinine ratio 0.06 g at goal        We are holding off on spironolactone with the potential future pregnancy  May have to increase the potassium supplement at this time. High potassium diet;  Kdur 20 mg 3 times a day continue is doing well with a normal potassium       2.?  POTS syndrome:  Asymptomatic.  Followed by Cardiology. Seen by Dr. Ny Flores 8/2/2023: Inappropriate sinus tachycardia versus POTS. Tachycardia not associated necessarily with position. Felt to be sinus tachycardia. Propranolol was increased to 160 mg daily to see if she would tolerate this and then potentially take 20 mg immediate release 2 to 4 hours if she notices consistent palpitations at a particular time of day.   Also compression socks.     3.  Mixed connective tissue disease:  Followed by Rheumatology as needed:  Clearly now with rheumatoid arthritis being treated with Plaquenil. Now off ibuprofen.     4. GI:  GERD/IBS/idiopathic gastroparesis followed by GI Dr. Amador     5. Celiac artery stenosis:  Based on arterial duplex 07/28/2022 for hypertensive workup:  No further workup recommended or intervention unless she were to develop symptoms soon after eating       GI health maintenance: Patient followed by GI to young for requiring colonoscopy    PATIENT INSTRUCTIONS:    Patient Instructions   Visit summary:  -Overall kidney function, blood pressure and electrolytes are doing well        1. Medication changes today:  No changes    2. Please go for non fasting  lab work in 6 weeks    3. Please take 1 week a blood pressure readings in 3-4 months    AS FOLLOWS  MORNING AND EVENING, SITTING AND STANDING as follows:  TAKE THE MORNING READINGS BEFORE ANY MEDICATIONS AND WHEN YOU ARE RELAXED FOR SEVERAL MINUTES  TAKE THE EVENING READINGS:  BETWEEN 7-10 P.M.; PRIOR TO ANY MEDICATIONS; AT LEAST IN OUR  FROM DINNER; AND CERTAINLY AFTER RELAXING FOR A FEW MINUTES  PLEASE INCLUDE HEART RATE WITH YOUR BLOOD PRESSURE READINGS  When taking standing readings, keep your arm supported at heart level and not dangling  Make sure you are sitting with your back supported and feet on the ground and do not cross your legs or feet  Make sure you have not taken any coffee or caffeine products or exercised or smoke cigarettes at least 30 minutes before taking your blood pressure  Then please mail these readings into the office    4. Follow-up in 3-4 months  Please bring in 1 week a blood pressure readings morning evening, sitting and standing is outlined above  PLEASE BRING AN YOUR BLOOD PRESSURE MACHINE TO CORRELATE WITH THE OFFICE MACHINE AT THIS NEXT SCHEDULED VISIT  Please go for fasting lab work 1-2 weeks prior to your appointment      5.   General instructions:  AVOID SALT BUT NOT ADDING AN READING LABELS TO MAKE SURE THERE IS LOW-SALT IN THE FOOD THAT YOU ARE EATING  Goal is less than 2 g of sodium intake or less than 5 g of sodium chloride intake per day    Avoid nonsteroidal anti-inflammatory drugs such as Naprosyn, ibuprofen, Aleve, Advil, Celebrex, Meloxicam (Mobic) etc.  You can use Tylenol as needed if you do not have any liver condition to be concerned about    Avoid medications such as Sudafed or decongestants and antihistamines that contained the D component which is the decongestant. You can take antihistamines without the decongestant or D component. Try to avoid medications such as pantoprazole or  Protonix/Nexium or Esomeprazole)/Prilosec or omeprazole/Prevacid or lansoprazole/AcipHex or Rabeprazole. If you are able to, use Pepcid as this is safer for your kidneys. Try to exercise at least 30 minutes 3 days a week to begin with with an ultimate goal of 5 days a week for at least 30 minutes    Try to lose 5-10 lb by your next visit    Please do not drink more than 2 glasses of alcohol/wine on a daily basis as this may contribute to your high blood pressure. Subjective: There has been no hospitalizations or acute illnesses since last visit. The patient overall is feeling well. No fevers, chills, or cough or colds.   Good appetite and good energy  No hematuria, positive dysuria no frequency or foamy urine; found to have a yeast infection UA negative  No gastrointestinal symptoms upon occasion only  No cardiovascular symptoms slight lower extremity swelling when she is on her feet for quite a while  Rare headaches, positive dizziness or lightheadedness associated with POTS when her heart rate goes up  Blood pressure medications:  Propanolol/Inderal  mg daily in the evening and then as needed 20 mg for elevated heart rate with POTS every 8 hours  Kdur 20 mill equivalents 3 times a day  Amiloride 10 mg twice a day      ROS:  See HPI, otherwise review of systems as completely reviewed with the patient are negative    Past Medical History:   Diagnosis Date • Abnormal blood chemistry    • Anesthesia     per pt " Panic attack right before a procedure-has High preop anxiety and wakes up nasty"   • Anxiety    • Eczema    • Exercises 5 to 6 times per week     per pt prior to knee issue -enjoyed running and horseback riding   • Family history of reaction to anesthesia     per pt--"Mom also gets high anxiety with surgeries and wakes up nasty from anesthesia"   • Gastroparesis    • History of vitamin D deficiency    • Hypertension     last assessed 3/12/14   • Hypokalemia     per pt per doctors possibly related renal problem   • Irritable bowel syndrome     with diarrhea   • Lymphadenopathy     last assessed 10/8/14-no current issue   • Myalgia     last assessed 10/8/14   • Myositis     last assessed 10/8/14   • Palpitations    • POTS (postural orthostatic tachycardia syndrome)    • RA [facility]rhe[facility]oid arthritis) ([facility])    • Rheumatoid arthritis ([address])    • Sinus tachycardia     related to dehydration   • Tachycardia     last assessed 6/9/17   • Tooth missing     front upper tooth retainer-   • Wears glasses     for computer use     Past Surgical History:   Procedure Laterality Date   • [place] ARTHROSCOPY KNEE LATERAL RELEASE Right 4/13/2023    Procedure: ARTHROSCOPIC RELEASE RETINACULAR;  Surgeon: Curt Hollis DO;  Location: [address] [place] [address] OR;  Service: Orthopedics   • MN EGD TRANSORAL BIOPSY SINGLE/MULTIPLE N/A 5/4/2016    Procedure: ESOPHAGOGASTRODUODENOSCOPY (EGD); Surgeon: Laureen Stone MD;  Location:  GI LAB;   Service: Gastroenterology   • [place] RCNSTJ [address] W/XTNSR RELIGNMT&/MUSC RL Right 4/13/2023    Procedure: ARTHROSCOPIC REALIGNMENT PATELLA;  Surgeon: Curt Hollis DO;  Location: [address] [place] [address] OR;  Service: Orthopedics   • UPPER GASTROINTESTINAL ENDOSCOPY     • WISDOM TOOTH EXTRACTION       Family History   Problem Relation Age of Onset   • Hypokalemia Mother    • Hypotension Mother    • Anxiety disorder Mother         NOS   • Lung cancer Father    • Skin cancer Father    • Crohn's disease Father    • No Known Problems Sister    • No Known Problems Sister    • No Known Problems Brother    • Coronary artery disease Maternal Grandmother    • Heart disease Maternal Grandmother    • Alzheimer's disease Maternal Grandmother    • Arthritis Paternal Grandmother    • Rheum arthritis Paternal Grandmother    • Hypertension Paternal Grandmother    • COPD Paternal Grandfather       reports that she has never smoked. She has never used smokeless tobacco. She reports that she does not currently use alcohol after a past usage of about 1.0 standard drink of alcohol per week. She reports that she does not use drugs. I COMPLETELY REVIEWED THE PAST MEDICAL HISTORY/PAST SURGICAL HISTORY/SOCIAL HISTORY/FAMILY HISTORY/AND MEDICATIONS  AND UPDATED ALL    Objective:     Vitals:   BP sitting on left: 152/78 with a heart rate of 88 and regular  BP standing on left: 152/86 with a heart rate of 112 and regular  Vitals:    09/14/23 1443   BP: 139/84   Pulse: (!) 122       Weight (last 2 days)     Date/Time Weight    09/14/23 1443 78.5 (173)        Wt Readings from Last 3 Encounters:   09/14/23 78.5 kg (173 lb)   08/28/23 76.7 kg (169 lb)   08/02/23 75.8 kg (167 lb 3.2 oz)       Body mass index is 29.7 kg/m².     Physical Exam: General:  No acute distress  Skin:  No acute rash  Eyes:  No scleral icterus, noninjected, no discharge from eyes  ENT:  Moist mucous membranes  Neck:  Supple, no jugular venous distention, trachea is midline, no lymphadenopathy and no thyromegaly  Back   No CVAT  Chest:  Clear to auscultation and percussion, good respiratory effort  CVS:  Regular rate and rhythm without a rub, or gallops or murmurs  Abdomen:  Soft and nontender with normal bowel sounds  Extremities:  No cyanosis and no edema, no arthritic changes, normal range of motion  Neuro:  Grossly intact  Psych:  Alert, oriented x3 and appropriate      Medications:    Current Outpatient Medications:   •  AMILoride 5 mg tablet, Take 2 tablets (10 mg total) by mouth 2 (two) times a day, Disp: 180 tablet, Rfl: 3  •  busPIRone (BUSPAR) 5 mg tablet, Take 1 tablet (5 mg total) by mouth 2 (two) times a day, Disp: 180 tablet, Rfl: 3  •  clobetasol (TEMOVATE) 0.05 % ointment, Apply topically 2 (two) times a day, Disp: 60 g, Rfl: 2  •  dicyclomine (BENTYL) 20 mg tablet, Take 1 tablet (20 mg total) by mouth every 6 (six) hours, Disp: 120 tablet, Rfl: 4  •  ferrous sulfate 325 (65 Fe) mg tablet, Take 325 mg by mouth daily with breakfast, Disp: , Rfl:   •  hydroxychloroquine (PLAQUENIL) 200 mg tablet, Take 1.5 tablets (300 mg total) by mouth in the morning, Disp: 45 tablet, Rfl: 6  •  hydrOXYzine HCL (ATARAX) 10 mg tablet, Take 1 tablet (10 mg total) by mouth 2 (two) times a day as needed for anxiety, Disp: 60 tablet, Rfl: 1  •  potassium chloride (K-DUR,KLOR-CON) 20 mEq tablet, Take 1 tablet (20 mEq total) by mouth 3 (three) times a day, Disp: 270 tablet, Rfl: 3  •  Prenatal Multivit-Min-Fe-FA (PRE- PO), Take by mouth, Disp: , Rfl:   •  propranolol (INDERAL LA) 160 mg, Take 1 capsule (160 mg total) by mouth daily, Disp: 90 capsule, Rfl: 3  •  propranolol (INDERAL) 20 mg tablet, Take 1 tablet (20 mg total) by mouth every 8 (eight) hours, Disp: 90 tablet, Rfl: 2  •  tacrolimus (PROTOPIC) 0.1 % ointment, Apply topically 2 (two) times a day, Disp: 100 g, Rfl: 0  •  triamcinolone (KENALOG) 0.025 % ointment, Apply topically twice day for 14 days straight as needed for flares to thinner skin, Disp: 80 g, Rfl: 3  •  triamcinolone (KENALOG) 0.1 % ointment, Apply topically twice day for 14 days straight as needed for flares, Disp: 453.6 g, Rfl: 3    Lab, Imaging and other studies: I have personally reviewed pertinent labs.   Laboratory Results:  Results for orders placed or performed in visit on 23   UA w Reflex to Microscopic w Reflex to Culture    Specimen: Urine   Result Value Ref Range    Color, UA Yellow     Clarity, UA Clear     Specific [place], UA 1.020 1.003 - 1.030    pH, UA 5.5 4.5, 5.0, 5.5, 6.0, 6.5, 7.0, 7.5, 8.0    Leukocytes, UA Negative Negative    Nitrite, UA Negative Negative    Protein, UA Negative Negative mg/dl    Glucose, UA Negative Negative mg/dl    Ketones, UA Negative Negative mg/dl    Urobilinogen, UA <2.0 <2.0 mg/dl mg/dl    Bilirubin, UA Negative Negative    Occult Blood, UA Negative Negative       Results from last 7 days   Lab Units 09/13/23  0752   POTASSIUM mmol/L 3.8   CHLORIDE mmol/L 103   CO2 mmol/L 28   BUN mg/dL 19   CREATININE mg/dL 0.83   CALCIUM mg/dL 9.2   MAGNESIUM mg/dL 2.0           Radiology review:   chest X-ray    Ultrasound      Portions of the record may have been created with voice recognition software. Occasional wrong word or "sound a like" substitutions may have occurred due to the inherent limitations of voice recognition software. Read the chart carefully and recognize, using context, where substitutions have occurred.

## 2023-09-12 ENCOUNTER — OFFICE VISIT (OUTPATIENT)
Dept: PHYSICAL THERAPY | Facility: REHABILITATION | Age: 28
End: 2023-09-12
Payer: COMMERCIAL

## 2023-09-12 DIAGNOSIS — Z98.890 S/P RIGHT KNEE SURGERY: Primary | ICD-10-CM

## 2023-09-12 DIAGNOSIS — M25.561 RIGHT KNEE PAIN, UNSPECIFIED CHRONICITY: ICD-10-CM

## 2023-09-12 PROCEDURE — 97110 THERAPEUTIC EXERCISES: CPT | Performed by: PHYSICAL THERAPIST

## 2023-09-12 PROCEDURE — 97112 NEUROMUSCULAR REEDUCATION: CPT | Performed by: PHYSICAL THERAPIST

## 2023-09-12 NOTE — PROGRESS NOTES
Daily Note     Today's date: 2023  Patient name: Art Sharp  : 1995  MRN: [id number]  Referring provider: Simeon Ramirez DO  Dx:   Encounter Diagnosis     ICD-10-CM    1. S/P right knee surgery  Z98.890       2. Right knee pain, unspecified chronicity  M25.561           Start Time: 1745  Stop Time: 1825  Total time in clinic (min): 40 minutes    Subjective: Patient reports that she is doing well overall. Patient reports a fall while on vacation about a week ago when she slipped on a mat. She is okay and reports minimal pain this visit.        Objective: See treatment diary below  Precautions: hx POTS, HTN, follow protocol         Manuals    Measurements/ RE    15 min/ MC       15 min/ MC                              Neuro Re-Ed                    Lateral jogging (sidestepping) 3 laps                  Sit to stand elevated surface  10x chair (staggered)    15x staggered chair      Chair x10 Chair x15 Chair x15   Lateral step up (SL squat) Step 8x R   1R 12x kevin    5x R step 0R x5 0R x8 0R x10   tandem stance           2x30" foam 30"x3 ea foam 30"x3 ea foam 30"x3 ea foam   Light jog trampoline 3x30"    3x30"               Monster walks Red 2 laps    red 3 laps        Red 2 laps Red 2 laps   Sidestepping  red 3 laps    red 3 laps      Red 2 laps Red 2 laps Red 2 laps   Agility ladder   5 min          SLS  2x30: kevin foam   2x30" kevin foam     2x30" kevin 30"x2 ea 30"x2, 30"x1 foam Foam 30"x2 ea   Ther Ex                    Treadmill 2.0 mph 8 min 2 mph 8 min  1.8 mph 8 min     5 min 1.8 mph 10 min 2.0 mph 10 min 2.0 mph 10 min   sidelying hip abduction HEP        5" 15x R 5"x15 R 5"x15 R 5"x15 R   Prone hip extension HEP                  SLR 5" 15x R  5" 5x R  5" 15x     5" 15x R 5"x15 R 5"x15 R 5"x15 R   SL press (0-45*) seat 3, plate 6 35 lbs 15G R   45 lbs 20x R      25# x10 25# x15 R 25# x20 R   Ther Activity                                                           Gait Training                                                              Modalities                    Ice PRN    10 min                NMES          10 min                      Assessment: Tolerated treatment well. Patient demonstrated fatigue post treatment, exhibited good technique with therapeutic exercises and would benefit from continued PT. Minimal to no lag with SLR. Initiated ladder drills. Patient is cautious with movements, but minimal symptoms. Continue to progress as able. Plan: Progress treatment as tolerated.

## 2023-09-13 ENCOUNTER — APPOINTMENT (OUTPATIENT)
Dept: LAB | Facility: CLINIC | Age: 28
End: 2023-09-13
Payer: COMMERCIAL

## 2023-09-13 ENCOUNTER — TELEPHONE (OUTPATIENT)
Dept: ENDOCRINOLOGY | Facility: CLINIC | Age: 28
End: 2023-09-13

## 2023-09-13 DIAGNOSIS — R39.9 UTI SYMPTOMS: Primary | ICD-10-CM

## 2023-09-13 DIAGNOSIS — E83.42 HYPOMAGNESEMIA: ICD-10-CM

## 2023-09-13 DIAGNOSIS — E87.6 HYPOKALEMIA: Chronic | ICD-10-CM

## 2023-09-13 LAB
ANION GAP SERPL CALCULATED.3IONS-SCNC: 6 MMOL/L
BILIRUB UR QL STRIP: NEGATIVE
BUN SERPL-MCNC: 19 MG/DL (ref 5–25)
CALCIUM SERPL-MCNC: 9.2 MG/DL (ref 8.4–10.2)
CHLORIDE SERPL-SCNC: 103 MMOL/L (ref 96–108)
CLARITY UR: CLEAR
CO2 SERPL-SCNC: 28 MMOL/L (ref 21–32)
COLOR UR: YELLOW
CREAT SERPL-MCNC: 0.83 MG/DL (ref 0.6–1.3)
GFR SERPL CREATININE-BSD FRML MDRD: 95 ML/MIN/1.73SQ M
GLUCOSE P FAST SERPL-MCNC: 107 MG/DL (ref 65–99)
GLUCOSE UR STRIP-MCNC: NEGATIVE MG/DL
HGB UR QL STRIP.AUTO: NEGATIVE
KETONES UR STRIP-MCNC: NEGATIVE MG/DL
LEUKOCYTE ESTERASE UR QL STRIP: NEGATIVE
MAGNESIUM SERPL-MCNC: 2 MG/DL (ref 1.9–2.7)
NITRITE UR QL STRIP: NEGATIVE
PH UR STRIP.AUTO: 5.5 [PH]
POTASSIUM SERPL-SCNC: 3.8 MMOL/L (ref 3.5–5.3)
PROT UR STRIP-MCNC: NEGATIVE MG/DL
SODIUM SERPL-SCNC: 137 MMOL/L (ref 135–147)
SP GR UR STRIP.AUTO: 1.02 (ref 1–1.03)
UROBILINOGEN UR STRIP-ACNC: <2 MG/DL

## 2023-09-13 PROCEDURE — 36415 COLL VENOUS BLD VENIPUNCTURE: CPT

## 2023-09-13 PROCEDURE — 80048 BASIC METABOLIC PNL TOTAL CA: CPT

## 2023-09-13 PROCEDURE — 83735 ASSAY OF MAGNESIUM: CPT

## 2023-09-13 PROCEDURE — 81003 URINALYSIS AUTO W/O SCOPE: CPT

## 2023-09-13 NOTE — TELEPHONE ENCOUNTER
Pt aware    Angelic Hicks MD  [facility] [place] ([place]) Clinical  Please let the patient know her UA looks normal no evidence of an infection

## 2023-09-14 ENCOUNTER — OFFICE VISIT (OUTPATIENT)
Dept: NEPHROLOGY | Facility: CLINIC | Age: 28
End: 2023-09-14

## 2023-09-14 VITALS
SYSTOLIC BLOOD PRESSURE: 139 MMHG | DIASTOLIC BLOOD PRESSURE: 84 MMHG | HEART RATE: 122 BPM | BODY MASS INDEX: 29.7 KG/M2 | WEIGHT: 173 LBS

## 2023-09-14 DIAGNOSIS — I10 ESSENTIAL HYPERTENSION, BENIGN: Primary | Chronic | ICD-10-CM

## 2023-09-14 DIAGNOSIS — E87.6 HYPOKALEMIA: ICD-10-CM

## 2023-09-14 DIAGNOSIS — G90.A POTS (POSTURAL ORTHOSTATIC TACHYCARDIA SYNDROME): Chronic | ICD-10-CM

## 2023-09-14 DIAGNOSIS — E55.9 VITAMIN D DEFICIENCY: ICD-10-CM

## 2023-09-14 NOTE — LETTER
September 14, 2023     Maggie Alston, [address] [facility]  [address]  [address] [address]    Patient: Berkley Matthews   YOB: 1995   Date of Visit: 9/14/2023       Dear Dr. Edouard Sat:    Thank you for referring Lyndsey Ellis to me for evaluation. Below are my notes for this consultation. If you have questions, please do not hesitate to call me. I look forward to following your patient along with you. Sincerely,        Madeline Sharpe MD        CC: DO Madeline Hilton MD  9/14/2023  3:14 PM  Sign when Signing Visit  RENAL FOLLOW UP NOTE:.td    ASSESSMENT AND PLAN:  1. Hypertension/hypokalemia/hyponatremia:  Negative secondary workup. Genetic workup was negative. She had been seen by Dr. Maciel Zambrano as a 2nd opinion. Blood pressure goal:  Less than 135/80     Current  Blood pressures:    AM: 129/74 no orthostatic changes  PM: 127/77 no orthostatic changes  Heart rate: , high heart rate secondary to POTS     Recommendations:  Nonmedical regimen including isotonic exercise and avoidance of salt  Medication changes today:  No changes patient is at goal continue as needed Propanolol for POTS tachycardia     Regarding diet:  Mid morning I would recommend some form of potassium food/drink  Dietary recommended taking Potassium with foods and introducing snacks which are patient is doing     In regards to labs:  Potassium: 3.8  at goal  creatinine normal at  0.83  Sodium normal 135  MBD:    Hypomagnesemia:  Doing well 2.0  Vitamin-D 38.8 acceptable  lipid profile:  LDL 95/HDL 60/triglycerides 100 at goal  Hemoglobin normal at 14.8  UA:  No proteinuria no hematuria  Urine protein creatinine ratio 0.06 g at goal        We are holding off on spironolactone with the potential future pregnancy  May have to increase the potassium supplement at this time.   High potassium diet;  Kdur 20 mg 3 times a day continue is doing well with a normal potassium       2.?  POTS syndrome:  Asymptomatic. Followed by Cardiology. Seen by Dr. Keely Murray 8/2/2023: Inappropriate sinus tachycardia versus POTS. Tachycardia not associated necessarily with position. Felt to be sinus tachycardia. Propranolol was increased to 160 mg daily to see if she would tolerate this and then potentially take 20 mg immediate release 2 to 4 hours if she notices consistent palpitations at a particular time of day. Also compression socks. 3.  Mixed connective tissue disease: Followed by Rheumatology as needed:  Clearly now with rheumatoid arthritis being treated with Plaquenil. Now off ibuprofen. 4. GI:  GERD/IBS/idiopathic gastroparesis followed by GI Dr. Pilar uKmari     5. Celiac artery stenosis:  Based on arterial duplex 07/28/2022 for hypertensive workup:  No further workup recommended or intervention unless she were to develop symptoms soon after eating       GI health maintenance: Patient followed by GI to young for requiring colonoscopy    PATIENT INSTRUCTIONS:    Patient Instructions   Visit summary:  -Overall kidney function, blood pressure and electrolytes are doing well        1. Medication changes today:  No changes    2. Please go for non fasting  lab work in 6 weeks    3.   Please take 1 week a blood pressure readings in 3-4 months    AS FOLLOWS  MORNING AND EVENING, SITTING AND STANDING as follows:  TAKE THE MORNING READINGS BEFORE ANY MEDICATIONS AND WHEN YOU ARE RELAXED FOR SEVERAL MINUTES  TAKE THE EVENING READINGS:  BETWEEN 7-10 P.M.; PRIOR TO ANY MEDICATIONS; AT LEAST IN OUR  FROM DINNER; AND CERTAINLY AFTER RELAXING FOR A FEW MINUTES  PLEASE INCLUDE HEART RATE WITH YOUR BLOOD PRESSURE READINGS  When taking standing readings, keep your arm supported at heart level and not dangling  Make sure you are sitting with your back supported and feet on the ground and do not cross your legs or feet  Make sure you have not taken any coffee or caffeine products or exercised or smoke cigarettes at least 30 minutes before taking your blood pressure  Then please mail these readings into the office    4. Follow-up in 3-4 months  Please bring in 1 week a blood pressure readings morning evening, sitting and standing is outlined above  PLEASE BRING AN YOUR BLOOD PRESSURE MACHINE TO CORRELATE WITH THE OFFICE MACHINE AT THIS NEXT SCHEDULED VISIT  Please go for fasting lab work 1-2 weeks prior to your appointment      5. General instructions:  AVOID SALT BUT NOT ADDING AN READING LABELS TO MAKE SURE THERE IS LOW-SALT IN THE FOOD THAT YOU ARE EATING  Goal is less than 2 g of sodium intake or less than 5 g of sodium chloride intake per day    Avoid nonsteroidal anti-inflammatory drugs such as Naprosyn, ibuprofen, Aleve, Advil, Celebrex, Meloxicam (Mobic) etc.  You can use Tylenol as needed if you do not have any liver condition to be concerned about    Avoid medications such as Sudafed or decongestants and antihistamines that contained the D component which is the decongestant. You can take antihistamines without the decongestant or D component. Try to avoid medications such as pantoprazole or  Protonix/Nexium or Esomeprazole)/Prilosec or omeprazole/Prevacid or lansoprazole/AcipHex or Rabeprazole. If you are able to, use Pepcid as this is safer for your kidneys. Try to exercise at least 30 minutes 3 days a week to begin with with an ultimate goal of 5 days a week for at least 30 minutes    Try to lose 5-10 lb by your next visit    Please do not drink more than 2 glasses of alcohol/wine on a daily basis as this may contribute to your high blood pressure. Subjective: There has been no hospitalizations or acute illnesses since last visit. The patient overall is feeling well. No fevers, chills, or cough or colds.   Good appetite and good energy  No hematuria, positive dysuria no frequency or foamy urine; found to have a yeast infection UA negative  No gastrointestinal symptoms upon occasion only  No cardiovascular symptoms slight lower extremity swelling when she is on her feet for quite a while  Rare headaches, positive dizziness or lightheadedness associated with POTS when her heart rate goes up  Blood pressure medications:  Propanolol/Inderal  mg daily in the evening and then as needed 20 mg for elevated heart rate with POTS every 8 hours  Kdur 20 mill equivalents 3 times a day  Amiloride 10 mg twice a day      ROS:  See HPI, otherwise review of systems as completely reviewed with the patient are negative    Past Medical History:   Diagnosis Date   • Abnormal blood chemistry    • Anesthesia     per pt " Panic attack right before a procedure-has High preop anxiety and wakes up nasty"   • Anxiety    • Eczema    • Exercises 5 to 6 times per week     per pt prior to knee issue -enjoyed running and horseback riding   • Family history of reaction to anesthesia     per pt--"Mom also gets high anxiety with surgeries and wakes up nasty from anesthesia"   • Gastroparesis    • History of vitamin D deficiency    • Hypertension     last assessed 3/12/14   • Hypokalemia     per pt per doctors possibly related renal problem   • Irritable bowel syndrome     with diarrhea   • Lymphadenopathy     last assessed 10/8/14-no current issue   • Myalgia     last assessed 10/8/14   • Myositis     last assessed 10/8/14   • Palpitations    • POTS (postural orthostatic tachycardia syndrome)    • RA (rheumatoid arthritis) ([address])    • Rheumatoid arthritis ([address])    • Sinus tachycardia     related to dehydration   • Tachycardia     last assessed 6/9/17   • Tooth missing     front upper tooth retainer-   • Wears glasses     for computer use     Past Surgical History:   Procedure Laterality Date   • MD ARTHROSCOPY KNEE LATERAL RELEASE Right 4/13/2023    Procedure: ARTHROSCOPIC RELEASE RETINACULAR;  Surgeon: Allen Calhoun DO;  Location: [facility] MAIN OR;  Service: Orthopedics   • MD EGD TRANSORAL BIOPSY SINGLE/MULTIPLE N/A 5/4/2016 Procedure: ESOPHAGOGASTRODUODENOSCOPY (EGD); Surgeon: Alpesh Seth MD;  Location:  GI LAB; Service: Gastroenterology   • NM RCNSTJ [address] W/XTNSR RELIGNMT&/MUSC RL Right 4/13/2023    Procedure: ARTHROSCOPIC REALIGNMENT PATELLA;  Surgeon: Sarah Hernández DO;  Location: [address] [place] [address] OR;  Service: Orthopedics   • UPPER GASTROINTESTINAL ENDOSCOPY     • WISDOM TOOTH EXTRACTION       Family History   Problem Relation Age of Onset   • Hypokalemia Mother    • Hypotension Mother    • Anxiety disorder Mother         NOS   • Lung cancer Father    • Skin cancer Father    • Crohn's disease Father    • No Known Problems Sister    • No Known Problems Sister    • No Known Problems Brother    • Coronary artery disease Maternal Grandmother    • Heart disease Maternal Grandmother    • Alzheimer's disease Maternal Grandmother    • Arthritis Paternal Grandmother    • Rheum arthritis Paternal Grandmother    • Hypertension Paternal Grandmother    • COPD Paternal Grandfather       reports that she has never smoked. She has never used smokeless tobacco. She reports that she does not currently use alcohol after a past usage of about 1.0 standard drink of alcohol per week. She reports that she does not use drugs. I COMPLETELY REVIEWED THE PAST MEDICAL HISTORY/PAST SURGICAL HISTORY/SOCIAL HISTORY/FAMILY HISTORY/AND MEDICATIONS  AND UPDATED ALL    Objective:     Vitals:   BP sitting on left: 152/78 with a heart rate of 88 and regular  BP standing on left: 152/86 with a heart rate of 112 and regular  Vitals:    09/14/23 1443   BP: 139/84   Pulse: (!) 122       Weight (last 2 days)       Date/Time Weight    09/14/23 1443 78.5 (173)          Wt Readings from Last 3 Encounters:   09/14/23 78.5 kg (173 lb)   08/28/23 76.7 kg (169 lb)   08/02/23 75.8 kg (167 lb 3.2 oz)       Body mass index is 29.7 kg/m².     Physical Exam: General:  No acute distress  Skin:  No acute rash  Eyes:  No scleral icterus, noninjected, no discharge from eyes  ENT:  Moist mucous membranes  Neck:  Supple, no jugular venous distention, trachea is midline, no lymphadenopathy and no thyromegaly  Back   No CVAT  Chest:  Clear to auscultation and percussion, good respiratory effort  CVS:  Regular rate and rhythm without a rub, or gallops or murmurs  Abdomen:  Soft and nontender with normal bowel sounds  Extremities:  No cyanosis and no edema, no arthritic changes, normal range of motion  Neuro:  Grossly intact  Psych:  Alert, oriented x3 and appropriate      Medications:    Current Outpatient Medications:   •  AMILoride 5 mg tablet, Take 2 tablets (10 mg total) by mouth 2 (two) times a day, Disp: 180 tablet, Rfl: 3  •  busPIRone (BUSPAR) 5 mg tablet, Take 1 tablet (5 mg total) by mouth 2 (two) times a day, Disp: 180 tablet, Rfl: 3  •  clobetasol (TEMOVATE) 0.05 % ointment, Apply topically 2 (two) times a day, Disp: 60 g, Rfl: 2  •  dicyclomine (BENTYL) 20 mg tablet, Take 1 tablet (20 mg total) by mouth every 6 (six) hours, Disp: 120 tablet, Rfl: 4  •  ferrous sulfate 325 (65 Fe) mg tablet, Take 325 mg by mouth daily with breakfast, Disp: , Rfl:   •  hydroxychloroquine (PLAQUENIL) 200 mg tablet, Take 1.5 tablets (300 mg total) by mouth in the morning, Disp: 45 tablet, Rfl: 6  •  hydrOXYzine HCL (ATARAX) 10 mg tablet, Take 1 tablet (10 mg total) by mouth 2 (two) times a day as needed for anxiety, Disp: 60 tablet, Rfl: 1  •  potassium chloride (K-DUR,KLOR-CON) 20 mEq tablet, Take 1 tablet (20 mEq total) by mouth 3 (three) times a day, Disp: 270 tablet, Rfl: 3  •  Prenatal Multivit-Min-Fe-FA (PRE- PO), Take by mouth, Disp: , Rfl:   •  propranolol (INDERAL LA) 160 mg, Take 1 capsule (160 mg total) by mouth daily, Disp: 90 capsule, Rfl: 3  •  propranolol (INDERAL) 20 mg tablet, Take 1 tablet (20 mg total) by mouth every 8 (eight) hours, Disp: 90 tablet, Rfl: 2  •  tacrolimus (PROTOPIC) 0.1 % ointment, Apply topically 2 (two) times a day, Disp: 100 g, Rfl: 0  • triamcinolone (KENALOG) 0.025 % ointment, Apply topically twice day for 14 days straight as needed for flares to thinner skin, Disp: 80 g, Rfl: 3  •  triamcinolone (KENALOG) 0.1 % ointment, Apply topically twice day for 14 days straight as needed for flares, Disp: 453.6 g, Rfl: 3    Lab, Imaging and other studies: I have personally reviewed pertinent labs. Laboratory Results:  Results for orders placed or performed in visit on 09/13/23   UA w Reflex to Microscopic w Reflex to Culture    Specimen: Urine   Result Value Ref Range    Color, UA Yellow     Clarity, UA Clear     Specific Gravity, UA 1.020 1.003 - 1.030    pH, UA 5.5 4.5, 5.0, 5.5, 6.0, 6.5, 7.0, 7.5, 8.0    Leukocytes, UA Negative Negative    Nitrite, UA Negative Negative    Protein, UA Negative Negative mg/dl    Glucose, UA Negative Negative mg/dl    Ketones, UA Negative Negative mg/dl    Urobilinogen, UA <2.0 <2.0 mg/dl mg/dl    Bilirubin, UA Negative Negative    Occult Blood, UA Negative Negative       Results from last 7 days   Lab Units 09/13/23  0752   POTASSIUM mmol/L 3.8   CHLORIDE mmol/L 103   CO2 mmol/L 28   BUN mg/dL 19   CREATININE mg/dL 0.83   CALCIUM mg/dL 9.2   MAGNESIUM mg/dL 2.0         Radiology review:   chest X-ray    Ultrasound      Portions of the record may have been created with voice recognition software. Occasional wrong word or "sound a like" substitutions may have occurred due to the inherent limitations of voice recognition software. Read the chart carefully and recognize, using context, where substitutions have occurred.

## 2023-09-14 NOTE — PATIENT INSTRUCTIONS
Visit summary:  -Overall kidney function, blood pressure and electrolytes are doing well        1. Medication changes today:  No changes    2. Please go for non fasting  lab work in 6 weeks    3. Please take 1 week a blood pressure readings in 3-4 months    AS FOLLOWS  MORNING AND EVENING, SITTING AND STANDING as follows:  TAKE THE MORNING READINGS BEFORE ANY MEDICATIONS AND WHEN YOU ARE RELAXED FOR SEVERAL MINUTES  TAKE THE EVENING READINGS:  BETWEEN 7-10 P.M.; PRIOR TO ANY MEDICATIONS; AT LEAST IN OUR  FROM DINNER; AND CERTAINLY AFTER RELAXING FOR A FEW MINUTES  PLEASE INCLUDE HEART RATE WITH YOUR BLOOD PRESSURE READINGS  When taking standing readings, keep your arm supported at heart level and not dangling  Make sure you are sitting with your back supported and feet on the ground and do not cross your legs or feet  Make sure you have not taken any coffee or caffeine products or exercised or smoke cigarettes at least 30 minutes before taking your blood pressure  Then please mail these readings into the office    4. Follow-up in 3-4 months  Please bring in 1 week a blood pressure readings morning evening, sitting and standing is outlined above  PLEASE BRING AN YOUR BLOOD PRESSURE MACHINE TO CORRELATE WITH THE OFFICE MACHINE AT THIS NEXT SCHEDULED VISIT  Please go for fasting lab work 1-2 weeks prior to your appointment      5. General instructions:  AVOID SALT BUT NOT ADDING AN READING LABELS TO MAKE SURE THERE IS LOW-SALT IN THE FOOD THAT YOU ARE EATING  Goal is less than 2 g of sodium intake or less than 5 g of sodium chloride intake per day    Avoid nonsteroidal anti-inflammatory drugs such as Naprosyn, ibuprofen, Aleve, Advil, Celebrex, Meloxicam (Mobic) etc.  You can use Tylenol as needed if you do not have any liver condition to be concerned about    Avoid medications such as Sudafed or decongestants and antihistamines that contained the D component which is the decongestant.   You can take antihistamines without the decongestant or D component. Try to avoid medications such as pantoprazole or  Protonix/Nexium or Esomeprazole)/Prilosec or omeprazole/Prevacid or lansoprazole/AcipHex or Rabeprazole. If you are able to, use Pepcid as this is safer for your kidneys. Try to exercise at least 30 minutes 3 days a week to begin with with an ultimate goal of 5 days a week for at least 30 minutes    Try to lose 5-10 lb by your next visit    Please do not drink more than 2 glasses of alcohol/wine on a daily basis as this may contribute to your high blood pressure.

## 2023-09-18 ENCOUNTER — APPOINTMENT (OUTPATIENT)
Dept: PHYSICAL THERAPY | Facility: REHABILITATION | Age: 28
End: 2023-09-18
Payer: COMMERCIAL

## 2023-09-21 ENCOUNTER — OFFICE VISIT (OUTPATIENT)
Dept: PHYSICAL THERAPY | Facility: REHABILITATION | Age: 28
End: 2023-09-21
Payer: COMMERCIAL

## 2023-09-21 DIAGNOSIS — Z98.890 S/P RIGHT KNEE SURGERY: Primary | ICD-10-CM

## 2023-09-21 DIAGNOSIS — M25.561 RIGHT KNEE PAIN, UNSPECIFIED CHRONICITY: ICD-10-CM

## 2023-09-21 PROCEDURE — 97110 THERAPEUTIC EXERCISES: CPT

## 2023-09-21 PROCEDURE — 97112 NEUROMUSCULAR REEDUCATION: CPT

## 2023-09-21 NOTE — PROGRESS NOTES
Daily Note     Today's date: 2023  Patient name: Chico Deleon  : 1995  MRN: [id number]  Referring provider: Kevin Roger DO  Dx:   Encounter Diagnosis     ICD-10-CM    1. S/P right knee surgery  Z98.890       2. Right knee pain, unspecified chronicity  M25.561           Start Time: 1700  Stop Time: 1745  Total time in clinic (min): 45 minutes    Subjective: Patient reports soreness for the rest of the day with progressions made last visit. Pt notes overall doing well over the last week.         Objective: See treatment diary below  Precautions: hx POTS, HTN, follow protocol         Manuals    Measurements/ RE    15 min/ MC          15 min/ MC                                 Neuro Re-Ed                       Lateral jogging (sidestepping) 3 laps                     Sit to stand elevated surface  10x chair (staggered)    15x staggered chair  x15 staggered chair       Chair x10 Chair x15 Chair x15   Lateral step up (SL squat) Step 8x R   1R 12x kevin  1R x15 ea     5x R step 0R x5 0R x8 0R x10   tandem stance              2x30" foam 30"x3 ea foam 30"x3 ea foam 30"x3 ea foam   Light jog trampoline 3x30"    3x30"   30"x3               Monster walks Red 2 laps    red 3 laps  Green 2 laps         Red 2 laps Red 2 laps   Sidestepping  red 3 laps    red 3 laps  Green 3 laps       Red 2 laps Red 2 laps Red 2 laps   Agility ladder     5 min  5 min               SLS  2x30: kevin foam   2x30" kevin foam  30"x2 ea foam      2x30" kevin 30"x2 ea 30"x2, 30"x1 foam Foam 30"x2 ea   Ther Ex                       Treadmill 2.0 mph 8 min 2 mph 8 min  1.8 mph 8 min  2.0 mph 8 min      5 min 1.8 mph 10 min 2.0 mph 10 min 2.0 mph 10 min   sidelying hip abduction HEP           5" 15x R 5"x15 R 5"x15 R 5"x15 R   Prone hip extension HEP                     SLR 5" 15x R  5" 5x R  5" 15x  5"x15      5" 15x R 5"x15 R 5"x15 R 5"x15 R   SL press (0-45*) seat 3, plate 6 35 lbs 11H R    45 lbs 20x R  45# x20 R       25# x10 25# x15 R 25# x20 R   Ther Activity                                                                       Gait Training                                                                       Modalities                       Ice PRN    10 min                   NMES             10 min               Assessment: Tolerated treatment well. Progressed side stepping and monster walks to green band with increased challenge and gluteal fatigue. Pt cont to be cautious with agility ladders, but with cueing demonstrates quicker step pattern. R knee discomfort superior to patella with lateral step ups, however no other discomfort noted throughout session. Patient demonstrated fatigue post treatment, exhibited good technique with therapeutic exercises and would benefit from continued PT. Plan: Progress treatment as tolerated.

## 2023-09-26 ENCOUNTER — APPOINTMENT (OUTPATIENT)
Dept: PHYSICAL THERAPY | Facility: REHABILITATION | Age: 28
End: 2023-09-26
Payer: COMMERCIAL

## 2023-10-06 ENCOUNTER — EVALUATION (OUTPATIENT)
Dept: PHYSICAL THERAPY | Facility: REHABILITATION | Age: 28
End: 2023-10-06
Payer: COMMERCIAL

## 2023-10-06 DIAGNOSIS — M25.561 RIGHT KNEE PAIN, UNSPECIFIED CHRONICITY: ICD-10-CM

## 2023-10-06 DIAGNOSIS — Z98.890 S/P RIGHT KNEE SURGERY: Primary | ICD-10-CM

## 2023-10-06 PROCEDURE — 97110 THERAPEUTIC EXERCISES: CPT | Performed by: PHYSICAL THERAPIST

## 2023-10-06 PROCEDURE — 97112 NEUROMUSCULAR REEDUCATION: CPT | Performed by: PHYSICAL THERAPIST

## 2023-10-06 PROCEDURE — 97140 MANUAL THERAPY 1/> REGIONS: CPT | Performed by: PHYSICAL THERAPIST

## 2023-10-06 PROCEDURE — 97164 PT RE-EVAL EST PLAN CARE: CPT | Performed by: PHYSICAL THERAPIST
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
## 2023-10-09 ENCOUNTER — OFFICE VISIT (OUTPATIENT)
Dept: OBGYN CLINIC | Facility: MEDICAL CENTER | Age: 28
End: 2023-10-09
Payer: COMMERCIAL

## 2023-10-09 VITALS
HEART RATE: 76 BPM | WEIGHT: 173.6 LBS | SYSTOLIC BLOOD PRESSURE: 128 MMHG | HEIGHT: 64 IN | BODY MASS INDEX: 29.64 KG/M2 | DIASTOLIC BLOOD PRESSURE: 82 MMHG

## 2023-10-09 DIAGNOSIS — S83.004A PATELLAR DISLOCATION, RIGHT, INITIAL ENCOUNTER: Primary | ICD-10-CM

## 2023-10-09 PROCEDURE — 99213 OFFICE O/P EST LOW 20 MIN: CPT | Performed by: ORTHOPAEDIC SURGERY

## 2023-10-09 NOTE — PROGRESS NOTES
Ortho Sports Medicine Knee Follow Up Visit     Assesment:     33 y.o. female right knee follow up right knee arthroscopic patellar realignment and retinacular release. 6 months post op    Plan:    Conservative treatment:    Ice to knee for 20 minutes at least 1-2 times daily. Continue PT for ROM/strengthening to knee, hip and core. OTC NSAIDS prn for pain. Cleared for all activities    Injection:    No Injection planned at this time. Surgery:     No surgery is recommended at this point, continue with conservative treatment plan as noted. Follow up:    No follow-ups on file. No chief complaint on file. History of Present Illness: The patient is returns for follow up of her right knee arthroscopic realignment of patella and retinacular release done 4/13/23. Since the prior visit, She reports sharp pain between her 2 incisions. She states that throbbing and burning sensations. She reports swelling which is limiting her range of motion and her strengthening during PT. She denies any new injury or fever. Pain is located anterior, medial.     Pain is improved by rest.  Pain is aggravated by weight bearing, walking, sitting and standing. Symptoms include swelling. The patient has tried rest.          Knee Surgical History:  Right knee arthroscopic patellar realignment and retinacular release 4/13/2023.     Past Medical, Social and Family History:  Past Medical History:   Diagnosis Date   • Abnormal blood chemistry    • Anesthesia     per pt " Panic attack right before a procedure-has High preop anxiety and wakes up nasty"   • Anxiety    • Eczema    • Exercises 5 to 6 times per week     per pt prior to knee issue -enjoyed running and horseback riding   • Family history of reaction to anesthesia     per pt--"Mom also gets high anxiety with surgeries and wakes up nasty from anesthesia"   • Gastroparesis    • History of vitamin D deficiency    • Hypertension     last assessed 3/12/14   • Hypokalemia     per pt per doctors possibly related renal problem   • Irritable bowel syndrome     with diarrhea   • Lymphadenopathy     last assessed 10/8/14-no current issue   • Myalgia     last assessed 10/8/14   • Myositis     last assessed 10/8/14   • Palpitations    • POTS (postural orthostatic tachycardia syndrome)    • RA (rheumatoid arthritis) ([facility])    • Rheumatoid arthritis ([address])    • Sinus tachycardia     related to dehydration   • Tachycardia     last assessed 6/9/17   • Tooth missing     front upper tooth retainer-   • Wears glasses     for computer use     Past Surgical History:   Procedure Laterality Date   • IN ARTHROSCOPY KNEE LATERAL RELEASE Right 4/13/2023    Procedure: ARTHROSCOPIC RELEASE RETINACULAR;  Surgeon: Brayan Silva DO;  Location: [address] [place] [address] MAIN OR;  Service: Orthopedics   • IN EGD TRANSORAL BIOPSY SINGLE/MULTIPLE N/A 5/4/2016    Procedure: ESOPHAGOGASTRODUODENOSCOPY (EGD); Surgeon: Vanessa Varma MD;  Location:  GI LAB;   Service: Gastroenterology   • IN NSTJ [address] [place] [address] W/XTNSR RELIGNMT&/MUSC RL Right 4/13/2023    Procedure: ARTHROSCOPIC REALIGNMENT PATELLA;  Surgeon: rBayan Silva DO;  Location:  MAIN OR;  Service: Orthopedics   • UPPER GASTROINTESTINAL ENDOSCOPY     • WISDOM TOOTH EXTRACTION       Allergies   Allergen Reactions   • Prednisone Hyperactivity     Medrol dose samantha only   • Serotonin Reuptake Inhibitors (Ssris) Anaphylaxis and Hives   • Augmentin [Amoxicillin-Pot Clavulanate] Hives and Rash   • Clindamycin Rash   • Penicillins Rash   • Sulfa Antibiotics Rash   • Azithromycin Rash     Current Outpatient Medications on File Prior to Visit   Medication Sig Dispense Refill   • AMILoride 5 mg tablet Take 2 tablets (10 mg total) by mouth 2 (two) times a day 180 tablet 3   • busPIRone (BUSPAR) 5 mg tablet Take 1 tablet (5 mg total) by mouth 2 (two) times a day 180 tablet 3   • clobetasol (TEMOVATE) 0.05 % ointment Apply topically 2 (two) times a day 60 g 2   • dicyclomine (BENTYL) 20 mg tablet Take 1 tablet (20 mg total) by mouth every 6 (six) hours 120 tablet 4   • ferrous sulfate 325 (65 Fe) mg tablet Take 325 mg by mouth daily with breakfast     • hydroxychloroquine (PLAQUENIL) 200 mg tablet Take 1.5 tablets (300 mg total) by mouth in the morning 45 tablet 6   • hydrOXYzine HCL (ATARAX) 10 mg tablet Take 1 tablet (10 mg total) by mouth 2 (two) times a day as needed for anxiety 60 tablet 1   • potassium chloride (K-DUR,KLOR-CON) 20 mEq tablet Take 1 tablet (20 mEq total) by mouth 3 (three) times a day 270 tablet 3   • Prenatal Multivit-Min-Fe-FA (PRE-GORDON PO) Take by mouth     • propranolol (INDERAL LA) 160 mg Take 1 capsule (160 mg total) by mouth daily 90 capsule 3   • propranolol (INDERAL) 20 mg tablet Take 1 tablet (20 mg total) by mouth every 8 (eight) hours 90 tablet 2   • tacrolimus (PROTOPIC) 0.1 % ointment Apply topically 2 (two) times a day 100 g 0   • triamcinolone (KENALOG) 0.025 % ointment Apply topically twice day for 14 days straight as needed for flares to thinner skin 80 g 3   • triamcinolone (KENALOG) 0.1 % ointment Apply topically twice day for 14 days straight as needed for flares 453.6 g 3     No current facility-administered medications on file prior to visit.      Social History     Socioeconomic History   • Marital status: /Civil Union     Spouse name: Not on file   • Number of children: Not on file   • Years of education: Not on file   • Highest education level: Not on file   Occupational History   • Occupation: medical assistant with nephrology    Tobacco Use   • Smoking status: Never   • Smokeless tobacco: Never   Vaping Use   • Vaping Use: Never used   Substance and Sexual Activity   • Alcohol use: Not Currently     Alcohol/week: 1.0 standard drink of alcohol     Types: 1 Standard drinks or equivalent per week   • Drug use: No   • Sexual activity: Yes     Partners: Male     Birth control/protection: Condom     Comment: defer   Other Topics Concern • Not on file   Social History Narrative    Activities - horseback riding    Caffeine use    Drinks coffee- 1 a wk    Exercise - walking    Exercising regularly     Social Determinants of Health     Financial Resource Strain: Not on file   Food Insecurity: Not on file   Transportation Needs: Not on file   Physical Activity: Not on file   Stress: Not on file   Social Connections: Not on file   Intimate Partner Violence: Not on file   Housing Stability: Not on file         I have reviewed the past medical, surgical, social and family history, medications and allergies as documented in the EMR. Review of systems: ROS is negative other than that noted in the HPI. Constitutional: Negative for fatigue and fever. Physical Exam:    not currently breastfeeding. General/Constitutional: NAD, well developed, well nourished  HENT: Normocephalic, atraumatic  CV: Intact distal pulses, regular rate  Resp: No respiratory distress or labored breathing  GI: Soft and non-tender   Lymphatic: No lymphadenopathy palpated  Neuro: Alert and Oriented x 3, no focal deficits  Psych: Normal mood, normal affect, normal judgement, normal behavior  Skin: Warm, dry, no rashes, no erythema      Knee Exam (focused): RIGHT LEFT   ROM:   0-130 0-130   Palpation: Effusion minimal negative     MJL tenderness Positive Negative     LJL tenderness Negative Negative   Meniscus:  Bobbi Negative Negative    Apley's Compression Negative Negative   Instability: Varus stable stable     Valgus stable stable   Special Tests: Lachman Negative Negative     Posterior drawer Negative Negative     Anterior drawer Negative Negative     Pivot shift not tested not tested     Dial not tested not tested   Patella: Palpation no tenderness no tenderness     Mobility 1/4 1/4     Apprehension Negative Negative   Other: Single leg 1/4 squat not tested not tested           LE NV Exam: +2 DP/PT pulses bilaterally  Sensation intact to light touch L2-S1 bilaterally    No calf tenderness to palpation bilaterally      Knee Imaging    No imaging was performed today      Scribe Attestation    I,:   am acting as a scribe while in the presence of the attending physician.:       I,:   personally performed the services described in this documentation    as scribed in my presence.:

## 2023-10-11 ENCOUNTER — OFFICE VISIT (OUTPATIENT)
Dept: CARDIOLOGY CLINIC | Facility: CLINIC | Age: 28
End: 2023-10-11
Payer: COMMERCIAL

## 2023-10-11 VITALS
DIASTOLIC BLOOD PRESSURE: 72 MMHG | HEIGHT: 64 IN | HEART RATE: 131 BPM | BODY MASS INDEX: 29.52 KG/M2 | SYSTOLIC BLOOD PRESSURE: 140 MMHG | WEIGHT: 172.9 LBS

## 2023-10-11 DIAGNOSIS — R00.2 HEART PALPITATIONS: ICD-10-CM

## 2023-10-11 DIAGNOSIS — I47.11 INAPPROPRIATE SINUS TACHYCARDIA: ICD-10-CM

## 2023-10-11 DIAGNOSIS — G90.A POTS (POSTURAL ORTHOSTATIC TACHYCARDIA SYNDROME): Primary | ICD-10-CM

## 2023-10-11 PROCEDURE — 93000 ELECTROCARDIOGRAM COMPLETE: CPT | Performed by: INTERNAL MEDICINE

## 2023-10-11 PROCEDURE — 99214 OFFICE O/P EST MOD 30 MIN: CPT | Performed by: INTERNAL MEDICINE

## 2023-10-11 RX ORDER — METOPROLOL SUCCINATE 50 MG/1
50 TABLET, EXTENDED RELEASE ORAL DAILY
Qty: 90 TABLET | Refills: 3 | Status: SHIPPED | OUTPATIENT
Start: 2023-10-11 | End: 2023-10-11 | Stop reason: SDUPTHER

## 2023-10-11 RX ORDER — METOPROLOL SUCCINATE 50 MG/1
50 TABLET, EXTENDED RELEASE ORAL 2 TIMES DAILY
Qty: 90 TABLET | Refills: 3 | Status: SHIPPED | OUTPATIENT
Start: 2023-10-11 | End: 2023-10-12 | Stop reason: SDUPTHER

## 2023-10-11 NOTE — PROGRESS NOTES
HEART  [address] [facility]    Outpatient Follow-up  Today's Date: 10/11/23        Patient name: Laya Perkins  YOB: 1995  Sex: female         Chief Complaint: f/u Tachycardia      ASSESSMENT:    34 yo female  Inappropriate Sinus tachycardia/dysautonomia.  symptoms since teenager, she has HR up to 160s, can be at rest, not really positional so technically not POTS, tilt was neg also. Currently on propranolol, 160mg daily and takes 20mg prn. She has been to ER multipel times. They tried adenosine one which she thinks helped (160 to 120 and gradually slower). EKG from triage that days shows sinus tachy. Cannot r/o Atach, but her 2 week Zio shows symptoms w HR 100s, 120s, 150s gradual onset, offset, no clear abrupt changes you would expect with SVT. Today her EKG is Sinus tach 131bpm. She reports HTN with episodes. She has tried POTS Physical therapy and compression stockings, hydration, and salt intake w/o improvement. She has normal TSH and neg urine metanephrine (2014) . Echo normal this year  Hypokalemia/hypomagnesemia. Sees Dr Renée Willard, on Amiloride and KDUR 20meq TID  3. Rheumatoid arthritis  She wishes to get pregnant        PLAN:  1> Try Metoprolol xl 50mg daily titrate up to 50mg bid and 25mg tartrate prn. Not sure if propranolol best choice in pregnancy since blocks Thyroid T3-4 conversion  2. Corlanor could work but not save in pregnancy  3. SA Node ablation? Skeptical if this helps, we don't usually perform  4. Don't recommend loop, will likely show more of same. Nothing she is has is life threatening. 5. Will Ask Dr Renée Willard about Florinef  6. Cont hydration, compression stockings, exercise.       F/u 6 months        Orders Placed This Encounter   Procedures    POCT ECG     Medications Discontinued During This Encounter   Medication Reason    propranolol (INDERAL) 20 mg tablet     propranolol (INDERAL LA) 160 mg metoprolol succinate (TOPROL-XL) 50 mg 24 hr tablet Reorder         . ............................................................................................ HPI/Subjective:   Referred by Dr Pancho Logan for inappropriate sinus tachy, she has been w Dr Malena Boo for years but not improving so wanted to try someone new. She is very symptomatic w palptiations,  to 160 just at rest sometime, can increase and decrease frequently. Muliple visits for this , recently in ER in July 2023, had adenosine which she thinked helped HR came down 160s to 120s . Please note HPI is listed by problem with with update following it, it is copied again in the assessment above and reflects medical decision making as well. Complete 12 point ROS reviewed and otherwise non pertinent or negative except as per HPI pertinent positives in Cardiovascular and Respiratory emphasized. Please see paper chart for outpatient clinic patients where the patient completed the 12 point ROS survey.            Past Medical History:   Diagnosis Date    Abnormal blood chemistry     Anesthesia     per pt " Panic attack right before a procedure-has High preop anxiety and wakes up nasty"    Anxiety     Eczema     Exercises 5 to 6 times per week     per pt prior to knee issue -enjoyed running and horseback riding    Family history of reaction to anesthesia     per pt--"Mom also gets high anxiety with surgeries and wakes up nasty from anesthesia"    Gastroparesis     History of vitamin D deficiency     Hypertension     last assessed 3/12/14    Hypokalemia     per pt per doctors possibly related renal problem    Irritable bowel syndrome     with diarrhea    Lymphadenopathy     last assessed 10/8/14-no current issue    Myalgia     last assessed 10/8/14    Myositis     last assessed 10/8/14    Palpitations     POTS (postural orthostatic tachycardia syndrome)     RA (rheumatoid arthritis) (HCC)     Rheumatoid arthritis (HCC)     Sinus tachycardia related to dehydration    Tachycardia     last assessed 17    Tooth missing     front upper tooth retainer-    Wears glasses     for computer use       Allergies   Allergen Reactions    Prednisone Hyperactivity     Medrol dose samantha only    Serotonin Reuptake Inhibitors (Ssris) Anaphylaxis and Hives    Augmentin [Amoxicillin-Pot Clavulanate] Hives and Rash    Clindamycin Rash    Penicillins Rash    Sulfa Antibiotics Rash    Azithromycin Rash     I reviewed the Home Medication list and Allergies in the chart.    Scheduled Meds:  Current Outpatient Medications   Medication Sig Dispense Refill    AMILoride 5 mg tablet Take 2 tablets (10 mg total) by mouth 2 (two) times a day 180 tablet 3    busPIRone (BUSPAR) 5 mg tablet Take 1 tablet (5 mg total) by mouth 2 (two) times a day 180 tablet 3    clobetasol (TEMOVATE) 0.05 % ointment Apply topically 2 (two) times a day 60 g 2    dicyclomine (BENTYL) 20 mg tablet Take 1 tablet (20 mg total) by mouth every 6 (six) hours 120 tablet 4    ferrous sulfate 325 (65 Fe) mg tablet Take 325 mg by mouth daily with breakfast      hydrOXYzine HCL (ATARAX) 10 mg tablet Take 1 tablet (10 mg total) by mouth 2 (two) times a day as needed for anxiety 60 tablet 1    metoprolol succinate (TOPROL-XL) 50 mg 24 hr tablet Take 1 tablet (50 mg total) by mouth 2 (two) times a day 90 tablet 3    metoprolol tartrate (LOPRESSOR) 25 mg tablet Take 1 tablet (25 mg total) by mouth every 6 (six) hours as needed (palpitatins) 90 tablet 3    potassium chloride (K-DUR,KLOR-CON) 20 mEq tablet Take 1 tablet (20 mEq total) by mouth 3 (three) times a day 270 tablet 3    Prenatal Multivit-Min-Fe-FA (PRE-GORDON PO) Take by mouth      tacrolimus (PROTOPIC) 0.1 % ointment Apply topically 2 (two) times a day 100 g 0    triamcinolone (KENALOG) 0.025 % ointment Apply topically twice day for 14 days straight as needed for flares to thinner skin 80 g 3    triamcinolone (KENALOG) 0.1 % ointment Apply topically twice day for 14 days straight as needed for flares 453.6 g 3    hydroxychloroquine (PLAQUENIL) 200 mg tablet Take 1.5 tablets (300 mg total) by mouth in the morning 45 tablet 6     No current facility-administered medications for this visit.      PRN Meds:.        Family History   Problem Relation Age of Onset    Hypokalemia Mother     Hypotension Mother     Anxiety disorder Mother         NOS    Lung cancer Father     Skin cancer Father     Crohn's disease Father     No Known Problems Sister     No Known Problems Sister     No Known Problems Brother     Coronary artery disease Maternal Grandmother     Heart disease Maternal Grandmother     Alzheimer's disease Maternal Grandmother     Arthritis Paternal Grandmother     Rheum arthritis Paternal Grandmother     Hypertension Paternal Grandmother     COPD Paternal Grandfather        Social History     Socioeconomic History    Marital status: /Civil Union     Spouse name: Not on file    Number of children: Not on file    Years of education: Not on file    Highest education level: Not on file   Occupational History    Occupation: medical assistant with nephrology    Tobacco Use    Smoking status: Never    Smokeless tobacco: Never   Vaping Use    Vaping Use: Never used   Substance and Sexual Activity    Alcohol use: Not Currently     Alcohol/week: 1.0 standard drink of alcohol     Types: 1 Standard drinks or equivalent per week    Drug use: No    Sexual activity: Yes     Partners: Male     Birth control/protection: Condom     Comment: defer   Other Topics Concern    Not on file   Social History Narrative    Activities - horseback riding    Caffeine use    Drinks coffee- 1 a wk    Exercise - walking    Exercising regularly     Social Determinants of Health     Financial Resource Strain: Not on file   Food Insecurity: Not on file   Transportation Needs: Not on file   Physical Activity: Not on file   Stress: Not on file   Social Connections: Not on file   Intimate Partner Violence: Not on file   Housing Stability: Not on file         OBJECTIVE:    /72 (BP Location: Left arm, Patient Position: Sitting, Cuff Size: Standard)   Pulse (!) 131   Ht 5' 4" (1.626 m)   Wt 78.4 kg (172 lb 14.4 oz)   BMI 29.68 kg/m²   Vitals:    10/11/23 1530   Weight: 78.4 kg (172 lb 14.4 oz)     GEN: No acute distress, Alert and oriented, well appearing  HEENT:External ears normal, oral pharynx clear, mucous membranes moist  EYES: Pupils equal, sclera anicteric, midline, normal conjuctiva  NECK: No JVD, supple, no obvious masses or thryomegaly or goiter  CARDIOVASCULAR:  RRR, No murmur, rub, gallops S1,S2  LUNGS: Clear To auscultation bilaterally, normal effort, no rales, rhonchi, crackles   ABDOMEN:  nondistended,  without obvious organomegaly or ascites  EXTREMITIES/VASCULAR:  No edema. warm an well perfused. PSYCH: Normal Affect,  linear speech pattern without evidence of psychosis. NEURO: Grossly intact, moving all extremiteis equal, face symmetric, alert and responsive, no obvious focal defecits   GAIT:  Ambulates normally without difficulty  HEME: No bleeding, bruising, petechia, purpura   SKIN: No significant rashes on visibile skin, warm, no diaphoresis or pallor.      Lab Results:       LABS:      Chemistry        Component Value Date/Time     (L) 01/07/2016 1654    K 3.8 09/13/2023 0752    K 3.3 (L) 01/07/2016 1654     09/13/2023 0752    CL 93 (L) 01/07/2016 1654    CO2 28 09/13/2023 0752    CO2 23.5 01/07/2016 1654    BUN 19 09/13/2023 0752    BUN 15 01/07/2016 1654    CREATININE 0.83 09/13/2023 0752    CREATININE 0.93 01/07/2016 1654        Component Value Date/Time    CALCIUM 9.2 09/13/2023 0752    CALCIUM 8.1 (L) 01/07/2016 1654    ALKPHOS 97 07/19/2023 1038    ALKPHOS 56 12/28/2015 1739    AST 14 07/19/2023 1038    AST 16 12/28/2015 1739    ALT 12 07/19/2023 1038    ALT 17 12/28/2015 1739    BILITOT 0.33 12/28/2015 1739            Lab Results   Component Value Date    CHOL 195 08/27/2015    CHOL 176 06/05/2015    CHOL 188 04/20/2015     Lab Results   Component Value Date    HDL 52 06/05/2023    HDL 60 10/12/2022    HDL 64 03/28/2022     Lab Results   Component Value Date    LDLCALC 89 06/05/2023    LDLCALC 95 10/12/2022    LDLCALC 96 03/28/2022     Lab Results   Component Value Date    TRIG 122 06/05/2023    TRIG 100 10/12/2022    TRIG 165 (H) 03/28/2022     No results found for: "CHOLHDL"    IMAGING: No results found. Cardiac testing:   No results found for this or any previous visit. No results found for this or any previous visit. No results found for this or any previous visit. No results found for this or any previous visit.           I reviewed and interpreted the following LABS/EKG/TELE/IMAGING and below is summary of my interpretation (if data available):        Current EKG and Rhythm Strip:Sinus tachy 131bpm. PRITESH

## 2023-10-12 ENCOUNTER — PATIENT MESSAGE (OUTPATIENT)
Dept: CARDIOLOGY CLINIC | Facility: CLINIC | Age: 28
End: 2023-10-12

## 2023-10-12 ENCOUNTER — APPOINTMENT (OUTPATIENT)
Dept: PHYSICAL THERAPY | Facility: REHABILITATION | Age: 28
End: 2023-10-12
Payer: COMMERCIAL

## 2023-10-12 DIAGNOSIS — I47.11 INAPPROPRIATE SINUS TACHYCARDIA: ICD-10-CM

## 2023-10-12 DIAGNOSIS — R00.2 HEART PALPITATIONS: ICD-10-CM

## 2023-10-12 RX ORDER — METOPROLOL SUCCINATE 50 MG/1
50 TABLET, EXTENDED RELEASE ORAL 2 TIMES DAILY
Qty: 90 TABLET | Refills: 3 | Status: SHIPPED | OUTPATIENT
Start: 2023-10-12

## 2023-10-16 ENCOUNTER — APPOINTMENT (OUTPATIENT)
Dept: PHYSICAL THERAPY | Facility: REHABILITATION | Age: 28
End: 2023-10-16
Payer: COMMERCIAL

## 2023-10-19 ENCOUNTER — APPOINTMENT (OUTPATIENT)
Dept: RADIOLOGY | Facility: HOSPITAL | Age: 28
End: 2023-10-19
Payer: COMMERCIAL

## 2023-10-19 ENCOUNTER — HOSPITAL ENCOUNTER (EMERGENCY)
Facility: HOSPITAL | Age: 28
Discharge: HOME | End: 2023-10-19
Payer: COMMERCIAL

## 2023-10-19 ENCOUNTER — OFFICE VISIT (OUTPATIENT)
Dept: URGENT CARE | Facility: CLINIC | Age: 28
End: 2023-10-19
Payer: COMMERCIAL

## 2023-10-19 VITALS
RESPIRATION RATE: 16 BRPM | DIASTOLIC BLOOD PRESSURE: 65 MMHG | OXYGEN SATURATION: 99 % | SYSTOLIC BLOOD PRESSURE: 137 MMHG | HEART RATE: 111 BPM | TEMPERATURE: 99.4 F

## 2023-10-19 VITALS
OXYGEN SATURATION: 100 % | TEMPERATURE: 98 F | DIASTOLIC BLOOD PRESSURE: 95 MMHG | RESPIRATION RATE: 18 BRPM | SYSTOLIC BLOOD PRESSURE: 149 MMHG | HEART RATE: 110 BPM

## 2023-10-19 DIAGNOSIS — R00.2 PALPITATIONS: Primary | ICD-10-CM

## 2023-10-19 LAB
ALBUMIN SERPL BCP-MCNC: 4.5 G/DL (ref 3.5–5)
ALP SERPL-CCNC: 100 U/L (ref 34–104)
ALT SERPL W P-5'-P-CCNC: 12 U/L (ref 7–52)
ANION GAP SERPL CALCULATED.3IONS-SCNC: 11 MMOL/L
AST SERPL W P-5'-P-CCNC: 15 U/L (ref 13–39)
B-HCG SERPL-ACNC: <1 MIU/ML (ref 0–5)
BASOPHILS # BLD AUTO: 0.06 THOUSANDS/ÂΜL (ref 0–0.1)
BASOPHILS NFR BLD AUTO: 1 % (ref 0–1)
BILIRUB SERPL-MCNC: 0.4 MG/DL (ref 0.2–1)
BUN SERPL-MCNC: 14 MG/DL (ref 5–25)
CALCIUM SERPL-MCNC: 9.3 MG/DL (ref 8.4–10.2)
CARDIAC TROPONIN I PNL SERPL HS: <2 NG/L
CHLORIDE SERPL-SCNC: 102 MMOL/L (ref 96–108)
CO2 SERPL-SCNC: 24 MMOL/L (ref 21–32)
CREAT SERPL-MCNC: 0.74 MG/DL (ref 0.6–1.3)
EOSINOPHIL # BLD AUTO: 0.06 THOUSAND/ÂΜL (ref 0–0.61)
EOSINOPHIL NFR BLD AUTO: 1 % (ref 0–6)
ERYTHROCYTE [DISTWIDTH] IN BLOOD BY AUTOMATED COUNT: 11.8 % (ref 11.6–15.1)
GFR SERPL CREATININE-BSD FRML MDRD: 109 ML/MIN/1.73SQ M
GLUCOSE SERPL-MCNC: 134 MG/DL (ref 65–140)
HCT VFR BLD AUTO: 41.5 % (ref 34.8–46.1)
HGB BLD-MCNC: 14.3 G/DL (ref 11.5–15.4)
IMM GRANULOCYTES # BLD AUTO: 0.05 THOUSAND/UL (ref 0–0.2)
IMM GRANULOCYTES NFR BLD AUTO: 1 % (ref 0–2)
LYMPHOCYTES # BLD AUTO: 2.21 THOUSANDS/ÂΜL (ref 0.6–4.47)
LYMPHOCYTES NFR BLD AUTO: 20 % (ref 14–44)
MCH RBC QN AUTO: 30.4 PG (ref 26.8–34.3)
MCHC RBC AUTO-ENTMCNC: 34.5 G/DL (ref 31.4–37.4)
MCV RBC AUTO: 88 FL (ref 82–98)
MONOCYTES # BLD AUTO: 0.73 THOUSAND/ÂΜL (ref 0.17–1.22)
MONOCYTES NFR BLD AUTO: 7 % (ref 4–12)
NEUTROPHILS # BLD AUTO: 7.77 THOUSANDS/ÂΜL (ref 1.85–7.62)
NEUTS SEG NFR BLD AUTO: 70 % (ref 43–75)
NRBC BLD AUTO-RTO: 0 /100 WBCS
PLATELET # BLD AUTO: 203 THOUSANDS/UL (ref 149–390)
PMV BLD AUTO: 11.4 FL (ref 8.9–12.7)
POTASSIUM SERPL-SCNC: 3.2 MMOL/L (ref 3.5–5.3)
PROT SERPL-MCNC: 7.7 G/DL (ref 6.4–8.4)
RBC # BLD AUTO: 4.71 MILLION/UL (ref 3.81–5.12)
SODIUM SERPL-SCNC: 137 MMOL/L (ref 135–147)
WBC # BLD AUTO: 10.88 THOUSAND/UL (ref 4.31–10.16)

## 2023-10-19 PROCEDURE — 99213 OFFICE O/P EST LOW 20 MIN: CPT | Performed by: PHYSICIAN ASSISTANT

## 2023-10-19 PROCEDURE — 71046 X-RAY EXAM CHEST 2 VIEWS: CPT

## 2023-10-19 PROCEDURE — 80053 COMPREHEN METABOLIC PANEL: CPT

## 2023-10-19 PROCEDURE — 85025 COMPLETE CBC W/AUTO DIFF WBC: CPT

## 2023-10-19 PROCEDURE — 84484 ASSAY OF TROPONIN QUANT: CPT

## 2023-10-19 PROCEDURE — 93005 ELECTROCARDIOGRAM TRACING: CPT

## 2023-10-19 PROCEDURE — 93005 ELECTROCARDIOGRAM TRACING: CPT | Performed by: PHYSICIAN ASSISTANT

## 2023-10-19 PROCEDURE — 36415 COLL VENOUS BLD VENIPUNCTURE: CPT

## 2023-10-19 PROCEDURE — 84702 CHORIONIC GONADOTROPIN TEST: CPT

## 2023-10-19 NOTE — PROGRESS NOTES
[facility]        NAME: Jovan Khalil is a 33 y.o. female  : 1995    MRN: [id number]  DATE: 2023  TIME: 5:33 PM    Assessment and Plan   Palpitations [R00.2]  1. Palpitations  ECG 12 lead    Transfer to other facility        EKG- Sinus tachycardia with premature supraventricular complexes    Patient called to have her  pick her up. Patient Instructions       Follow up with PCP in 3-5 days. Proceed to  ER if symptoms worsen. Chief Complaint     Chief Complaint   Patient presents with    Heart Problem     Pt reports feeling several PVCs over the course of the day. History of Present Illness       Patient is here today reporting chest pressure and possible PVC's. Patient reports history of PVC's. Denies any shortness of breath. Admits dizziness. Denies any blurred vision or ringing in in ear. Patient does follow with cardiologist    Heart Problem        Review of Systems   Review of Systems   Constitutional: Negative. HENT: Negative. Respiratory:  Positive for chest tightness. Cardiovascular:  Positive for palpitations. Neurological:  Positive for dizziness. Psychiatric/Behavioral: Negative.            Current Medications       Current Outpatient Medications:     AMILoride 5 mg tablet, Take 2 tablets (10 mg total) by mouth 2 (two) times a day, Disp: 180 tablet, Rfl: 3    busPIRone (BUSPAR) 5 mg tablet, Take 1 tablet (5 mg total) by mouth 2 (two) times a day, Disp: 180 tablet, Rfl: 3    clobetasol (TEMOVATE) 0.05 % ointment, Apply topically 2 (two) times a day, Disp: 60 g, Rfl: 2    dicyclomine (BENTYL) 20 mg tablet, Take 1 tablet (20 mg total) by mouth every 6 (six) hours, Disp: 120 tablet, Rfl: 4    ferrous sulfate 325 (65 Fe) mg tablet, Take 325 mg by mouth daily with breakfast, Disp: , Rfl:     hydroxychloroquine (PLAQUENIL) 200 mg tablet, Take 1.5 tablets (300 mg total) by mouth in the morning, Disp: 45 tablet, Rfl: 6    hydrOXYzine HCL (ATARAX) 10 mg tablet, Take 1 tablet (10 mg total) by mouth 2 (two) times a day as needed for anxiety, Disp: 60 tablet, Rfl: 1    metoprolol succinate (TOPROL-XL) 50 mg 24 hr tablet, Take 1 tablet (50 mg total) by mouth 2 (two) times a day, Disp: 90 tablet, Rfl: 3    metoprolol tartrate (LOPRESSOR) 25 mg tablet, Take 1 tablet (25 mg total) by mouth every 6 (six) hours as needed (palpitatins), Disp: 90 tablet, Rfl: 3    potassium chloride (K-DUR,KLOR-CON) 20 mEq tablet, Take 1 tablet (20 mEq total) by mouth 3 (three) times a day, Disp: 270 tablet, Rfl: 3    Prenatal Multivit-Min-Fe-FA (PRE- PO), Take by mouth, Disp: , Rfl:     tacrolimus (PROTOPIC) 0.1 % ointment, Apply topically 2 (two) times a day, Disp: 100 g, Rfl: 0    triamcinolone (KENALOG) 0.025 % ointment, Apply topically twice day for 14 days straight as needed for flares to thinner skin, Disp: 80 g, Rfl: 3    triamcinolone (KENALOG) 0.1 % ointment, Apply topically twice day for 14 days straight as needed for flares, Disp: 453.6 g, Rfl: 3    Current Allergies     Allergies as of 10/19/2023 - Reviewed 10/19/2023   Allergen Reaction Noted    Prednisone Hyperactivity 2016    Serotonin reuptake inhibitors (ssris) Anaphylaxis and Hives 2016    Augmentin [amoxicillin-pot clavulanate] Hives and Rash 2016    Clindamycin Rash 12/15/2016    Penicillins Rash 2016    Sulfa antibiotics Rash 2016    Azithromycin Rash 2018            The following portions of the patient's history were reviewed and updated as appropriate: allergies, current medications, past family history, past medical history, past social history, past surgical history and problem list.     Past Medical History:   Diagnosis Date    Abnormal blood chemistry     Anesthesia     per pt " Panic attack right before a procedure-has High preop anxiety and wakes up nasty"    Anxiety     Eczema     Exercises 5 to 6 times per week     per pt prior to knee issue -enjoyed running and horseback riding    Family history of reaction to anesthesia     per pt--"Mom also gets high anxiety with surgeries and wakes up nasty from anesthesia"    Gastroparesis     History of vitamin D deficiency     Hypertension     last assessed 3/12/14    Hypokalemia     per pt per doctors possibly related renal problem    Irritable bowel syndrome     with diarrhea    Lymphadenopathy     last assessed 10/8/14-no current issue    Myalgia     last assessed 10/8/14    Myositis     last assessed 10/8/14    Palpitations     POTS (postural orthostatic tachycardia syndrome)     RA (rheumatoid arthritis) (HCC)     Rheumatoid arthritis (HCC)     Sinus tachycardia     related to dehydration    Tachycardia     last assessed 6/9/17    Tooth missing     front upper tooth retainer-    Wears glasses     for computer use       Past Surgical History:   Procedure Laterality Date    TN ARTHROSCOPY KNEE LATERAL RELEASE Right 4/13/2023    Procedure: ARTHROSCOPIC RELEASE RETINACULAR;  Surgeon: Gavin Barros DO;  Location: [facility] MAIN OR;  Service: Orthopedics    TN EGD TRANSORAL BIOPSY SINGLE/MULTIPLE N/A 5/4/2016    Procedure: ESOPHAGOGASTRODUODENOSCOPY (EGD); Surgeon: Dora Shipley MD;  Location: [facility];   Service: Gastroenterology    TN NSTJ [address] W/XTNSR RELIGNMT&/MUSC RL Right 4/13/2023    Procedure: ARTHROSCOPIC REALIGNMENT PATELLA;  Surgeon: Gavin Barros DO;  Location: [facility] MAIN OR;  Service: Orthopedics    UPPER GASTROINTESTINAL ENDOSCOPY      WISDOM TOOTH EXTRACTION         Family History   Problem Relation Age of Onset    Hypokalemia Mother     Hypotension Mother     Anxiety disorder Mother         NOS    Lung cancer Father     Skin cancer Father     Crohn's disease Father     No Known Problems Sister     No Known Problems Sister     No Known Problems Brother     Coronary artery disease Maternal Grandmother     Heart disease Maternal Grandmother     Alzheimer's disease Maternal Grandmother     Arthritis Paternal Grandmother     Rheum arthritis Paternal Grandmother     Hypertension Paternal Grandmother     COPD Paternal Grandfather          Medications have been verified. Objective   /65   Pulse (!) 111   Temp 99.4 °F (37.4 °C)   Resp 16   SpO2 99%   No LMP recorded. Physical Exam     Physical Exam  Vitals and nursing note reviewed. Constitutional:       Appearance: Normal appearance. HENT:      Head: Normocephalic. Right Ear: Tympanic membrane, ear canal and external ear normal.      Left Ear: Tympanic membrane, ear canal and external ear normal.      Mouth/Throat:      Mouth: Mucous membranes are moist.   Eyes:      Pupils: Pupils are equal, round, and reactive to light. Cardiovascular:      Rate and Rhythm: Normal rate and regular rhythm. Heart sounds: Normal heart sounds. Pulmonary:      Breath sounds: Normal breath sounds. No wheezing. Musculoskeletal:      Comments: Upper and lower body motor intact. Neurological:      General: No focal deficit present. Mental Status: She is alert and oriented to person, place, and time.    Psychiatric:         Mood and Affect: Mood normal.         Behavior: Behavior normal.

## 2023-10-20 ENCOUNTER — TELEPHONE (OUTPATIENT)
Dept: NEPHROLOGY | Facility: CLINIC | Age: 28
End: 2023-10-20

## 2023-10-20 DIAGNOSIS — E87.6 HYPOKALEMIA: Primary | ICD-10-CM

## 2023-10-20 DIAGNOSIS — I10 ESSENTIAL HYPERTENSION, BENIGN: Chronic | ICD-10-CM

## 2023-10-20 LAB
ATRIAL RATE: 109 BPM
ATRIAL RATE: 115 BPM
ATRIAL RATE: 116 BPM
P AXIS: -14 DEGREES
P AXIS: -8 DEGREES
P AXIS: 66 DEGREES
PR INTERVAL: 140 MS
PR INTERVAL: 142 MS
PR INTERVAL: 144 MS
QRS AXIS: 1 DEGREES
QRS AXIS: 6 DEGREES
QRS AXIS: 69 DEGREES
QRSD INTERVAL: 72 MS
QRSD INTERVAL: 76 MS
QRSD INTERVAL: 82 MS
QT INTERVAL: 336 MS
QT INTERVAL: 338 MS
QT INTERVAL: 340 MS
QTC INTERVAL: 457 MS
QTC INTERVAL: 467 MS
QTC INTERVAL: 467 MS
T WAVE AXIS: 35 DEGREES
T WAVE AXIS: 5 DEGREES
T WAVE AXIS: 8 DEGREES
VENTRICULAR RATE: 109 BPM
VENTRICULAR RATE: 115 BPM
VENTRICULAR RATE: 116 BPM

## 2023-10-20 PROCEDURE — 93010 ELECTROCARDIOGRAM REPORT: CPT | Performed by: INTERNAL MEDICINE

## 2023-10-20 NOTE — TELEPHONE ENCOUNTER
Patient reached out because she was in the ED last night and she saw her potassium was slightly low at 3.2. She is wondering if it would be okay if she took an extra potassium today to help bring it up. She is also wondering if you would be agreeable to fluids to help with heart rate/rhythm. She also reached out to cardiology about this but she wanted to make sure Dr. Peña  was agreeable to it.

## 2023-10-20 NOTE — TELEPHONE ENCOUNTER
Patient aware and agreeable to the following:    Definitely increase potassium 4 times a day along with a high potassium diet   Fluids are okay with me   Repeat a basic metabolic profile magnesium in 1 week

## 2023-10-23 ENCOUNTER — CLINICAL SUPPORT (OUTPATIENT)
Dept: CARDIOLOGY CLINIC | Facility: CLINIC | Age: 28
End: 2023-10-23
Payer: COMMERCIAL

## 2023-10-23 ENCOUNTER — TELEPHONE (OUTPATIENT)
Dept: NEPHROLOGY | Facility: CLINIC | Age: 28
End: 2023-10-23

## 2023-10-23 ENCOUNTER — APPOINTMENT (OUTPATIENT)
Dept: LAB | Facility: CLINIC | Age: 28
End: 2023-10-23
Payer: COMMERCIAL

## 2023-10-23 DIAGNOSIS — I49.3 PVC'S (PREMATURE VENTRICULAR CONTRACTIONS): Primary | ICD-10-CM

## 2023-10-23 DIAGNOSIS — E87.6 HYPOKALEMIA: Primary | ICD-10-CM

## 2023-10-23 DIAGNOSIS — G90.A POTS (POSTURAL ORTHOSTATIC TACHYCARDIA SYNDROME): Chronic | ICD-10-CM

## 2023-10-23 DIAGNOSIS — I10 ESSENTIAL HYPERTENSION, BENIGN: Chronic | ICD-10-CM

## 2023-10-23 DIAGNOSIS — E55.9 VITAMIN D DEFICIENCY: ICD-10-CM

## 2023-10-23 DIAGNOSIS — E87.6 HYPOKALEMIA: ICD-10-CM

## 2023-10-23 LAB
ANION GAP SERPL CALCULATED.3IONS-SCNC: 8 MMOL/L
BUN SERPL-MCNC: 11 MG/DL (ref 5–25)
CALCIUM SERPL-MCNC: 9.6 MG/DL (ref 8.4–10.2)
CHLORIDE SERPL-SCNC: 103 MMOL/L (ref 96–108)
CO2 SERPL-SCNC: 25 MMOL/L (ref 21–32)
CREAT SERPL-MCNC: 0.76 MG/DL (ref 0.6–1.3)
GFR SERPL CREATININE-BSD FRML MDRD: 106 ML/MIN/1.73SQ M
GLUCOSE SERPL-MCNC: 103 MG/DL (ref 65–140)
MAGNESIUM SERPL-MCNC: 1.8 MG/DL (ref 1.9–2.7)
POTASSIUM SERPL-SCNC: 3.7 MMOL/L (ref 3.5–5.3)
SODIUM SERPL-SCNC: 136 MMOL/L (ref 135–147)

## 2023-10-23 PROCEDURE — 93246 EXT ECG>7D<15D RECORDING: CPT | Performed by: INTERNAL MEDICINE

## 2023-10-23 PROCEDURE — 36415 COLL VENOUS BLD VENIPUNCTURE: CPT

## 2023-10-23 PROCEDURE — 80048 BASIC METABOLIC PNL TOTAL CA: CPT

## 2023-10-23 PROCEDURE — 83735 ASSAY OF MAGNESIUM: CPT

## 2023-10-23 NOTE — TELEPHONE ENCOUNTER
Patient aware of the following:    ----- Message from Argenis Vargas MD sent at 10/23/2023 12:33 PM EDT -----  Labs are better  I would continue with 4 times a day potassium and a higher amount  She needs Slow-Mag once a day over-the-counter for low magnesium    Repeat a basic metabolic profile and magnesium in 4 weeks  ----- Message -----  From: Lab, Background User  Sent: 10/23/2023  12:23 PM EDT  To: Argenis Vargas MD

## 2023-10-24 ENCOUNTER — OFFICE VISIT (OUTPATIENT)
Dept: PHYSICAL THERAPY | Facility: REHABILITATION | Age: 28
End: 2023-10-24
Payer: COMMERCIAL

## 2023-10-24 DIAGNOSIS — Z98.890 S/P RIGHT KNEE SURGERY: Primary | ICD-10-CM

## 2023-10-24 DIAGNOSIS — M25.561 RIGHT KNEE PAIN, UNSPECIFIED CHRONICITY: ICD-10-CM

## 2023-10-24 PROCEDURE — 97112 NEUROMUSCULAR REEDUCATION: CPT

## 2023-10-24 PROCEDURE — 97110 THERAPEUTIC EXERCISES: CPT

## 2023-10-28 ENCOUNTER — APPOINTMENT (EMERGENCY)
Dept: RADIOLOGY | Facility: HOSPITAL | Age: 28
End: 2023-10-28
Payer: COMMERCIAL

## 2023-10-28 ENCOUNTER — HOSPITAL ENCOUNTER (EMERGENCY)
Facility: HOSPITAL | Age: 28
Discharge: HOME | End: 2023-10-28
Attending: EMERGENCY MEDICINE
Payer: COMMERCIAL

## 2023-10-28 VITALS
BODY MASS INDEX: 29.37 KG/M2 | WEIGHT: 172 LBS | SYSTOLIC BLOOD PRESSURE: 116 MMHG | HEIGHT: 64 IN | HEART RATE: 86 BPM | DIASTOLIC BLOOD PRESSURE: 66 MMHG | OXYGEN SATURATION: 97 % | RESPIRATION RATE: 16 BRPM

## 2023-10-28 DIAGNOSIS — R00.2 PALPITATIONS: ICD-10-CM

## 2023-10-28 DIAGNOSIS — Z34.90 PREGNANCY: Primary | ICD-10-CM

## 2023-10-28 LAB
ANION GAP SERPL CALCULATED.3IONS-SCNC: 12 MMOL/L
B-HCG SERPL-ACNC: 35 MIU/ML (ref 0–5)
BASOPHILS # BLD AUTO: 0.06 THOUSANDS/ÂΜL (ref 0–0.1)
BASOPHILS NFR BLD AUTO: 0 % (ref 0–1)
BUN SERPL-MCNC: 13 MG/DL (ref 5–25)
CALCIUM SERPL-MCNC: 9.8 MG/DL (ref 8.4–10.2)
CARDIAC TROPONIN I PNL SERPL HS: <2 NG/L
CHLORIDE SERPL-SCNC: 101 MMOL/L (ref 96–108)
CO2 SERPL-SCNC: 24 MMOL/L (ref 21–32)
CREAT SERPL-MCNC: 0.93 MG/DL (ref 0.6–1.3)
EOSINOPHIL # BLD AUTO: 0.01 THOUSAND/ÂΜL (ref 0–0.61)
EOSINOPHIL NFR BLD AUTO: 0 % (ref 0–6)
ERYTHROCYTE [DISTWIDTH] IN BLOOD BY AUTOMATED COUNT: 11.9 % (ref 11.6–15.1)
EXT PREGNANCY TEST URINE: POSITIVE
EXT. CONTROL: ABNORMAL
GFR SERPL CREATININE-BSD FRML MDRD: 83 ML/MIN/1.73SQ M
GLUCOSE SERPL-MCNC: 109 MG/DL (ref 65–140)
HCT VFR BLD AUTO: 43 % (ref 34.8–46.1)
HGB BLD-MCNC: 15.1 G/DL (ref 11.5–15.4)
IMM GRANULOCYTES # BLD AUTO: 0.06 THOUSAND/UL (ref 0–0.2)
IMM GRANULOCYTES NFR BLD AUTO: 0 % (ref 0–2)
LYMPHOCYTES # BLD AUTO: 1.75 THOUSANDS/ÂΜL (ref 0.6–4.47)
LYMPHOCYTES NFR BLD AUTO: 12 % (ref 14–44)
MAGNESIUM SERPL-MCNC: 1.8 MG/DL (ref 1.9–2.7)
MCH RBC QN AUTO: 30.1 PG (ref 26.8–34.3)
MCHC RBC AUTO-ENTMCNC: 35.1 G/DL (ref 31.4–37.4)
MCV RBC AUTO: 86 FL (ref 82–98)
MONOCYTES # BLD AUTO: 0.94 THOUSAND/ÂΜL (ref 0.17–1.22)
MONOCYTES NFR BLD AUTO: 6 % (ref 4–12)
NEUTROPHILS # BLD AUTO: 11.99 THOUSANDS/ÂΜL (ref 1.85–7.62)
NEUTS SEG NFR BLD AUTO: 82 % (ref 43–75)
NRBC BLD AUTO-RTO: 0 /100 WBCS
PHOSPHATE SERPL-MCNC: 2.6 MG/DL (ref 2.7–4.5)
PLATELET # BLD AUTO: 241 THOUSANDS/UL (ref 149–390)
PMV BLD AUTO: 11.4 FL (ref 8.9–12.7)
POTASSIUM SERPL-SCNC: 3.2 MMOL/L (ref 3.5–5.3)
RBC # BLD AUTO: 5.02 MILLION/UL (ref 3.81–5.12)
SODIUM SERPL-SCNC: 137 MMOL/L (ref 135–147)
WBC # BLD AUTO: 14.81 THOUSAND/UL (ref 4.31–10.16)

## 2023-10-28 PROCEDURE — 71045 X-RAY EXAM CHEST 1 VIEW: CPT

## 2023-10-28 PROCEDURE — 81025 URINE PREGNANCY TEST: CPT

## 2023-10-28 PROCEDURE — 99285 EMERGENCY DEPT VISIT HI MDM: CPT | Performed by: EMERGENCY MEDICINE

## 2023-10-28 PROCEDURE — 96366 THER/PROPH/DIAG IV INF ADDON: CPT

## 2023-10-28 PROCEDURE — 96368 THER/DIAG CONCURRENT INF: CPT

## 2023-10-28 PROCEDURE — 99285 EMERGENCY DEPT VISIT HI MDM: CPT

## 2023-10-28 PROCEDURE — 83735 ASSAY OF MAGNESIUM: CPT

## 2023-10-28 PROCEDURE — 85025 COMPLETE CBC W/AUTO DIFF WBC: CPT

## 2023-10-28 PROCEDURE — 84484 ASSAY OF TROPONIN QUANT: CPT

## 2023-10-28 PROCEDURE — 84100 ASSAY OF PHOSPHORUS: CPT

## 2023-10-28 PROCEDURE — 93010 ELECTROCARDIOGRAM REPORT: CPT | Performed by: INTERNAL MEDICINE

## 2023-10-28 PROCEDURE — 84702 CHORIONIC GONADOTROPIN TEST: CPT | Performed by: EMERGENCY MEDICINE

## 2023-10-28 PROCEDURE — 80048 BASIC METABOLIC PNL TOTAL CA: CPT

## 2023-10-28 PROCEDURE — 36415 COLL VENOUS BLD VENIPUNCTURE: CPT

## 2023-10-28 PROCEDURE — 96365 THER/PROPH/DIAG IV INF INIT: CPT

## 2023-10-28 RX ORDER — METOPROLOL TARTRATE 5 MG/5ML
5 INJECTION INTRAVENOUS ONCE
Status: DISCONTINUED | OUTPATIENT
Start: 2023-10-28 | End: 2023-10-28

## 2023-10-28 RX ORDER — MAGNESIUM SULFATE HEPTAHYDRATE 40 MG/ML
2 INJECTION, SOLUTION INTRAVENOUS ONCE
Status: COMPLETED | OUTPATIENT
Start: 2023-10-28 | End: 2023-10-28

## 2023-10-28 RX ORDER — POTASSIUM CHLORIDE 14.9 MG/ML
20 INJECTION INTRAVENOUS ONCE
Status: COMPLETED | OUTPATIENT
Start: 2023-10-28 | End: 2023-10-28

## 2023-10-28 RX ORDER — POTASSIUM CHLORIDE 20 MEQ/1
40 TABLET, EXTENDED RELEASE ORAL ONCE
Status: COMPLETED | OUTPATIENT
Start: 2023-10-28 | End: 2023-10-28

## 2023-10-28 RX ADMIN — POTASSIUM CHLORIDE 40 MEQ: 1500 TABLET, EXTENDED RELEASE ORAL at 21:52

## 2023-10-28 RX ADMIN — MAGNESIUM SULFATE HEPTAHYDRATE 2 G: 40 INJECTION, SOLUTION INTRAVENOUS at 22:07

## 2023-10-28 RX ADMIN — POTASSIUM CHLORIDE 20 MEQ: 14.9 INJECTION, SOLUTION INTRAVENOUS at 21:54

## 2023-10-29 NOTE — ED ATTENDING ATTESTATION
10/28/2023  ICesar MD, saw and evaluated the patient. I have discussed the patient with the resident/non-physician practitioner and agree with the resident's/non-physician practitioner's findings, Plan of Care, and MDM as documented in the resident's/non-physician practitioner's note, except where noted. All available labs and Radiology studies were reviewed. I was present for key portions of any procedure(s) performed by the resident/non-physician practitioner and I was immediately available to provide assistance. At this point I agree with the current assessment done in the Emergency Department. I have conducted an independent evaluation of this patient a history and physical is as follows:    ED Course     Patient is a 79-year-old female with a past medical history markable for palpitations currently followed by EP for same currently on metoprolol daily for same. Patient's had a resting heart rate in the 140s. Intermittent palpitations consistent with PVCs. Patient currently being investigated with a Zio patch by her cardiologist.  Patient currently taking potassium supplementation for diagnosis of hypokalemia. She denies any chest pain or shortness of breath at this time. Does admit to palpitations while working on shift. Vital signs reviewed. Heart regular rate and rhythm without murmurs. Lungs are auscultation bilateral.  Ab soft nontender nondistended normal bowel sounds. Extremities no edema. Impression: Palpitations  Differential diagnosis: SVT, arrhythmia, less likely ACS MI, possible electrolyte abnormality, additionally, patient's states that she is concerned she may be pregnant. We will check pregnancy test.  Troponins, basic metabolic panel magnesium phosphorus CBC chest x-ray ECG    Anticipate discharge with outpatient follow-up. ECG independently interpreted by me sinus tachycardia present Normal rhythm no acute ST-T changes.   CMP remarkable for mild leukocytosis may be reactive. Urine pregnancy was positive with a beta hCG of 35. Patient offers no abdominal pain vaginal bleeding back pain lightheadedness or bulbar complaints at this time. Initial troponin was less than 2. Basic metabolic panel was markable for mild hypokalemia magnesium level was low consistent with hypomagnesemia. Hypophosphatemia. Given electrolyte abnormalities electrolytes will be repleted while in the ED patient to follow-up with OB/GYN as outpatient. Amatory referral placed. Patient also to follow-up with cardiologist as outpatient. Symptoms resolved without further intervention while in ED.   Patient stable for discharge      Critical Care Time  Procedures

## 2023-10-29 NOTE — DISCHARGE INSTRUCTIONS
Please follow up with cardiology for further evaluation and management of your palpitations and abnormal EKG results. Please follow up with OBGYN for further evaluation and management of your pregnancy.

## 2023-10-29 NOTE — ED PROVIDER NOTES
History  Chief Complaint   Patient presents with    Abnormal ECG     Watch showed reading bigeminy and SOB during. Pt c/o high heart rate when it breaks. HPI  Patient is 42-year-old female presenting for concerns of abnormal EKG. Past medical history significant for POTS, hypertension, anxiety, sinus tach (patient wearing Zio patch for concern of palpitations/arrhythmia), RA. Patient states she has been having palpitations, lightheadedness, and slight shortness of breath for most of the day. Patient is concerned that she has had bigeminy per her home EKG/Apple Watch. Patient denies any chest pain, dizziness, nausea/vomit/diarrhea, recent travel, recent surgeries, lower extremity swelling. Patient also says that she and her partner have been trying for pregnancy. Like a pregnancy test.  Last menstrual period was October 3 per patient. Prior to Admission Medications   Prescriptions Last Dose Informant Patient Reported? Taking?    AMILoride 5 mg tablet 10/28/2023 Self No Yes   Sig: Take 2 tablets (10 mg total) by mouth 2 (two) times a day   Prenatal Multivit-Min-Fe-FA (PRE-GORDON PO) 10/28/2023 Self Yes Yes   Sig: Take by mouth   busPIRone (BUSPAR) 5 mg tablet  Self No No   Sig: Take 1 tablet (5 mg total) by mouth 2 (two) times a day   Patient taking differently: Take 5 mg by mouth as needed   clobetasol (TEMOVATE) 0.05 % ointment  Self No Yes   Sig: Apply topically 2 (two) times a day   dicyclomine (BENTYL) 20 mg tablet  Self No Yes   Sig: Take 1 tablet (20 mg total) by mouth every 6 (six) hours   ferrous sulfate 325 (65 Fe) mg tablet 10/28/2023 Self Yes Yes   Sig: Take 325 mg by mouth daily with breakfast   hydrOXYzine HCL (ATARAX) 10 mg tablet  Self No Yes   Sig: Take 1 tablet (10 mg total) by mouth 2 (two) times a day as needed for anxiety   hydroxychloroquine (PLAQUENIL) 200 mg tablet  Self No No   Sig: Take 1.5 tablets (300 mg total) by mouth in the morning   metoprolol succinate (TOPROL-XL) 50 mg 24 hr tablet 10/28/2023  No Yes   Sig: Take 1 tablet (50 mg total) by mouth 2 (two) times a day   metoprolol tartrate (LOPRESSOR) 25 mg tablet   No Yes   Sig: Take 1 tablet (25 mg total) by mouth every 6 (six) hours as needed (palpitatins)   potassium chloride (K-DUR,KLOR-CON) 20 mEq tablet 10/28/2023 Self No Yes   Sig: Take 1 tablet (20 mEq total) by mouth 3 (three) times a day   Patient taking differently: Take 20 mEq by mouth 4 (four) times a day   tacrolimus (PROTOPIC) 0.1 % ointment Not Taking Self No No   Sig: Apply topically 2 (two) times a day   Patient not taking: Reported on 10/28/2023   triamcinolone (KENALOG) 0.025 % ointment Not Taking Self No No   Sig: Apply topically twice day for 14 days straight as needed for flares to thinner skin   Patient not taking: Reported on 10/28/2023   triamcinolone (KENALOG) 0.1 % ointment Not Taking Self No No   Sig: Apply topically twice day for 14 days straight as needed for flares   Patient not taking: Reported on 10/28/2023      Facility-Administered Medications: None       Past Medical History:   Diagnosis Date    Abnormal blood chemistry     Anesthesia     per pt " Panic attack right before a procedure-has High preop anxiety and wakes up nasty"    Anxiety     Eczema     Exercises 5 to 6 times per week     per pt prior to knee issue -enjoyed running and horseback riding    Family history of reaction to anesthesia     per pt--"Mom also gets high anxiety with surgeries and wakes up nasty from anesthesia"    Gastroparesis     History of vitamin D deficiency     Hypertension     last assessed 3/12/14    Hypokalemia     per pt per doctors possibly related renal problem    Irritable bowel syndrome     with diarrhea    Lymphadenopathy     last assessed 10/8/14-no current issue    Myalgia     last assessed 10/8/14    Myositis     last assessed 10/8/14    Palpitations     POTS (postural orthostatic tachycardia syndrome)     RA (rheumatoid arthritis) ([facility])     Rheumatoid arthritis ([address])     Sinus tachycardia     related to dehydration    Tachycardia     last assessed 6/9/17    Tooth missing     front upper tooth retainer-    Wears glasses     for computer use       Past Surgical History:   Procedure Laterality Date    TN ARTHROSCOPY KNEE LATERAL RELEASE Right 4/13/2023    Procedure: ARTHROSCOPIC RELEASE RETINACULAR;  Surgeon: Marva Frazier DO;  Location: [address] [place] [address];  Service: Orthopedics    TN EGD TRANSORAL BIOPSY SINGLE/MULTIPLE N/A 5/4/2016    Procedure: ESOPHAGOGASTRODUODENOSCOPY (EGD); Surgeon: Maged Khalil MD;  Location:  GI LAB; Service: Gastroenterology    TN RCNSTJ [address] W/XTNSR RELIGNMT&/MUSC RL Right 4/13/2023    Procedure: ARTHROSCOPIC REALIGNMENT PATELLA;  Surgeon: Marva Frazier DO;  Location: [address] [place] [address];  Service: Orthopedics    UPPER GASTROINTESTINAL ENDOSCOPY      WISDOM TOOTH EXTRACTION         Family History   Problem Relation Age of Onset    Hypokalemia Mother     Hypotension Mother     Anxiety disorder Mother         NOS    Lung cancer Father     Skin cancer Father     Crohn's disease Father     No Known Problems Sister     No Known Problems Sister     No Known Problems Brother     Coronary artery disease Maternal Grandmother     Heart disease Maternal Grandmother     Alzheimer's disease Maternal Grandmother     Arthritis Paternal Grandmother     Rheum arthritis Paternal Grandmother     Hypertension Paternal Grandmother     COPD Paternal Grandfather      I have reviewed and agree with the history as documented.     E-Cigarette/Vaping    E-Cigarette Use Never User      E-Cigarette/Vaping Substances    Nicotine No     THC No     CBD No     Flavoring No     Other No     Unknown No      Social History     Tobacco Use    Smoking status: Never    Smokeless tobacco: Never   Vaping Use    Vaping Use: Never used   Substance Use Topics    Alcohol use: Not Currently     Alcohol/week: 1.0 standard drink of alcohol     Types: 1 Standard drinks or equivalent per week    Drug use: No        Review of Systems   Constitutional: Negative. HENT: Negative. Eyes: Negative. Respiratory:  Positive for shortness of breath. Cardiovascular:  Positive for palpitations. Negative for chest pain. Gastrointestinal: Negative. Endocrine: Negative. Genitourinary: Negative. Musculoskeletal: Negative. Skin: Negative. Allergic/Immunologic: Negative. Neurological:  Positive for light-headedness. Hematological: Negative. Psychiatric/Behavioral: Negative. Physical Exam  ED Triage Vitals   Temp Pulse Respirations Blood Pressure SpO2   -- 10/28/23 1938 10/28/23 1938 10/28/23 1938 10/28/23 1938    (!) 130 18 158/84 99 %      Temp src Heart Rate Source Patient Position - Orthostatic VS BP Location FiO2 (%)   -- 10/28/23 1938 10/28/23 1938 10/28/23 1938 --    Monitor Sitting Left arm       Pain Score       10/28/23 2102       No Pain             Orthostatic Vital Signs  Vitals:    10/28/23 2102 10/28/23 2159 10/28/23 2200 10/28/23 2300   BP: 118/65 130/76 120/74 116/66   Pulse: 100 94 90 86   Patient Position - Orthostatic VS:  Sitting  Sitting       Physical Exam  Vitals and nursing note reviewed. Constitutional:       Appearance: Normal appearance. She is normal weight. HENT:      Head: Normocephalic and atraumatic. Right Ear: Tympanic membrane, ear canal and external ear normal.      Left Ear: Tympanic membrane, ear canal and external ear normal.      Nose: Nose normal.      Mouth/Throat:      Mouth: Mucous membranes are moist.      Pharynx: Oropharynx is clear. Eyes:      Extraocular Movements: Extraocular movements intact. Conjunctiva/sclera: Conjunctivae normal.      Pupils: Pupils are equal, round, and reactive to light. Cardiovascular:      Rate and Rhythm: Regular rhythm. Tachycardia present. Pulses: Normal pulses. Heart sounds: Normal heart sounds.    Pulmonary:      Effort: Pulmonary effort is normal.      Breath sounds: Normal breath sounds. Abdominal:      General: Abdomen is flat. Bowel sounds are normal.      Palpations: Abdomen is soft. Musculoskeletal:         General: Normal range of motion. Cervical back: Normal range of motion and neck supple. Skin:     General: Skin is warm and dry. Capillary Refill: Capillary refill takes less than 2 seconds. Neurological:      General: No focal deficit present. Mental Status: She is alert and oriented to person, place, and time. Psychiatric:         Mood and Affect: Mood normal.         Behavior: Behavior normal.         Thought Content: Thought content normal.         Judgment: Judgment normal.         ED Medications  Medications   potassium chloride (K-DUR,KLOR-CON) CR tablet 40 mEq (40 mEq Oral Given 10/28/23 2152)   potassium chloride 20 mEq IVPB (premix) (0 mEq Intravenous Stopped 10/28/23 2346)   magnesium sulfate 2 g/50 mL IVPB (premix) 2 g (0 g Intravenous Stopped 10/28/23 2346)       Diagnostic Studies  Results Reviewed       Procedure Component Value Units Date/Time    hCG, quantitative, pregnancy [[id number]]  (Abnormal) Collected: 10/28/23 2014    Lab Status: Final result Specimen: Blood from Arm, Right Updated: 10/28/23 2119     HCG, Quant 35 mIU/mL     Narrative:       Expected Ranges:    HCG results between 5 and 25 mIU/mL may be indicative of early pregnancy but should be interpreted in light of the total clinical presentation. HCG can rise to detectable levels in bib and post menopausal women (0-11.6 mIU/mL).      Approximate               Approximate HCG  Gestation age          Concentration ( mIU/mL)  _____________          ______________________   [facility]                      HCG values  0.2-1                       5-50  1-2                           2-3                         100-5000  3-4                         500-89001  4-5                         1000-61910  5-6                         [id number]  6-8 [id number]  8-12                        [id number]      HS Troponin 0hr (reflex protocol) [[id number]]  (Normal) Collected: 10/28/23 2014    Lab Status: Final result Specimen: Blood from Arm, Right Updated: 10/28/23 2056     hs TnI 0hr <2 ng/L     Basic metabolic panel [[id number]]  (Abnormal) Collected: 10/28/23 2014    Lab Status: Final result Specimen: Blood from Arm, Right Updated: 10/28/23 2047     Sodium 137 mmol/L      Potassium 3.2 mmol/L      Chloride 101 mmol/L      CO2 24 mmol/L      ANION GAP 12 mmol/L      BUN 13 mg/dL      Creatinine 0.93 mg/dL      Glucose 109 mg/dL      Calcium 9.8 mg/dL      eGFR 83 ml/min/1.73sq m     Narrative:      [place] guidelines for Chronic Kidney Disease (CKD):     Stage 1 with normal or high GFR (GFR > 90 mL/min/1.73 square meters)    Stage 2 Mild CKD (GFR = 60-89 mL/min/1.73 square meters)    Stage 3A Moderate CKD (GFR = 45-59 mL/min/1.73 square meters)    Stage 3B Moderate CKD (GFR = 30-44 mL/min/1.73 square meters)    Stage 4 Severe CKD (GFR = 15-29 mL/min/1.73 square meters)    Stage 5 End Stage CKD (GFR <15 mL/min/1.73 square meters)  Note: GFR calculation is accurate only with a steady state creatinine    Magnesium [[id number]]  (Abnormal) Collected: 10/28/23 2014    Lab Status: Final result Specimen: Blood from Arm, Right Updated: 10/28/23 2047     Magnesium 1.8 mg/dL     Phosphorus [[id number]]  (Abnormal) Collected: 10/28/23 2014    Lab Status: Final result Specimen: Blood from Arm, Right Updated: 10/28/23 2047     Phosphorus 2.6 mg/dL     POCT pregnancy, urine [[id number]]  (Abnormal) Resulted: 10/28/23 2024    Lab Status: Final result Updated: 10/28/23 2024     EXT Preg Test, Ur Positive     Control Valid    CBC and differential [[id number]]  (Abnormal) Collected: 10/28/23 2014    Lab Status: Final result Specimen: Blood from Arm, Right Updated: 10/28/23 2020     WBC 14.81 Thousand/uL      RBC 5.02 Million/uL      Hemoglobin 15.1 g/dL Hematocrit 43.0 %      MCV 86 fL      MCH 30.1 pg      MCHC 35.1 g/dL      RDW 11.9 %      MPV 11.4 fL      Platelets 108 Thousands/uL      nRBC 0 /100 WBCs      Neutrophils Relative 82 %      Immat GRANS % 0 %      Lymphocytes Relative 12 %      Monocytes Relative 6 %      Eosinophils Relative 0 %      Basophils Relative 0 %      Neutrophils Absolute 11.99 Thousands/µL      Immature Grans Absolute 0.06 Thousand/uL      Lymphocytes Absolute 1.75 Thousands/µL      Monocytes Absolute 0.94 Thousand/µL      Eosinophils Absolute 0.01 Thousand/µL      Basophils Absolute 0.06 Thousands/µL                    XR chest 1 view portable    (Results Pending)         Procedures  ECG 12 Lead Documentation Only    Date/Time: 10/28/2023 11:16 PM    Performed by: Mati Camara MD  Authorized by: Mati Camara MD    Indications / Diagnosis:  Palpitations  ECG reviewed by me, the ED Provider: yes    Patient location:  ED  Previous ECG:     Comparison to cardiac monitor: Yes    Interpretation:     Interpretation: normal    Rate:     ECG rate assessment: tachycardic    Rhythm:     Rhythm: sinus rhythm    Ectopy:     Ectopy: none    QRS:     QRS axis:  Normal    QRS intervals:  Normal  Conduction:     Conduction: normal    T waves:     T waves: normal          ED Course                                       Medical Decision Making  Patient is 65-year-old female presenting for concerns of palpitations. DDx: Arrhythmia, ACS, electrolyte abnormality, pregnancy  Based on patient mentation physical exam findings, will obtain full cardiac work-up. Patient should not noted to be in sinus rhythm now on the monitor. Recommend continue Lopressor at home, follow-up with cardiology via [facility] referral.  Rest of cardiac work-up was negative for any acute abnormalities aside for some electrolyte abnormalities were repleted as necessary. Patient also noted to have positive urine pregnancy test confirmed by beta hCG quant.   Will provide patient with amatory referral to OB/GYN. Strict return precautions given. Patient understands and agrees. Ready for discharge at this time. Problems Addressed:  Palpitations: acute illness or injury  Pregnancy: acute illness or injury    Amount and/or Complexity of Data Reviewed  Labs: ordered. Radiology: ordered. Risk  Prescription drug management. Disposition  Final diagnoses:   Palpitations   Pregnancy     Time reflects when diagnosis was documented in both MDM as applicable and the Disposition within this note       Time User Action Codes Description Comment    10/28/2023  9:41 PM Mark Ortega Add [R00.2] Palpitations     10/28/2023  9:42 PM Mark Ortega Add [A42.81] Pregnancy           ED Disposition       ED Disposition   Discharge    Condition   Stable    Date/Time   Sat Oct 28, 2023  9:41 PM    Comment   [facility] discharge to home/self care.                    Follow-up Information       Follow up With Specialties Details Why [address] [facility] Emergency Department Emergency Medicine Go to  If symptoms worsen [address] [facility] Emergency Department, [address] [place], [place], [address] [place] [address], Internal Medicine, Nurse Practitioner Go to  If symptoms worsen [address]  [phone]               Discharge Medication List as of 10/28/2023 11:37 PM        CONTINUE these medications which have NOT CHANGED    Details   AMILoride 5 mg tablet Take 2 tablets (10 mg total) by mouth 2 (two) times a day, Starting Fri 3/18/2022, Normal      clobetasol (TEMOVATE) 0.05 % ointment Apply topically 2 (two) times a day, Starting Tue 5/10/2022, Normal      dicyclomine (BENTYL) 20 mg tablet Take 1 tablet (20 mg total) by mouth every 6 (six) hours, Starting Thu 12/2/2021, Normal ferrous sulfate 325 (65 Fe) mg tablet Take 325 mg by mouth daily with breakfast, Historical Med      hydrOXYzine HCL (ATARAX) 10 mg tablet Take 1 tablet (10 mg total) by mouth 2 (two) times a day as needed for anxiety, Starting 2021, Normal      metoprolol succinate (TOPROL-XL) 50 mg 24 hr tablet Take 1 tablet (50 mg total) by mouth 2 (two) times a day, Starting Thu 10/12/2023, Normal      metoprolol tartrate (LOPRESSOR) 25 mg tablet Take 1 tablet (25 mg total) by mouth every 6 (six) hours as needed (palpitatins), Starting Thu 10/12/2023, Normal      potassium chloride (K-DUR,KLOR-CON) 20 mEq tablet Take 1 tablet (20 mEq total) by mouth 3 (three) times a day, Starting 2022, Normal      Prenatal Multivit-Min-Fe-FA (PRE-GORDON PO) Take by mouth, Historical Med      busPIRone (BUSPAR) 5 mg tablet Take 1 tablet (5 mg total) by mouth 2 (two) times a day, Starting 2022, Normal      hydroxychloroquine (PLAQUENIL) 200 mg tablet Take 1.5 tablets (300 mg total) by mouth in the morning, Starting 2022, Until Thu 2023, Normal      tacrolimus (PROTOPIC) 0.1 % ointment Apply topically 2 (two) times a day, Starting Wed 10/5/2022, Normal      triamcinolone (KENALOG) 0.025 % ointment Apply topically twice day for 14 days straight as needed for flares to thinner skin, Normal      triamcinolone (KENALOG) 0.1 % ointment Apply topically twice day for 14 days straight as needed for flares, Normal               PDMP Review         Value Time User    PDMP Reviewed  Yes 2023  3:53 PM Isaac Silva, [address] [place] [address]             ED Provider  Attending physically available and evaluated Hector. I managed the patient along with the ED Attending.     Electronically Signed by           Aryan Mccormick MD  10/29/23 8559

## 2023-11-04 ENCOUNTER — PATIENT MESSAGE (OUTPATIENT)
Dept: CARDIOLOGY CLINIC | Facility: CLINIC | Age: 28
End: 2023-11-04

## 2023-11-04 DIAGNOSIS — E87.6 HYPOKALEMIA: Primary | ICD-10-CM

## 2023-11-07 ENCOUNTER — APPOINTMENT (OUTPATIENT)
Dept: PHYSICAL THERAPY | Facility: REHABILITATION | Age: 28
End: 2023-11-07
Payer: COMMERCIAL

## 2023-11-10 ENCOUNTER — CLINICAL SUPPORT (OUTPATIENT)
Dept: CARDIOLOGY CLINIC | Facility: CLINIC | Age: 28
End: 2023-11-10
Payer: COMMERCIAL

## 2023-11-10 DIAGNOSIS — I49.3 PVC'S (PREMATURE VENTRICULAR CONTRACTIONS): ICD-10-CM

## 2023-11-10 PROCEDURE — 93248 EXT ECG>7D<15D REV&INTERPJ: CPT | Performed by: INTERNAL MEDICINE

## 2023-11-13 NOTE — RESULT ENCOUNTER NOTE
Patient had a min HR of 50 bpm, max HR of 182 bpm, and avg HR of 81 bpm. Predominant underlying rhythm was Sinus Rhythm. 1 run of Supraventricular Tachycardia occurred lasting 8.1 secs with a max rate of 182 bpm (avg 161 bpm). Supraventricular Tachycardia was detected within +/- 45 seconds of symptomatic patient event(s). Isolated SVEs were rare (<1.0%), SVE Couplets were rare (<1.0%), and no SVE Triplets were present. Isolated VEs were rare (<1.0%), VE Couplets were rare (<1.0%), and no VE Triplets were present. Ventricular Bigeminy and Trigeminy were present      Reviewed the above strips and interpreted, agree with above. I left patient message with results. Overall benign arrhythmias, symptoms correlate with PVC's but low burden and brief SVT 8s long with abrupt onset and offset, thi silikely PAT but cannot r/o reentry (P waves not well seen). Also sinus tachycardia as already known.

## 2023-11-14 ENCOUNTER — OFFICE VISIT (OUTPATIENT)
Dept: PHYSICAL THERAPY | Facility: REHABILITATION | Age: 28
End: 2023-11-14
Payer: COMMERCIAL

## 2023-11-14 DIAGNOSIS — M25.561 RIGHT KNEE PAIN, UNSPECIFIED CHRONICITY: ICD-10-CM

## 2023-11-14 DIAGNOSIS — Z98.890 S/P RIGHT KNEE SURGERY: Primary | ICD-10-CM

## 2023-11-14 PROCEDURE — 97110 THERAPEUTIC EXERCISES: CPT

## 2023-11-14 PROCEDURE — 97112 NEUROMUSCULAR REEDUCATION: CPT

## 2023-11-14 NOTE — PROGRESS NOTES
Daily Note     Today's date: 2023  Patient name: Art Sharp  : 1995  MRN: [id number]  Referring provider: Simeon Ramirez DO  Dx:   Encounter Diagnosis     ICD-10-CM    1. S/P right knee surgery  Z98.890       2. Right knee pain, unspecified chronicity  M25.561           Start Time: 1700  Stop Time: 1745  Total time in clinic (min): 45 minutes    Subjective: Pt reports R knee has been doing well. 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 Pt notes the only thing she hasn't attempted yet is horseback riding in the saddle.           Objective: See treatment diary below  Precautions: hx POTS, HTN, follow protocol         Manuals 8/14  8/21  9/12  9/21  10/6  10/24 11/14      Measurements/ RE    15 min/ MC      15 min/ MC                             Neuro Re-Ed                   Lateral jogging (sidestepping) 3 laps                 Sit to stand elevated surface  10x chair (staggered)    15x staggered chair  x15 staggered chair           Lateral step up (SL squat) Step 8x R   1R 12x kevin  1R x15 ea    1R x15 ea 1R x16 R      tandem stance                   Light jog trampoline 3x30"    3x30"   30"x3  5 min outside  5 min outside [employer]	[place] walks Red 2 laps    red 3 laps  Green 2 laps    Green 2 laps Green 3 laps      Sidestepping  red 3 laps    red 3 laps  Green 3 laps    Green 3 laps Green 3 laps      Agility ladder     5 min  5 min  5 min  5 min np      SLS  2x30: kevin foam   2x30" kevin foam  30"x2 ea foam    30"x2 ea foam 30"x3 ea foam      Bosu mini squat       x10      Ther Ex                   Treadmill 2.0 mph 8 min 2 mph 8 min  1.8 mph 8 min  2.0 mph 8 min  2.0 mph 10 min  2.0 mph 10 min 2.0 mph 10 min      sidelying hip abduction HEP                 Prone hip extension HEP                 SLR 5" 15x R  5" 5x R  5" 15x  5"x15     5"x15      SL press (0-45*) seat 3, plate 6 35 lbs 48Q R    45 lbs 20x R  45# x20 R  65 lbs 20x R  65# x20 R 65# x20 R      Ther Activity                                                           Gait Training                                                           Modalities                   Ice PRN    10 min                                           Assessment: Tolerated treatment well. Running based activities NP this visit. Initiated mini squats on [place] Ev[employer] ball to mimic patient description of "posting" while riding in saddle. Pt is challenged by exercise, but can perform without increase in symptoms. Pt fatigues with lateral step ups but demonstrates good control with exercise. Plan to RE next visit. Patient demonstrated fatigue post treatment and would benefit from continued PT      Plan: Progress treatment as tolerated.

## 2023-11-16 ENCOUNTER — APPOINTMENT (OUTPATIENT)
Dept: LAB | Facility: CLINIC | Age: 28
End: 2023-11-16
Payer: COMMERCIAL

## 2023-11-16 DIAGNOSIS — I10 ESSENTIAL HYPERTENSION, BENIGN: Chronic | ICD-10-CM

## 2023-11-16 DIAGNOSIS — E55.9 VITAMIN D DEFICIENCY: ICD-10-CM

## 2023-11-16 DIAGNOSIS — G90.A POTS (POSTURAL ORTHOSTATIC TACHYCARDIA SYNDROME): Chronic | ICD-10-CM

## 2023-11-16 DIAGNOSIS — E87.6 HYPOKALEMIA: ICD-10-CM

## 2023-11-16 LAB
ANION GAP SERPL CALCULATED.3IONS-SCNC: 9 MMOL/L
BUN SERPL-MCNC: 9 MG/DL (ref 5–25)
CALCIUM SERPL-MCNC: 9.1 MG/DL (ref 8.4–10.2)
CHLORIDE SERPL-SCNC: 102 MMOL/L (ref 96–108)
CO2 SERPL-SCNC: 27 MMOL/L (ref 21–32)
CREAT SERPL-MCNC: 0.63 MG/DL (ref 0.6–1.3)
GFR SERPL CREATININE-BSD FRML MDRD: 121 ML/MIN/1.73SQ M
GLUCOSE P FAST SERPL-MCNC: 77 MG/DL (ref 65–99)
MAGNESIUM SERPL-MCNC: 1.9 MG/DL (ref 1.9–2.7)
POTASSIUM SERPL-SCNC: 3.5 MMOL/L (ref 3.5–5.3)
SODIUM SERPL-SCNC: 138 MMOL/L (ref 135–147)

## 2023-11-16 PROCEDURE — 36415 COLL VENOUS BLD VENIPUNCTURE: CPT | Performed by: INTERNAL MEDICINE

## 2023-11-16 PROCEDURE — 80048 BASIC METABOLIC PNL TOTAL CA: CPT | Performed by: INTERNAL MEDICINE

## 2023-11-16 PROCEDURE — 83735 ASSAY OF MAGNESIUM: CPT

## 2023-11-17 ENCOUNTER — TELEPHONE (OUTPATIENT)
Dept: NEPHROLOGY | Facility: CLINIC | Age: 28
End: 2023-11-17

## 2023-11-17 NOTE — TELEPHONE ENCOUNTER
Patient aware    ----- Message from Wendy Forbes MD sent at 11/17/2023  7:54 AM EST -----  Please increase potassium by an additional 20 mEq a day it is borderline low  Repeat basic metabolic profile in 1 week  ----- Message -----  From: Lab, Background User  Sent: 11/16/2023   8:34 PM EST  To: Wendy Forbes MD

## 2023-11-21 ENCOUNTER — APPOINTMENT (OUTPATIENT)
Dept: PHYSICAL THERAPY | Facility: REHABILITATION | Age: 28
End: 2023-11-21
Payer: COMMERCIAL

## 2023-11-24 ENCOUNTER — TELEPHONE (OUTPATIENT)
Dept: FAMILY MEDICINE CLINIC | Facility: CLINIC | Age: 28
End: 2023-11-24

## 2023-11-27 ENCOUNTER — EVALUATION (OUTPATIENT)
Dept: PHYSICAL THERAPY | Facility: REHABILITATION | Age: 28
End: 2023-11-27
Payer: COMMERCIAL

## 2023-11-27 DIAGNOSIS — Z98.890 S/P RIGHT KNEE SURGERY: Primary | ICD-10-CM

## 2023-11-27 DIAGNOSIS — M25.561 RIGHT KNEE PAIN, UNSPECIFIED CHRONICITY: ICD-10-CM

## 2023-11-27 PROCEDURE — 97110 THERAPEUTIC EXERCISES: CPT | Performed by: PHYSICAL THERAPIST

## 2023-11-27 PROCEDURE — 97140 MANUAL THERAPY 1/> REGIONS: CPT | Performed by: PHYSICAL THERAPIST
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
## 2023-12-05 ENCOUNTER — ULTRASOUND (OUTPATIENT)
Dept: OBGYN CLINIC | Facility: CLINIC | Age: 28
End: 2023-12-05
Payer: COMMERCIAL

## 2023-12-05 ENCOUNTER — APPOINTMENT (OUTPATIENT)
Dept: LAB | Facility: CLINIC | Age: 28
End: 2023-12-05
Payer: COMMERCIAL

## 2023-12-05 VITALS
BODY MASS INDEX: 29.15 KG/M2 | HEART RATE: 119 BPM | WEIGHT: 169.8 LBS | SYSTOLIC BLOOD PRESSURE: 136 MMHG | DIASTOLIC BLOOD PRESSURE: 70 MMHG

## 2023-12-05 DIAGNOSIS — Z34.90 PREGNANCY: ICD-10-CM

## 2023-12-05 DIAGNOSIS — N91.1 AMENORRHEA, SECONDARY: Primary | ICD-10-CM

## 2023-12-05 NOTE — PROGRESS NOTES
Patient presents for early US for secondary amenorrhea. She denies bleeding since her LMP. She has some nausea - discussed small frequent meals. See ultrasound tab for details - EDC by LMP confirmed by today's US. Ultrasound Probe Disinfection  A transvaginal ultrasound was performed. Prior to use, disinfection was performed with High Level Disinfection Process (Corpora). Probe serial number. Probe serial number: [id number]    MFM referral given. Bleeding precautions reviewed. Return for OB intake and PN1.

## 2023-12-06 ENCOUNTER — TELEPHONE (OUTPATIENT)
Dept: PERINATAL CARE | Facility: CLINIC | Age: 28
End: 2023-12-06

## 2023-12-06 ENCOUNTER — TELEPHONE (OUTPATIENT)
Dept: NEPHROLOGY | Facility: CLINIC | Age: 28
End: 2023-12-06

## 2023-12-06 DIAGNOSIS — E87.6 HYPOKALEMIA: Primary | ICD-10-CM

## 2023-12-06 NOTE — TELEPHONE ENCOUNTER
Patient aware    ----- Message from Mary Carlin MD sent at 12/6/2023  7:15 AM EST -----  Labs look good magnesium borderline low potassium 2.6    Recommend  1. Try to increase Kdur 4 times a day  2. Slow-Mag over-the-counter daily watch for diarrhea  3.   Repeat a basic metabolic profile magnesium in 2 weeks  ----- Message -----  From: Lab, Background User  Sent: 12/5/2023   8:40 PM EST  To: Mary Carlin MD

## 2023-12-06 NOTE — TELEPHONE ENCOUNTER
Called patient to schedule MFM appointment, based on referral issued to Maternal Fetal Medicine by [place]. Left voicemail requesting patient to call back and schedule appointment, with office number for return call [phone].

## 2023-12-10 ENCOUNTER — PATIENT MESSAGE (OUTPATIENT)
Dept: NEPHROLOGY | Facility: CLINIC | Age: 28
End: 2023-12-10

## 2023-12-10 DIAGNOSIS — E87.6 HYPOKALEMIA: Primary | ICD-10-CM

## 2023-12-10 DIAGNOSIS — E83.39 HYPOPHOSPHATEMIA: ICD-10-CM

## 2023-12-11 ENCOUNTER — INITIAL PRENATAL (OUTPATIENT)
Dept: OBGYN CLINIC | Facility: CLINIC | Age: 28
End: 2023-12-11

## 2023-12-11 ENCOUNTER — APPOINTMENT (OUTPATIENT)
Dept: LAB | Facility: CLINIC | Age: 28
End: 2023-12-11
Payer: COMMERCIAL

## 2023-12-11 VITALS — WEIGHT: 169 LBS | HEIGHT: 64 IN | BODY MASS INDEX: 28.85 KG/M2

## 2023-12-11 DIAGNOSIS — Z34.01 ENCOUNTER FOR SUPERVISION OF NORMAL FIRST PREGNANCY IN FIRST TRIMESTER: ICD-10-CM

## 2023-12-11 DIAGNOSIS — Z34.01 ENCOUNTER FOR SUPERVISION OF NORMAL FIRST PREGNANCY IN FIRST TRIMESTER: Primary | ICD-10-CM

## 2023-12-11 DIAGNOSIS — E87.6 HYPOKALEMIA: ICD-10-CM

## 2023-12-11 LAB
ABO GROUP BLD: NORMAL
ALBUMIN SERPL BCP-MCNC: 4 G/DL (ref 3.5–5)
ALP SERPL-CCNC: 72 U/L (ref 34–104)
ALT SERPL W P-5'-P-CCNC: 16 U/L (ref 7–52)
ANION GAP SERPL CALCULATED.3IONS-SCNC: 7 MMOL/L
AST SERPL W P-5'-P-CCNC: 18 U/L (ref 13–39)
BACTERIA UR QL AUTO: NORMAL /HPF
BASOPHILS # BLD AUTO: 0.04 THOUSANDS/ÂΜL (ref 0–0.1)
BASOPHILS NFR BLD AUTO: 1 % (ref 0–1)
BILIRUB SERPL-MCNC: 0.57 MG/DL (ref 0.2–1)
BILIRUB UR QL STRIP: NEGATIVE
BLD GP AB SCN SERPL QL: NEGATIVE
BUN SERPL-MCNC: 8 MG/DL (ref 5–25)
CALCIUM SERPL-MCNC: 8.8 MG/DL (ref 8.4–10.2)
CHLORIDE SERPL-SCNC: 103 MMOL/L (ref 96–108)
CLARITY UR: CLEAR
CO2 SERPL-SCNC: 26 MMOL/L (ref 21–32)
COLOR UR: COLORLESS
CREAT SERPL-MCNC: 0.51 MG/DL (ref 0.6–1.3)
CREAT UR-MCNC: 46 MG/DL
EOSINOPHIL # BLD AUTO: 0.04 THOUSAND/ÂΜL (ref 0–0.61)
EOSINOPHIL NFR BLD AUTO: 1 % (ref 0–6)
ERYTHROCYTE [DISTWIDTH] IN BLOOD BY AUTOMATED COUNT: 12.2 % (ref 11.6–15.1)
GFR SERPL CREATININE-BSD FRML MDRD: 130 ML/MIN/1.73SQ M
GLUCOSE P FAST SERPL-MCNC: 87 MG/DL (ref 65–99)
GLUCOSE UR STRIP-MCNC: NEGATIVE MG/DL
HBV SURFACE AG SER QL: NORMAL
HCT VFR BLD AUTO: 41 % (ref 34.8–46.1)
HCV AB SER QL: NORMAL
HGB BLD-MCNC: 14.2 G/DL (ref 11.5–15.4)
HGB UR QL STRIP.AUTO: NEGATIVE
HIV 1+2 AB+HIV1 P24 AG SERPL QL IA: NORMAL
HIV 2 AB SERPL QL IA: NORMAL
HIV1 AB SERPL QL IA: NORMAL
HIV1 P24 AG SERPL QL IA: NORMAL
IMM GRANULOCYTES # BLD AUTO: 0.02 THOUSAND/UL (ref 0–0.2)
IMM GRANULOCYTES NFR BLD AUTO: 0 % (ref 0–2)
KETONES UR STRIP-MCNC: NEGATIVE MG/DL
LEUKOCYTE ESTERASE UR QL STRIP: NEGATIVE
LYMPHOCYTES # BLD AUTO: 1.65 THOUSANDS/ÂΜL (ref 0.6–4.47)
LYMPHOCYTES NFR BLD AUTO: 24 % (ref 14–44)
MAGNESIUM SERPL-MCNC: 1.9 MG/DL (ref 1.9–2.7)
MCH RBC QN AUTO: 29.8 PG (ref 26.8–34.3)
MCHC RBC AUTO-ENTMCNC: 34.6 G/DL (ref 31.4–37.4)
MCV RBC AUTO: 86 FL (ref 82–98)
MONOCYTES # BLD AUTO: 0.44 THOUSAND/ÂΜL (ref 0.17–1.22)
MONOCYTES NFR BLD AUTO: 6 % (ref 4–12)
NEUTROPHILS # BLD AUTO: 4.72 THOUSANDS/ÂΜL (ref 1.85–7.62)
NEUTS SEG NFR BLD AUTO: 68 % (ref 43–75)
NITRITE UR QL STRIP: NEGATIVE
NON-SQ EPI CELLS URNS QL MICRO: NORMAL /HPF
NRBC BLD AUTO-RTO: 0 /100 WBCS
PH UR STRIP.AUTO: 7 [PH]
PLATELET # BLD AUTO: 206 THOUSANDS/UL (ref 149–390)
PMV BLD AUTO: 12.3 FL (ref 8.9–12.7)
POTASSIUM SERPL-SCNC: 3.4 MMOL/L (ref 3.5–5.3)
PROT SERPL-MCNC: 6.8 G/DL (ref 6.4–8.4)
PROT UR STRIP-MCNC: NEGATIVE MG/DL
PROT UR-MCNC: <4 MG/DL
RBC # BLD AUTO: 4.76 MILLION/UL (ref 3.81–5.12)
RBC #/AREA URNS AUTO: NORMAL /HPF
RH BLD: POSITIVE
RUBV IGG SERPL IA-ACNC: 34 IU/ML
SODIUM SERPL-SCNC: 136 MMOL/L (ref 135–147)
SP GR UR STRIP.AUTO: 1.01 (ref 1–1.03)
TREPONEMA PALLIDUM IGG+IGM AB [PRESENCE] IN SERUM OR PLASMA BY IMMUNOASSAY: NORMAL
URATE SERPL-MCNC: 3.6 MG/DL (ref 2–7.5)
UROBILINOGEN UR STRIP-ACNC: <2 MG/DL
WBC # BLD AUTO: 6.91 THOUSAND/UL (ref 4.31–10.16)
WBC #/AREA URNS AUTO: NORMAL /HPF

## 2023-12-11 PROCEDURE — 85025 COMPLETE CBC W/AUTO DIFF WBC: CPT

## 2023-12-11 PROCEDURE — 87086 URINE CULTURE/COLONY COUNT: CPT

## 2023-12-11 PROCEDURE — 86900 BLOOD TYPING SEROLOGIC ABO: CPT

## 2023-12-11 PROCEDURE — 82570 ASSAY OF URINE CREATININE: CPT

## 2023-12-11 PROCEDURE — 80053 COMPREHEN METABOLIC PANEL: CPT

## 2023-12-11 PROCEDURE — 36415 COLL VENOUS BLD VENIPUNCTURE: CPT

## 2023-12-11 PROCEDURE — 83735 ASSAY OF MAGNESIUM: CPT | Performed by: INTERNAL MEDICINE

## 2023-12-11 PROCEDURE — 86901 BLOOD TYPING SEROLOGIC RH(D): CPT

## 2023-12-11 PROCEDURE — 81001 URINALYSIS AUTO W/SCOPE: CPT

## 2023-12-11 PROCEDURE — 86850 RBC ANTIBODY SCREEN: CPT

## 2023-12-11 PROCEDURE — OBC

## 2023-12-11 PROCEDURE — 86803 HEPATITIS C AB TEST: CPT

## 2023-12-11 PROCEDURE — 86780 TREPONEMA PALLIDUM: CPT

## 2023-12-11 PROCEDURE — 84156 ASSAY OF PROTEIN URINE: CPT

## 2023-12-11 PROCEDURE — 87389 HIV-1 AG W/HIV-1&-2 AB AG IA: CPT

## 2023-12-11 PROCEDURE — 86762 RUBELLA ANTIBODY: CPT

## 2023-12-11 PROCEDURE — 87340 HEPATITIS B SURFACE AG IA: CPT

## 2023-12-11 PROCEDURE — 84550 ASSAY OF BLOOD/URIC ACID: CPT

## 2023-12-11 NOTE — PATIENT INSTRUCTIONS
Congratulations!! Please review our Pregnancy Essential Guide and [employer] Virtual tour from our [employer]. [facility] Pregnancy Essentials Guide  [facility] ([facility])     [id number] [facility] ([employer])     [address] [facility] ([facility]) (Lab Locations)

## 2023-12-12 ENCOUNTER — TELEPHONE (OUTPATIENT)
Dept: NEPHROLOGY | Facility: CLINIC | Age: 28
End: 2023-12-12

## 2023-12-12 ENCOUNTER — DOCUMENTATION (OUTPATIENT)
Dept: NEPHROLOGY | Facility: CLINIC | Age: 28
End: 2023-12-12

## 2023-12-12 DIAGNOSIS — E87.6 HYPOKALEMIA: Primary | ICD-10-CM

## 2023-12-12 NOTE — TELEPHONE ENCOUNTER
Patient aware.    ----- Message from Wally Andres MD sent at 12/12/2023  7:36 AM EST -----  Magnesium good  Potassium still low  Recommend increasing potassium 40 mill equivalents twice a day for total of 80 mill equivalents  Recheck labs on Friday stat potassium  ----- Message -----  From: Lab, Background User  Sent: 12/11/2023   8:36 PM EST  To: Wally Andres MD

## 2023-12-13 LAB — BACTERIA UR CULT: NORMAL

## 2023-12-14 ENCOUNTER — APPOINTMENT (OUTPATIENT)
Dept: LAB | Facility: CLINIC | Age: 28
End: 2023-12-14
Payer: COMMERCIAL

## 2023-12-14 ENCOUNTER — PATIENT MESSAGE (OUTPATIENT)
Dept: NEPHROLOGY | Facility: CLINIC | Age: 28
End: 2023-12-14

## 2023-12-14 DIAGNOSIS — E87.6 HYPOKALEMIA: ICD-10-CM

## 2023-12-14 DIAGNOSIS — E83.42 HYPOMAGNESEMIA: ICD-10-CM

## 2023-12-14 DIAGNOSIS — E55.9 VITAMIN D DEFICIENCY: ICD-10-CM

## 2023-12-14 DIAGNOSIS — G90.A POTS (POSTURAL ORTHOSTATIC TACHYCARDIA SYNDROME): Chronic | ICD-10-CM

## 2023-12-14 DIAGNOSIS — E87.6 HYPOKALEMIA: Chronic | ICD-10-CM

## 2023-12-14 DIAGNOSIS — E87.6 HYPOKALEMIA: Primary | ICD-10-CM

## 2023-12-14 DIAGNOSIS — E83.39 HYPOPHOSPHATEMIA: ICD-10-CM

## 2023-12-14 DIAGNOSIS — I10 ESSENTIAL HYPERTENSION, BENIGN: Chronic | ICD-10-CM

## 2023-12-14 NOTE — TELEPHONE ENCOUNTER
Spoke to pt, she was taking potassium 100 meq daily so after 3-4 day on the 120 meq daily she will go back to 100 meq daily

## 2023-12-14 NOTE — TELEPHONE ENCOUNTER
----- Message from Isela Mario MD sent at 12/14/2023  4:17 PM EST -----  I would have her take a total of 120 mEq of Kdur split up 3 times  by 4 to 6 hours for the next 3 to 4 days then decrease down to 80 mill equivalents or 40 mill equivalents twice a day thereafter  Have her go for labs either Saturday or Monday morning basic metabolic profile magnesium and a hospital-based labs stat  The oral potassium is always better than intravenous and more effective however she develops nausea vomiting or diarrhea she should go to the emergency room just to be on the safe side  Actually, I will also increase amiloride 20 mg in the morning and continue 10 mg in the evening if she has been on amiloride 10 mg twice a day. I am going to check with her gynecologist/OB as well.   I would also increase the potassium as stated to a total of 120 mill equivalents daily in my prior note   I would have her go for lab work Saturday morning at a hospital-based lab.     ----- Message -----  From: Lab, Background User  Sent: 12/14/2023   3:57 PM EST  To: Isela Mario MD

## 2023-12-15 ENCOUNTER — HOSPITAL ENCOUNTER (INPATIENT)
Facility: HOSPITAL | Age: 28
LOS: 2 days | Discharge: HOME | End: 2023-12-17
Attending: EMERGENCY MEDICINE | Admitting: INTERNAL MEDICINE
Payer: COMMERCIAL

## 2023-12-15 DIAGNOSIS — R00.2 HEART PALPITATIONS: ICD-10-CM

## 2023-12-15 DIAGNOSIS — Z3A.10 10 WEEKS GESTATION OF PREGNANCY: ICD-10-CM

## 2023-12-15 DIAGNOSIS — I47.10 SVT (SUPRAVENTRICULAR TACHYCARDIA): ICD-10-CM

## 2023-12-15 DIAGNOSIS — E87.6 HYPOKALEMIA: Primary | ICD-10-CM

## 2023-12-15 LAB
ALBUMIN SERPL BCP-MCNC: 4.3 G/DL (ref 3.5–5)
ALP SERPL-CCNC: 79 U/L (ref 34–104)
ALT SERPL W P-5'-P-CCNC: 16 U/L (ref 7–52)
ANION GAP SERPL CALCULATED.3IONS-SCNC: 9 MMOL/L
AST SERPL W P-5'-P-CCNC: 19 U/L (ref 13–39)
BACTERIA UR QL AUTO: ABNORMAL /HPF
BASOPHILS # BLD AUTO: 0.03 THOUSANDS/ÂΜL (ref 0–0.1)
BASOPHILS NFR BLD AUTO: 0 % (ref 0–1)
BILIRUB SERPL-MCNC: 0.4 MG/DL (ref 0.2–1)
BILIRUB UR QL STRIP: NEGATIVE
BUN SERPL-MCNC: 7 MG/DL (ref 5–25)
CALCIUM SERPL-MCNC: 9.6 MG/DL (ref 8.4–10.2)
CHLORIDE SERPL-SCNC: 101 MMOL/L (ref 96–108)
CLARITY UR: CLEAR
CO2 SERPL-SCNC: 26 MMOL/L (ref 21–32)
COLOR UR: ABNORMAL
CREAT SERPL-MCNC: 0.66 MG/DL (ref 0.6–1.3)
EOSINOPHIL # BLD AUTO: 0.06 THOUSAND/ÂΜL (ref 0–0.61)
EOSINOPHIL NFR BLD AUTO: 1 % (ref 0–6)
ERYTHROCYTE [DISTWIDTH] IN BLOOD BY AUTOMATED COUNT: 12.3 % (ref 11.6–15.1)
GFR SERPL CREATININE-BSD FRML MDRD: 119 ML/MIN/1.73SQ M
GLUCOSE SERPL-MCNC: 144 MG/DL (ref 65–140)
GLUCOSE UR STRIP-MCNC: NEGATIVE MG/DL
HCT VFR BLD AUTO: 40.5 % (ref 34.8–46.1)
HGB BLD-MCNC: 14.1 G/DL (ref 11.5–15.4)
HGB UR QL STRIP.AUTO: NEGATIVE
HYALINE CASTS #/AREA URNS LPF: ABNORMAL /LPF
IMM GRANULOCYTES # BLD AUTO: 0.03 THOUSAND/UL (ref 0–0.2)
IMM GRANULOCYTES NFR BLD AUTO: 0 % (ref 0–2)
KETONES UR STRIP-MCNC: NEGATIVE MG/DL
LEUKOCYTE ESTERASE UR QL STRIP: ABNORMAL
LYMPHOCYTES # BLD AUTO: 1.89 THOUSANDS/ÂΜL (ref 0.6–4.47)
LYMPHOCYTES NFR BLD AUTO: 20 % (ref 14–44)
MAGNESIUM SERPL-MCNC: 1.6 MG/DL (ref 1.9–2.7)
MCH RBC QN AUTO: 30 PG (ref 26.8–34.3)
MCHC RBC AUTO-ENTMCNC: 34.8 G/DL (ref 31.4–37.4)
MCV RBC AUTO: 86 FL (ref 82–98)
MONOCYTES # BLD AUTO: 0.61 THOUSAND/ÂΜL (ref 0.17–1.22)
MONOCYTES NFR BLD AUTO: 7 % (ref 4–12)
MUCOUS THREADS UR QL AUTO: ABNORMAL
NEUTROPHILS # BLD AUTO: 6.83 THOUSANDS/ÂΜL (ref 1.85–7.62)
NEUTS SEG NFR BLD AUTO: 72 % (ref 43–75)
NITRITE UR QL STRIP: NEGATIVE
NON-SQ EPI CELLS URNS QL MICRO: ABNORMAL /HPF
NRBC BLD AUTO-RTO: 0 /100 WBCS
PH UR STRIP.AUTO: 6 [PH]
PLATELET # BLD AUTO: 220 THOUSANDS/UL (ref 149–390)
PMV BLD AUTO: 11.3 FL (ref 8.9–12.7)
POTASSIUM SERPL-SCNC: 3.1 MMOL/L (ref 3.5–5.3)
PROT SERPL-MCNC: 7.5 G/DL (ref 6.4–8.4)
PROT UR STRIP-MCNC: ABNORMAL MG/DL
RBC # BLD AUTO: 4.7 MILLION/UL (ref 3.81–5.12)
RBC #/AREA URNS AUTO: ABNORMAL /HPF
SODIUM SERPL-SCNC: 136 MMOL/L (ref 135–147)
SP GR UR STRIP.AUTO: 1.02 (ref 1–1.03)
TSH SERPL DL<=0.05 MIU/L-ACNC: 1.65 UIU/ML (ref 0.45–4.5)
UROBILINOGEN UR STRIP-ACNC: <2 MG/DL
WBC # BLD AUTO: 9.45 THOUSAND/UL (ref 4.31–10.16)
WBC #/AREA URNS AUTO: ABNORMAL /HPF

## 2023-12-15 PROCEDURE — 83735 ASSAY OF MAGNESIUM: CPT | Performed by: EMERGENCY MEDICINE

## 2023-12-15 PROCEDURE — 96365 THER/PROPH/DIAG IV INF INIT: CPT

## 2023-12-15 PROCEDURE — 96368 THER/DIAG CONCURRENT INF: CPT

## 2023-12-15 PROCEDURE — 99284 EMERGENCY DEPT VISIT MOD MDM: CPT

## 2023-12-15 PROCEDURE — 96367 TX/PROPH/DG ADDL SEQ IV INF: CPT

## 2023-12-15 PROCEDURE — 96376 TX/PRO/DX INJ SAME DRUG ADON: CPT

## 2023-12-15 PROCEDURE — 81001 URINALYSIS AUTO W/SCOPE: CPT | Performed by: INTERNAL MEDICINE

## 2023-12-15 PROCEDURE — 96375 TX/PRO/DX INJ NEW DRUG ADDON: CPT

## 2023-12-15 PROCEDURE — 36415 COLL VENOUS BLD VENIPUNCTURE: CPT | Performed by: EMERGENCY MEDICINE

## 2023-12-15 PROCEDURE — 93005 ELECTROCARDIOGRAM TRACING: CPT

## 2023-12-15 PROCEDURE — 84443 ASSAY THYROID STIM HORMONE: CPT | Performed by: EMERGENCY MEDICINE

## 2023-12-15 PROCEDURE — 99285 EMERGENCY DEPT VISIT HI MDM: CPT | Performed by: EMERGENCY MEDICINE

## 2023-12-15 PROCEDURE — 96366 THER/PROPH/DIAG IV INF ADDON: CPT

## 2023-12-15 PROCEDURE — 80053 COMPREHEN METABOLIC PANEL: CPT | Performed by: EMERGENCY MEDICINE

## 2023-12-15 PROCEDURE — 85025 COMPLETE CBC W/AUTO DIFF WBC: CPT | Performed by: EMERGENCY MEDICINE

## 2023-12-15 PROCEDURE — 99223 1ST HOSP IP/OBS HIGH 75: CPT | Performed by: INTERNAL MEDICINE

## 2023-12-15 RX ORDER — ONDANSETRON 2 MG/ML
4 INJECTION INTRAMUSCULAR; INTRAVENOUS ONCE
Status: COMPLETED | OUTPATIENT
Start: 2023-12-15 | End: 2023-12-15

## 2023-12-15 RX ORDER — AMILORIDE HYDROCHLORIDE 5 MG/1
10 TABLET ORAL EVERY EVENING
COMMUNITY
End: 2023-12-17

## 2023-12-15 RX ORDER — ONDANSETRON 2 MG/ML
4 INJECTION INTRAMUSCULAR; INTRAVENOUS EVERY 6 HOURS PRN
Status: DISCONTINUED | OUTPATIENT
Start: 2023-12-15 | End: 2023-12-17 | Stop reason: HOSPADM

## 2023-12-15 RX ORDER — METOPROLOL TARTRATE 50 MG/1
50 TABLET, FILM COATED ORAL ONCE
Status: COMPLETED | OUTPATIENT
Start: 2023-12-15 | End: 2023-12-15

## 2023-12-15 RX ORDER — POTASSIUM CHLORIDE 20 MEQ/1
120 TABLET, EXTENDED RELEASE ORAL DAILY
Status: DISCONTINUED | OUTPATIENT
Start: 2023-12-16 | End: 2023-12-17 | Stop reason: HOSPADM

## 2023-12-15 RX ORDER — POTASSIUM CHLORIDE 14.9 MG/ML
20 INJECTION INTRAVENOUS ONCE
Status: COMPLETED | OUTPATIENT
Start: 2023-12-15 | End: 2023-12-15

## 2023-12-15 RX ORDER — AMILORIDE HYDROCHLORIDE 5 MG/1
20 TABLET ORAL DAILY
Status: DISCONTINUED | OUTPATIENT
Start: 2023-12-16 | End: 2023-12-17

## 2023-12-15 RX ORDER — ACETAMINOPHEN 325 MG/1
650 TABLET ORAL ONCE
Status: COMPLETED | OUTPATIENT
Start: 2023-12-15 | End: 2023-12-15

## 2023-12-15 RX ORDER — SODIUM CHLORIDE, SODIUM GLUCONATE, SODIUM ACETATE, POTASSIUM CHLORIDE, MAGNESIUM CHLORIDE, SODIUM PHOSPHATE, DIBASIC, AND POTASSIUM PHOSPHATE .53; .5; .37; .037; .03; .012; .00082 G/100ML; G/100ML; G/100ML; G/100ML; G/100ML; G/100ML; G/100ML
1000 INJECTION, SOLUTION INTRAVENOUS ONCE
Status: COMPLETED | OUTPATIENT
Start: 2023-12-15 | End: 2023-12-15

## 2023-12-15 RX ORDER — HYDROXYZINE HYDROCHLORIDE 10 MG/1
10 TABLET, FILM COATED ORAL 2 TIMES DAILY PRN
Status: DISCONTINUED | OUTPATIENT
Start: 2023-12-15 | End: 2023-12-17 | Stop reason: HOSPADM

## 2023-12-15 RX ORDER — DOCUSATE SODIUM 100 MG/1
100 CAPSULE, LIQUID FILLED ORAL 2 TIMES DAILY PRN
Status: DISCONTINUED | OUTPATIENT
Start: 2023-12-15 | End: 2023-12-17 | Stop reason: HOSPADM

## 2023-12-15 RX ORDER — POTASSIUM CHLORIDE 20 MEQ/1
TABLET, EXTENDED RELEASE ORAL
Start: 2023-12-15

## 2023-12-15 RX ORDER — AMILORIDE HYDROCHLORIDE 5 MG/1
TABLET ORAL
Start: 2023-12-15 | End: 2023-12-17

## 2023-12-15 RX ORDER — ACETAMINOPHEN 325 MG/1
650 TABLET ORAL EVERY 6 HOURS PRN
Status: DISCONTINUED | OUTPATIENT
Start: 2023-12-15 | End: 2023-12-17 | Stop reason: HOSPADM

## 2023-12-15 RX ORDER — SODIUM CHLORIDE, SODIUM GLUCONATE, SODIUM ACETATE, POTASSIUM CHLORIDE, MAGNESIUM CHLORIDE, SODIUM PHOSPHATE, DIBASIC, AND POTASSIUM PHOSPHATE .53; .5; .37; .037; .03; .012; .00082 G/100ML; G/100ML; G/100ML; G/100ML; G/100ML; G/100ML; G/100ML
50 INJECTION, SOLUTION INTRAVENOUS CONTINUOUS
Status: DISCONTINUED | OUTPATIENT
Start: 2023-12-15 | End: 2023-12-16

## 2023-12-15 RX ORDER — METOPROLOL SUCCINATE 50 MG/1
50 TABLET, EXTENDED RELEASE ORAL 2 TIMES DAILY
Status: DISCONTINUED | OUTPATIENT
Start: 2023-12-15 | End: 2023-12-17 | Stop reason: HOSPADM

## 2023-12-15 RX ORDER — MAGNESIUM SULFATE HEPTAHYDRATE 40 MG/ML
2 INJECTION, SOLUTION INTRAVENOUS ONCE
Status: COMPLETED | OUTPATIENT
Start: 2023-12-15 | End: 2023-12-15

## 2023-12-15 RX ORDER — MAGNESIUM HYDROXIDE/ALUMINUM HYDROXICE/SIMETHICONE 120; 1200; 1200 MG/30ML; MG/30ML; MG/30ML
30 SUSPENSION ORAL EVERY 6 HOURS PRN
Status: DISCONTINUED | OUTPATIENT
Start: 2023-12-15 | End: 2023-12-17 | Stop reason: HOSPADM

## 2023-12-15 RX ORDER — FERROUS SULFATE 325(65) MG
325 TABLET ORAL
Status: DISCONTINUED | OUTPATIENT
Start: 2023-12-16 | End: 2023-12-17 | Stop reason: HOSPADM

## 2023-12-15 RX ORDER — DICYCLOMINE HCL 20 MG
20 TABLET ORAL 4 TIMES DAILY PRN
Status: DISCONTINUED | OUTPATIENT
Start: 2023-12-15 | End: 2023-12-17 | Stop reason: HOSPADM

## 2023-12-15 RX ORDER — AMILORIDE HYDROCHLORIDE 5 MG/1
10 TABLET ORAL EVERY EVENING
Status: DISCONTINUED | OUTPATIENT
Start: 2023-12-15 | End: 2023-12-17 | Stop reason: HOSPADM

## 2023-12-15 RX ORDER — BUSPIRONE HYDROCHLORIDE 5 MG/1
5 TABLET ORAL 2 TIMES DAILY PRN
Status: DISCONTINUED | OUTPATIENT
Start: 2023-12-15 | End: 2023-12-17 | Stop reason: HOSPADM

## 2023-12-15 RX ORDER — SODIUM CHLORIDE, SODIUM GLUCONATE, SODIUM ACETATE, POTASSIUM CHLORIDE, MAGNESIUM CHLORIDE, SODIUM PHOSPHATE, DIBASIC, AND POTASSIUM PHOSPHATE .53; .5; .37; .037; .03; .012; .00082 G/100ML; G/100ML; G/100ML; G/100ML; G/100ML; G/100ML; G/100ML
1000 INJECTION, SOLUTION INTRAVENOUS ONCE
Status: DISCONTINUED | OUTPATIENT
Start: 2023-12-15 | End: 2023-12-17 | Stop reason: HOSPADM

## 2023-12-15 RX ADMIN — SODIUM CHLORIDE 1000 ML: 0.9 INJECTION, SOLUTION INTRAVENOUS at 18:08

## 2023-12-15 RX ADMIN — METOPROLOL TARTRATE 50 MG: 50 TABLET ORAL at 20:09

## 2023-12-15 RX ADMIN — SODIUM CHLORIDE, SODIUM GLUCONATE, SODIUM ACETATE, POTASSIUM CHLORIDE, MAGNESIUM CHLORIDE, SODIUM PHOSPHATE, DIBASIC, AND POTASSIUM PHOSPHATE 1000 ML: .53; .5; .37; .037; .03; .012; .00082 INJECTION, SOLUTION INTRAVENOUS at 16:01

## 2023-12-15 RX ADMIN — ONDANSETRON 4 MG: 2 INJECTION INTRAMUSCULAR; INTRAVENOUS at 16:17

## 2023-12-15 RX ADMIN — MAGNESIUM SULFATE HEPTAHYDRATE 2 G: 40 INJECTION, SOLUTION INTRAVENOUS at 17:31

## 2023-12-15 RX ADMIN — ONDANSETRON 4 MG: 2 INJECTION INTRAMUSCULAR; INTRAVENOUS at 20:10

## 2023-12-15 RX ADMIN — POTASSIUM CHLORIDE 20 MEQ: 14.9 INJECTION, SOLUTION INTRAVENOUS at 19:43

## 2023-12-15 RX ADMIN — ACETAMINOPHEN 650 MG: 325 TABLET, FILM COATED ORAL at 17:31

## 2023-12-15 RX ADMIN — POTASSIUM CHLORIDE 20 MEQ: 14.9 INJECTION, SOLUTION INTRAVENOUS at 17:30

## 2023-12-15 RX ADMIN — SODIUM CHLORIDE, SODIUM GLUCONATE, SODIUM ACETATE, POTASSIUM CHLORIDE, MAGNESIUM CHLORIDE, SODIUM PHOSPHATE, DIBASIC, AND POTASSIUM PHOSPHATE 125 ML/HR: .53; .5; .37; .037; .03; .012; .00082 INJECTION, SOLUTION INTRAVENOUS at 21:02

## 2023-12-15 NOTE — ED PROVIDER NOTES
History  Chief Complaint   Patient presents with    Abnormal Lab     Pt reports having her electrolytes checked yesterday and was told they were low; pt informed to be seen if symptoms worsen; reports having palpitations throughout day     HPI  28-year-old female currently 9 weeks pregnant who has had a known deficit in potassium and magnesium electrolytes comes in with nausea and vomiting which has inhibited her from repeating her electrolytes by p.o. method.  The patient had a BMP done on Saturday which showed continuous decline in her potassium and magnesium.  The patient was told to come to the ED if she continued feeling bad and was unable to tolerate p.o. medication.  The patient has intermittent palpitations and a headache at the moment.  Prior to Admission Medications   Prescriptions Last Dose Informant Patient Reported? Taking?   AMILoride 5 mg tablet   No No   Sig: Take 20 mg in am and 10 mg in pm   Patient taking differently: Take 20 mg by mouth daily Take 20 mg in am and 10 mg in pm   AMILoride 5 mg tablet   Yes Yes   Sig: Take 10 mg by mouth every evening   Prenatal Multivit-Min-Fe-FA (PRE-GORDON PO)  Self Yes No   Sig: Take by mouth   busPIRone (BUSPAR) 5 mg tablet  Self No No   Sig: Take 1 tablet (5 mg total) by mouth 2 (two) times a day   Patient taking differently: Take 5 mg by mouth as needed   clobetasol (TEMOVATE) 0.05 % ointment  Self No No   Sig: Apply topically 2 (two) times a day   dicyclomine (BENTYL) 20 mg tablet  Self No No   Sig: Take 1 tablet (20 mg total) by mouth every 6 (six) hours   Patient taking differently: Take 20 mg by mouth as needed   ferrous sulfate 325 (65 Fe) mg tablet  Self Yes No   Sig: Take 325 mg by mouth daily with breakfast   hydrOXYzine HCL (ATARAX) 10 mg tablet  Self No No   Sig: Take 1 tablet (10 mg total) by mouth 2 (two) times a day as needed for anxiety   hydroxychloroquine (PLAQUENIL) 200 mg tablet  Self No No   Sig: Take 1.5 tablets (300 mg total) by mouth in  "the morning   metoprolol succinate (TOPROL-XL) 50 mg 24 hr tablet   No No   Sig: Take 1 tablet (50 mg total) by mouth 2 (two) times a day   metoprolol tartrate (LOPRESSOR) 25 mg tablet   No No   Sig: Take 1 tablet (25 mg total) by mouth every 6 (six) hours as needed (palpitatins)   potassium chloride (K-DUR,KLOR-CON) 20 mEq tablet   No No   Sig: Take a total of 120 meq daily for 3-4 days then decrease to 100 meq thereafter.   tacrolimus (PROTOPIC) 0.1 % ointment  Self No No   Sig: Apply topically 2 (two) times a day   triamcinolone (KENALOG) 0.025 % ointment  Self No No   Sig: Apply topically twice day for 14 days straight as needed for flares to thinner skin   triamcinolone (KENALOG) 0.1 % ointment  Self No No   Sig: Apply topically twice day for 14 days straight as needed for flares      Facility-Administered Medications: None       Past Medical History:   Diagnosis Date    Anxiety     Eczema     Exercises 5 to 6 times per week     per pt prior to knee issue -enjoyed running and horseback riding    Family history of reaction to anesthesia     per pt--\"Mom also gets high anxiety with surgeries and wakes up nasty from anesthesia\"    Gastroparesis 2012    Hypertension     last assessed 3/12/14    Hypokalemia     per pt per doctors possibly related renal problem    Hypomagnesemia     Irritable bowel syndrome     with diarrhea    Rheumatoid arthritis (HCC) 2022    Sinus tachycardia     related to dehydration    Tooth missing     front upper tooth retainer-    Wears glasses     for computer use       Past Surgical History:   Procedure Laterality Date    [facility] ARTHROSCOPY KNEE LATERAL RELEASE Right 04/13/2023    Procedure: ARTHROSCOPIC RELEASE RETINACULAR;  Surgeon: Ilya Brizuela DO;  Location:  MAIN OR;  Service: Orthopedics    [facility] EGD TRANSORAL BIOPSY SINGLE/MULTIPLE N/A 05/04/2016    Procedure: ESOPHAGOGASTRODUODENOSCOPY (EGD);  Surgeon: Raji Monzon MD;  Location:  GI LAB;  Service: Gastroenterology    [facility] " DISLC PATELLA W/XTNSR RELIGNMT&/MUSC RL Right 04/13/2023    Procedure: ARTHROSCOPIC REALIGNMENT PATELLA;  Surgeon: Ilya Brizuela DO;  Location:  MAIN OR;  Service: Orthopedics    WISDOM TOOTH EXTRACTION         Family History   Problem Relation Age of Onset    Hypokalemia Mother     Hypotension Mother     Anxiety disorder Mother         NOS    Lung cancer Father     Skin cancer Father     Crohn's disease Father     Schizoaffective Disorder  Father     Alcohol abuse Father     Drug abuse Father     No Known Problems Sister     No Known Problems Sister     No Known Problems Brother     Coronary artery disease Maternal Grandmother     Heart disease Maternal Grandmother     Alzheimer's disease Maternal Grandmother     Arthritis Paternal Grandmother     Rheum arthritis Paternal Grandmother     Hypertension Paternal Grandmother     COPD Paternal Grandfather      I have reviewed and agree with the history as documented.    E-Cigarette/Vaping    E-Cigarette Use Never User      E-Cigarette/Vaping Substances    Nicotine No     THC No     CBD No     Flavoring No     Other No     Unknown No      Social History     Tobacco Use    Smoking status: Never    Smokeless tobacco: Never   Vaping Use    Vaping status: Never Used   Substance Use Topics    Alcohol use: Not Currently     Alcohol/week: 1.0 standard drink of alcohol     Types: 1 Standard drinks or equivalent per week    Drug use: No        Review of Systems   Constitutional:  Negative for chills and fever.   HENT:  Negative for ear pain and sore throat.    Eyes:  Negative for pain and visual disturbance.   Respiratory:  Negative for cough and shortness of breath.    Cardiovascular:  Positive for palpitations. Negative for chest pain.   Gastrointestinal:  Positive for nausea and vomiting. Negative for abdominal pain.   Genitourinary:  Negative for dysuria and hematuria.   Musculoskeletal:  Negative for arthralgias and back pain.   Skin:  Negative for color change and rash.    Neurological:  Positive for headaches. Negative for seizures and syncope.   All other systems reviewed and are negative.      Physical Exam  ED Triage Vitals [12/15/23 1530]   Temperature Pulse Respirations Blood Pressure SpO2   97.6 °F (36.4 °C) 104 18 146/78 100 %      Temp Source Heart Rate Source Patient Position - Orthostatic VS BP Location FiO2 (%)   Oral Monitor Sitting Right arm --      Pain Score       7             Orthostatic Vital Signs  Vitals:    12/15/23 2139 12/16/23 0247 12/16/23 0700 12/16/23 1106   BP: 123/90 99/54 118/74 115/66   Pulse: 82 95 79 76   Patient Position - Orthostatic VS:           Physical Exam  Vitals and nursing note reviewed.   Constitutional:       General: She is not in acute distress.     Appearance: She is well-developed.   HENT:      Head: Normocephalic and atraumatic.   Eyes:      Conjunctiva/sclera: Conjunctivae normal.   Cardiovascular:      Rate and Rhythm: Normal rate and regular rhythm.      Pulses: Normal pulses.      Heart sounds: Normal heart sounds. No murmur heard.     No friction rub.   Pulmonary:      Effort: Pulmonary effort is normal. No respiratory distress.      Breath sounds: Normal breath sounds.   Abdominal:      Palpations: Abdomen is soft.      Tenderness: There is no abdominal tenderness.   Musculoskeletal:         General: No swelling.      Cervical back: Neck supple.   Skin:     General: Skin is warm and dry.      Capillary Refill: Capillary refill takes less than 2 seconds.   Neurological:      Mental Status: She is alert and oriented to person, place, and time.      Cranial Nerves: No cranial nerve deficit.      Sensory: No sensory deficit.      Motor: No weakness.   Psychiatric:         Mood and Affect: Mood normal.         Behavior: Behavior normal.         Thought Content: Thought content normal.         ED Medications  Medications   multi-electrolyte (ISOLYTE-S PH 7.4) bolus 1,000 mL (1,000 mL Intravenous Not Given 12/15/23 3004)   AMILoride  tablet 20 mg (20 mg Oral Given 12/16/23 0854)   AMILoride tablet 10 mg (10 mg Oral Not Given 12/15/23 2215)   busPIRone (BUSPAR) tablet 5 mg (has no administration in time range)   dicyclomine (BENTYL) tablet 20 mg (has no administration in time range)   ferrous sulfate tablet 325 mg (325 mg Oral Given 12/16/23 0853)   hydrOXYzine HCL (ATARAX) tablet 10 mg (has no administration in time range)   metoprolol succinate (TOPROL-XL) 24 hr tablet 50 mg (50 mg Oral Given 12/16/23 0853)   metoprolol tartrate (LOPRESSOR) tablet 25 mg (has no administration in time range)   potassium chloride (K-DUR,KLOR-CON) CR tablet 120 mEq (120 mEq Oral Given 12/16/23 0853)   prenatal multivitamin tablet 1 tablet (1 tablet Oral Given 12/16/23 0853)   multi-electrolyte (PLASMALYTE-A/ISOLYTE-S PH 7.4) IV solution (125 mL/hr Intravenous New Bag 12/16/23 0439)   acetaminophen (TYLENOL) tablet 650 mg (has no administration in time range)   docusate sodium (COLACE) capsule 100 mg (has no administration in time range)   ondansetron (ZOFRAN) injection 4 mg (4 mg Intravenous Given 12/16/23 0443)   aluminum-magnesium hydroxide-simethicone (MAALOX) oral suspension 30 mL (has no administration in time range)   potassium chloride 20 mEq IVPB (premix) (has no administration in time range)   multi-electrolyte (ISOLYTE-S PH 7.4) bolus 1,000 mL (0 mL Intravenous Stopped 12/15/23 1727)   ondansetron (ZOFRAN) injection 4 mg (4 mg Intravenous Given 12/15/23 1617)   potassium chloride 20 mEq IVPB (premix) (0 mEq Intravenous Stopped 12/15/23 1942)   magnesium sulfate 2 g/50 mL IVPB (premix) 2 g (0 g Intravenous Stopped 12/15/23 1932)   acetaminophen (TYLENOL) tablet 650 mg (650 mg Oral Given 12/15/23 1731)   sodium chloride 0.9 % bolus 1,000 mL (0 mL Intravenous Stopped 12/15/23 2051)   potassium chloride 20 mEq IVPB (premix) (20 mEq Intravenous New Bag 12/15/23 1943)   metoprolol tartrate (LOPRESSOR) tablet 50 mg (50 mg Oral Given 12/15/23 2009)   ondansetron  (ZOFRAN) injection 4 mg (4 mg Intravenous Given 12/15/23 2010)       Diagnostic Studies  Results Reviewed       Procedure Component Value Units Date/Time    Comprehensive metabolic panel [[id number]]  (Abnormal) Collected: 12/16/23 0438    Lab Status: Final result Specimen: Blood from Arm, Left Updated: 12/16/23 0816     Sodium 139 mmol/L      Potassium 3.4 mmol/L      Chloride 108 mmol/L      CO2 23 mmol/L      ANION GAP 8 mmol/L      BUN 5 mg/dL      Creatinine 0.50 mg/dL      Glucose 81 mg/dL      Calcium 8.4 mg/dL      Corrected Calcium 8.9 mg/dL      AST 14 U/L      ALT 12 U/L      Alkaline Phosphatase 58 U/L      Total Protein 5.9 g/dL      Albumin 3.4 g/dL      Total Bilirubin 0.36 mg/dL      eGFR 131 ml/min/1.73sq m     Narrative:      National Kidney Disease Foundation guidelines for Chronic Kidney Disease (CKD):     Stage 1 with normal or high GFR (GFR > 90 mL/min/1.73 square meters)    Stage 2 Mild CKD (GFR = 60-89 mL/min/1.73 square meters)    Stage 3A Moderate CKD (GFR = 45-59 mL/min/1.73 square meters)    Stage 3B Moderate CKD (GFR = 30-44 mL/min/1.73 square meters)    Stage 4 Severe CKD (GFR = 15-29 mL/min/1.73 square meters)    Stage 5 End Stage CKD (GFR <15 mL/min/1.73 square meters)  Note: GFR calculation is accurate only with a steady state creatinine    Magnesium [[id number]]  (Normal) Collected: 12/16/23 0438    Lab Status: Final result Specimen: Blood from Arm, Left Updated: 12/16/23 0816     Magnesium 2.1 mg/dL     Phosphorus [[id number]]  (Normal) Collected: 12/16/23 0438    Lab Status: Final result Specimen: Blood from Arm, Left Updated: 12/16/23 0816     Phosphorus 2.9 mg/dL     TSH, 3rd generation [[id number]]  (Normal) Collected: 12/16/23 0438    Lab Status: Final result Specimen: Blood from Arm, Left Updated: 12/16/23 0621     TSH 3RD GENERATON 2.228 uIU/mL     CBC and differential [[id number]] Collected: 12/16/23 0438    Lab Status: Final result Specimen: Blood from Arm, Left Updated:  12/16/23 0548     WBC 9.25 Thousand/uL      RBC 4.18 Million/uL      Hemoglobin 13.0 g/dL      Hematocrit 37.0 %      MCV 89 fL      MCH 31.1 pg      MCHC 35.1 g/dL      RDW 12.6 %      MPV 11.6 fL      Platelets 182 Thousands/uL      nRBC 0 /100 WBCs      Neutrophils Relative 64 %      Immat GRANS % 0 %      Lymphocytes Relative 27 %      Monocytes Relative 8 %      Eosinophils Relative 1 %      Basophils Relative 0 %      Neutrophils Absolute 5.84 Thousands/µL      Immature Grans Absolute 0.02 Thousand/uL      Lymphocytes Absolute 2.52 Thousands/µL      Monocytes Absolute 0.77 Thousand/µL      Eosinophils Absolute 0.06 Thousand/µL      Basophils Absolute 0.04 Thousands/µL     Urine Microscopic [[id number]]  (Abnormal) Collected: 12/15/23 2044    Lab Status: Final result Specimen: Urine, Clean Catch Updated: 12/15/23 2133     RBC, UA None Seen /hpf      WBC, UA 1-2 /hpf      Epithelial Cells Occasional /hpf      Bacteria, UA Moderate /hpf      MUCUS THREADS Moderate     Hyaline Casts, UA 0-3 /lpf     UA (URINE) with reflex to Scope [[id number]]  (Abnormal) Collected: 12/15/23 2044    Lab Status: Final result Specimen: Urine, Clean Catch Updated: 12/15/23 2052     Color, UA Light Yellow     Clarity, UA Clear     Specific Gravity, UA 1.018     pH, UA 6.0     Leukocytes, UA Large     Nitrite, UA Negative     Protein, UA Trace mg/dl      Glucose, UA Negative mg/dl      Ketones, UA Negative mg/dl      Urobilinogen, UA <2.0 mg/dl      Bilirubin, UA Negative     Occult Blood, UA Negative    TSH, 3rd generation with Free T4 reflex [[id number]]  (Normal) Collected: 12/15/23 1555    Lab Status: Final result Specimen: Blood from Arm, Right Updated: 12/15/23 1905     TSH 3RD GENERATON 1.648 uIU/mL     Comprehensive metabolic panel [[id number]]  (Abnormal) Collected: 12/15/23 1555    Lab Status: Final result Specimen: Blood from Arm, Right Updated: 12/15/23 1645     Sodium 136 mmol/L      Potassium 3.1 mmol/L      Chloride 101  mmol/L      CO2 26 mmol/L      ANION GAP 9 mmol/L      BUN 7 mg/dL      Creatinine 0.66 mg/dL      Glucose 144 mg/dL      Calcium 9.6 mg/dL      AST 19 U/L      ALT 16 U/L      Alkaline Phosphatase 79 U/L      Total Protein 7.5 g/dL      Albumin 4.3 g/dL      Total Bilirubin 0.40 mg/dL      eGFR 119 ml/min/1.73sq m     Narrative:      National Kidney Disease Foundation guidelines for Chronic Kidney Disease (CKD):     Stage 1 with normal or high GFR (GFR > 90 mL/min/1.73 square meters)    Stage 2 Mild CKD (GFR = 60-89 mL/min/1.73 square meters)    Stage 3A Moderate CKD (GFR = 45-59 mL/min/1.73 square meters)    Stage 3B Moderate CKD (GFR = 30-44 mL/min/1.73 square meters)    Stage 4 Severe CKD (GFR = 15-29 mL/min/1.73 square meters)    Stage 5 End Stage CKD (GFR <15 mL/min/1.73 square meters)  Note: GFR calculation is accurate only with a steady state creatinine    Magnesium [[id number]]  (Abnormal) Collected: 12/15/23 1555    Lab Status: Final result Specimen: Blood from Arm, Right Updated: 12/15/23 1645     Magnesium 1.6 mg/dL     CBC and differential [[id number]] Collected: 12/15/23 1555    Lab Status: Final result Specimen: Blood from Arm, Right Updated: 12/15/23 1612     WBC 9.45 Thousand/uL      RBC 4.70 Million/uL      Hemoglobin 14.1 g/dL      Hematocrit 40.5 %      MCV 86 fL      MCH 30.0 pg      MCHC 34.8 g/dL      RDW 12.3 %      MPV 11.3 fL      Platelets 220 Thousands/uL      nRBC 0 /100 WBCs      Neutrophils Relative 72 %      Immat GRANS % 0 %      Lymphocytes Relative 20 %      Monocytes Relative 7 %      Eosinophils Relative 1 %      Basophils Relative 0 %      Neutrophils Absolute 6.83 Thousands/µL      Immature Grans Absolute 0.03 Thousand/uL      Lymphocytes Absolute 1.89 Thousands/µL      Monocytes Absolute 0.61 Thousand/µL      Eosinophils Absolute 0.06 Thousand/µL      Basophils Absolute 0.03 Thousands/µL                    No orders to display         Procedures  Procedures      ED Course  ED  Course as of 12/16/23 1235   Fri Dec 15, 2023   1645 Potassium(!): 3.1   1646 Magnesium(!): 1.6   1905 Potassium(!): 3.1                             SBIRT 22yo+      Flowsheet Row Most Recent Value   Initial Alcohol Screen: US AUDIT-C     3b. FEMALE Any Age, or MALE 65+: How often do you have 4 or more drinks on one occassion? 0 Filed at: 12/15/2023 1530   Audit-C Score 0 Filed at: 12/15/2023 1530                  Medical Decision Making  Amount and/or Complexity of Data Reviewed  Labs: ordered. Decision-making details documented in ED Course.    Risk  OTC drugs.  Prescription drug management.      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  The patient has had to have IV replacement of her electrolytes in the past.  The patient does follow-up with neurology who instructed the patient to come to the ED if she is unable to tolerate p.o. replacement.  The patient's last BMP was indicative of a potassium of 3.1 and a midnight magnesium of 1.6.  Patient is experiencing palpitations however she does not have any abnormalities seen on her EKG monitoring in room.  Patient is incredibly nauseous and is given IV Zofran on 2 occasions to try and help with her nausea and vomiting to ease into p.o. intake.  Patient was in able to handle p.o. medications requiring IV supplementation of potassium and magnesium.  The patient was admitted to medicine for further electrolyte replacement at this time.      Disposition  Final diagnoses:   Hypokalemia   Heart palpitations   SVT (supraventricular tachycardia)   10 weeks gestation of pregnancy     Time reflects when diagnosis was documented in both MDM as applicable and the Disposition within this note       Time User Action Codes Description Comment     12/15/2023  8:33 PM Johnathon Metz Add [E87.6] Hypokalemia     12/15/2023  8:33 PM Johnathon Metz Add [R00.2] Heart palpitations     12/15/2023  8:33 PM Johnathon Metz S Add [I47.10] SVT (supraventricular tachycardia)     12/15/2023 10:36 PM Johnathon Metz Add [Z3A.10] 10 weeks gestation of pregnancy           ED Disposition       None          Follow-up Information    None         Current Discharge Medication List        CONTINUE these medications which have NOT CHANGED    Details   !! AMILoride 5 mg tablet Take 10 mg by mouth every evening      !! AMILoride 5 mg tablet Take 20 mg in am and 10 mg in pm    Associated Diagnoses: Essential hypertension, benign; Hypokalemia; Hypomagnesemia; Vitamin D deficiency; POTS (postural orthostatic tachycardia syndrome)      busPIRone (BUSPAR) 5 mg tablet Take 1 tablet (5 mg total) by mouth 2 (two) times a day  Qty: 180 tablet, Refills: 3    Associated Diagnoses: RIYA (generalized anxiety disorder)      clobetasol (TEMOVATE) 0.05 % ointment Apply topically 2 (two) times a day  Qty: 60 g, Refills: 2    Associated Diagnoses: Other eczema      dicyclomine (BENTYL) 20 mg tablet Take 1 tablet (20 mg total) by mouth every 6 (six) hours  Qty: 120 tablet, Refills: 4    Associated Diagnoses: Irritable bowel syndrome with diarrhea      ferrous sulfate 325 (65 Fe) mg tablet Take 325 mg by mouth daily with breakfast      hydroxychloroquine (PLAQUENIL) 200 mg tablet Take 1.5 tablets (300 mg total) by mouth in the morning  Qty: 45 tablet, Refills: 6    Associated Diagnoses: Seropositive rheumatoid arthritis (HCC)      hydrOXYzine HCL (ATARAX) 10 mg tablet Take 1 tablet (10 mg total) by mouth 2 (two) times a day as needed for anxiety  Qty: 60 tablet, Refills: 1    Associated Diagnoses: RIYA (generalized anxiety disorder)      metoprolol succinate (TOPROL-XL) 50 mg 24 hr tablet Take 1 tablet (50 mg total) by mouth 2 (two) times a day  Qty: 90 tablet, Refills: 3    Associated  Diagnoses: Inappropriate sinus tachycardia      metoprolol tartrate (LOPRESSOR) 25 mg tablet Take 1 tablet (25 mg total) by mouth every 6 (six) hours as needed (palpitatins)  Qty: 90 tablet, Refills: 3    Associated Diagnoses: Heart palpitations      potassium chloride (K-DUR,KLOR-CON) 20 mEq tablet Take a total of 120 meq daily for 3-4 days then decrease to 100 meq thereafter.    Associated Diagnoses: Hypokalemia      Prenatal Multivit-Min-Fe-FA (PRE-GORDON PO) Take by mouth      tacrolimus (PROTOPIC) 0.1 % ointment Apply topically 2 (two) times a day  Qty: 100 g, Refills: 0    Associated Diagnoses: Eczema of both hands      triamcinolone (KENALOG) 0.025 % ointment Apply topically twice day for 14 days straight as needed for flares to thinner skin  Qty: 80 g, Refills: 3    Associated Diagnoses: Dyshidrotic dermatitis      triamcinolone (KENALOG) 0.1 % ointment Apply topically twice day for 14 days straight as needed for flares  Qty: 453.6 g, Refills: 3    Associated Diagnoses: Dyshidrotic dermatitis       !! - Potential duplicate medications found. Please discuss with provider.        No discharge procedures on file.    PDMP Review         Value Time User    PDMP Reviewed  Yes 2023  3:53 PM ENMA Echavarria             ED Provider  Attending physically available and evaluated Julita Berry. I managed the patient along with the ED Attending.    Electronically Signed by           Kulwinder Polanco MD  [phone]

## 2023-12-15 NOTE — ED ATTENDING ATTESTATION
12/15/2023  I, Kelsey Jung MD, saw and evaluated the patient. I have discussed the patient with the resident/non-physician practitioner and agree with the resident's/non-physician practitioner's findings, Plan of Care, and MDM as documented in the resident's/non-physician practitioner's note, except where noted. All available labs and Radiology studies were reviewed.  I was present for key portions of any procedure(s) performed by the resident/non-physician practitioner and I was immediately available to provide assistance.       At this point I agree with the current assessment done in the Emergency Department.  I have conducted an independent evaluation of this patient a history and physical is as follows:    27 y/o f at approximately 10 weeks pregnant, rheumatoid arthritis, baseline hypokalemia and hypomagnesia treated with amiloride and K-Jeni daily as well as diagnosis of inappropriate sinus tachycardia dysautonomia who presents with progressive nausea vomiting of pregnancy and generalized feeling of shakiness today.  Patient states she had blood work done yesterday that showed her potassium was 3.2.  She did discuss with her nephrologist who advised increasing potassium however she has vomited that up and has not been able to keep it down.  Patient also was unable to tolerate her metoprolol.  States that while at work today she felt shaky and lightheaded causing her to need to sit down.  She noted also that she was having more frequent PVCs which she normally does get when she is hypokalemic.  Also with a frontal headache today.  Otherwise denies any chest pain pressure.  No shortness of breath or dyspnea.  No visual changes.  No focal numbness weakness or tingling.  No urinary symptoms.  No stool changes.  Has had baseline ultrasound and intake with OB but otherwise has not had her first full appointment.  On exam patient is nontoxic.  She is mildly tachycardic.  HEENT is normocephalic and atraumatic  with mildly dry mucous membranes that are otherwise pink.  Neck is supple full range of motion.  Heart is tachycardic but regular.  Lungs are clear no wheezing rhonchi or rales.  No tachypnea.  Abdomen is soft positive bowel sounds no rebound no guarding nontender.  No edema no calf tenderness palpation.  Intact pulses.  Capillary fill less than 2 seconds.  Awake alert oriented and appropriate.  Assessment and plan 28-year-old female G1, P0 at 9 weeks pregnant with concern for hypokalemia and decreased oral intake secondary to nausea vomiting of pregnancy.  Will evaluate with repeat blood work.  Will give IV fluid hydration.  Will give antiemetic.  Once potassium is rechecked will replace accordingly.  Will monitor on telemetry.  Reevaluate and dispo accordingly.      ED Course     Pt with elevated HR after ambulation. States this happens often. She is asxm, denies lightheadedness/dizziness, cp/sob.     Pt feels improved, HR in the 80s. Still with mild nausea but declining additional medication at this time.     PT now requiring additional anti-emetic, given pt is having difficulty tolerating PO with ongoing issues with intermittent tachycardia and electrolyte abnormalities, will discuss possibility of admission    Critical Care Time  Procedures

## 2023-12-16 LAB
ALBUMIN SERPL BCP-MCNC: 3.4 G/DL (ref 3.5–5)
ALP SERPL-CCNC: 58 U/L (ref 34–104)
ALT SERPL W P-5'-P-CCNC: 12 U/L (ref 7–52)
ANION GAP SERPL CALCULATED.3IONS-SCNC: 8 MMOL/L
AST SERPL W P-5'-P-CCNC: 14 U/L (ref 13–39)
BASOPHILS # BLD AUTO: 0.04 THOUSANDS/ÂΜL (ref 0–0.1)
BASOPHILS NFR BLD AUTO: 0 % (ref 0–1)
BILIRUB SERPL-MCNC: 0.36 MG/DL (ref 0.2–1)
BUN SERPL-MCNC: 5 MG/DL (ref 5–25)
CALCIUM ALBUM COR SERPL-MCNC: 8.9 MG/DL (ref 8.3–10.1)
CALCIUM SERPL-MCNC: 8.4 MG/DL (ref 8.4–10.2)
CHLORIDE SERPL-SCNC: 108 MMOL/L (ref 96–108)
CO2 SERPL-SCNC: 23 MMOL/L (ref 21–32)
CREAT SERPL-MCNC: 0.5 MG/DL (ref 0.6–1.3)
EOSINOPHIL # BLD AUTO: 0.06 THOUSAND/ÂΜL (ref 0–0.61)
EOSINOPHIL NFR BLD AUTO: 1 % (ref 0–6)
ERYTHROCYTE [DISTWIDTH] IN BLOOD BY AUTOMATED COUNT: 12.6 % (ref 11.6–15.1)
GFR SERPL CREATININE-BSD FRML MDRD: 131 ML/MIN/1.73SQ M
GLUCOSE SERPL-MCNC: 81 MG/DL (ref 65–140)
HCT VFR BLD AUTO: 37 % (ref 34.8–46.1)
HGB BLD-MCNC: 13 G/DL (ref 11.5–15.4)
IMM GRANULOCYTES # BLD AUTO: 0.02 THOUSAND/UL (ref 0–0.2)
IMM GRANULOCYTES NFR BLD AUTO: 0 % (ref 0–2)
LYMPHOCYTES # BLD AUTO: 2.52 THOUSANDS/ÂΜL (ref 0.6–4.47)
LYMPHOCYTES NFR BLD AUTO: 27 % (ref 14–44)
MAGNESIUM SERPL-MCNC: 2.1 MG/DL (ref 1.9–2.7)
MCH RBC QN AUTO: 31.1 PG (ref 26.8–34.3)
MCHC RBC AUTO-ENTMCNC: 35.1 G/DL (ref 31.4–37.4)
MCV RBC AUTO: 89 FL (ref 82–98)
MONOCYTES # BLD AUTO: 0.77 THOUSAND/ÂΜL (ref 0.17–1.22)
MONOCYTES NFR BLD AUTO: 8 % (ref 4–12)
NEUTROPHILS # BLD AUTO: 5.84 THOUSANDS/ÂΜL (ref 1.85–7.62)
NEUTS SEG NFR BLD AUTO: 64 % (ref 43–75)
NRBC BLD AUTO-RTO: 0 /100 WBCS
PHOSPHATE SERPL-MCNC: 2.9 MG/DL (ref 2.7–4.5)
PLATELET # BLD AUTO: 182 THOUSANDS/UL (ref 149–390)
PMV BLD AUTO: 11.6 FL (ref 8.9–12.7)
POTASSIUM SERPL-SCNC: 3.4 MMOL/L (ref 3.5–5.3)
PROT SERPL-MCNC: 5.9 G/DL (ref 6.4–8.4)
RBC # BLD AUTO: 4.18 MILLION/UL (ref 3.81–5.12)
SODIUM SERPL-SCNC: 139 MMOL/L (ref 135–147)
TSH SERPL DL<=0.05 MIU/L-ACNC: 2.23 UIU/ML (ref 0.45–4.5)
WBC # BLD AUTO: 9.25 THOUSAND/UL (ref 4.31–10.16)

## 2023-12-16 PROCEDURE — 85025 COMPLETE CBC W/AUTO DIFF WBC: CPT | Performed by: INTERNAL MEDICINE

## 2023-12-16 PROCEDURE — 99222 1ST HOSP IP/OBS MODERATE 55: CPT | Performed by: INTERNAL MEDICINE

## 2023-12-16 PROCEDURE — NC001 PR NO CHARGE: Performed by: STUDENT IN AN ORGANIZED HEALTH CARE EDUCATION/TRAINING PROGRAM

## 2023-12-16 PROCEDURE — 84443 ASSAY THYROID STIM HORMONE: CPT | Performed by: INTERNAL MEDICINE

## 2023-12-16 PROCEDURE — 80053 COMPREHEN METABOLIC PANEL: CPT | Performed by: INTERNAL MEDICINE

## 2023-12-16 PROCEDURE — 99232 SBSQ HOSP IP/OBS MODERATE 35: CPT | Performed by: STUDENT IN AN ORGANIZED HEALTH CARE EDUCATION/TRAINING PROGRAM

## 2023-12-16 PROCEDURE — 83735 ASSAY OF MAGNESIUM: CPT | Performed by: INTERNAL MEDICINE

## 2023-12-16 PROCEDURE — 84100 ASSAY OF PHOSPHORUS: CPT | Performed by: INTERNAL MEDICINE

## 2023-12-16 PROCEDURE — 99255 IP/OBS CONSLTJ NEW/EST HI 80: CPT | Performed by: INTERNAL MEDICINE

## 2023-12-16 RX ORDER — POTASSIUM CHLORIDE 14.9 MG/ML
20 INJECTION INTRAVENOUS ONCE
Status: COMPLETED | OUTPATIENT
Start: 2023-12-16 | End: 2023-12-16

## 2023-12-16 RX ADMIN — PRENATAL VIT W/ FE FUMARATE-FA TAB 27-0.8 MG 1 TABLET: 27-0.8 TAB at 08:53

## 2023-12-16 RX ADMIN — POTASSIUM CHLORIDE 120 MEQ: 1500 TABLET, EXTENDED RELEASE ORAL at 08:53

## 2023-12-16 RX ADMIN — AMILORIDE HYDROCLORIDE 20 MG: 5 TABLET ORAL at 08:54

## 2023-12-16 RX ADMIN — FERROUS SULFATE TAB 325 MG (65 MG ELEMENTAL FE) 325 MG: 325 (65 FE) TAB at 08:53

## 2023-12-16 RX ADMIN — ONDANSETRON 4 MG: 2 INJECTION INTRAMUSCULAR; INTRAVENOUS at 23:18

## 2023-12-16 RX ADMIN — METOPROLOL SUCCINATE 50 MG: 50 TABLET, EXTENDED RELEASE ORAL at 17:36

## 2023-12-16 RX ADMIN — SODIUM CHLORIDE, SODIUM GLUCONATE, SODIUM ACETATE, POTASSIUM CHLORIDE, MAGNESIUM CHLORIDE, SODIUM PHOSPHATE, DIBASIC, AND POTASSIUM PHOSPHATE 125 ML/HR: .53; .5; .37; .037; .03; .012; .00082 INJECTION, SOLUTION INTRAVENOUS at 04:39

## 2023-12-16 RX ADMIN — ONDANSETRON 4 MG: 2 INJECTION INTRAMUSCULAR; INTRAVENOUS at 04:43

## 2023-12-16 RX ADMIN — POTASSIUM CHLORIDE 20 MEQ: 14.9 INJECTION, SOLUTION INTRAVENOUS at 12:39

## 2023-12-16 RX ADMIN — METOPROLOL SUCCINATE 50 MG: 50 TABLET, EXTENDED RELEASE ORAL at 08:53

## 2023-12-16 RX ADMIN — AMILORIDE HYDROCLORIDE 10 MG: 5 TABLET ORAL at 17:36

## 2023-12-16 RX ADMIN — SODIUM CHLORIDE, SODIUM GLUCONATE, SODIUM ACETATE, POTASSIUM CHLORIDE, MAGNESIUM CHLORIDE, SODIUM PHOSPHATE, DIBASIC, AND POTASSIUM PHOSPHATE 50 ML/HR: .53; .5; .37; .037; .03; .012; .00082 INJECTION, SOLUTION INTRAVENOUS at 12:38

## 2023-12-16 RX ADMIN — ONDANSETRON 4 MG: 2 INJECTION INTRAMUSCULAR; INTRAVENOUS at 14:28

## 2023-12-16 NOTE — UTILIZATION REVIEW
Initial Clinical Review    Admission: Date/Time/Statement:   Admission Orders (From admission, onward)       Ordered        12/15/23 2034  Inpatient Admission  Once                          Orders Placed This Encounter   Procedures    Inpatient Admission     Standing Status:   Standing     Number of Occurrences:   1     Order Specific Question:   Level of Care     Answer:   Med Surg [16]     Order Specific Question:   Estimated length of stay     Answer:   More than 2 Midnights     Order Specific Question:   Certification     Answer:   I certify that inpatient services are medically necessary for this patient for a duration of greater than two midnights. See H&P and MD Progress Notes for additional information about the patient's course of treatment.     ED Arrival Information       Expected   -    Arrival   12/15/2023 15:23    Acuity   Urgent              Means of arrival   Walk-In    Escorted by   Self    Service   Hospitalist    Admission type   Emergency              Arrival complaint   Abnormal Lab             Chief Complaint   Patient presents with    Abnormal Lab     Pt reports having her electrolytes checked yesterday and was told they were low; pt informed to be seen if symptoms worsen; reports having palpitations throughout day       Initial Presentation: 28 y.o. female to ED from home admitted Inpatient d/t heart palpitations and atrial tachycardia with hypokalemia. past medical history significant for 10 weeks pregnant, hypertension, vitamin D deficiency POTS syndrome with undifferentiated connective tissue disease, atrial tachycardia with episodic hypokalemia and hypophosphatemia.  She also has gastroparesis ankylosing spondylitis and acid reflux. Tele. metoprolol succinate and metoprolol tartrate. IV K & mg. Cardiology consult.Nephrology consult.    12/16 CARDIOLOGY CONSULT:PVC. Occurring in setting of pregnancy (10 weeks) with electrolyte disturbances.PVC are not harmful to baby/mother.Inappropriate  sinus tachycardia/dysautonomia. Heart rate elevated up to 160s at rest.Cardiac event monitor has not shown any abrupt changes in heart rate suggesting likely inappropriate sinus tachycardia.Recent event monitor with 8 sec of SVT.Reportedly had AT lasting few seconds on telemetry in ER; nothing on the floors.Continue metoprolol.Stool study was negative.Was on propranolol.Tele.Continue metoprolol.    12/16 NEPHROLOGY CONSULT:Acute on chronic hypokalemia.potassium was as low as 3.1.oral potassium.Amiloride.Isolyte with potassium chloride.now starting to eat better.Decrease her IV Isolyte to 50 cc an hour, increased urine output with IV fluids can create a potassium gradient and increase renal potassium wasting.Acute on chronic hypomagnesemia. IV mg.metoprolol, and amiloride.      Date: 12/16   Day 2:denies any acute chest pain shortness of breath fevers or chills.  starting to eat better.some lower extremity edema, her blood pressures remain adequate, she is tolerating her medications having some nausea but this is improving.  Palpitations better after correction.cannot take her magnesium supplement more than once a day because of IBS symptoms. metoprolol, and amiloride EP evaluated her and is following closely.    ED Triage Vitals [12/15/23 1530]   Temperature Pulse Respirations Blood Pressure SpO2   97.6 °F (36.4 °C) 104 18 146/78 100 %      Temp Source Heart Rate Source Patient Position - Orthostatic VS BP Location FiO2 (%)   Oral Monitor Sitting Right arm --      Pain Score       7          Wt Readings from Last 1 Encounters:   12/16/23 78 kg (171 lb 15.3 oz)     Additional Vital Signs:   12/16/23 11:06:30 98.4 °F (36.9 °C) 76 18 115/66 82 97 % -- --   12/16/23 0800 -- -- -- -- -- 99 % None (Room air) --   12/16/23 07:00:52 98.2 °F (36.8 °C) 79 16 118/74 89 98 % -- --   12/16/23 02:47:28 98.4 °F (36.9 °C) 95 16 99/54 69 90 % -- --   12/15/23 21:39:13 98.2 °F (36.8 °C) 82 12 123/90 101 98 % -- --   12/15/23 2015 --  89 20 127/64 89 100 % None (Room air) Sitting   12/15/23 2009 -- 98 -- 127/64 -- -- -- --   12/15/23 2000 -- -- -- -- -- -- None (Room air) --   12/15/23 1930 -- 100 18 173/67 Abnormal  96 100 % None (Room air) Sitting   12/15/23 1900 -- 104 18 140/64 92 100 % None (Room air) Sitting   12/15/23 1800 -- 96 18 129/63 90 100 % None (Room air) Sitting   12/15/23 1530 97.6 °F (36.4 °C) 104 18 146/78 101 100 % None (Room air) Sitting     Pertinent Labs/Diagnostic Test Results:   No orders to display         Results from last 7 days   Lab Units 12/16/23  0438 12/15/23  1555 12/11/23  1100   WBC Thousand/uL 9.25 9.45 6.91   HEMOGLOBIN g/dL 13.0 14.1 14.2   HEMATOCRIT % 37.0 40.5 41.0   PLATELETS Thousands/uL 182 220 206   NEUTROS ABS Thousands/µL 5.84 6.83 4.72         Results from last 7 days   Lab Units 12/16/23  0438 12/15/23  1555 12/14/23  1306 12/11/23  1100   SODIUM mmol/L 139 136 136 136   POTASSIUM mmol/L 3.4* 3.1* 3.2* 3.4*   CHLORIDE mmol/L 108 101 101 103   CO2 mmol/L 23 26 20* 26   ANION GAP mmol/L 8 9 15 7   BUN mg/dL 5 7 9 8   CREATININE mg/dL 0.50* 0.66 0.56* 0.51*   EGFR ml/min/1.73sq m 131 119 126 130   CALCIUM mg/dL 8.4 9.6 9.2 8.8   MAGNESIUM mg/dL 2.1 1.6* 1.7* 1.9   PHOSPHORUS mg/dL 2.9  --   --   --      Results from last 7 days   Lab Units 12/16/23  0438 12/15/23  1555 12/11/23  1100   AST U/L 14 19 18   ALT U/L 12 16 16   ALK PHOS U/L 58 79 72   TOTAL PROTEIN g/dL 5.9* 7.5 6.8   ALBUMIN g/dL 3.4* 4.3 4.0   TOTAL BILIRUBIN mg/dL 0.36 0.40 0.57         Results from last 7 days   Lab Units 12/16/23  0438 12/15/23  1555 12/14/23  1306   GLUCOSE RANDOM mg/dL 81 144* 109       Results from last 7 days   Lab Units 12/16/23  0438 12/15/23  1555   TSH 3RD GENERATON uIU/mL 2.228 1.648       Results from last 7 days   Lab Units 12/11/23  1100   HEP B S AG  Non-reactive   HEP C AB  Non-reactive       Results from last 7 days   Lab Units 12/15/23  2044 12/11/23  1100   CLARITY UA  Clear Clear   COLOR UA  Light  Yellow Colorless   SPEC GRAV UA  1.018 1.011   PH UA  6.0 7.0   GLUCOSE UA mg/dl Negative Negative   KETONES UA mg/dl Negative Negative   BLOOD UA  Negative Negative   PROTEIN UA mg/dl Trace* Negative   NITRITE UA  Negative Negative   BILIRUBIN UA  Negative Negative   UROBILINOGEN UA (BE) mg/dl <2.0 <2.0   LEUKOCYTES UA  Large* Negative   WBC UA /hpf 1-2 None Seen   RBC UA /hpf None Seen None Seen   BACTERIA UA /hpf Moderate* None Seen   EPITHELIAL CELLS WET PREP /hpf Occasional Occasional   MUCUS THREADS  Moderate*  --    CREATININE UR mg/dL  --  46.0   PROTEIN UR mg/dL  --  <4       Results from last 7 days   Lab Units 12/11/23  1100   URINE CULTURE  10,000-19,000 cfu/ml       ED Treatment:   Medication Administration from 12/15/2023 1523 to 12/15/2023 2136         Date/Time Order Dose Route Action Action by Comments                12/15/2023 1601 EST multi-electrolyte (ISOLYTE-S PH 7.4) bolus 1,000 mL 1,000 mL Intravenous New Bag       12/15/2023 1617 EST ondansetron (ZOFRAN) injection 4 mg 4 mg Intravenous Given                  12/15/2023 1730 EST potassium chloride 20 mEq IVPB (premix) 20 mEq Intravenous New Bag                  12/15/2023 1731 EST magnesium sulfate 2 g/50 mL IVPB (premix) 2 g 2 g Intravenous New Bag       12/15/2023 1731 EST acetaminophen (TYLENOL) tablet 650 mg 650 mg Oral Given                             12/15/2023 1808 EST sodium chloride 0.9 % bolus 1,000 mL 1,000 mL Intravenous New Bag       12/15/2023 1943 EST potassium chloride 20 mEq IVPB (premix) 20 mEq Intravenous New Bag       12/15/2023 2009 EST metoprolol tartrate (LOPRESSOR) tablet 50 mg 50 mg Oral Given       12/15/2023 2010 EST ondansetron (ZOFRAN) injection 4 mg 4 mg Intravenous Given                  12/15/2023 2102 EST multi-electrolyte (PLASMALYTE-A/ISOLYTE-S PH 7.4) IV solution 125 mL/hr Intravenous New Bag            Past Medical History:   Diagnosis Date    Anxiety     Eczema     Exercises 5 to 6 times per week     per  "pt prior to knee issue -enjoyed running and horseback riding    Family history of reaction to anesthesia     per pt--\"Mom also gets high anxiety with surgeries and wakes up nasty from anesthesia\"    Gastroparesis 2012    Hypertension     last assessed 3/12/14    Hypokalemia     per pt per doctors possibly related renal problem    Hypomagnesemia     Irritable bowel syndrome     with diarrhea    Rheumatoid arthritis (HCC) 2022    Sinus tachycardia     related to dehydration    Tooth missing     front upper tooth retainer-    Wears glasses     for computer use     Present on Admission:   Acid reflux   Anxiety   Essential hypertension, benign   Heart palpitations   Hypokalemia   Hypomagnesemia      Admitting Diagnosis: Hypokalemia [E87.6]  SVT (supraventricular tachycardia) [I47.10]  Heart palpitations [R00.2]  Abnormal laboratory test result [R89.9]  Age/Sex: 28 y.o. female  Admission Orders:  Scheduled Medications:  AMILoride, 10 mg, Oral, QPM  AMILoride, 20 mg, Oral, Daily  ferrous sulfate, 325 mg, Oral, Daily With Breakfast  metoprolol succinate, 50 mg, Oral, BID  multi-electrolyte, 1,000 mL, Intravenous, Once  potassium chloride, 120 mEq, Oral, Daily  potassium chloride, 20 mEq, Intravenous, Once  prenatal multivitamin, 1 tablet, Oral, Daily      Continuous IV Infusions:  multi-electrolyte, 50 mL/hr, Intravenous, Continuous      PRN Meds:  acetaminophen, 650 mg, Oral, Q6H PRN  aluminum-magnesium hydroxide-simethicone, 30 mL, Oral, Q6H PRN  busPIRone, 5 mg, Oral, BID PRN  dicyclomine, 20 mg, Oral, 4x Daily PRN  docusate sodium, 100 mg, Oral, BID PRN  hydrOXYzine HCL, 10 mg, Oral, BID PRN  metoprolol tartrate, 25 mg, Oral, Q6H PRN  ondansetron, 4 mg, Intravenous, Q6H PRN 12/16 X1    SCD  I&O  DAILY WEIGHTS  OOB  REG DIET      IP CONSULT TO NEPHROLOGY  IP CONSULT TO CARDIOLOGY  IP CONSULT TO CASE MANAGEMENT  IP CONSULT TO OB GYN    Network Utilization Review Department  ATTENTION: Please call with any questions or " concerns to [phone] and carefully listen to the prompts so that you are directed to the right person. All voicemails are confidential.   For Discharge needs, contact Care Management DC Support Team at [phone] opt. 2  Send all requests for admission clinical reviews, approved or denied determinations and any other requests to dedicated fax number below belonging to the [place] where the patient is receiving treatment. List of dedicated fax numbers for the Facilities:  FACILITY NAME UR FAX NUMBER   ADMISSION DENIALS (Administrative/Medical Necessity) [phone]   DISCHARGE SUPPORT TEAM (NETWORK) [phone]   PARENT CHILD HEALTH (Maternity/NICU/Pediatrics) [phone]   [facility] [phone]   [facility] [phone]   [facility] [phone]   [facility] [phone]   Critical access hospital [phone]   [facility] [phone]   [facility] [phone]   [facility] [phone]   [facility] [phone]   [facility] [phone]   [facility] [phone]

## 2023-12-16 NOTE — ASSESSMENT & PLAN NOTE
Repleted with potassium chloride IV and p.o.  Continue amiloride 10 mg in the evening and 20 mg in the morning.  Nephrology consult to continue.

## 2023-12-16 NOTE — H&P
[facility]  H&P  Name: Julita Berry 28 y.o. female I MRN: [id number]  [facility] I Date of Admission: 12/15/2023   Date of Service: 12/15/2023 I Hospital Day: 0      Assessment/Plan   * Heart palpitations  Assessment & Plan  Patient with palpitations and atrial tachycardia with an approach as a level of hypokalemia.  Patient sees Dr. Boyle for follow-up.  Currently she is on metoprolol succinate and as needed metoprolol tartrate.  Meanwhile we will place on telemetry for continued monitoring.  Cardiology consult in the morning.  Patient's potassium has been repleted p.o. and IV.  Will continue with Isolyte intravenous fluids.  Magnesium also repleted IV.  Will recheck metabolic profile in the morning.    Hypokalemia  Assessment & Plan  Repleted with potassium chloride IV and p.o.  Continue amiloride 10 mg in the evening and 20 mg in the morning.  Nephrology consult to continue.    Hypomagnesemia  Assessment & Plan  Magnesium repleted IV.  Will recheck metabolic profile in the morning.    10 weeks gestation of pregnancy  Assessment & Plan  Patient states that she is 10 weeks pregnant; currently sees OB/GYN.  Will place consult for in-hospital follow-up.    Acid reflux  Assessment & Plan  Continue Maalox as needed.  Bentyl for cramping.    Essential hypertension, benign  Assessment & Plan  Continue metoprolol.             VTE Prophylaxis: Pharmacologic VTE Prophylaxis contraindicated due to pregnancy   / sequential compression device   Code Status: Level 1 - Full Code as discussed  POLST: There is no POLST form on file for this patient (pre-hospital)    Anticipated Length of Stay:  Patient will be admitted on an Inpatient basis with an anticipated length of stay of greater than 2 midnights.   Justification for Hospital Stay: Please see detailed plans noted above.    Chief Complaint:     Palpitations and tachycardia  History of Present Illness:  Julita ALVAREZ  Jamal is a 28 y.o. female who has past medical history significant for hypertension, vitamin D deficiency POTS syndrome with undifferentiated connective tissue disease, atrial tachycardia with episodic hypokalemia and hypophosphatemia.  She also has gastroparesis ankylosing spondylitis and acid reflux.    Patient states that she has been following with nephrology ever since she was 17 years of age due to episodes of atrial tachycardia which is associated with hypokalemia.  With that, the patient needs to be on potassium supplementation and currently being 10 weeks pregnant, it seems that this has become worse and currently is placed on amiloride 20 mg in the morning and 10 mg at night with potassium supplementation as much is 120 mill equivalents daily for the next 4 days to decrease to 100 mill equivalents daily on day 5.    Patient also sees cardiology and currently she is on both metoprolol succinate and tartrate for the tachycardia/hypertension.  She takes the succinate round-the-clock in the tartrate on a as needed basis if she gets tachycardic.  Usually her heart rate is in the 70s but when she approaches a certain amount of hypokalemia, her heart rate goes up to the 90s and then shoots up to 120s.  Patient comes in with some nausea and vomiting possibly related to her reflux and hormonal changes associated with pregnancy.  Because she is taking amiloride plus the potassium, she is unable to take these medications making her gag.  Since about a week ago, she has episod complains of palpitationsic with headache and general nausea.  Her potassium was found to be 3.1 magnesium is 1.6.  She then received magnesium sulfate 1 g x 1 with potassium 20 mill equivalents IV.  She is placed in telemetry for further monitoring.    Currently, patient is looking comfortable on stretcher.  She does not appear to be in any distress.  No shortness of breath.    Review of Systems:    Constitutional:  Denies fever or chills  "  Eyes:  Denies change in visual acuity   HENT:  Denies nasal congestion or sore throat   Respiratory:  Denies cough or shortness of breath   Cardiovascular:  Denies chest pain or edema   GI:  Denies abdominal pain, nausea, vomiting, bloody stools or diarrhea   :  Denies dysuria   Musculoskeletal:  Denies back pain or joint pain   Integument:  Denies rash   Neurologic:  Denies headache, focal weakness or sensory changes   Endocrine:  Denies polyuria or polydipsia   Psychiatric:  Denies depression or anxiety     Past Medical and Surgical History:   Past Medical History:   Diagnosis Date    Anxiety     Eczema     Exercises 5 to 6 times per week     per pt prior to knee issue -enjoyed running and horseback riding    Family history of reaction to anesthesia     per pt--\"Mom also gets high anxiety with surgeries and wakes up nasty from anesthesia\"    Gastroparesis 2012    Hypertension     last assessed 3/12/14    Hypokalemia     per pt per doctors possibly related renal problem    Hypomagnesemia     Irritable bowel syndrome     with diarrhea    Rheumatoid arthritis (HCC) 2022    Sinus tachycardia     related to dehydration    Tooth missing     front upper tooth retainer-    Wears glasses     for computer use     Past Surgical History:   Procedure Laterality Date    DC ARTHROSCOPY KNEE LATERAL RELEASE Right 04/13/2023    Procedure: ARTHROSCOPIC RELEASE RETINACULAR;  Surgeon: Ilya Brizuela DO;  Location:  MAIN OR;  Service: Orthopedics    DC EGD TRANSORAL BIOPSY SINGLE/MULTIPLE N/A 05/04/2016    Procedure: ESOPHAGOGASTRODUODENOSCOPY (EGD);  Surgeon: Raji Monzon MD;  Location:  GI LAB;  Service: Gastroenterology    DC RCNSTJ DISLC PATELLA W/XTNSR RELIGNMT&/MUSC RL Right 04/13/2023    Procedure: ARTHROSCOPIC REALIGNMENT PATELLA;  Surgeon: Ilya Brizuela DO;  Location:  MAIN OR;  Service: Orthopedics    WISDOM TOOTH EXTRACTION         Meds/Allergies:  Medications Prior to Admission   Medication    AMILoride 5 mg " tablet    AMILoride 5 mg tablet    busPIRone (BUSPAR) 5 mg tablet    clobetasol (TEMOVATE) 0.05 % ointment    dicyclomine (BENTYL) 20 mg tablet    ferrous sulfate 325 (65 Fe) mg tablet    hydroxychloroquine (PLAQUENIL) 200 mg tablet    hydrOXYzine HCL (ATARAX) 10 mg tablet    metoprolol succinate (TOPROL-XL) 50 mg 24 hr tablet    metoprolol tartrate (LOPRESSOR) 25 mg tablet    potassium chloride (K-DUR,KLOR-CON) 20 mEq tablet    Prenatal Multivit-Min-Fe-FA (PRE- PO)    tacrolimus (PROTOPIC) 0.1 % ointment    triamcinolone (KENALOG) 0.025 % ointment    triamcinolone (KENALOG) 0.1 % ointment       Allergies:   Allergies   Allergen Reactions    Prednisone Hyperactivity     Medrol dose samantha only    Serotonin Reuptake Inhibitors (Ssris) Anaphylaxis and Hives    Augmentin [Amoxicillin-Pot Clavulanate] Hives and Rash    Clindamycin Rash    Penicillins Rash    Sulfa Antibiotics Rash    Azithromycin Rash     History:  Marital Status: /Civil Union   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  Patient Pre-hospital Living Situation: Lives at home  Patient Pre-hospital Level of Mobility: Ambulatory  Patient Pre-hospital Diet Restrictions: Regular  Substance Use History:   Social History     Substance and Sexual Activity   Alcohol Use Not Currently    Alcohol/week: 1.0 standard drink of alcohol    Types: 1 Standard drinks or equivalent per week     Social History     Tobacco Use   Smoking Status Never   Smokeless Tobacco Never     Social History     Substance and Sexual Activity   Drug Use No       Family History:  Family History   Problem Relation Age of Onset    Hypokalemia Mother     Hypotension Mother     Anxiety disorder Mother         NOS    Lung cancer Father     Skin cancer Father     Crohn's disease Father     Schizoaffective Disorder  Father     Alcohol abuse Father     Drug abuse Father     No Known Problems Sister     No Known Problems Sister     No Known Problems Brother     Coronary artery disease Maternal  "Grandmother     Heart disease Maternal Grandmother     Alzheimer's disease Maternal Grandmother     Arthritis Paternal Grandmother     Rheum arthritis Paternal Grandmother     Hypertension Paternal Grandmother     COPD Paternal Grandfather        Physical Exam:     Vitals:   Blood Pressure: 123/90 (12/15/23 2139)  Pulse: 82 (12/15/23 2139)  Temperature: 98.2 °F (36.8 °C) (12/15/23 2139)  Temp Source: Oral (12/15/23 1530)  Respirations: 12 (12/15/23 2139)  Height: 5' 4\" (162.6 cm) (12/15/23 1530)  Weight - Scale: 76.2 kg (168 lb) (12/15/23 1530)  SpO2: 98 % (12/15/23 2139)    Constitutional:  Well developed, well nourished, no acute distress, non-toxic appearance   Eyes:  PERRL, conjunctiva normal   HENT:  Atraumatic, external ears normal, nose normal, oropharynx moist, no pharyngeal exudates. Neck- normal range of motion, no tenderness, supple   Respiratory:  No respiratory distress, normal breath sounds, no rales, no wheezing   Cardiovascular:  Normal rate, normal rhythm, no murmurs, no gallops, no rubs   GI:  Soft, nondistended, normal bowel sounds, nontender, no organomegaly, no mass, no rebound, no guarding   :  No costovertebral angle tenderness   Musculoskeletal:  No edema, no tenderness, no deformities. Back- no tenderness  Integument:  Well hydrated, no rash   Lymphatic:  No lymphadenopathy noted   Neurologic:  Alert &awake, communicative, CN 2-12 normal, normal motor function, normal sensory function, no focal deficits noted   Psychiatric:  Speech and behavior appropriate       Lab Results: I have personally reviewed pertinent reports.      Results from last 7 days   Lab Units 12/15/23  1555   WBC Thousand/uL 9.45   HEMOGLOBIN g/dL 14.1   HEMATOCRIT % 40.5   PLATELETS Thousands/uL 220   NEUTROS PCT % 72   LYMPHS PCT % 20   MONOS PCT % 7   EOS PCT % 1     Results from last 7 days   Lab Units 12/15/23  1555   POTASSIUM mmol/L 3.1*   CHLORIDE mmol/L 101   CO2 mmol/L 26   BUN mg/dL 7   CREATININE mg/dL 0.66 "   CALCIUM mg/dL 9.6   ALK PHOS U/L 79   ALT U/L 16   AST U/L 19               Imaging: I have personally reviewed pertinent reports.      AMB US OB < 14 weeks single or first gestation level 1    Result Date: 12/5/2023  Narrative: Viable IUP at 9 0/7wks by LMP, confirmed by today's US         ** Please Note: Dragon 360 Dictation voice to text software was used in the creation of this document. **

## 2023-12-16 NOTE — CONSULTS
e-Consult (IPC)   Julita Berry 28 y.o. female MRN: [id number]  Unit/Bed#: [facility] 401-01 Encounter: [id number]      Reason for Consult / Principal Problem: admitted, pregnant  Inpatient consult to Obstetrics / Gynecology  Consult performed by: Saundra Caruso MD  Consult ordered by: Johnathon Metz MD        23    ASSESSMENT:  Julita Berry is a 28 y.o.  at 10w4d admitted for palpitations and atrial tachycardia, found to have multiple electrolyte abnormalities and c/f asymptomatic bacteruria    RECOMMENDATIONS:  -do not recommend treatment based off of urine microscopic alone if asymptomatic, recommend follow-up urine culture  -asymptomatic bacteruria usually defined as greater than 10^5/ml of isolated bacteria, however, if >75k colonies of isolated, non-native bacteria, reasonable to treat  -if unable to treat with keflex given penicillin allergy, reasonable to treat with macrobid 100mg BID for 5-7 days, will need COLETTE with Ob/GYN; if not sensitive to macrobid and have any questions regarding treatment, please feel free to reach out to covering Ob  -recommend auscultating fetal heart tones by doppler or TAUS during this admission  -call outpatient ob/gyn upon discharge to set up follow-up    5-10 minutes, >50% of the total time devoted to medical consultative verbal/EMR discussion between providers. Written report will be generated in the EMR.    Saundra Caruso MD

## 2023-12-16 NOTE — CONSULTS
Consultation -  Cardiac Electrophysiology  Julita Berry 28 y.o. female MRN: [id number]  Unit/Bed#: [facility] 401-01 Encounter: [id number]      Assessment/Plan:    PVC  Occurring in setting of pregnancy (10 weeks) with electrolyte disturbances  Had discussed with Dr. Boyle  Plan is to maintain current dose due to her pregancy as PVC are not harmful to baby/mother  PVC burden less than 1% on cardiac monitor in November   Tele showing no significant arrhythmias  Continue telemetry while inpt  Continue metoprolol  Inappropriate sinus tachycardia/dysautonomia  Symptoms since teenager  Heart rate elevated up to 160s at rest  Stool study was negative  Was on propranolol 160 Milic daily  Has had multiple ER visits  Sees Dr. Boyle as outpatient  Cardiac event monitor has not shown any abrupt changes in heart rate suggesting likely inappropriate sinus tachycardia  Recent event monitor with 8 sec of SVT  Reportedly had AT lasting few seconds on telemetry in ER; nothing on the floors  Continue metoprolol  Electrolyte disturbance   Feels arrhythmias with electrolyte disturbances  Palpitations better after correction   Appreciate nephrology recommendations    History of Present Illness   Physician Requesting Consult: Shmuel Clark  Reason for Consult / Principal Problem: electrolyte disturbances   HPI: Julita Berry is a 28 y.o. year old female who presents with 10 week pregnancy, hx of IST, who presents with electrolyte disturbances.  Patient has hypokalemia and hypomagnesia who is on amiloride and potassium daily as outpatient as well as history of inappropriate sinus tachycardia.  She had blood work done prior to arrival which showed potassium was 3.2.  She was advised to increase potassium but had vomiting.  She was feeling shaky and lightheadedness and having more frequent PVCs.  She also reported having headache.  She denied any chest pressure, shortness of breath, numbness tingling.  She presented to the  "emergency room.  Electrophysiology team is being consulted for arrhythmia management.    Consults    Review of Systems:   Negative except mentioned above    Historical Information   Past Medical History:   Diagnosis Date    Anxiety     Eczema     Exercises 5 to 6 times per week     per pt prior to knee issue -enjoyed running and horseback riding    Family history of reaction to anesthesia     per pt--\"Mom also gets high anxiety with surgeries and wakes up nasty from anesthesia\"    Gastroparesis 2012    Hypertension     last assessed 3/12/14    Hypokalemia     per pt per doctors possibly related renal problem    Hypomagnesemia     Irritable bowel syndrome     with diarrhea    Rheumatoid arthritis (HCC) 2022    Sinus tachycardia     related to dehydration    Tooth missing     front upper tooth retainer-    Wears glasses     for computer use     Past Surgical History:   Procedure Laterality Date    VT ARTHROSCOPY KNEE LATERAL RELEASE Right 04/13/2023    Procedure: ARTHROSCOPIC RELEASE RETINACULAR;  Surgeon: Ilya Brizuela DO;  Location:  MAIN OR;  Service: Orthopedics    VT EGD TRANSORAL BIOPSY SINGLE/MULTIPLE N/A 05/04/2016    Procedure: ESOPHAGOGASTRODUODENOSCOPY (EGD);  Surgeon: Raji Monzon MD;  Location:  GI LAB;  Service: Gastroenterology    VT RCNSTJ DISLC PATELLA W/XTNSR RELIGNMT&/MUSC RL Right 04/13/2023    Procedure: ARTHROSCOPIC REALIGNMENT PATELLA;  Surgeon: Ilya Brizuela DO;  Location:  MAIN OR;  Service: Orthopedics    WISDOM TOOTH EXTRACTION          Family History   Problem Relation Age of Onset    Hypokalemia Mother     Hypotension Mother     Anxiety disorder Mother         NOS    Lung cancer Father     Skin cancer Father     Crohn's disease Father     Schizoaffective Disorder  Father     Alcohol abuse Father     Drug abuse Father     No Known Problems Sister     No Known Problems Sister     No Known Problems Brother     Coronary artery disease Maternal Grandmother     Heart disease Maternal " Grandmother     Alzheimer's disease Maternal Grandmother     Arthritis Paternal Grandmother     Rheum arthritis Paternal Grandmother     Hypertension Paternal Grandmother     COPD Paternal Grandfather      Social History     Socioeconomic History    Marital status: /Civil Union     Spouse name: Not on file    Number of children: Not on file    Years of education: Not on file    Highest education level: Not on file   Occupational History    Occupation: medical assistant with nephrology    Tobacco Use    Smoking status: Never    Smokeless tobacco: Never   Vaping Use    Vaping status: Never Used   Substance and Sexual Activity    Alcohol use: Not Currently     Alcohol/week: 1.0 standard drink of alcohol     Types: 1 Standard drinks or equivalent per week    Drug use: No    Sexual activity: Yes     Partners: Male     Birth control/protection: None   Other Topics Concern    Not on file   Social History Narrative    Activities - horseback riding    Caffeine use    Drinks coffee- 1 a wk    Exercise - walking    Exercising regularly     Social Determinants of Health     Financial Resource Strain: Not on file   Food Insecurity: Not on file   Transportation Needs: Not on file   Physical Activity: Not on file   Stress: Not on file   Social Connections: Not on file   Intimate Partner Violence: Not on file   Housing Stability: Not on file     Social History     Tobacco Use   Smoking Status Never   Smokeless Tobacco Never     Social History     Substance and Sexual Activity   Alcohol Use Not Currently    Alcohol/week: 1.0 standard drink of alcohol    Types: 1 Standard drinks or equivalent per week     Meds/Allergies     Medication Administration - last 24 hours from 12/15/2023 0956 to 12/16/2023 0956         Date/Time Order Dose Route Action Action by     12/15/2023 1727 EST multi-electrolyte (ISOLYTE-S PH 7.4) bolus 1,000 mL 0 mL Intravenous Stopped Yisel Escalante RN     12/15/2023 1601 EST multi-electrolyte (ISOLYTE-S  PH 7.4) bolus 1,000 mL 1,000 mL Intravenous New Bag Yisel Escalante RN     12/15/2023 1617 EST ondansetron (ZOFRAN) injection 4 mg 4 mg Intravenous Given Yisel Escalante RN     12/15/2023 1942 EST potassium chloride 20 mEq IVPB (premix) 0 mEq Intravenous Stopped Nadine Menon RN     12/15/2023 1730 EST potassium chloride 20 mEq IVPB (premix) 20 mEq Intravenous New Bag Yisel Escalante RN     12/15/2023 1932 EST magnesium sulfate 2 g/50 mL IVPB (premix) 2 g 0 g Intravenous Stopped Nadine Menon RN     12/15/2023 1731 EST magnesium sulfate 2 g/50 mL IVPB (premix) 2 g 2 g Intravenous New Bag Yisel Escalante RN     12/15/2023 1731 EST acetaminophen (TYLENOL) tablet 650 mg 650 mg Oral Given Yisel Escalante RN     12/15/2023 1744 EST multi-electrolyte (ISOLYTE-S PH 7.4) bolus 1,000 mL 1,000 mL Intravenous Not Given Yisel Escalante RN     12/15/2023 2051 EST sodium chloride 0.9 % bolus 1,000 mL 0 mL Intravenous Stopped Nadine Menon RN     12/15/2023 1808 EST sodium chloride 0.9 % bolus 1,000 mL 1,000 mL Intravenous New Bag Yisel Escalante RN     12/15/2023 1943 EST potassium chloride 20 mEq IVPB (premix) 20 mEq Intravenous New Bag Nadine Menon RN     12/15/2023 2009 EST metoprolol tartrate (LOPRESSOR) tablet 50 mg 50 mg Oral Given Nadine Menon RN     12/15/2023 2010 EST ondansetron (ZOFRAN) injection 4 mg 4 mg Intravenous Given Nadine Menon RN     12/16/2023 0854 EST AMILoride tablet 20 mg 20 mg Oral Given Danuta Case RN     12/15/2023 2215 EST AMILoride tablet 10 mg 10 mg Oral Not Given Mai Tong RN     12/16/2023 0853 EST ferrous sulfate tablet 325 mg 325 mg Oral Given Danuta Case RN     12/16/2023 0853 EST metoprolol succinate (TOPROL-XL) 24 hr tablet 50 mg 50 mg Oral Given Danuta Case, SINDHU     12/15/2023 2055 EST metoprolol succinate (TOPROL-XL) 24 hr tablet 50 mg 50 mg Oral Not Given Nadine Menon, SINDHU     12/16/2023 0853 EST potassium chloride (K-DUR,KLOR-CON) CR  tablet 120 mEq 120 mEq Oral Given Danuta Case RN     12/16/2023 0853 EST prenatal multivitamin tablet 1 tablet 1 tablet Oral Given Danuta Case RN     12/16/2023 0439 EST multi-electrolyte (PLASMALYTE-A/ISOLYTE-S PH 7.4) IV solution 125 mL/hr Intravenous New Bag Mai Tong RN     12/16/2023 0438 EST multi-electrolyte (PLASMALYTE-A/ISOLYTE-S PH 7.4) IV solution 0 mL/hr Intravenous Stopped Mai Tong RN     12/15/2023 2102 EST multi-electrolyte (PLASMALYTE-A/ISOLYTE-S PH 7.4) IV solution 125 mL/hr Intravenous New Bag Nadine HECTOR Menon RN     12/16/2023 0443 EST ondansetron (ZOFRAN) injection 4 mg 4 mg Intravenous Given Mai Tong RN              Current Facility-Administered Medications:     acetaminophen (TYLENOL) tablet 650 mg, 650 mg, Oral, Q6H PRN, Johnathon Metz MD    aluminum-magnesium hydroxide-simethicone (MAALOX) oral suspension 30 mL, 30 mL, Oral, Q6H PRN, Johnathon Metz MD    AMILoride tablet 10 mg, 10 mg, Oral, QPM, Johnathon Metz MD    AMILoride tablet 20 mg, 20 mg, Oral, Daily, Johnathon Metz MD, 20 mg at 12/16/23 0854    busPIRone (BUSPAR) tablet 5 mg, 5 mg, Oral, BID PRN, Johnathon Metz MD    dicyclomine (BENTYL) tablet 20 mg, 20 mg, Oral, 4x Daily PRN, Johnathon Metz MD    docusate sodium (COLACE) capsule 100 mg, 100 mg, Oral, BID PRN, Johnathon Metz MD    ferrous sulfate tablet 325 mg, 325 mg, Oral, Daily With Breakfast, Johnathon Metz MD, 325 mg at 12/16/23 0853    hydrOXYzine HCL (ATARAX) tablet 10 mg, 10 mg, Oral, BID PRN, Johnathon Metz MD    metoprolol succinate (TOPROL-XL) 24 hr tablet 50 mg, 50 mg, Oral, BID, Johnathon Metz MD, 50 mg at 12/16/23 0853    metoprolol tartrate (LOPRESSOR) tablet 25 mg, 25 mg, Oral, Q6H PRN, Johnathon Metz MD    multi-electrolyte (ISOLYTE-S PH 7.4) bolus 1,000 mL, 1,000 mL, Intravenous, Once, Johnathon Metz MD    multi-electrolyte (PLASMALYTE-A/ISOLYTE-S PH 7.4) IV solution,  "125 mL/hr, Intravenous, Continuous, Johnathon Metz MD, Last Rate: 125 mL/hr at 12/16/23 0439, 125 mL/hr at 12/16/23 0439    ondansetron (ZOFRAN) injection 4 mg, 4 mg, Intravenous, Q6H PRN, Johnathon Metz MD, 4 mg at 12/16/23 0443    potassium chloride (K-DUR,KLOR-CON) CR tablet 120 mEq, 120 mEq, Oral, Daily, Johnathon Metz MD, 120 mEq at 12/16/23 0853    prenatal multivitamin tablet 1 tablet, 1 tablet, Oral, Daily, Johnathon Metz MD, 1 tablet at 12/16/23 0853    Allergies   Allergen Reactions    Prednisone Hyperactivity     Medrol dose samantha only    Serotonin Reuptake Inhibitors (Ssris) Anaphylaxis and Hives    Augmentin [Amoxicillin-Pot Clavulanate] Hives and Rash    Clindamycin Rash    Penicillins Rash    Sulfa Antibiotics Rash    Azithromycin Rash       Objective   Vitals: Blood pressure 118/74, pulse 79, temperature 98.2 °F (36.8 °C), resp. rate 16, height 5' 4\" (1.626 m), weight 78 kg (171 lb 15.3 oz), last menstrual period 10/03/2023, SpO2 99%, not currently breastfeeding., Body mass index is 29.52 kg/m².,   Orthostatic Blood Pressures      Flowsheet Row Most Recent Value   Blood Pressure 118/74 filed at 12/16/2023 0700   Patient Position - Orthostatic VS Sitting filed at 12/15/2023 2015              Intake/Output Summary (Last 24 hours) at 12/16/2023 0956  Last data filed at 12/16/2023 0740  Gross per 24 hour   Intake 1732.59 ml   Output 1050 ml   Net 682.59 ml       Invasive Devices       Peripheral Intravenous Line  Duration             Peripheral IV 12/15/23 Right Antecubital <1 day                      Physical Exam:  GEN: Julita Berry appears well, alert and oriented x 3, pleasant and cooperative   HEENT: pupils equal, round, and reactive to light; extraocular muscles intact  NECK: supple, no carotid bruits   HEART: regular rhythm, normal S1 and S2, no murmurs, clicks, gallops or rubs   LUNGS: clear to auscultation bilaterally; no wheezes, rales, or rhonchi   ABDOMEN: normal " "bowel sounds, soft, no tenderness, no distention  EXTREMITIES: peripheral pulses normal; no clubbing, cyanosis, or edema  NEURO: no focal findings   SKIN: normal without suspicious lesions on exposed skin      Lab Results:   CBC with diff:   Lab Results   Component Value Date    WBC 9.25 12/16/2023    HGB 13.0 12/16/2023    HCT 37.0 12/16/2023    MCV 89 12/16/2023     12/16/2023    RBC 4.18 12/16/2023    MCH 31.1 12/16/2023    MCHC 35.1 12/16/2023    RDW 12.6 12/16/2023    MPV 11.6 12/16/2023    NRBC 0 12/16/2023       CMP:  Lab Results   Component Value Date     (L) 01/07/2016    K 3.4 (L) 12/16/2023    K 3.3 (L) 01/07/2016     12/16/2023    CL 93 (L) 01/07/2016    CO2 23 12/16/2023    CO2 23.5 01/07/2016    ANIONGAP 10 01/07/2016    BUN 5 12/16/2023    BUN 15 01/07/2016    CREATININE 0.50 (L) 12/16/2023    CREATININE 0.93 01/07/2016    GLUCOSE 96 01/07/2016    CALCIUM 8.4 12/16/2023    CALCIUM 8.1 (L) 01/07/2016    AST 14 12/16/2023    AST 16 12/28/2015    ALT 12 12/16/2023    ALT 17 12/28/2015    ALKPHOS 58 12/16/2023    ALKPHOS 56 12/28/2015    PROT 8.2 12/28/2015    BILITOT 0.33 12/28/2015    EGFR 131 12/16/2023       BMP:  Results from last 7 days   Lab Units 12/16/23  0438 12/15/23  1555 12/14/23  1306   POTASSIUM mmol/L 3.4* 3.1* 3.2*   CHLORIDE mmol/L 108 101 101   CO2 mmol/L 23 26 20*   BUN mg/dL 5 7 9   CREATININE mg/dL 0.50* 0.66 0.56*   CALCIUM mg/dL 8.4 9.6 9.2       Magnesium:   Lab Results   Component Value Date    MG 2.1 12/16/2023    MG 1.3 (L) 01/07/2016       CPK:       Troponin: No results found for: \"TROPONINI\"    BNP: No results found for: \"BNP\"    Coags:   Lab Results   Component Value Date    PTT 31 03/25/2021    PTT 25 10/03/2014    INR 1.01 03/25/2021    INR 1.01 07/04/2015       TSH: No results found for: \"TSH\"    Lipid Profile: No results found for: \"LABLIPI\"    Imaging: I have personally reviewed pertinent reports.    AMB US OB < 14 weeks single or first gestation " level 1    Result Date: 12/5/2023  Narrative: Viable IUP at 9 0/7wks by LMP, confirmed by today's US       Cardiac testing:   No results found for this or any previous visit.    No results found for this or any previous visit.    No results found for this or any previous visit.    No results found for this or any previous visit.        EKG: no ECG available to review  Tele - sinus rhythm with occasional PVC      Rick Null MD    * This note was completed in part utilizing [employer] direct voice recognition software.   Grammatical errors, random word insertion, spelling mistakes, and incomplete sentences may be an occasional consequence of the system secondary to software limitations, ambient noise and hardware issues. At the time of dictation, efforts were made to edit, clarify and /or correct errors. Please read the chart carefully and recognize, using context, where substitutions have occurred.  If you have any questions or concerns about the context, text or information contained within the body of this dictation, please contact myself, the provider, for further clarification.

## 2023-12-16 NOTE — PLAN OF CARE
Problem: GASTROINTESTINAL - ADULT  Goal: Minimal or absence of nausea and/or vomiting  Description: INTERVENTIONS:  - Administer IV fluids if ordered to ensure adequate hydration  - Maintain NPO status until nausea and vomiting are resolved  - Nasogastric tube if ordered  - Administer ordered antiemetic medications as needed  - Provide nonpharmacologic comfort measures as appropriate  - Advance diet as tolerated, if ordered  - Consider nutrition services referral to assist patient with adequate nutrition and appropriate food choices  Outcome: Progressing     Problem: GASTROINTESTINAL - ADULT  Goal: Maintains adequate nutritional intake  Description: INTERVENTIONS:  - Monitor percentage of each meal consumed  - Identify factors contributing to decreased intake, treat as appropriate  - Assist with meals as needed  - Monitor I&O, weight, and lab values if indicated  - Obtain nutrition services referral as needed  Outcome: Progressing     Problem: CARDIOVASCULAR - ADULT  Goal: Maintains optimal cardiac output and hemodynamic stability  Description: INTERVENTIONS:  - Monitor I/O, vital signs and rhythm  - Monitor for S/S and trends of decreased cardiac output  - Administer and titrate ordered vasoactive medications to optimize hemodynamic stability  - Assess quality of pulses, skin color and temperature  - Assess for signs of decreased coronary artery perfusion  - Instruct patient to report change in severity of symptoms  Outcome: Progressing     Problem: CARDIOVASCULAR - ADULT  Goal: Absence of cardiac dysrhythmias or at baseline rhythm  Description: INTERVENTIONS:  - Continuous cardiac monitoring, vital signs, obtain 12 lead EKG if ordered  - Administer antiarrhythmic and heart rate control medications as ordered  - Monitor electrolytes and administer replacement therapy as ordered  Outcome: Progressing

## 2023-12-16 NOTE — ASSESSMENT & PLAN NOTE
Patient states that she is 10 weeks pregnant; currently sees OB/GYN.  Will place consult for in-hospital follow-up.

## 2023-12-16 NOTE — CONSULTS
Consultation - Nephrology   Julita Berry 28 y.o. female MRN: [id number]  Unit/Bed#: [facility] 401-01 Encounter: [id number]    ASSESSMENT & PLAN    Acute on chronic hypokalemia follows very closely with Dr. Jim Covarrubias has had a negative secondary workup  Her potassium was as low as 3.1  She is on 120 mill equivalents of oral potassium   Amiloride 20 mg in the morning and 10 mg at night  She received Isolyte with 20 mill equivalents of potassium chloride she is now starting to eat better  Decrease her IV Isolyte to 50 cc an hour, increased urine output with IV fluids can create a potassium gradient and increase renal potassium wasting  We will give an additional 20 mill equivalents of IV potassium  We will continue 120 mill equivalents of oral potassium  Has had an extensive workup in the past    Acute on chronic hypomagnesemia  Unable to take significant amounts of magnesium as an outpatient  We discussed potentially changing formulation to see if her diarrhea improves  For now with IV repletion this is much better    Hypotension with concerns for POTS  She has been following with electrophysiology as well as nephrology for several years  Is on metoprolol, and amiloride  Overall blood pressures are adequate currently    Heart palpitations with sinus tachycardia versus SVT  EP evaluated her and is following closely    10 weeks into pregnancy  Does notice some lower extremity edema, her blood pressures remain adequate, she is tolerating her medications having some nausea but this is improving        HISTORY OF PRESENT ILLNESS:  Requesting Physician: Shmuel Clark  Reason for Consult: Hyponatremia and hypomagnesemia     Julita Berry is a 28 y.o. year old female who was admitted for heart palpitations was found to be hypokalemic and hypomagnesemic, she has a history of sinus tachycardia versus SVT,.  This appears to be inappropriate sinus tachycardia this seems to be exacerbated by low potassium.  She  "unfortunately does not seem to absorb much oral potassium or magnesium.  Has had several discussions with her outpatient nephrologist about starting infusions.  She is now 10 weeks pregnant, her urine output is stable denies any acute chest pain shortness of breath fevers or chills.  She states she has had a very poor appetite.  She states she cannot take her magnesium supplement more than once a day because of IBS symptoms    PAST MEDICAL HISTORY:  Past Medical History:   Diagnosis Date    Anxiety     Eczema     Exercises 5 to 6 times per week     per pt prior to knee issue -enjoyed running and horseback riding    Family history of reaction to anesthesia     per pt--\"Mom also gets high anxiety with surgeries and wakes up nasty from anesthesia\"    Gastroparesis 2012    Hypertension     last assessed 3/12/14    Hypokalemia     per pt per doctors possibly related renal problem    Hypomagnesemia     Irritable bowel syndrome     with diarrhea    Rheumatoid arthritis (HCC) 2022    Sinus tachycardia     related to dehydration    Tooth missing     front upper tooth retainer-    Wears glasses     for computer use       PAST SURGICAL HISTORY:  Past Surgical History:   Procedure Laterality Date    WY ARTHROSCOPY KNEE LATERAL RELEASE Right 04/13/2023    Procedure: ARTHROSCOPIC RELEASE RETINACULAR;  Surgeon: Ilya Brizuela DO;  Location:  MAIN OR;  Service: Orthopedics    WY EGD TRANSORAL BIOPSY SINGLE/MULTIPLE N/A 05/04/2016    Procedure: ESOPHAGOGASTRODUODENOSCOPY (EGD);  Surgeon: Raji Monzon MD;  Location:  GI LAB;  Service: Gastroenterology    WY RCNSTJ DISLC PATELLA W/XTNSR RELIGNMT&/MUSC RL Right 04/13/2023    Procedure: ARTHROSCOPIC REALIGNMENT PATELLA;  Surgeon: Ilya Brizuela DO;  Location:  MAIN OR;  Service: Orthopedics    WISDOM TOOTH EXTRACTION         ALLERGIES:  Allergies   Allergen Reactions    Prednisone Hyperactivity     Medrol dose samantha only    Serotonin Reuptake Inhibitors (Ssris) Anaphylaxis and " Hives    Augmentin [Amoxicillin-Pot Clavulanate] Hives and Rash    Clindamycin Rash    Penicillins Rash    Sulfa Antibiotics Rash    Azithromycin Rash       SOCIAL HISTORY:  Social History     Substance and Sexual Activity   Alcohol Use Not Currently    Alcohol/week: 1.0 standard drink of alcohol    Types: 1 Standard drinks or equivalent per week     Social History     Substance and Sexual Activity   Drug Use No     Social History     Tobacco Use   Smoking Status Never   Smokeless Tobacco Never       FAMILY HISTORY:  Family History   Problem Relation Age of Onset    Hypokalemia Mother     Hypotension Mother     Anxiety disorder Mother         NOS    Lung cancer Father     Skin cancer Father     Crohn's disease Father     Schizoaffective Disorder  Father     Alcohol abuse Father     Drug abuse Father     No Known Problems Sister     No Known Problems Sister     No Known Problems Brother     Coronary artery disease Maternal Grandmother     Heart disease Maternal Grandmother     Alzheimer's disease Maternal Grandmother     Arthritis Paternal Grandmother     Rheum arthritis Paternal Grandmother     Hypertension Paternal Grandmother     COPD Paternal Grandfather        MEDICATIONS:    Current Facility-Administered Medications:     acetaminophen (TYLENOL) tablet 650 mg, 650 mg, Oral, Q6H PRN, Johnathon Metz MD    aluminum-magnesium hydroxide-simethicone (MAALOX) oral suspension 30 mL, 30 mL, Oral, Q6H PRN, Johnathon Metz MD    AMILoride tablet 10 mg, 10 mg, Oral, QPM, Johnathon Metz MD    AMILoride tablet 20 mg, 20 mg, Oral, Daily, Johnatohn Metz MD, 20 mg at 12/16/23 0854    busPIRone (BUSPAR) tablet 5 mg, 5 mg, Oral, BID PRN, Johnathon Metz MD    dicyclomine (BENTYL) tablet 20 mg, 20 mg, Oral, 4x Daily PRN, Johnathon Metz MD    docusate sodium (COLACE) capsule 100 mg, 100 mg, Oral, BID PRN, Johnathon Metz MD    ferrous sulfate tablet 325 mg, 325 mg, Oral, Daily With Breakfast,  Johnathon Metz MD, 325 mg at 12/16/23 0853    hydrOXYzine HCL (ATARAX) tablet 10 mg, 10 mg, Oral, BID PRN, Johnathon Metz MD    metoprolol succinate (TOPROL-XL) 24 hr tablet 50 mg, 50 mg, Oral, BID, Johnathon Metz MD, 50 mg at 12/16/23 0853    metoprolol tartrate (LOPRESSOR) tablet 25 mg, 25 mg, Oral, Q6H PRN, Johnathon Metz MD    multi-electrolyte (ISOLYTE-S PH 7.4) bolus 1,000 mL, 1,000 mL, Intravenous, Once, Johnathon Metz MD    multi-electrolyte (PLASMALYTE-A/ISOLYTE-S PH 7.4) IV solution, 50 mL/hr, Intravenous, Continuous, Carroll Rice, DO, Last Rate: 125 mL/hr at 12/16/23 0439, 125 mL/hr at 12/16/23 0439    ondansetron (ZOFRAN) injection 4 mg, 4 mg, Intravenous, Q6H PRN, Johnathon Metz MD, 4 mg at 12/16/23 0443    potassium chloride (K-DUR,KLOR-CON) CR tablet 120 mEq, 120 mEq, Oral, Daily, Johnathon Metz MD, 120 mEq at 12/16/23 0853    potassium chloride 20 mEq IVPB (premix), 20 mEq, Intravenous, Once, Carroll Rice, DO    prenatal multivitamin tablet 1 tablet, 1 tablet, Oral, Daily, Johnathon Metz MD, 1 tablet at 12/16/23 0853    REVIEW OF SYSTEMS:    A 12 point review of systems was performed and was negative besides what is mentioned in HPI    OBJECTIVE:    Vitals:    12/16/23 0600 12/16/23 0700 12/16/23 0800 12/16/23 1106   BP:  118/74  115/66   BP Location:       Pulse:  79  76   Resp:  16  18   Temp:  98.2 °F (36.8 °C)  98.4 °F (36.9 °C)   TempSrc:       SpO2:  98% 99% 97%   Weight: 78 kg (171 lb 15.3 oz)      Height:            Temp:  [97.6 °F (36.4 °C)-98.4 °F (36.9 °C)] 98.4 °F (36.9 °C)  HR:  [] 76  Resp:  [12-20] 18  BP: ()/(54-90) 115/66  SpO2:  [90 %-100 %] 97 %   Body mass index is 29.52 kg/m².    I/O last 3 completed shifts:  In: 1614.6 [I.V.:964.6; IV Piggyback:650]  Out: 1050 [Urine:1050]  I/O this shift:  In: 236 [P.O.:236]  Out: 600 [Urine:600]    Weight (last 2 days)       Date/Time Weight    12/16/23 0600 78 (171.96)    12/15/23  "2036 78 (171.96)    12/15/23 1530 76.2 (168)             Physical exam:    General: no acute distress, cooperative  Eyes: conjunctivae pink, anicteric sclerae  ENT: lips and mucous membranes moist, no exudates, normal external ears  Neck: ROM intact, no JVD  Chest: No respiratory distress, no accessory muscle use  CVS: normal rate, non pericardial friction rub  Abdomen: soft, non-tender, non-distended, normoactive bowel sounds  Extremities: no edema of both legs  Skin: no rash  Neuro: awake, alert, oriented, grossly intact  Psych:  Pleasant affect      Invasive Devices:      Lab Results:   Results from last 7 days   Lab Units 12/16/23  0438 12/15/23  2044 12/15/23  1555 12/14/23  1306 12/11/23  1100   WBC Thousand/uL 9.25  --  9.45  --  6.91   HEMOGLOBIN g/dL 13.0  --  14.1  --  14.2   HEMATOCRIT % 37.0  --  40.5  --  41.0   PLATELETS Thousands/uL 182  --  220  --  206   POTASSIUM mmol/L 3.4*  --  3.1* 3.2* 3.4*   CHLORIDE mmol/L 108  --  101 101 103   CO2 mmol/L 23  --  26 20* 26   BUN mg/dL 5  --  7 9 8   CREATININE mg/dL 0.50*  --  0.66 0.56* 0.51*   CALCIUM mg/dL 8.4  --  9.6 9.2 8.8   MAGNESIUM mg/dL 2.1  --  1.6* 1.7* 1.9   PHOSPHORUS mg/dL 2.9  --   --   --   --    ALK PHOS U/L 58  --  79  --  72   ALT U/L 12  --  16  --  16   AST U/L 14  --  19  --  18   NITRITE UA   --  Negative  --   --  Negative   BLOOD UA   --  Negative  --   --  Negative   LEUKOCYTES UA   --  Large*  --   --  Negative         Portions of the record may have been created with voice recognition software. Occasional wrong word or \"sound a like\" substitutions may have occurred due to the inherent limitations of voice recognition software. Read the chart carefully and recognize, using context, where substitutions have occurred.If you have any questions, please contact the dictating provider.    "

## 2023-12-16 NOTE — UTILIZATION REVIEW
"NOTIFICATION OF INPATIENT ADMISSION   AUTHORIZATION REQUEST   SERVICING FACILITY:   [facility]  Address: [address] [place] [address]  Tax ID: [id number]  NPI: [id number] ATTENDING PROVIDER:  Attending Name and NPI#: Shmuel Clark [[id number]]  Address: [address] [place] [address]  Phone: [phone]   ADMISSION INFORMATION:  Place of Service: Inpatient [facility]  Place of Service Code: 21  Inpatient Admission Date/Time: 12/15/23  8:34 PM  Discharge Date/Time: No discharge date for patient encounter.  Admitting Diagnosis Code/Description:  Hypokalemia [E87.6]  SVT (supraventricular tachycardia) [I47.10]  Heart palpitations [R00.2]  Abnormal laboratory test result [R89.9]     UTILIZATION REVIEW CONTACT:  Yennifer \"Anika\"Dru Utilization   Network Utilization Review Department  Phone: [phone]  Fax: [phone]  Email: [email or web address]  Contact for approvals/pending authorizations, clinical reviews, and discharge.     PHYSICIAN ADVISORY SERVICES:  Medical Necessity Denial & Excw-si-Hwjy Review  Phone: [phone]  Fax: [phone]  Email: [email or web address]     DISCHARGE SUPPORT TEAM:  For Patients Discharge Needs & Updates  Phone: [phone] opt. 2 Fax: [phone]  Email: [email or web address]      "

## 2023-12-16 NOTE — ASSESSMENT & PLAN NOTE
Patient states that she is 10 weeks pregnant; currently sees OB/GYN.  Awaiting fetal heart tone monitoring prior to discharge  Urinalysis with bacteriuriaa, discussed with OB and would elect not to treat for now, monitor urine culture

## 2023-12-16 NOTE — ASSESSMENT & PLAN NOTE
Patient with palpitations and atrial tachycardia with an approach as a level of hypokalemia.  Patient sees Dr. Boyle for follow-up.  Currently she is on metoprolol succinate and as needed metoprolol tartrate.  Meanwhile we will place on telemetry for continued monitoring.  Cardiology consult in the morning.  Patient's potassium has been repleted p.o. and IV.  Will continue with Isolyte intravenous fluids.  Magnesium also repleted IV.  Will recheck metabolic profile in the morning.

## 2023-12-16 NOTE — PROGRESS NOTES
[facility]  Progress Note  Name: Julita Berry I  MRN: [id number]  [facility] I Date of Admission: 12/15/2023   Date of Service: 12/16/2023 I Hospital Day: 1    Assessment/Plan   10 weeks gestation of pregnancy  Assessment & Plan  Patient states that she is 10 weeks pregnant; currently sees OB/GYN.  Awaiting fetal heart tone monitoring prior to discharge  Urinalysis with bacteriuriaa, discussed with OB and would elect not to treat for now, monitor urine culture    Hypokalemia  Assessment & Plan  Repleted with potassium chloride IV and p.o.  Continue amiloride 10 mg in the evening and 20 mg in the morning.  Nephrology consult to continue.    Acid reflux  Assessment & Plan  Continue Maalox as needed.  Bentyl for cramping.    Hypomagnesemia  Assessment & Plan  Magnesium repleted IV.  Will recheck metabolic profile in the morning.    Essential hypertension, benign  Assessment & Plan  Continue metoprolol.    * Heart palpitations  Assessment & Plan  Patient with palpitations and atrial tachycardia with an approach as a level of hypokalemia.  Patient sees Dr. Boyle for follow-up.  Currently she is on metoprolol succinate and as needed metoprolol tartrate.  Meanwhile we will place on telemetry for continued monitoring.  Cardiology consult in the morning.  Patient's potassium has been repleted p.o. and IV.  Will continue with Isolyte intravenous fluids.  Magnesium also repleted IV.  Will recheck metabolic profile in the morning.           VTE Pharmacologic Prophylaxis:   Moderate Risk (Score 3-4) - Pharmacological DVT Prophylaxis Contraindicated. Sequential Compression Devices Ordered.    Mobility:   Basic Mobility Inpatient Raw Score: 24  -HLM Goal: 8: Walk 250 feet or more  HLM Goal achieved. Continue to encourage appropriate mobility.    Patient Centered Rounds: I performed bedside rounds with nursing staff today.   Discussions with Specialists or Other Care Team  Provider: Urology    Education and Discussions with Family / Patient: Patient declined call to .     Total Time Spent on Date of Encounter in care of patient: 45 mins. This time was spent on one or more of the following: performing physical exam; counseling and coordination of care; obtaining or reviewing history; documenting in the medical record; reviewing/ordering tests, medications or procedures; communicating with other healthcare professionals and discussing with patient's family/caregivers.    Current Length of Stay: 1 day(s)  Current Patient Status: Inpatient   Certification Statement: The patient will continue to require additional inpatient hospital stay due to Palpitations  Discharge Plan: Anticipate discharge tomorrow to home.    Code Status: Level 1 - Full Code    Subjective:   Seen and examined at bedside, patient resting comfortably.  Symptoms of palpitations are improving with treatment of her electrolyte abnormalities.  She has no other concerns at this time    Objective:     Vitals:   Temp (24hrs), Av.2 °F (36.8 °C), Min:98.1 °F (36.7 °C), Max:98.4 °F (36.9 °C)    Temp:  [98.1 °F (36.7 °C)-98.4 °F (36.9 °C)] 98.1 °F (36.7 °C)  HR:  [] 71  Resp:  [12-20] 18  BP: ()/(54-90) 113/65  SpO2:  [90 %-100 %] 99 %  Body mass index is 29.52 kg/m².     Input and Output Summary (last 24 hours):     Intake/Output Summary (Last 24 hours) at 2023 1752  Last data filed at 2023 1522  Gross per 24 hour   Intake 1850.59 ml   Output 2750 ml   Net -899.41 ml       Physical Exam:   Physical Exam  Vitals reviewed.   Constitutional:       General: She is not in acute distress.     Appearance: She is not ill-appearing.   HENT:      Head: Normocephalic.      Mouth/Throat:      Mouth: Mucous membranes are moist.   Eyes:      General: No scleral icterus.     Extraocular Movements: Extraocular movements intact.   Cardiovascular:      Rate and Rhythm: Normal rate and regular rhythm.       Pulses: Normal pulses.      Heart sounds: No murmur heard.     No friction rub. No gallop.   Pulmonary:      Effort: Pulmonary effort is normal. No respiratory distress.      Breath sounds: No wheezing, rhonchi or rales.   Abdominal:      General: Abdomen is flat. Bowel sounds are normal. There is no distension.      Palpations: Abdomen is soft.      Tenderness: There is no abdominal tenderness. There is no guarding or rebound.   Musculoskeletal:      Right lower leg: No edema.      Left lower leg: No edema.   Skin:     General: Skin is warm.      Capillary Refill: Capillary refill takes less than 2 seconds.      Coloration: Skin is not jaundiced.      Findings: No rash.   Neurological:      General: No focal deficit present.      Mental Status: She is alert and oriented to person, place, and time.      Sensory: No sensory deficit.      Motor: No weakness.   Psychiatric:         Mood and Affect: Mood normal.         Behavior: Behavior normal.          Additional Data:     Labs:  Results from last 7 days   Lab Units 12/16/23  0438   WBC Thousand/uL 9.25   HEMOGLOBIN g/dL 13.0   HEMATOCRIT % 37.0   PLATELETS Thousands/uL 182   NEUTROS PCT % 64   LYMPHS PCT % 27   MONOS PCT % 8   EOS PCT % 1     Results from last 7 days   Lab Units 12/16/23  0438   SODIUM mmol/L 139   POTASSIUM mmol/L 3.4*   CHLORIDE mmol/L 108   CO2 mmol/L 23   BUN mg/dL 5   CREATININE mg/dL 0.50*   ANION GAP mmol/L 8   CALCIUM mg/dL 8.4   ALBUMIN g/dL 3.4*   TOTAL BILIRUBIN mg/dL 0.36   ALK PHOS U/L 58   ALT U/L 12   AST U/L 14   GLUCOSE RANDOM mg/dL 81                       Lines/Drains:  Invasive Devices       Peripheral Intravenous Line  Duration             Peripheral IV 12/15/23 Right Antecubital 1 day                    Imaging: No pertinent imaging reviewed.    Recent Cultures (last 7 days):   Results from last 7 days   Lab Units 12/11/23  1100   URINE CULTURE  10,000-19,000 cfu/ml       Last 24 Hours Medication List:   Current  Facility-Administered Medications   Medication Dose Route Frequency Provider Last Rate    acetaminophen  650 mg Oral Q6H PRN Johnathon Metz MD      aluminum-magnesium hydroxide-simethicone  30 mL Oral Q6H PRN Johnathon Metz MD      AMILoride  10 mg Oral QPM Johnathon Metz MD      AMILoride  20 mg Oral Daily Johnathon Metz MD      busPIRone  5 mg Oral BID PRN Johnathon Metz MD      dicyclomine  20 mg Oral 4x Daily PRN Johnathon Metz MD      docusate sodium  100 mg Oral BID PRN Johnathon eMtz MD      ferrous sulfate  325 mg Oral Daily With Breakfast Johnathon Metz MD      hydrOXYzine HCL  10 mg Oral BID PRN Johnathon Metz MD      metoprolol succinate  50 mg Oral BID Johnathon Metz MD      metoprolol tartrate  25 mg Oral Q6H PRN Johnathon Metz MD      multi-electrolyte  1,000 mL Intravenous Once Johnathon Metz MD      multi-electrolyte  50 mL/hr Intravenous Continuous Carroll Rice, DO 50 mL/hr (12/16/23 1238)    ondansetron  4 mg Intravenous Q6H PRN Johnathon Metz MD      potassium chloride  120 mEq Oral Daily Johnahton Metz MD      prenatal multivitamin  1 tablet Oral Daily Johnathon Metz MD          Today, Patient Was Seen By: Shmuel Clark    **Please Note: This note may have been constructed using a voice recognition system.**

## 2023-12-17 ENCOUNTER — APPOINTMENT (INPATIENT)
Dept: RADIOLOGY | Facility: HOSPITAL | Age: 28
End: 2023-12-17
Payer: COMMERCIAL

## 2023-12-17 VITALS
DIASTOLIC BLOOD PRESSURE: 67 MMHG | BODY MASS INDEX: 29.02 KG/M2 | TEMPERATURE: 98.2 F | SYSTOLIC BLOOD PRESSURE: 107 MMHG | RESPIRATION RATE: 18 BRPM | HEIGHT: 64 IN | HEART RATE: 66 BPM | WEIGHT: 169.97 LBS | OXYGEN SATURATION: 98 %

## 2023-12-17 LAB
ANION GAP SERPL CALCULATED.3IONS-SCNC: 8 MMOL/L
ATRIAL RATE: 126 BPM
ATRIAL RATE: 147 BPM
BUN SERPL-MCNC: 6 MG/DL (ref 5–25)
CALCIUM SERPL-MCNC: 9 MG/DL (ref 8.4–10.2)
CHLORIDE SERPL-SCNC: 107 MMOL/L (ref 96–108)
CO2 SERPL-SCNC: 23 MMOL/L (ref 21–32)
CREAT SERPL-MCNC: 0.63 MG/DL (ref 0.6–1.3)
ERYTHROCYTE [DISTWIDTH] IN BLOOD BY AUTOMATED COUNT: 12.6 % (ref 11.6–15.1)
GFR SERPL CREATININE-BSD FRML MDRD: 121 ML/MIN/1.73SQ M
GLUCOSE SERPL-MCNC: 81 MG/DL (ref 65–140)
HCT VFR BLD AUTO: 38.7 % (ref 34.8–46.1)
HGB BLD-MCNC: 13.7 G/DL (ref 11.5–15.4)
MAGNESIUM SERPL-MCNC: 1.8 MG/DL (ref 1.9–2.7)
MCH RBC QN AUTO: 30.9 PG (ref 26.8–34.3)
MCHC RBC AUTO-ENTMCNC: 35.4 G/DL (ref 31.4–37.4)
MCV RBC AUTO: 87 FL (ref 82–98)
P AXIS: 67 DEGREES
P AXIS: 83 DEGREES
PLATELET # BLD AUTO: 202 THOUSANDS/UL (ref 149–390)
PMV BLD AUTO: 11.8 FL (ref 8.9–12.7)
POTASSIUM SERPL-SCNC: 3.7 MMOL/L (ref 3.5–5.3)
PR INTERVAL: 136 MS
PR INTERVAL: 156 MS
QRS AXIS: 55 DEGREES
QRS AXIS: 60 DEGREES
QRSD INTERVAL: 74 MS
QRSD INTERVAL: 78 MS
QT INTERVAL: 282 MS
QT INTERVAL: 340 MS
QTC INTERVAL: 441 MS
QTC INTERVAL: 492 MS
RBC # BLD AUTO: 4.44 MILLION/UL (ref 3.81–5.12)
SODIUM SERPL-SCNC: 138 MMOL/L (ref 135–147)
T WAVE AXIS: 28 DEGREES
T WAVE AXIS: 30 DEGREES
VENTRICULAR RATE: 126 BPM
VENTRICULAR RATE: 147 BPM
WBC # BLD AUTO: 8.21 THOUSAND/UL (ref 4.31–10.16)

## 2023-12-17 PROCEDURE — 83735 ASSAY OF MAGNESIUM: CPT | Performed by: STUDENT IN AN ORGANIZED HEALTH CARE EDUCATION/TRAINING PROGRAM

## 2023-12-17 PROCEDURE — 99232 SBSQ HOSP IP/OBS MODERATE 35: CPT | Performed by: INTERNAL MEDICINE

## 2023-12-17 PROCEDURE — 80048 BASIC METABOLIC PNL TOTAL CA: CPT | Performed by: STUDENT IN AN ORGANIZED HEALTH CARE EDUCATION/TRAINING PROGRAM

## 2023-12-17 PROCEDURE — 76801 OB US < 14 WKS SINGLE FETUS: CPT

## 2023-12-17 PROCEDURE — 85027 COMPLETE CBC AUTOMATED: CPT | Performed by: STUDENT IN AN ORGANIZED HEALTH CARE EDUCATION/TRAINING PROGRAM

## 2023-12-17 PROCEDURE — 99239 HOSP IP/OBS DSCHRG MGMT >30: CPT | Performed by: STUDENT IN AN ORGANIZED HEALTH CARE EDUCATION/TRAINING PROGRAM

## 2023-12-17 RX ORDER — POTASSIUM CHLORIDE 14.9 MG/ML
20 INJECTION INTRAVENOUS ONCE
Status: COMPLETED | OUTPATIENT
Start: 2023-12-17 | End: 2023-12-17

## 2023-12-17 RX ORDER — AMILORIDE HYDROCHLORIDE 5 MG/1
10 TABLET ORAL DAILY
Status: DISCONTINUED | OUTPATIENT
Start: 2023-12-18 | End: 2023-12-17 | Stop reason: HOSPADM

## 2023-12-17 RX ORDER — MAGNESIUM SULFATE HEPTAHYDRATE 40 MG/ML
2 INJECTION, SOLUTION INTRAVENOUS ONCE
Status: COMPLETED | OUTPATIENT
Start: 2023-12-17 | End: 2023-12-17

## 2023-12-17 RX ORDER — AMILORIDE HYDROCHLORIDE 5 MG/1
10 TABLET ORAL 2 TIMES DAILY
Start: 2023-12-17 | End: 2023-12-27

## 2023-12-17 RX ADMIN — MAGNESIUM SULFATE HEPTAHYDRATE 2 G: 40 INJECTION, SOLUTION INTRAVENOUS at 12:55

## 2023-12-17 RX ADMIN — FERROUS SULFATE TAB 325 MG (65 MG ELEMENTAL FE) 325 MG: 325 (65 FE) TAB at 08:26

## 2023-12-17 RX ADMIN — ONDANSETRON 4 MG: 2 INJECTION INTRAMUSCULAR; INTRAVENOUS at 05:32

## 2023-12-17 RX ADMIN — POTASSIUM CHLORIDE 20 MEQ: 14.9 INJECTION, SOLUTION INTRAVENOUS at 07:10

## 2023-12-17 RX ADMIN — AMILORIDE HYDROCLORIDE 10 MG: 5 TABLET ORAL at 11:23

## 2023-12-17 RX ADMIN — PRENATAL VIT W/ FE FUMARATE-FA TAB 27-0.8 MG 1 TABLET: 27-0.8 TAB at 08:25

## 2023-12-17 RX ADMIN — POTASSIUM CHLORIDE 120 MEQ: 1500 TABLET, EXTENDED RELEASE ORAL at 08:25

## 2023-12-17 NOTE — PROGRESS NOTES
NEPHROLOGY PROGRESS NOTE   Julita Berry 28 y.o. female MRN: [id number]  Unit/Bed#: [facility] 401-01 Encounter: [id number]      ASSESSMENT & PLAN     Acute on chronic hypokalemia follows very closely with . Jim Covarrubias has had a negative secondary workup  Her potassium was as low as 3.1  She is on 120 mill equivalents of oral potassium   Amiloride 20 mg in the morning and 10 mg at night-now she is feeling lightheaded with some low blood pressures we will decrease back to 10 mg in the morning and 10 at night  She states she is eating better.  Her potassium is better, her magnesium is stable  Her IV fluids have been discontinued  She is getting another 20 mill equivalents of potassium chloride today     Acute on chronic hypomagnesemia  Unable to take significant amounts of magnesium as an outpatient  We discussed potentially changing formulation to see if her diarrhea improves  For now with IV repletion this is much better  We will give another 2 g of IV magnesium today     Hypotension with concerns for POTS  She has been following with electrophysiology as well as nephrology for several years  Is on metoprolol, and amiloride  Could consider decreasing her metoprolol and giving a higher dose of the amiloride however we will have her EP doctor and her primary nephrologist weigh on on this.  For now given that she has had some hypotensive symptoms we will decrease her amiloride back to with her dose of 10 mg twice daily.  Her amiloride was recently increased to 20 mg daily in the morning and 10 mg at night     Heart palpitations with sinus tachycardia versus SVT  EP evaluated her and is following closely     10 weeks into pregnancy  Waiting for a GYN ultrasound    SUBJECTIVE:    Patient was seen today.  Sitting up resting comfortably  States she feels okay and denies any acute chest pain or shortness of breath    12 point review of systems was otherwise negative besides what is mentioned  above.    Medications:    Current Facility-Administered Medications:     acetaminophen (TYLENOL) tablet 650 mg, 650 mg, Oral, Q6H PRN, Johnathon Metz MD    aluminum-magnesium hydroxide-simethicone (MAALOX) oral suspension 30 mL, 30 mL, Oral, Q6H PRN, Johnathon Metz MD    AMILoride tablet 10 mg, 10 mg, Oral, QPM, Johnathon Metz MD, 10 mg at 12/16/23 1736    AMILoride tablet 20 mg, 20 mg, Oral, Daily, Johnathon Metz MD, 20 mg at 12/16/23 0854    busPIRone (BUSPAR) tablet 5 mg, 5 mg, Oral, BID PRN, Johnathon Metz MD    dicyclomine (BENTYL) tablet 20 mg, 20 mg, Oral, 4x Daily PRN, Johnathon Metz MD    docusate sodium (COLACE) capsule 100 mg, 100 mg, Oral, BID PRN, Johnathon Metz MD    ferrous sulfate tablet 325 mg, 325 mg, Oral, Daily With Breakfast, Johnathon Metz MD, 325 mg at 12/17/23 0826    hydrOXYzine HCL (ATARAX) tablet 10 mg, 10 mg, Oral, BID PRN, Johnathon Metz MD    magnesium sulfate 2 g/50 mL IVPB (premix) 2 g, 2 g, Intravenous, Once, Carroll Rice DO    metoprolol succinate (TOPROL-XL) 24 hr tablet 50 mg, 50 mg, Oral, BID, Johnathon Metz MD, 50 mg at 12/16/23 1736    metoprolol tartrate (LOPRESSOR) tablet 25 mg, 25 mg, Oral, Q6H PRN, Johnathon Metz MD    multi-electrolyte (ISOLYTE-S PH 7.4) bolus 1,000 mL, 1,000 mL, Intravenous, Once, Johnathon Metz MD    ondansetron (ZOFRAN) injection 4 mg, 4 mg, Intravenous, Q6H PRN, Johnathon Metz MD, 4 mg at 12/17/23 0532    potassium chloride (K-DUR,KLOR-CON) CR tablet 120 mEq, 120 mEq, Oral, Daily, Johnathon Metz MD, 120 mEq at 12/17/23 0825    prenatal multivitamin tablet 1 tablet, 1 tablet, Oral, Daily, Johnathon Metz MD, 1 tablet at 12/17/23 0825    OBJECTIVE:    Vitals:    12/17/23 0225 12/17/23 0600 12/17/23 0719 12/17/23 0800   BP: (!) 91/47  105/59    Pulse: 64  66    Resp: 15      Temp: 98.5 °F (36.9 °C)  98.2 °F (36.8 °C)    TempSrc:       SpO2: 98%  99% 98%   Weight:   77.1 kg (169 lb 15.6 oz)     Height:            Temp:  [98.1 °F (36.7 °C)-98.5 °F (36.9 °C)] 98.2 °F (36.8 °C)  HR:  [63-76] 66  Resp:  [15-18] 15  BP: ()/(47-66) 105/59  SpO2:  [97 %-99 %] 98 %     Body mass index is 29.18 kg/m².    Weight (last 2 days)       Date/Time Weight    12/17/23 0600 77.1 (169.97)    12/16/23 0600 78 (171.96)    12/15/23 2036 78 (171.96)    12/15/23 1530 76.2 (168)            I/O last 3 completed shifts:  In: 2510.6 [P.O.:896; I.V.:964.6; IV Piggyback:650]  Out: 3350 [Urine:3350]    I/O this shift:  In: 520 [P.O.:520]  Out: 350 [Urine:350]      Physical exam:    General: no acute distress, cooperative  Eyes: conjunctivae pink, anicteric sclerae  ENT: lips and mucous membranes moist, no exudates, normal external ears  Neck: ROM intact, no JVD  Chest: No respiratory distress, no accessory muscle use  CVS: normal rate, non pericardial friction rub  Abdomen: soft, non-tender, non-distended, normoactive bowel sounds  Extremities: no edema of both legs  Skin: no rash  Neuro: awake, alert, oriented, grossly intact  Psych:  Pleasant affect    Invasive Devices:      Lab Results:   Results from last 7 days   Lab Units 12/17/23  0530 12/16/23  0438 12/15/23  2044 12/15/23  1555 12/14/23  1306 12/11/23  1100   WBC Thousand/uL 8.21 9.25  --  9.45  --  6.91   HEMOGLOBIN g/dL 13.7 13.0  --  14.1  --  14.2   HEMATOCRIT % 38.7 37.0  --  40.5  --  41.0   PLATELETS Thousands/uL 202 182  --  220  --  206   POTASSIUM mmol/L 3.7 3.4*  --  3.1* 3.2* 3.4*   CHLORIDE mmol/L 107 108  --  101 101 103   CO2 mmol/L 23 23  --  26 20* 26   BUN mg/dL 6 5  --  7 9 8   CREATININE mg/dL 0.63 0.50*  --  0.66 0.56* 0.51*   CALCIUM mg/dL 9.0 8.4  --  9.6 9.2 8.8   MAGNESIUM mg/dL 1.8* 2.1  --  1.6* 1.7* 1.9   PHOSPHORUS mg/dL  --  2.9  --   --   --   --    ALK PHOS U/L  --  58  --  79  --  72   ALT U/L  --  12  --  16  --  16   AST U/L  --  14  --  19  --  18   NITRITE UA   --   --  Negative  --   --  Negative  "  LEUKOCYTES UA   --   --  Large*  --   --  Negative   BLOOD UA   --   --  Negative  --   --  Negative         Portions of the record may have been created with voice recognition software. Occasional wrong word or \"sound a like\" substitutions may have occurred due to the inherent limitations of voice recognition software. Read the chart carefully and recognize, using context, where substitutions have occurred.If you have any questions, please contact the dictating provider.    "

## 2023-12-17 NOTE — DISCHARGE SUMMARY
[facility]  Discharge- Julita Berry 1995, 28 y.o. female MRN: [id number]  Unit/Bed#: [facility] 401-01 Encounter: [id number]  Primary Care Provider: ENMA Echavarria   Date and time admitted to hospital: 12/15/2023  3:23 PM    10 weeks gestation of pregnancy  Assessment & Plan  Patient states that she is 10 weeks pregnant; currently sees OB/GYN.  Awaiting fetal heart tone monitoring prior to discharge  Urinalysis with bacteriuriaa, discussed with OB and would elect not to treat for now, monitor urine culture    Hypokalemia  Assessment & Plan  Repleted with potassium chloride IV and p.o.  Continue amiloride 10 mg in the evening and 20 mg in the morning.  Nephrology consult to continue.    Acid reflux  Assessment & Plan  Continue Maalox as needed.  Bentyl for cramping.    Hypomagnesemia  Assessment & Plan  Magnesium repleted IV.  Will recheck metabolic profile in the morning.    Essential hypertension, benign  Assessment & Plan  Continue metoprolol.    * Heart palpitations  Assessment & Plan  Patient with palpitations and atrial tachycardia with an approach as a level of hypokalemia.  Patient sees Dr. Boyle for follow-up.  Currently she is on metoprolol succinate and as needed metoprolol tartrate.  Meanwhile we will place on telemetry for continued monitoring.  Cardiology consult in the morning.  Patient's potassium has been repleted p.o. and IV.  Will continue with Isolyte intravenous fluids.  Magnesium also repleted IV.  Will recheck metabolic profile in the morning.      Medical Problems       Resolved Problems  Date Reviewed: 12/17/2023   None       Discharging Physician / Practitioner: Shmuel Clark  PCP: ENMA Echavarria  Admission Date:   Admission Orders (From admission, onward)       Ordered        12/15/23 2034  Inpatient Admission  Once                          Discharge Date: 12/17/23    Consultations During Hospital  "Stay:  Nephrology  Cardiology    Procedures Performed:   None    Significant Findings / Test Results:   US OB 12/17/23- Single live intrauterine gestation at 10 weeks 6 days. ESTELLE of 7/8/2024.  K 3.1, Mg 1.6    Incidental Findings:   N/A     Test Results Pending at Discharge (will require follow up):   None     Outpatient Tests Requested:  Outpatient OB followup    Complications:  None    Reason for Admission: Palpitations    Hospital Course:   Julita Berry is a 28 y.o. female patient who originally presented to the hospital on 12/15/2023 due to palpitations, she has had multiple ED visits relating to her chronic electrolyte abnormalities and cardiac issues.  She has seen cardiology and nephrology in the outpatient setting.  She came to the ED for palpitations and referred for admission for electrolyte repletion.  Cardiology and nephro consulted.  No significant events on telemetry as patient remained in sinus rhythm.  Cardiology did not adjust patient's metoprolol dose.  Nephrology aided in electrolyte repletion and creased amiloride dose to 10 twice daily for dizziness.  OB consulted who recommended fetal heart tone monitoring which was within normal range.  Patient discharged with plans to follow-up with her outpatient providers    Please see above list of diagnoses and related plan for additional information.     Condition at Discharge: good    Discharge Day Visit / Exam:   Subjective:  Laying in bed in no acute distress  Vitals: Blood Pressure: 104/60 (12/17/23 1124)  Pulse: 66 (12/17/23 0719)  Temperature: 98.6 °F (37 °C) (12/17/23 1124)  Temp Source: Oral (12/15/23 1530)  Respirations: 18 (12/17/23 1124)  Height: 5' 4\" (162.6 cm) (12/15/23 1530)  Weight - Scale: 77.1 kg (169 lb 15.6 oz) (12/17/23 0600)  SpO2: 98 % (12/17/23 0800)  Exam:   Physical Exam  Vitals reviewed.   Constitutional:       General: She is not in acute distress.     Appearance: She is not ill-appearing.   HENT:      Head: " Normocephalic.      Mouth/Throat:      Mouth: Mucous membranes are moist.   Eyes:      General: No scleral icterus.     Extraocular Movements: Extraocular movements intact.   Cardiovascular:      Rate and Rhythm: Normal rate and regular rhythm.      Pulses: Normal pulses.      Heart sounds: No murmur heard.     No friction rub. No gallop.   Pulmonary:      Effort: Pulmonary effort is normal. No respiratory distress.      Breath sounds: No wheezing, rhonchi or rales.   Abdominal:      General: Abdomen is flat. Bowel sounds are normal. There is no distension.      Palpations: Abdomen is soft.      Tenderness: There is no abdominal tenderness. There is no guarding or rebound.   Musculoskeletal:      Right lower leg: No edema.      Left lower leg: No edema.   Skin:     General: Skin is warm.      Capillary Refill: Capillary refill takes less than 2 seconds.      Coloration: Skin is not jaundiced.      Findings: No rash.   Neurological:      General: No focal deficit present.      Mental Status: She is alert and oriented to person, place, and time.      Sensory: No sensory deficit.      Motor: No weakness.   Psychiatric:         Mood and Affect: Mood normal.         Behavior: Behavior normal.          Discussion with Family: Patient declined call to .     Discharge instructions/Information to patient and family:   See after visit summary for information provided to patient and family.      Provisions for Follow-Up Care:  See after visit summary for information related to follow-up care and any pertinent home health orders.      Mobility at time of Discharge:   Basic Mobility Inpatient Raw Score: 24  JH-HLM Goal: 8: Walk 250 feet or more  JH-HLM Achieved: 8: Walk 250 feet ot more  HLM Goal achieved. Continue to encourage appropriate mobility.     Disposition:   Home    Planned Readmission: No     Discharge Statement:  I spent 45 minutes discharging the patient. This time was spent on the day of discharge. I  had direct contact with the patient on the day of discharge. Greater than 50% of the total time was spent examining patient, answering all patient questions, arranging and discussing plan of care with patient as well as directly providing post-discharge instructions.  Additional time then spent on discharge activities.    Discharge Medications:  See after visit summary for reconciled discharge medications provided to patient and/or family.      **Please Note: This note may have been constructed using a voice recognition system**

## 2023-12-18 ENCOUNTER — TRANSITIONAL CARE MANAGEMENT (OUTPATIENT)
Dept: FAMILY MEDICINE CLINIC | Facility: CLINIC | Age: 28
End: 2023-12-18

## 2023-12-18 ENCOUNTER — TELEPHONE (OUTPATIENT)
Dept: NEPHROLOGY | Facility: CLINIC | Age: 28
End: 2023-12-18

## 2023-12-18 DIAGNOSIS — O99.891 BACTERIURIA DURING PREGNANCY: Primary | ICD-10-CM

## 2023-12-18 DIAGNOSIS — R82.71 BACTERIURIA DURING PREGNANCY: Primary | ICD-10-CM

## 2023-12-18 NOTE — TELEPHONE ENCOUNTER
Patient aware    ----- Message from Abelardo Covarrubias MD sent at 12/18/2023  8:14 AM EST -----  1.  Have her go for a basic metabolic profile magnesium this week  2.  Have her send in a week of blood pressures  ----- Message -----  From: Carroll Rice DO  Sent: 12/17/2023  12:14 PM EST  To: MD Quentin RobeNahidelle came in over the weekend.  We repleted her lytes IV.  Sodium and potassium are stable on discharge.  Her blood pressures were a little low with the increase in the amiloride to 20 mg and she was symptomatic in the morning so I asked her to go back down to 10 mg in the morning and 10 mg in the evening.  I also asked her to call you this week to discuss may be going down on the metoprolol so you can increase the amiloride but I know she has a somewhat complicated history so did not make that change here.  she is feeling better. Thanks.

## 2023-12-21 ENCOUNTER — TELEPHONE (OUTPATIENT)
Dept: NEPHROLOGY | Facility: CLINIC | Age: 28
End: 2023-12-21

## 2023-12-21 ENCOUNTER — APPOINTMENT (OUTPATIENT)
Dept: LAB | Facility: CLINIC | Age: 28
End: 2023-12-21
Payer: COMMERCIAL

## 2023-12-21 DIAGNOSIS — E87.6 HYPOKALEMIA: ICD-10-CM

## 2023-12-21 DIAGNOSIS — E83.42 HYPOMAGNESEMIA: ICD-10-CM

## 2023-12-21 DIAGNOSIS — R82.71 BACTERIURIA DURING PREGNANCY: ICD-10-CM

## 2023-12-21 DIAGNOSIS — O99.891 BACTERIURIA DURING PREGNANCY: ICD-10-CM

## 2023-12-21 LAB
ANION GAP SERPL CALCULATED.3IONS-SCNC: 10 MMOL/L
BUN SERPL-MCNC: 11 MG/DL (ref 5–25)
CALCIUM SERPL-MCNC: 9.3 MG/DL (ref 8.4–10.2)
CHLORIDE SERPL-SCNC: 103 MMOL/L (ref 96–108)
CO2 SERPL-SCNC: 24 MMOL/L (ref 21–32)
CREAT SERPL-MCNC: 0.7 MG/DL (ref 0.6–1.3)
GFR SERPL CREATININE-BSD FRML MDRD: 117 ML/MIN/1.73SQ M
GLUCOSE SERPL-MCNC: 130 MG/DL (ref 65–140)
MAGNESIUM SERPL-MCNC: 1.7 MG/DL (ref 1.9–2.7)
POTASSIUM SERPL-SCNC: 3.5 MMOL/L (ref 3.5–5.3)
SODIUM SERPL-SCNC: 137 MMOL/L (ref 135–147)

## 2023-12-21 PROCEDURE — 83735 ASSAY OF MAGNESIUM: CPT

## 2023-12-21 PROCEDURE — 36415 COLL VENOUS BLD VENIPUNCTURE: CPT

## 2023-12-21 PROCEDURE — 80048 BASIC METABOLIC PNL TOTAL CA: CPT

## 2023-12-21 NOTE — TELEPHONE ENCOUNTER
Patient aware.    I would recommend the following  1.  Try to keep potassium as high as she can so 100 mill equivalents would be awesome  2.  Try to see if we can increase amiloride 10 mg in the morning and 15 mg in the evening so just the 5 mg increase to see if that would work

## 2023-12-21 NOTE — TELEPHONE ENCOUNTER
Spoke with patient, she is currently taking  mEq of potassium daily depending on if she can keep it down.  She just received her magnesium supplement and nausea medication yesterday and is starting them.  She takes amiloride 10mg twice daily because 30mg was creating low blood pressures.    ----- Message from Abelardo Covarrubias MD sent at 12/21/2023  1:11 PM EST -----  Labs are better however I do want to tweak both magnesium and potassium  1.  Please find out exactly what potassium/magnesium/amiloride doses she is taking  2.  Then I will adjust  3.  Then write for a basic metabolic profile magnesium in 1 week  ----- Message -----  From: Lab, Background User  Sent: 12/21/2023  12:02 PM EST  To: Abelardo Covarrubias MD

## 2023-12-26 ENCOUNTER — HOSPITAL ENCOUNTER (OUTPATIENT)
Facility: HOSPITAL | Age: 28
Setting detail: OBSERVATION
Discharge: HOME | End: 2023-12-27
Attending: EMERGENCY MEDICINE | Admitting: INTERNAL MEDICINE
Payer: COMMERCIAL

## 2023-12-26 DIAGNOSIS — O21.9 NAUSEA AND VOMITING IN PREGNANCY: ICD-10-CM

## 2023-12-26 DIAGNOSIS — E87.6 HYPOKALEMIA: ICD-10-CM

## 2023-12-26 DIAGNOSIS — O21.9 VOMITING AFFECTING PREGNANCY: Primary | ICD-10-CM

## 2023-12-26 DIAGNOSIS — E83.42 HYPOMAGNESEMIA: ICD-10-CM

## 2023-12-26 LAB
BILIRUB UR QL STRIP: NEGATIVE
CLARITY UR: CLEAR
COLOR UR: YELLOW
EXT PREGNANCY TEST URINE: POSITIVE
EXT. CONTROL: ABNORMAL
GLUCOSE UR STRIP-MCNC: NEGATIVE MG/DL
HGB UR QL STRIP.AUTO: NEGATIVE
KETONES UR STRIP-MCNC: ABNORMAL MG/DL
LEUKOCYTE ESTERASE UR QL STRIP: NEGATIVE
NITRITE UR QL STRIP: NEGATIVE
PH UR STRIP.AUTO: 6.5 [PH] (ref 4.5–8)
PROT UR STRIP-MCNC: ABNORMAL MG/DL
SP GR UR STRIP.AUTO: 1.02 (ref 1–1.03)
UROBILINOGEN UR QL STRIP.AUTO: 1 E.U./DL

## 2023-12-26 PROCEDURE — 99284 EMERGENCY DEPT VISIT MOD MDM: CPT | Performed by: EMERGENCY MEDICINE

## 2023-12-26 PROCEDURE — 96375 TX/PRO/DX INJ NEW DRUG ADDON: CPT

## 2023-12-26 PROCEDURE — 99284 EMERGENCY DEPT VISIT MOD MDM: CPT

## 2023-12-26 PROCEDURE — 96361 HYDRATE IV INFUSION ADD-ON: CPT

## 2023-12-26 PROCEDURE — 83735 ASSAY OF MAGNESIUM: CPT | Performed by: EMERGENCY MEDICINE

## 2023-12-26 PROCEDURE — 80076 HEPATIC FUNCTION PANEL: CPT | Performed by: EMERGENCY MEDICINE

## 2023-12-26 PROCEDURE — 81001 URINALYSIS AUTO W/SCOPE: CPT

## 2023-12-26 PROCEDURE — 76815 OB US LIMITED FETUS(S): CPT | Performed by: EMERGENCY MEDICINE

## 2023-12-26 PROCEDURE — 80048 BASIC METABOLIC PNL TOTAL CA: CPT | Performed by: EMERGENCY MEDICINE

## 2023-12-26 PROCEDURE — 36415 COLL VENOUS BLD VENIPUNCTURE: CPT | Performed by: EMERGENCY MEDICINE

## 2023-12-26 PROCEDURE — 87086 URINE CULTURE/COLONY COUNT: CPT

## 2023-12-26 PROCEDURE — 85025 COMPLETE CBC W/AUTO DIFF WBC: CPT | Performed by: EMERGENCY MEDICINE

## 2023-12-26 PROCEDURE — 83690 ASSAY OF LIPASE: CPT | Performed by: EMERGENCY MEDICINE

## 2023-12-26 PROCEDURE — 81025 URINE PREGNANCY TEST: CPT | Performed by: EMERGENCY MEDICINE

## 2023-12-26 RX ORDER — ONDANSETRON 2 MG/ML
4 INJECTION INTRAMUSCULAR; INTRAVENOUS ONCE
Status: COMPLETED | OUTPATIENT
Start: 2023-12-26 | End: 2023-12-26

## 2023-12-26 RX ADMIN — SODIUM CHLORIDE 1000 ML: 0.9 INJECTION, SOLUTION INTRAVENOUS at 23:48

## 2023-12-26 RX ADMIN — ONDANSETRON 4 MG: 2 INJECTION INTRAMUSCULAR; INTRAVENOUS at 23:50

## 2023-12-27 VITALS
WEIGHT: 167.33 LBS | DIASTOLIC BLOOD PRESSURE: 67 MMHG | HEIGHT: 64 IN | HEART RATE: 90 BPM | OXYGEN SATURATION: 98 % | SYSTOLIC BLOOD PRESSURE: 119 MMHG | TEMPERATURE: 97.9 F | RESPIRATION RATE: 16 BRPM | BODY MASS INDEX: 28.57 KG/M2

## 2023-12-27 PROBLEM — Z31.69 PRE-CONCEPTION COUNSELING: Status: RESOLVED | Noted: 2022-05-13 | Resolved: 2023-12-27

## 2023-12-27 PROBLEM — R19.7 DIARRHEA: Status: ACTIVE | Noted: 2023-12-27

## 2023-12-27 PROBLEM — O21.9 NAUSEA AND VOMITING IN PREGNANCY: Status: ACTIVE | Noted: 2023-07-20

## 2023-12-27 PROBLEM — Z3A.12 12 WEEKS GESTATION OF PREGNANCY: Status: ACTIVE | Noted: 2023-12-15

## 2023-12-27 LAB
ALBUMIN SERPL BCP-MCNC: 4 G/DL (ref 3.5–5)
ALP SERPL-CCNC: 60 U/L (ref 34–104)
ALT SERPL W P-5'-P-CCNC: 17 U/L (ref 7–52)
ANION GAP SERPL CALCULATED.3IONS-SCNC: 11 MMOL/L
ANION GAP SERPL CALCULATED.3IONS-SCNC: 4 MMOL/L
AST SERPL W P-5'-P-CCNC: 19 U/L (ref 13–39)
ATRIAL RATE: 87 BPM
BACTERIA UR QL AUTO: ABNORMAL /HPF
BASOPHILS # BLD AUTO: 0.02 THOUSANDS/ÂΜL (ref 0–0.1)
BASOPHILS NFR BLD AUTO: 0 % (ref 0–1)
BILIRUB DIRECT SERPL-MCNC: 0.12 MG/DL (ref 0–0.2)
BILIRUB SERPL-MCNC: 0.53 MG/DL (ref 0.2–1)
BUN SERPL-MCNC: 5 MG/DL (ref 5–25)
BUN SERPL-MCNC: 9 MG/DL (ref 5–25)
CALCIUM SERPL-MCNC: 7.5 MG/DL (ref 8.4–10.2)
CALCIUM SERPL-MCNC: 8.9 MG/DL (ref 8.4–10.2)
CHLORIDE SERPL-SCNC: 103 MMOL/L (ref 96–108)
CHLORIDE SERPL-SCNC: 110 MMOL/L (ref 96–108)
CO2 SERPL-SCNC: 22 MMOL/L (ref 21–32)
CO2 SERPL-SCNC: 23 MMOL/L (ref 21–32)
CREAT SERPL-MCNC: 0.57 MG/DL (ref 0.6–1.3)
CREAT SERPL-MCNC: 0.68 MG/DL (ref 0.6–1.3)
EOSINOPHIL # BLD AUTO: 0.03 THOUSAND/ÂΜL (ref 0–0.61)
EOSINOPHIL NFR BLD AUTO: 0 % (ref 0–6)
ERYTHROCYTE [DISTWIDTH] IN BLOOD BY AUTOMATED COUNT: 12.3 % (ref 11.6–15.1)
ERYTHROCYTE [DISTWIDTH] IN BLOOD BY AUTOMATED COUNT: 12.4 % (ref 11.6–15.1)
FLUAV RNA RESP QL NAA+PROBE: NEGATIVE
FLUBV RNA RESP QL NAA+PROBE: NEGATIVE
GFR SERPL CREATININE-BSD FRML MDRD: 118 ML/MIN/1.73SQ M
GFR SERPL CREATININE-BSD FRML MDRD: 125 ML/MIN/1.73SQ M
GLUCOSE P FAST SERPL-MCNC: 91 MG/DL (ref 65–99)
GLUCOSE SERPL-MCNC: 105 MG/DL (ref 65–140)
GLUCOSE SERPL-MCNC: 91 MG/DL (ref 65–140)
HCT VFR BLD AUTO: 33.8 % (ref 34.8–46.1)
HCT VFR BLD AUTO: 40.6 % (ref 34.8–46.1)
HGB BLD-MCNC: 12.2 G/DL (ref 11.5–15.4)
HGB BLD-MCNC: 14.5 G/DL (ref 11.5–15.4)
IMM GRANULOCYTES # BLD AUTO: 0.14 THOUSAND/UL (ref 0–0.2)
IMM GRANULOCYTES NFR BLD AUTO: 2 % (ref 0–2)
LIPASE SERPL-CCNC: 8 U/L (ref 11–82)
LYMPHOCYTES # BLD AUTO: 0.67 THOUSANDS/ÂΜL (ref 0.6–4.47)
LYMPHOCYTES NFR BLD AUTO: 8 % (ref 14–44)
MAGNESIUM SERPL-MCNC: 1.7 MG/DL (ref 1.9–2.7)
MAGNESIUM SERPL-MCNC: 2.1 MG/DL (ref 1.9–2.7)
MCH RBC QN AUTO: 30.3 PG (ref 26.8–34.3)
MCH RBC QN AUTO: 31.1 PG (ref 26.8–34.3)
MCHC RBC AUTO-ENTMCNC: 35.7 G/DL (ref 31.4–37.4)
MCHC RBC AUTO-ENTMCNC: 36.1 G/DL (ref 31.4–37.4)
MCV RBC AUTO: 85 FL (ref 82–98)
MCV RBC AUTO: 86 FL (ref 82–98)
MONOCYTES # BLD AUTO: 0.53 THOUSAND/ÂΜL (ref 0.17–1.22)
MONOCYTES NFR BLD AUTO: 6 % (ref 4–12)
MUCOUS THREADS UR QL AUTO: ABNORMAL
NEUTROPHILS # BLD AUTO: 7.43 THOUSANDS/ÂΜL (ref 1.85–7.62)
NEUTS SEG NFR BLD AUTO: 84 % (ref 43–75)
NON-SQ EPI CELLS URNS QL MICRO: ABNORMAL /HPF
NRBC BLD AUTO-RTO: 0 /100 WBCS
P AXIS: 59 DEGREES
PLATELET # BLD AUTO: 151 THOUSANDS/UL (ref 149–390)
PLATELET # BLD AUTO: 176 THOUSANDS/UL (ref 149–390)
PMV BLD AUTO: 11.3 FL (ref 8.9–12.7)
PMV BLD AUTO: 12 FL (ref 8.9–12.7)
POTASSIUM SERPL-SCNC: 2.8 MMOL/L (ref 3.5–5.3)
POTASSIUM SERPL-SCNC: 3.5 MMOL/L (ref 3.5–5.3)
PR INTERVAL: 146 MS
PROT SERPL-MCNC: 7.2 G/DL (ref 6.4–8.4)
QRS AXIS: 54 DEGREES
QRSD INTERVAL: 74 MS
QT INTERVAL: 404 MS
QTC INTERVAL: 486 MS
RBC # BLD AUTO: 3.92 MILLION/UL (ref 3.81–5.12)
RBC # BLD AUTO: 4.78 MILLION/UL (ref 3.81–5.12)
RBC #/AREA URNS AUTO: ABNORMAL /HPF
RSV RNA RESP QL NAA+PROBE: NEGATIVE
SARS-COV-2 RNA RESP QL NAA+PROBE: NEGATIVE
SODIUM SERPL-SCNC: 136 MMOL/L (ref 135–147)
SODIUM SERPL-SCNC: 137 MMOL/L (ref 135–147)
T WAVE AXIS: 17 DEGREES
VENTRICULAR RATE: 87 BPM
WBC # BLD AUTO: 5.16 THOUSAND/UL (ref 4.31–10.16)
WBC # BLD AUTO: 8.82 THOUSAND/UL (ref 4.31–10.16)
WBC #/AREA URNS AUTO: ABNORMAL /HPF

## 2023-12-27 PROCEDURE — 83735 ASSAY OF MAGNESIUM: CPT

## 2023-12-27 PROCEDURE — 93005 ELECTROCARDIOGRAM TRACING: CPT

## 2023-12-27 PROCEDURE — 99235 HOSP IP/OBS SAME DATE MOD 70: CPT | Performed by: INTERNAL MEDICINE

## 2023-12-27 PROCEDURE — 36415 COLL VENOUS BLD VENIPUNCTURE: CPT

## 2023-12-27 PROCEDURE — 80048 BASIC METABOLIC PNL TOTAL CA: CPT

## 2023-12-27 PROCEDURE — 85027 COMPLETE CBC AUTOMATED: CPT

## 2023-12-27 PROCEDURE — 0241U HB NFCT DS VIR RESP RNA 4 TRGT: CPT | Performed by: PHYSICIAN ASSISTANT

## 2023-12-27 PROCEDURE — 99222 1ST HOSP IP/OBS MODERATE 55: CPT

## 2023-12-27 PROCEDURE — NC001 PR NO CHARGE: Performed by: OBSTETRICS & GYNECOLOGY

## 2023-12-27 PROCEDURE — 96365 THER/PROPH/DIAG IV INF INIT: CPT

## 2023-12-27 PROCEDURE — 96375 TX/PRO/DX INJ NEW DRUG ADDON: CPT

## 2023-12-27 RX ORDER — AMILORIDE HYDROCHLORIDE 5 MG/1
10 TABLET ORAL 2 TIMES DAILY
Status: DISCONTINUED | OUTPATIENT
Start: 2023-12-27 | End: 2023-12-27

## 2023-12-27 RX ORDER — METOPROLOL SUCCINATE 50 MG/1
50 TABLET, EXTENDED RELEASE ORAL 2 TIMES DAILY
Status: DISCONTINUED | OUTPATIENT
Start: 2023-12-27 | End: 2023-12-27 | Stop reason: HOSPADM

## 2023-12-27 RX ORDER — POTASSIUM CHLORIDE 14.9 MG/ML
20 INJECTION INTRAVENOUS ONCE
Status: COMPLETED | OUTPATIENT
Start: 2023-12-27 | End: 2023-12-27

## 2023-12-27 RX ORDER — MAGNESIUM HYDROXIDE/ALUMINUM HYDROXICE/SIMETHICONE 120; 1200; 1200 MG/30ML; MG/30ML; MG/30ML
30 SUSPENSION ORAL EVERY 6 HOURS PRN
Status: DISCONTINUED | OUTPATIENT
Start: 2023-12-27 | End: 2023-12-27 | Stop reason: HOSPADM

## 2023-12-27 RX ORDER — POLYETHYLENE GLYCOL 3350 17 G/17G
17 POWDER, FOR SOLUTION ORAL DAILY PRN
Status: DISCONTINUED | OUTPATIENT
Start: 2023-12-27 | End: 2023-12-27 | Stop reason: HOSPADM

## 2023-12-27 RX ORDER — ONDANSETRON 4 MG/1
4 TABLET, FILM COATED ORAL EVERY 8 HOURS PRN
Qty: 20 TABLET | Refills: 0 | Status: SHIPPED | OUTPATIENT
Start: 2023-12-27

## 2023-12-27 RX ORDER — PYRIDOXINE HCL (VITAMIN B6) 50 MG
25 TABLET ORAL DAILY PRN
Status: DISCONTINUED | OUTPATIENT
Start: 2023-12-27 | End: 2023-12-27 | Stop reason: HOSPADM

## 2023-12-27 RX ORDER — ONDANSETRON 2 MG/ML
4 INJECTION INTRAMUSCULAR; INTRAVENOUS EVERY 6 HOURS PRN
Status: DISCONTINUED | OUTPATIENT
Start: 2023-12-27 | End: 2023-12-27

## 2023-12-27 RX ORDER — SODIUM CHLORIDE 9 MG/ML
150 INJECTION, SOLUTION INTRAVENOUS CONTINUOUS
Status: DISCONTINUED | OUTPATIENT
Start: 2023-12-27 | End: 2023-12-27

## 2023-12-27 RX ORDER — ONDANSETRON 2 MG/ML
4 INJECTION INTRAMUSCULAR; INTRAVENOUS EVERY 6 HOURS PRN
Status: DISCONTINUED | OUTPATIENT
Start: 2023-12-27 | End: 2023-12-27 | Stop reason: HOSPADM

## 2023-12-27 RX ORDER — SODIUM CHLORIDE, SODIUM GLUCONATE, SODIUM ACETATE, POTASSIUM CHLORIDE, MAGNESIUM CHLORIDE, SODIUM PHOSPHATE, DIBASIC, AND POTASSIUM PHOSPHATE .53; .5; .37; .037; .03; .012; .00082 G/100ML; G/100ML; G/100ML; G/100ML; G/100ML; G/100ML; G/100ML
100 INJECTION, SOLUTION INTRAVENOUS CONTINUOUS
Status: DISCONTINUED | OUTPATIENT
Start: 2023-12-27 | End: 2023-12-27 | Stop reason: HOSPADM

## 2023-12-27 RX ORDER — PYRIDOXINE HCL (VITAMIN B6) 50 MG
25 TABLET ORAL DAILY
Status: DISCONTINUED | OUTPATIENT
Start: 2023-12-27 | End: 2023-12-27

## 2023-12-27 RX ORDER — MAGNESIUM SULFATE HEPTAHYDRATE 40 MG/ML
2 INJECTION, SOLUTION INTRAVENOUS ONCE
Status: COMPLETED | OUTPATIENT
Start: 2023-12-27 | End: 2023-12-27

## 2023-12-27 RX ORDER — ACETAMINOPHEN 325 MG/1
650 TABLET ORAL EVERY 6 HOURS PRN
Status: DISCONTINUED | OUTPATIENT
Start: 2023-12-27 | End: 2023-12-27 | Stop reason: HOSPADM

## 2023-12-27 RX ORDER — PYRIDOXINE HCL (VITAMIN B6) 25 MG
25 TABLET ORAL DAILY PRN
Start: 2023-12-27

## 2023-12-27 RX ADMIN — ONDANSETRON 4 MG: 2 INJECTION INTRAMUSCULAR; INTRAVENOUS at 04:36

## 2023-12-27 RX ADMIN — POTASSIUM CHLORIDE 20 MEQ: 14.9 INJECTION, SOLUTION INTRAVENOUS at 00:51

## 2023-12-27 RX ADMIN — POTASSIUM CHLORIDE 20 MEQ: 14.9 INJECTION, SOLUTION INTRAVENOUS at 02:57

## 2023-12-27 RX ADMIN — PRENATAL VIT W/ FE FUMARATE-FA TAB 27-0.8 MG 1 TABLET: 27-0.8 TAB at 10:28

## 2023-12-27 RX ADMIN — SODIUM CHLORIDE 150 ML/HR: 0.9 INJECTION, SOLUTION INTRAVENOUS at 00:57

## 2023-12-27 RX ADMIN — SODIUM CHLORIDE, SODIUM GLUCONATE, SODIUM ACETATE, POTASSIUM CHLORIDE, MAGNESIUM CHLORIDE, SODIUM PHOSPHATE, DIBASIC, AND POTASSIUM PHOSPHATE 100 ML/HR: .53; .5; .37; .037; .03; .012; .00082 INJECTION, SOLUTION INTRAVENOUS at 06:12

## 2023-12-27 RX ADMIN — METOPROLOL SUCCINATE 50 MG: 50 TABLET, EXTENDED RELEASE ORAL at 09:01

## 2023-12-27 RX ADMIN — MAGNESIUM SULFATE HEPTAHYDRATE 2 G: 40 INJECTION, SOLUTION INTRAVENOUS at 00:57

## 2023-12-27 NOTE — ED NOTES
Pt ambulated to the bathroom with steady gait and no assist. Pt denied N/V or dizziness/lightheadedness     Nancie Victor RN  12/27/23 2052

## 2023-12-27 NOTE — CONSULTS
Consultation - Gynecology  Julita Berry 28 y.o. female MRN: [id number]  Unit/Bed#: ED-25 Encounter: [id number]      Inpatient consult to Obstetrics / Gynecology  Consult performed by: Carla Krishna MD  Consult ordered by: Sheldon Boggs PA-C  Reason for consult: Nausea, vomiting and diarrhea            Chief Complaint   Patient presents with    Vomiting     Pt reports being 12 weeks pregnant, vomiting,diarrhea and some abd cramping since yesterday.        Assessment/Plan      12 weeks gestation of pregnancy  Assessment & Plan   bpm  No obstetric complaints at this time  Continue Prenatal care        * Nausea and vomiting in pregnancy  Assessment & Plan  Hx of Nausea and vomiting in the first trimester. Nausea/Vomiting today associated with watery diarrhea likely secondary to  gastroenteritis  History of electrolyte imbalance : CMP &  electrolyte within normal limits today  Normotensive  Pt able to tolerate Oral intake at time of OBGYN evaluation     Potassium   Date Value Ref Range Status   2023 3.5 3.5 - 5.3 mmol/L Final   2023 2.8 (L) 3.5 - 5.3 mmol/L Final     Magnesium   Date Value Ref Range Status   2023 2.1 1.9 - 2.7 mg/dL Final   2023 1.7 (L) 1.9 - 2.7 mg/dL Final     Calcium   Date Value Ref Range Status   2023 7.5 (L) 8.4 - 10.2 mg/dL Final   2023 8.9 8.4 - 10.2 mg/dL Final     Plan   Continue oral hydration  Currently on Unisom, recommend adding Vitamin B6 and Zofran Prn at discharge          Discussed with Dr. Munoz    History of Present Illness:  Physician Requesting Consult: No att. providers found  Reason for Consult / Principal Problem: nausea, vomiting & diarrhea  HPI: Julita Berry is a 28 y.o.  female who presents with nausea, vomiting and diarrhea. She reports that the watery diarrhea started last night and was associated with nausea,vomiting & diffuse abdominal cramping. She states that she thinks she has a stomach bug. At time of  "evaluation by OB team her symptoms have largely resolved and she no longer has nausea, vomiting, diarrhea or abdominal pain. She denies vaginal bleeding, leakage of fluids and contractions. She denies fever and chills. 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  She states that she has had nausea and vomiting in this pregnancy and was recently started on Unisom by her OB. All other review of symptoms are negative.      Historical Information   Past Medical History:   Diagnosis Date    Anxiety     Eczema     Exercises 5 to 6 times per week     per pt prior to knee issue -enjoyed running and horseback riding    Family history of reaction to anesthesia     per pt--\"Mom also gets high anxiety with surgeries and wakes up nasty from anesthesia\"    Gastroparesis     Hypertension     last assessed 3/12/14    Hypokalemia     per pt per doctors possibly related renal problem    Hypomagnesemia     Irritable bowel syndrome     with diarrhea    Rheumatoid arthritis (HCC)     Sinus tachycardia     related to dehydration    Tooth missing     front upper tooth retainer-    Wears glasses     for computer use     Past Surgical History:   Procedure Laterality Date    MN ARTHROSCOPY KNEE LATERAL RELEASE Right 2023    Procedure: ARTHROSCOPIC RELEASE RETINACULAR;  Surgeon: Ilya Brizuela DO;  Location:  MAIN OR;  Service: Orthopedics    MN EGD TRANSORAL BIOPSY SINGLE/MULTIPLE N/A 2016    Procedure: ESOPHAGOGASTRODUODENOSCOPY (EGD);  Surgeon: Raji Monzon MD;  Location:  GI LAB;  Service: Gastroenterology    MN RCNSTJ DISLC PATELLA W/XTNSR RELIGNMT&/MUSC RL Right 2023    Procedure: ARTHROSCOPIC REALIGNMENT PATELLA;  Surgeon: Ilya Brizuela DO;  Location:  MAIN OR;  Service: Orthopedics    WISDOM TOOTH EXTRACTION       OB History    Para Term  AB Living   1 0 0 0 0 0   SAB IAB Ectopic Multiple Live Births   0 0 0 0 0      # Outcome Date GA Lbr Myles/2nd Weight Sex Delivery " "Anes PTL Lv   1 Current              Family History   Problem Relation Age of Onset    Hypokalemia Mother     Hypotension Mother     Anxiety disorder Mother         NOS    Lung cancer Father     Skin cancer Father     Crohn's disease Father     Schizoaffective Disorder  Father     Alcohol abuse Father     Drug abuse Father     No Known Problems Sister     No Known Problems Sister     No Known Problems Brother     Coronary artery disease Maternal Grandmother     Heart disease Maternal Grandmother     Alzheimer's disease Maternal Grandmother     Arthritis Paternal Grandmother     Rheum arthritis Paternal Grandmother     Hypertension Paternal Grandmother     COPD Paternal Grandfather      Social History   Social History     Substance and Sexual Activity   Alcohol Use Not Currently    Alcohol/week: 1.0 standard drink of alcohol    Types: 1 Standard drinks or equivalent per week     Social History     Substance and Sexual Activity   Drug Use No     Social History     Tobacco Use   Smoking Status Never   Smokeless Tobacco Never     Allergies   Allergen Reactions    Prednisone Hyperactivity     Medrol dose samantha only    Serotonin Reuptake Inhibitors (Ssris) Anaphylaxis and Hives    Augmentin [Amoxicillin-Pot Clavulanate] Hives and Rash    Clindamycin Rash    Penicillins Rash    Sulfa Antibiotics Rash    Azithromycin Rash       Objective   Vitals: Blood pressure 119/67, pulse 90, temperature 97.9 °F (36.6 °C), resp. rate 16, height 5' 4\" (1.626 m), weight 75.9 kg (167 lb 5.3 oz), last menstrual period 10/03/2023, SpO2 98%, not currently breastfeeding. Body mass index is 28.72 kg/m².    Invasive Devices       None                   Physical Exam  Constitutional:       Appearance: Normal appearance.   HENT:      Head: Normocephalic and atraumatic.   Eyes:      Extraocular Movements: Extraocular movements intact.   Cardiovascular:      Rate and Rhythm: Normal rate.   Pulmonary:      Effort: Pulmonary effort is normal.      " Breath sounds: Normal breath sounds.   Abdominal:      Palpations: Abdomen is soft. There is no mass.      Tenderness: There is no abdominal tenderness. There is no guarding.   Musculoskeletal:         General: Normal range of motion.      Cervical back: Normal range of motion.   Skin:     General: Skin is warm and dry.   Neurological:      General: No focal deficit present.      Mental Status: She is alert.   Psychiatric:         Mood and Affect: Mood normal.         Behavior: Behavior normal.         Lab Results:   Recent Results (from the past 24 hour(s))   Urine Macroscopic, POC    Collection Time: 12/26/23 11:35 PM   Result Value Ref Range    Color, UA Yellow     Clarity, UA Clear     pH, UA 6.5 4.5 - 8.0    Leukocytes, UA Negative Negative    Nitrite, UA Negative Negative    Protein, UA 30 (1+) (A) Negative mg/dl    Glucose, UA Negative Negative mg/dl    Ketones, UA 15 (1+) (A) Negative mg/dl    Urobilinogen, UA 1.0 0.2, 1.0 E.U./dl E.U./dl    Bilirubin, UA Negative Negative    Occult Blood, UA Negative Negative    Specific Gravity, UA 1.025 1.003 - 1.030   Urine Microscopic    Collection Time: 12/26/23 11:35 PM   Result Value Ref Range    RBC, UA 2-4 (A) None Seen, 1-2 /hpf    WBC, UA 2-4 (A) None Seen, 1-2 /hpf    Epithelial Cells Moderate (A) None Seen, Occasional /hpf    Bacteria, UA Occasional None Seen, Occasional /hpf    MUCUS THREADS Occasional (A) None Seen   POCT pregnancy, urine    Collection Time: 12/26/23 11:42 PM   Result Value Ref Range    EXT Preg Test, Ur Positive (A)     Control Valid    CBC and differential    Collection Time: 12/26/23 11:54 PM   Result Value Ref Range    WBC 8.82 4.31 - 10.16 Thousand/uL    RBC 4.78 3.81 - 5.12 Million/uL    Hemoglobin 14.5 11.5 - 15.4 g/dL    Hematocrit 40.6 34.8 - 46.1 %    MCV 85 82 - 98 fL    MCH 30.3 26.8 - 34.3 pg    MCHC 35.7 31.4 - 37.4 g/dL    RDW 12.3 11.6 - 15.1 %    MPV 12.0 8.9 - 12.7 fL    Platelets 176 149 - 390 Thousands/uL    nRBC 0 /100  WBCs    Neutrophils Relative 84 (H) 43 - 75 %    Immat GRANS % 2 0 - 2 %    Lymphocytes Relative 8 (L) 14 - 44 %    Monocytes Relative 6 4 - 12 %    Eosinophils Relative 0 0 - 6 %    Basophils Relative 0 0 - 1 %    Neutrophils Absolute 7.43 1.85 - 7.62 Thousands/µL    Immature Grans Absolute 0.14 0.00 - 0.20 Thousand/uL    Lymphocytes Absolute 0.67 0.60 - 4.47 Thousands/µL    Monocytes Absolute 0.53 0.17 - 1.22 Thousand/µL    Eosinophils Absolute 0.03 0.00 - 0.61 Thousand/µL    Basophils Absolute 0.02 0.00 - 0.10 Thousands/µL   Basic metabolic panel    Collection Time: 12/26/23 11:54 PM   Result Value Ref Range    Sodium 136 135 - 147 mmol/L    Potassium 2.8 (L) 3.5 - 5.3 mmol/L    Chloride 103 96 - 108 mmol/L    CO2 22 21 - 32 mmol/L    ANION GAP 11 mmol/L    BUN 9 5 - 25 mg/dL    Creatinine 0.68 0.60 - 1.30 mg/dL    Glucose 105 65 - 140 mg/dL    Calcium 8.9 8.4 - 10.2 mg/dL    eGFR 118 ml/min/1.73sq m   Hepatic function panel    Collection Time: 12/26/23 11:54 PM   Result Value Ref Range    Total Bilirubin 0.53 0.20 - 1.00 mg/dL    Bilirubin, Direct 0.12 0.00 - 0.20 mg/dL    Alkaline Phosphatase 60 34 - 104 U/L    AST 19 13 - 39 U/L    ALT 17 7 - 52 U/L    Total Protein 7.2 6.4 - 8.4 g/dL    Albumin 4.0 3.5 - 5.0 g/dL   Lipase    Collection Time: 12/26/23 11:54 PM   Result Value Ref Range    Lipase 8 (L) 11 - 82 u/L   Magnesium    Collection Time: 12/26/23 11:54 PM   Result Value Ref Range    Magnesium 1.7 (L) 1.9 - 2.7 mg/dL   ECG 12 lead    Collection Time: 12/27/23 12:10 AM   Result Value Ref Range    Ventricular Rate 87 BPM    Atrial Rate 87 BPM    NJ Interval 146 ms    QRSD Interval 74 ms    QT Interval 404 ms    QTC Interval 486 ms    P [place] 59 degrees    QRS Axis 54 degrees    T Wave Axis 17 degrees   FLU/RSV/COVID - if FLU/RSV clinically relevant    Collection Time: 12/27/23  2:34 AM    Specimen: Nose; Nares   Result Value Ref Range    SARS-CoV-2 Negative Negative    INFLUENZA A PCR Negative Negative     INFLUENZA B PCR Negative Negative    RSV PCR Negative Negative   Basic metabolic panel    Collection Time: 12/27/23  5:40 AM   Result Value Ref Range    Sodium 137 135 - 147 mmol/L    Potassium 3.5 3.5 - 5.3 mmol/L    Chloride 110 (H) 96 - 108 mmol/L    CO2 23 21 - 32 mmol/L    ANION GAP 4 mmol/L    BUN 5 5 - 25 mg/dL    Creatinine 0.57 (L) 0.60 - 1.30 mg/dL    Glucose 91 65 - 140 mg/dL    Glucose, Fasting 91 65 - 99 mg/dL    Calcium 7.5 (L) 8.4 - 10.2 mg/dL    eGFR 125 ml/min/1.73sq m   Magnesium    Collection Time: 12/27/23  5:40 AM   Result Value Ref Range    Magnesium 2.1 1.9 - 2.7 mg/dL   CBC (With Platelets)    Collection Time: 12/27/23  5:40 AM   Result Value Ref Range    WBC 5.16 4.31 - 10.16 Thousand/uL    RBC 3.92 3.81 - 5.12 Million/uL    Hemoglobin 12.2 11.5 - 15.4 g/dL    Hematocrit 33.8 (L) 34.8 - 46.1 %    MCV 86 82 - 98 fL    MCH 31.1 26.8 - 34.3 pg    MCHC 36.1 31.4 - 37.4 g/dL    RDW 12.4 11.6 - 15.1 %    Platelets 151 149 - 390 Thousands/uL    MPV 11.3 8.9 - 12.7 fL       Carla Krishna MD  12/27/2023  3:44 PM

## 2023-12-27 NOTE — ASSESSMENT & PLAN NOTE
Denies abdominal pain, contractions or vaginal bleeding currently    Plan:  Continue pre- vitamins  Obgyn consulted

## 2023-12-27 NOTE — ASSESSMENT & PLAN NOTE
Following electrophysiology and nephrology for some time, rates controlled, PVCs noted on monitor, patient reports intermittent palpitations     Plan:  Maintain telemetry  Continue PTA metoprolol XL 50mg twice daily, metoprolol tartrate 25mg PRN  Will hold amilodride in setting of dehydration at this point, resume when appropriate   Monitor and replenish electrolytes

## 2023-12-27 NOTE — DISCHARGE SUMMARY
Discharge Summary - [facility] Internal Medicine    Patient Information: Julita Berry 28 y.o. female MRN: [id number]  Unit/Bed#: ED-25 Encounter: [id number]    Discharging Physician / Practitioner: Ivana Mckenzie DO  PCP: ENMA Echavarria  Admission Date: 12/26/2023  Discharge Date: 12/27/23    Disposition:     Home     Reason for Admission: n/v/d     Discharge Diagnoses:     Principal Problem:    Nausea and vomiting in pregnancy  Active Problems:    POTS (postural orthostatic tachycardia syndrome)    Hypomagnesemia    Hypokalemia    12 weeks gestation of pregnancy    Diarrhea  Resolved Problems:    * No resolved hospital problems. *      Consultations During Hospital Stay:  ob    Procedures Performed:     none    Significant Findings / Test Results:   none    Incidental Findings:   none    Test Results Pending at Discharge (will require follow up):   none     Outpatient Tests Requested:  none    Hospital Course:     Julita Berry is a 28 y.o. female patient who originally presented to the hospital on 12/26/2023 due to intractable n/v/d. She is 12 weeks pregnant and was found to have a viral gastroenteritis. She was treated for her symptoms and started on iv fluids. She was tolerating her diet at time of discharge without further vomiting. She was also evaluated by ob who were able to obtain fetal heart tones. She was discharged with unisom, vitamin b6, and zofran as needed.     Discharge Day Visit / Exam:     * Please refer to separate progress note for these details *    Discharge instructions/Information to patient and family:   See after visit summary for information provided to patient and family.      Provisions for Follow-Up Care:  See after visit summary for information related to follow-up care and any pertinent home health orders.      Discharge Medications:  See after visit summary for reconciled discharge medications provided to patient and family.

## 2023-12-27 NOTE — ASSESSMENT & PLAN NOTE
Acute on chronic, now worse with ongoing vomiting/diarrhea and poor PO intake    Plan:  Continue aggressive replenishment IV until able to tolerate PO  Monitor on telemetry

## 2023-12-27 NOTE — ED PROVIDER NOTES
History  Chief Complaint   Patient presents with    Vomiting     Pt reports being 12 weeks pregnant, vomiting,diarrhea and some abd cramping since yesterday.      Patient is a 28-year-old female with history of POTS, hypertension, anxiety, sinus tachycardia and rheumatoid arthritis.  Presenting to the emergency department with nausea vomiting diarrhea.  Currently approximately 12 weeks pregnant.  Recent hospitalization for vomiting during pregnancy.  On 12/15/2023.  Noted to be hypokalemic and hypomagnesemia at that time.  Was doing well over the past week and a half.  Yesterday with vomiting and multiple episodes of liquid stool.  Diarrhea has stopped however patient now with frequent vomiting throughout the day.  She has been unable to tolerate her p.o. potassium and metoprolol.        Prior to Admission Medications   Prescriptions Last Dose Informant Patient Reported? Taking?   AMILoride 5 mg tablet   No No   Sig: Take 2 tablets (10 mg total) by mouth 2 (two) times a day   Prenatal Multivit-Min-Fe-FA (PRE-GORDON PO)  Self Yes No   Sig: Take by mouth   dicyclomine (BENTYL) 20 mg tablet  Self No No   Sig: Take 1 tablet (20 mg total) by mouth every 6 (six) hours   Patient taking differently: Take 20 mg by mouth as needed   ferrous sulfate 325 (65 Fe) mg tablet  Self Yes No   Sig: Take 325 mg by mouth daily with breakfast   hydrOXYzine HCL (ATARAX) 10 mg tablet  Self No No   Sig: Take 1 tablet (10 mg total) by mouth 2 (two) times a day as needed for anxiety   metoprolol succinate (TOPROL-XL) 50 mg 24 hr tablet   No No   Sig: Take 1 tablet (50 mg total) by mouth 2 (two) times a day   metoprolol tartrate (LOPRESSOR) 25 mg tablet   No No   Sig: Take 1 tablet (25 mg total) by mouth every 6 (six) hours as needed (palpitatins)   potassium chloride (K-DUR,KLOR-CON) 20 mEq tablet   No No   Sig: Take a total of 120 meq daily for 3-4 days then decrease to 100 meq thereafter.      Facility-Administered Medications: None  "      Past Medical History:   Diagnosis Date    Anxiety     Eczema     Exercises 5 to 6 times per week     per pt prior to knee issue -enjoyed running and horseback riding    Family history of reaction to anesthesia     per pt--\"Mom also gets high anxiety with surgeries and wakes up nasty from anesthesia\"    Gastroparesis 2012    Hypertension     last assessed 3/12/14    Hypokalemia     per pt per doctors possibly related renal problem    Hypomagnesemia     Irritable bowel syndrome     with diarrhea    Rheumatoid arthritis (HCC) 2022    Sinus tachycardia     related to dehydration    Tooth missing     front upper tooth retainer-    Wears glasses     for computer use       Past Surgical History:   Procedure Laterality Date    SC ARTHROSCOPY KNEE LATERAL RELEASE Right 04/13/2023    Procedure: ARTHROSCOPIC RELEASE RETINACULAR;  Surgeon: Ilya Brizuela DO;  Location:  MAIN OR;  Service: Orthopedics    SC EGD TRANSORAL BIOPSY SINGLE/MULTIPLE N/A 05/04/2016    Procedure: ESOPHAGOGASTRODUODENOSCOPY (EGD);  Surgeon: Raji Monzon MD;  Location:  GI LAB;  Service: Gastroenterology    SC RCNSTJ DISLC PATELLA W/XTNSR RELIGNMT&/MUSC RL Right 04/13/2023    Procedure: ARTHROSCOPIC REALIGNMENT PATELLA;  Surgeon: Ilya Brizuela DO;  Location:  MAIN OR;  Service: Orthopedics    WISDOM TOOTH EXTRACTION         Family History   Problem Relation Age of Onset    Hypokalemia Mother     Hypotension Mother     Anxiety disorder Mother         NOS    Lung cancer Father     Skin cancer Father     Crohn's disease Father     Schizoaffective Disorder  Father     Alcohol abuse Father     Drug abuse Father     No Known Problems Sister     No Known Problems Sister     No Known Problems Brother     Coronary artery disease Maternal Grandmother     Heart disease Maternal Grandmother     Alzheimer's disease Maternal Grandmother     Arthritis Paternal Grandmother     Rheum arthritis Paternal Grandmother     Hypertension Paternal Grandmother     " COPD Paternal Grandfather      I have reviewed and agree with the history as documented.    E-Cigarette/Vaping    E-Cigarette Use Never User      E-Cigarette/Vaping Substances    Nicotine No     THC No     CBD No     Flavoring No     Other No     Unknown No      Social History     Tobacco Use    Smoking status: Never    Smokeless tobacco: Never   Vaping Use    Vaping status: Never Used   Substance Use Topics    Alcohol use: Not Currently     Alcohol/week: 1.0 standard drink of alcohol     Types: 1 Standard drinks or equivalent per week    Drug use: No       Review of Systems    Physical Exam  Physical Exam    Vital Signs  ED Triage Vitals [12/26/23 2216]   Temperature Pulse Respirations Blood Pressure SpO2   97.5 °F (36.4 °C) (!) 128 18 136/96 100 %      Temp Source Heart Rate Source Patient Position - Orthostatic VS BP Location FiO2 (%)   Oral Monitor Sitting Right arm --      Pain Score       6           Vitals:    12/26/23 2216   BP: 136/96   Pulse: (!) 128   Patient Position - Orthostatic VS: Sitting         Visual Acuity      ED Medications  Medications   sodium chloride 0.9 % bolus 1,000 mL (1,000 mL Intravenous New Bag 12/26/23 2348)   ondansetron (ZOFRAN) injection 4 mg (4 mg Intravenous Given 12/26/23 2350)       Diagnostic Studies  Results Reviewed       Procedure Component Value Units Date/Time    POCT pregnancy, urine [[id number]]  (Abnormal) Resulted: 12/26/23 2342    Lab Status: Final result Updated: 12/26/23 2342     EXT Preg Test, Ur Positive     Control Valid    Magnesium [[id number]]     Lab Status: No result Specimen: Blood     Urine Macroscopic, POC [[id number]]  (Abnormal) Collected: 12/26/23 2335    Lab Status: Final result Specimen: Urine Updated: 12/26/23 2336     Color, UA Yellow     Clarity, UA Clear     pH, UA 6.5     Leukocytes, UA Negative     Nitrite, UA Negative     Protein, UA 30 (1+) mg/dl      Glucose, UA Negative mg/dl      Ketones, UA 15 (1+) mg/dl      Urobilinogen, UA 1.0 E.U./dl       Bilirubin, UA Negative     Occult Blood, UA Negative     Specific Gravity, UA 1.025    Narrative:      CLINITEK RESULT    Urine Microscopic [[id number]] Collected: 12/26/23 2335    Lab Status: No result Specimen: Urine     Urine culture [[id number]] Collected: 12/26/23 2335    Lab Status: No result Specimen: Urine     Urine culture [[id number]]     Lab Status: No result Specimen: Urine, Clean Catch     CBC and differential [[id number]]     Lab Status: No result Specimen: Blood     Basic metabolic panel [[id number]]     Lab Status: No result Specimen: Blood     Hepatic function panel [[id number]]     Lab Status: No result Specimen: Blood     Lipase [[id number]]     Lab Status: No result Specimen: Blood                    No orders to display              Procedures  POC Pelvic US    Date/Time: 12/26/2023 11:48 PM    Performed by: Kayleigh Ceron MD  Authorized by: Kayleigh Ceron MD    Patient location:  Bedside  Other Assisting Provider: No    Procedure details:     Exam Type:  Diagnostic    Indications: evaluate for IUP and pregnant with abdominal pain      Assessment for: evaluate fetal viability      Technique:  Transabdominal obstetric (HCG+) exam    Views obtained: uterus (transverse and sagittal)      Image quality: diagnostic      Image availability:  Images available in PACS  Uterine findings:     Intrauterine pregnancy: identified      Single gestation: identified      Fetal heart rate: identified      Fetal heart rate (bpm):  168  Interpretation:     Pregnancy findings: intrauterine pregnancy (IUP)             ED Course  ED Course as of 12/26/23 2356   Tue Dec 26, 2023   2352 Chart review of last hospital discharge summary shows that this is a patient with history of hypokalemia.  Currently on amiloride.  History of hypomagnesemia.  History of heart palpitations and atrial tachycardia on metoprolol.                                 SBIRT 20yo+      Flowsheet Row Most Recent Value   Initial  Alcohol Screen: US AUDIT-C     1. How often do you have a drink containing alcohol? 0 Filed at: 12/26/2023 2307   2. How many drinks containing alcohol do you have on a typical day you are drinking?  0 Filed at: 12/26/2023 2307   3b. FEMALE Any Age, or MALE 65+: How often do you have 4 or more drinks on one occassion? 0 Filed at: 12/26/2023 2307   Audit-C Score 0 Filed at: 12/26/2023 2307   RAZ: How many times in the past year have you...    Used an illegal drug or used a prescription medication for non-medical reasons? Never Filed at: 12/26/2023 2307                      Medical Decision Making  Amount and/or Complexity of Data Reviewed  Labs: ordered.    Risk  Prescription drug management.             Disposition  Final diagnoses:   None     ED Disposition       None          Follow-up Information    None         Patient's Medications   Discharge Prescriptions    No medications on file       No discharge procedures on file.    PDMP Review         Value Time User    PDMP Reviewed  Yes 7/27/2023  3:53 PM ENMA Echavarria            ED Provider  Electronically Signed by           [email or web address]    SARS-CoV-2 assay is a Nucleic Acid Amplification assay intended for the  qualitative detection of nucleic acid from SARS-CoV-2 in nasopharyngeal  swabs. Results are for the presumptive identification of SARS-CoV-2 RNA.    Positive results are indicative of infection with SARS-CoV-2, the virus  causing COVID-19, but do not rule out bacterial infection or co-infection  with other viruses. Laboratories within the [place] and its  territories are required to report all positive results to the appropriate  public health authorities. Negative results do not preclude SARS-CoV-2  infection and should not be used as the sole basis for treatment or other  patient management decisions. Negative results must be combined with  clinical observations, patient history, and epidemiological information.  This test has not been FDA cleared or approved.    This test has been authorized by FDA under an Emergency Use Authorization  (EUA). This test is only authorized for the duration of time the  declaration that circumstances exist justifying the authorization of the  emergency use of an in vitro diagnostic tests for detection of SARS-CoV-2  virus and/or diagnosis of COVID-19 infection under section 564(b)(1) of  the Act, 21 U.S.C. 360bbb-3(b)(1), unless the authorization is terminated  or revoked sooner. The test has been validated but independent review by FDA  and CLIA is pending.    Test performed using [employer] GeneXpert: This RT-PCR assay targets N2,  a region unique to SARS-CoV-2. A conserved region in the E-gene was chosen  for pan-Sarbecovirus detection which includes SARS-CoV-2.    According to CMS-2020-01-R, this platform meets the definition of high-throughput technology.    Basic metabolic panel [[id number]]  (Abnormal) Collected: 12/26/23 4968    Lab Status: Final result Specimen: Blood from Arm, Left Updated: 12/27/23 0019     Sodium 136  mmol/L      Potassium 2.8 mmol/L      Chloride 103 mmol/L      CO2 22 mmol/L      ANION GAP 11 mmol/L      BUN 9 mg/dL      Creatinine 0.68 mg/dL      Glucose 105 mg/dL      Calcium 8.9 mg/dL      eGFR 118 ml/min/1.73sq m     Narrative:      National Kidney Disease Foundation guidelines for Chronic Kidney Disease (CKD):     Stage 1 with normal or high GFR (GFR > 90 mL/min/1.73 square meters)    Stage 2 Mild CKD (GFR = 60-89 mL/min/1.73 square meters)    Stage 3A Moderate CKD (GFR = 45-59 mL/min/1.73 square meters)    Stage 3B Moderate CKD (GFR = 30-44 mL/min/1.73 square meters)    Stage 4 Severe CKD (GFR = 15-29 mL/min/1.73 square meters)    Stage 5 End Stage CKD (GFR <15 mL/min/1.73 square meters)  Note: GFR calculation is accurate only with a steady state creatinine    Hepatic function panel [[id number]]  (Normal) Collected: 12/26/23 2354    Lab Status: Final result Specimen: Blood from Arm, Left Updated: 12/27/23 0019     Total Bilirubin 0.53 mg/dL      Bilirubin, Direct 0.12 mg/dL      Alkaline Phosphatase 60 U/L      AST 19 U/L      ALT 17 U/L      Total Protein 7.2 g/dL      Albumin 4.0 g/dL     Lipase [[id number]]  (Abnormal) Collected: 12/26/23 2354    Lab Status: Final result Specimen: Blood from Arm, Left Updated: 12/27/23 0019     Lipase 8 u/L     Magnesium [[id number]]  (Abnormal) Collected: 12/26/23 2354    Lab Status: Final result Specimen: Blood from Arm, Left Updated: 12/27/23 0019     Magnesium 1.7 mg/dL     CBC and differential [[id number]]  (Abnormal) Collected: 12/26/23 2354    Lab Status: Final result Specimen: Blood from Arm, Left Updated: 12/27/23 0010     WBC 8.82 Thousand/uL      RBC 4.78 Million/uL      Hemoglobin 14.5 g/dL      Hematocrit 40.6 %      MCV 85 fL      MCH 30.3 pg      MCHC 35.7 g/dL      RDW 12.3 %      MPV 12.0 fL      Platelets 176 Thousands/uL      nRBC 0 /100 WBCs      Neutrophils Relative 84 %      Immat GRANS % 2 %      Lymphocytes Relative 8 %      Monocytes Relative 6 %       Eosinophils Relative 0 %      Basophils Relative 0 %      Neutrophils Absolute 7.43 Thousands/µL      Immature Grans Absolute 0.14 Thousand/uL      Lymphocytes Absolute 0.67 Thousands/µL      Monocytes Absolute 0.53 Thousand/µL      Eosinophils Absolute 0.03 Thousand/µL      Basophils Absolute 0.02 Thousands/µL     Urine Microscopic [[id number]]  (Abnormal) Collected: 12/26/23 2335    Lab Status: Final result Specimen: Urine, Clean Catch Updated: 12/27/23 0009     RBC, UA 2-4 /hpf      WBC, UA 2-4 /hpf      Epithelial Cells Moderate /hpf      Bacteria, UA Occasional /hpf      MUCUS THREADS Occasional    POCT pregnancy, urine [[id number]]  (Abnormal) Resulted: 12/26/23 2342    Lab Status: Final result Updated: 12/26/23 2342     EXT Preg Test, Ur Positive     Control Valid    Urine Macroscopic, POC [[id number]]  (Abnormal) Collected: 12/26/23 2335    Lab Status: Final result Specimen: Urine Updated: 12/26/23 2336     Color, UA Yellow     Clarity, UA Clear     pH, UA 6.5     Leukocytes, UA Negative     Nitrite, UA Negative     Protein, UA 30 (1+) mg/dl      Glucose, UA Negative mg/dl      Ketones, UA 15 (1+) mg/dl      Urobilinogen, UA 1.0 E.U./dl      Bilirubin, UA Negative     Occult Blood, UA Negative     Specific Gravity, UA 1.025    Narrative:      CLINITEK RESULT                   No orders to display              Procedures  POC Pelvic US    Date/Time: 12/26/2023 11:48 PM    Performed by: Kayleigh Ceron MD  Authorized by: Kayleigh Ceron MD    Patient location:  Bedside  Other Assisting Provider: No    Procedure details:     Exam Type:  Diagnostic    Indications: evaluate for IUP and pregnant with abdominal pain      Assessment for: evaluate fetal viability      Technique:  Transabdominal obstetric (HCG+) exam    Views obtained: uterus (transverse and sagittal)      Image quality: diagnostic      Image availability:  Images available in PACS  Uterine findings:     Intrauterine pregnancy:  identified      Single gestation: identified      Fetal heart rate: identified      Fetal heart rate (bpm):  168  Interpretation:     Pregnancy findings: intrauterine pregnancy (IUP)             ED Course  ED Course as of 01/05/24 0751   Tue Dec 26, 2023   2352 Chart review of last hospital discharge summary shows that this is a patient with history of hypokalemia.  Currently on amiloride.  History of hypomagnesemia.  History of heart palpitations and atrial tachycardia on metoprolol.     Wed Dec 27, 2023   0016 EKG was just performed.  Heart rate is now 87   0107 D/w patient. Still with nausea and abdominal cramping. Does not feel she can take PO at this time. Has been unable to tolerate meds at home for past 24 hours.                                SBIRT 20yo+      Flowsheet Row Most Recent Value   Initial Alcohol Screen: US AUDIT-C     1. How often do you have a drink containing alcohol? 0 Filed at: 12/26/2023 2307   2. How many drinks containing alcohol do you have on a typical day you are drinking?  0 Filed at: 12/26/2023 2307   3b. FEMALE Any Age, or MALE 65+: How often do you have 4 or more drinks on one occassion? 0 Filed at: 12/26/2023 2307   Audit-C Score 0 Filed at: 12/26/2023 2307   RAZ: How many times in the past year have you...    Used an illegal drug or used a prescription medication for non-medical reasons? Never Filed at: 12/26/2023 2307                      Medical Decision Making  Patient with intractable vomiting, pregnant and unable to take home electrolyte replenishment therapy. Improved in ED but continued with inability to tolerate PO. Hypokalemia  and hypomagnesemia required IV repletion    Amount and/or Complexity of Data Reviewed  External Data Reviewed: notes.     Details: See ed course  Labs: ordered. Decision-making details documented in ED Course.    Risk  Prescription drug management.  Decision regarding hospitalization.             Disposition  Final diagnoses:   Vomiting affecting  pregnancy   Hypokalemia   Hypomagnesemia     Time reflects when diagnosis was documented in both MDM as applicable and the Disposition within this note       Time User Action Codes Description Comment    2023  1:08 AM JieDomitilaca HECTOR Add [O21.9] Vomiting affecting pregnancy     2023  1:09 AM Jie, Kayleigh M Add [E87.6] Hypokalemia     2023  1:09 AM Kayleigh Ceron Add [E83.42] Hypomagnesemia     2023  2:40 PM Ivana Mckenzie Add [O21.9] Nausea and vomiting in pregnancy           ED Disposition       ED Disposition   Admit    Condition   Stable    Date/Time   Wed Dec 27, 2023 0108    Comment   Case was discussed with [facility] and the patient's admission status was agreed to be Admission Status: observation status to the service of Dr. Ramirez .               Follow-up Information       Follow up With Specialties Details Why Contact Info    [facility], Internal Medicine, Nurse Practitioner Follow up  [address] [place] [address]  [phone]              Discharge Medication List as of 2023  3:18 PM        START taking these medications    Details   doxylamine (UNISOM) 25 MG tablet Take 0.5 tablets (12.5 mg total) by mouth daily as needed for nausea, Starting 2023, Normal      ondansetron (ZOFRAN) 4 mg tablet Take 1 tablet (4 mg total) by mouth every 8 (eight) hours as needed for nausea or vomiting, Starting 2023, Normal      pyridoxine (B-6) 25 MG tablet Take 1 tablet (25 mg total) by mouth daily as needed (nausea), Starting 2023, No Print           CONTINUE these medications which have NOT CHANGED    Details   metoprolol succinate (TOPROL-XL) 50 mg 24 hr tablet Take 1 tablet (50 mg total) by mouth 2 (two) times a day, Starting Thu 10/12/2023, Normal      Prenatal Multivit-Min-Fe-FA (PRE-GORDON PO) Take by mouth, Historical Med      metoprolol tartrate (LOPRESSOR) 25 mg tablet Take 1 tablet (25 mg total)  by mouth every 6 (six) hours as needed (palpitatins), Starting Thu 10/12/2023, Normal           STOP taking these medications       AMILoride 5 mg tablet Comments:   Reason for Stopping:         ferrous sulfate 325 (65 Fe) mg tablet Comments:   Reason for Stopping:         potassium chloride (K-DUR,KLOR-CON) 20 mEq tablet Comments:   Reason for Stopping:         dicyclomine (BENTYL) 20 mg tablet Comments:   Reason for Stopping:         hydrOXYzine HCL (ATARAX) 10 mg tablet Comments:   Reason for Stopping:               Outpatient Discharge Orders   Discharge Diet     Activity as tolerated       PDMP Review         Value Time User    PDMP Reviewed  Yes 7/27/2023  3:53 PM ENMA Echavarria            ED Provider  Electronically Signed by             Kayleigh Ceron MD  01/05/24 0755

## 2023-12-27 NOTE — ASSESSMENT & PLAN NOTE
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 No documented fevers, no abdominal pain. Recently discharged on 12/17/2023 but no antibiotics were used     Plan:  Likely just viral gastroenteritis, does have documentation of IBS with diarrhea  Monitor for fever, worsening abdominal pain, worsening diarrhea

## 2023-12-27 NOTE — ASSESSMENT & PLAN NOTE
Hx of Nausea and vomiting in the first trimester. Nausea/Vomiting today associated with watery diarrhea likely secondary to  gastroenteritis  History of electrolyte imbalance : CMP &  electrolyte within normal limits today  Normotensive  Pt able to tolerate Oral intake at time of OBGYN evaluation     Potassium   Date Value Ref Range Status   12/27/2023 3.5 3.5 - 5.3 mmol/L Final   12/26/2023 2.8 (L) 3.5 - 5.3 mmol/L Final     Magnesium   Date Value Ref Range Status   12/27/2023 2.1 1.9 - 2.7 mg/dL Final   12/26/2023 1.7 (L) 1.9 - 2.7 mg/dL Final     Calcium   Date Value Ref Range Status   12/27/2023 7.5 (L) 8.4 - 10.2 mg/dL Final   12/26/2023 8.9 8.4 - 10.2 mg/dL Final     Plan   Continue oral hydration  Currently on Unisom, recommend adding Vitamin B6 and Zofran Prn at discharge

## 2023-12-27 NOTE — ASSESSMENT & PLAN NOTE
12 weeks pregnant, on-going nausea/vomiting/diarrhea since yesterday 12/25/2023. No known sick contacts, no documented fever, no abdominal pain except cramping when she feels she is about to vomit, unable to tolerate PO  Viral panel negative    Plan:  Possibly due to early pregnancy vs viral gastroenteritis  Trial doxyalymine+pyridoxine if tolerates PO, if not IV zofran  Volume depleted, aggressive IVF resuscitation   Correct underlying electrolyte abnormalities   Ob/gyn consulted

## 2023-12-27 NOTE — H&P
[facility]  H&P  Name: Julita Berry 28 y.o. 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 No documented fevers, no abdominal pain. Recently discharged on 2023 but no antibiotics were used     Plan:  Likely just viral gastroenteritis, does have documentation of IBS with diarrhea  Monitor for fever, worsening abdominal pain, worsening diarrhea     12 weeks gestation of pregnancy  Assessment & Plan  Denies abdominal pain, contractions or vaginal bleeding currently    Plan:  Continue pre- vitamins  Obgyn consulted    Hypokalemia  Assessment & Plan  Acute on chronic, now worse with ongoing vomiting/diarrhea and poor PO intake    Plan:  Continue aggressive replenishment IV until able to take PO  Monitor on telemetry   Holding amiloride in setting of dehydration    Hypomagnesemia  Assessment & Plan  Acute on chronic, now worse with ongoing vomiting/diarrhea and poor PO intake    Plan:  Continue aggressive replenishment IV until able to tolerate PO  Monitor on telemetry     POTS (postural orthostatic tachycardia syndrome)  Assessment & Plan  Following electrophysiology and nephrology for some time, rates controlled, PVCs noted on monitor, patient reports intermittent palpitations     Plan:  Maintain telemetry  Continue PTA metoprolol XL 50mg twice daily, metoprolol tartrate 25mg PRN  Will hold amilodride in setting of dehydration at this point, resume when appropriate   Monitor and replenish electrolytes     * Nausea and vomiting in pregnancy  Assessment & Plan  12 weeks pregnant, on-going nausea/vomiting/diarrhea since yesterday 2023. No known sick contacts, no documented fever, no abdominal pain except cramping when she feels she is about to  vomit, unable to tolerate PO  Viral panel negative    Plan:  Possibly due to early pregnancy vs viral gastroenteritis  Trial doxyalymine+pyridoxine if tolerates PO, if not IV zofran  Volume depleted, aggressive IVF resuscitation   Correct underlying electrolyte abnormalities   Ob/gyn consulted     VTE Pharmacologic Prophylaxis: VTE Score: 1 Low Risk (Score 0-2) - Encourage Ambulation.  Code Status: Level 1 - Full Code   Discussion with family: patient    Anticipated Length of Stay: Patient will be admitted on an observation basis with an anticipated length of stay of less than 2 midnights secondary to vomiting.    Total Time Spent on Date of Encounter in care of patient:  mins. This time was spent on one or more of the following: performing physical exam; counseling and coordination of care; obtaining or reviewing history; documenting in the medical record; reviewing/ordering tests, medications or procedures; communicating with other healthcare professionals and discussing with patient's family/caregivers.    Chief Complaint: vomiting    History of Present Illness:  Julita Berry is a 28 y.o. female with a PMH of anxiety, POTS/atrial tachycardia, 12 weeks pregnant who presents with vomiting.   History obtained from patient, chart review and discussion with ED attending. She presents tonight for evaluation of nausea/vomiting/diarrhea. No inciting event or trauma. She is currently 12 weeks pregnant. Patient was recently discharged on 12/17/2023 for palpitations/hypokalemia/hypomagnesemia. She was doing well for the past week but yesterday 12/25/2023 she began to have persistent nausea/vomiting and episodes of diarrhea. None of this has been bloody. Does not report documented fever. She denies abdominal pain, but occasionally cramps prior to her vomiting. Her diarrhea is watery but is somewhat resolving. She has not been able to tolerate her oral medications at all. She has tried taking OTC Unisom with some relief  "but has not been able to keep it down. 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 No known foodborne exposures, no close family members having same symptoms. Denies feelings of contractions or abnormal vaginal bleeding. Does have intermittent palpitations.     Review of Systems:  Review of Systems   Constitutional:  Negative for chills and fever.   Respiratory:  Negative for shortness of breath.    Cardiovascular:  Positive for palpitations. Negative for chest pain.   Gastrointestinal:  Positive for diarrhea, nausea and vomiting. Negative for abdominal pain and blood in stool.   Neurological:  Negative for syncope.   All other systems reviewed and are negative.      Past Medical and Surgical History:   Past Medical History:   Diagnosis Date    Anxiety     Eczema     Exercises 5 to 6 times per week     per pt prior to knee issue -enjoyed running and horseback riding    Family history of reaction to anesthesia     per pt--\"Mom also gets high anxiety with surgeries and wakes up nasty from anesthesia\"    Gastroparesis 2012    Hypertension     last assessed 3/12/14    Hypokalemia     per pt per doctors possibly related renal problem    Hypomagnesemia     Irritable bowel syndrome     with diarrhea    Rheumatoid arthritis (HCC) 2022    Sinus tachycardia     related to dehydration    Tooth missing     front upper tooth retainer-    Wears glasses     for computer use       Past Surgical History:   Procedure Laterality Date    [place] ARTHROSCOPY KNEE LATERAL RELEASE Right 04/13/2023    Procedure: ARTHROSCOPIC RELEASE RETINACULAR;  Surgeon: Ilya Brizuela DO;  Location:  MAIN OR;  Service: Orthopedics    KY EGD TRANSORAL BIOPSY SINGLE/MULTIPLE N/A 05/04/2016    Procedure: ESOPHAGOGASTRODUODENOSCOPY (EGD);  Surgeon: Raji Monzon MD;  Location:  GI LAB;  Service: Gastroenterology    KY RCNSTJ DISLC PATELLA W/XTNSR RELIGNMT&/MUSC RL Right 04/13/2023    Procedure: ARTHROSCOPIC REALIGNMENT PATELLA;  Surgeon: " Ilya Brizuela DO;  Location:  MAIN OR;  Service: Orthopedics    WISDOM TOOTH EXTRACTION         Meds/Allergies:  Prior to Admission medications    Medication Sig Start Date End Date Taking? Authorizing Provider   AMILoride 5 mg tablet Take 2 tablets (10 mg total) by mouth 2 (two) times a day 23  Yes Shmuel Clark   ferrous sulfate 325 (65 Fe) mg tablet Take 325 mg by mouth daily with breakfast   Yes Historical Provider, MD   metoprolol succinate (TOPROL-XL) 50 mg 24 hr tablet Take 1 tablet (50 mg total) by mouth 2 (two) times a day 10/12/23  Yes Brice Boyle MD   potassium chloride (K-DUR,KLOR-CON) 20 mEq tablet Take a total of 120 meq daily for 3-4 days then decrease to 100 meq thereafter. 12/15/23  Yes Joselyn Reyes Bahamonde, MD   Prenatal Multivit-Min-Fe-FA (PRE-GORDON PO) Take by mouth   Yes Historical Provider, MD   dicyclomine (BENTYL) 20 mg tablet Take 1 tablet (20 mg total) by mouth every 6 (six) hours  Patient not taking: Reported on 2023   Ai Amador DO   hydrOXYzine HCL (ATARAX) 10 mg tablet Take 1 tablet (10 mg total) by mouth 2 (two) times a day as needed for anxiety 21   Xochilt Galaviz MD   metoprolol tartrate (LOPRESSOR) 25 mg tablet Take 1 tablet (25 mg total) by mouth every 6 (six) hours as needed (palpitatins) 10/12/23   Brice Boyle MD     I have reviewed home medications using recent Epic encounter.    Allergies:   Allergies   Allergen Reactions    Prednisone Hyperactivity     Medrol dose samantha only    Serotonin Reuptake Inhibitors (Ssris) Anaphylaxis and Hives    Augmentin [Amoxicillin-Pot Clavulanate] Hives and Rash    Clindamycin Rash    Penicillins Rash    Sulfa Antibiotics Rash    Azithromycin Rash       Social History:  Marital Status: /Civil Union   Occupation:   Patient Pre-hospital Living Situation: Home  Patient Pre-hospital Level of Mobility: walks  Patient Pre-hospital Diet Restrictions:   Substance Use History:   Social  "History     Substance and Sexual Activity   Alcohol Use Not Currently    Alcohol/week: 1.0 standard drink of alcohol    Types: 1 Standard drinks or equivalent per week     Social History     Tobacco Use   Smoking Status Never   Smokeless Tobacco Never     Social History     Substance and Sexual Activity   Drug Use No       Family History:  Family History   Problem Relation Age of Onset    Hypokalemia Mother     Hypotension Mother     Anxiety disorder Mother         NOS    Lung cancer Father     Skin cancer Father     Crohn's disease Father     Schizoaffective Disorder  Father     Alcohol abuse Father     Drug abuse Father     No Known Problems Sister     No Known Problems Sister     No Known Problems Brother     Coronary artery disease Maternal Grandmother     Heart disease Maternal Grandmother     Alzheimer's disease Maternal Grandmother     Arthritis Paternal Grandmother     Rheum arthritis Paternal Grandmother     Hypertension Paternal Grandmother     COPD Paternal Grandfather        Physical Exam:     Vitals:   Blood Pressure: 97/53 (12/27/23 0500)  Pulse: 82 (12/27/23 0500)  Temperature: 97.5 °F (36.4 °C) (12/26/23 2216)  Temp Source: Oral (12/26/23 2216)  Respirations: 18 (12/27/23 0500)  Height: 5' 4\" (162.6 cm) (12/26/23 2216)  Weight - Scale: 75.9 kg (167 lb 5.3 oz) (12/26/23 2216)  SpO2: 98 % (12/27/23 0500)    Physical Exam  Vitals and nursing note reviewed.   Constitutional:       General: She is not in acute distress.     Appearance: She is well-developed.   HENT:      Head: Normocephalic and atraumatic.   Eyes:      Conjunctiva/sclera: Conjunctivae normal.   Cardiovascular:      Rate and Rhythm: Regular rhythm. Tachycardia present.      Heart sounds: No murmur heard.  Pulmonary:      Effort: Pulmonary effort is normal. No respiratory distress.      Breath sounds: Normal breath sounds.   Abdominal:      Palpations: Abdomen is soft.      Tenderness: There is no abdominal tenderness.   Musculoskeletal:    "      General: No swelling.      Cervical back: Neck supple.   Skin:     General: Skin is warm and dry.      Capillary Refill: Capillary refill takes less than 2 seconds.   Neurological:      Mental Status: She is alert.   Psychiatric:         Mood and Affect: Mood normal.          Additional Data:     Lab Results:  Results from last 7 days   Lab Units 12/27/23  0540 12/26/23  2354   WBC Thousand/uL 5.16 8.82   HEMOGLOBIN g/dL 12.2 14.5   HEMATOCRIT % 33.8* 40.6   PLATELETS Thousands/uL 151 176   NEUTROS PCT %  --  84*   LYMPHS PCT %  --  8*   MONOS PCT %  --  6   EOS PCT %  --  0     Results from last 7 days   Lab Units 12/26/23  2354   SODIUM mmol/L 136   POTASSIUM mmol/L 2.8*   CHLORIDE mmol/L 103   CO2 mmol/L 22   BUN mg/dL 9   CREATININE mg/dL 0.68   ANION GAP mmol/L 11   CALCIUM mg/dL 8.9   ALBUMIN g/dL 4.0   TOTAL BILIRUBIN mg/dL 0.53   ALK PHOS U/L 60   ALT U/L 17   AST U/L 19   GLUCOSE RANDOM mg/dL 105                       Lines/Drains:  Invasive Devices       Peripheral Intravenous Line  Duration             Peripheral IV 12/26/23 Left Antecubital <1 day                        Imaging: No pertinent imaging reviewed.  No orders to display       EKG and Other Studies Reviewed on Admission:   EKG: No EKG obtained.    ** Please Note: This note has been constructed using a voice recognition system. **

## 2023-12-28 ENCOUNTER — INITIAL PRENATAL (OUTPATIENT)
Dept: OBGYN CLINIC | Facility: CLINIC | Age: 28
End: 2023-12-28
Payer: COMMERCIAL

## 2023-12-28 VITALS — DIASTOLIC BLOOD PRESSURE: 60 MMHG | WEIGHT: 167 LBS | SYSTOLIC BLOOD PRESSURE: 110 MMHG | BODY MASS INDEX: 28.67 KG/M2

## 2023-12-28 DIAGNOSIS — Z3A.12 12 WEEKS GESTATION OF PREGNANCY: ICD-10-CM

## 2023-12-28 DIAGNOSIS — I10 ESSENTIAL HYPERTENSION, BENIGN: Chronic | ICD-10-CM

## 2023-12-28 DIAGNOSIS — Z34.01 ENCOUNTER FOR SUPERVISION OF NORMAL FIRST PREGNANCY IN FIRST TRIMESTER: Primary | ICD-10-CM

## 2023-12-28 DIAGNOSIS — I47.11 INAPPROPRIATE SINUS TACHYCARDIA: ICD-10-CM

## 2023-12-28 DIAGNOSIS — K58.0 IRRITABLE BOWEL SYNDROME WITH DIARRHEA: ICD-10-CM

## 2023-12-28 DIAGNOSIS — O21.9 NAUSEA AND VOMITING IN PREGNANCY: ICD-10-CM

## 2023-12-28 DIAGNOSIS — F41.9 ANXIETY: ICD-10-CM

## 2023-12-28 LAB
SL AMB  POCT GLUCOSE, UA: NEGATIVE
SL AMB POCT URINE PROTEIN: ABNORMAL

## 2023-12-28 PROCEDURE — 87491 CHLMYD TRACH DNA AMP PROBE: CPT | Performed by: OBSTETRICS & GYNECOLOGY

## 2023-12-28 PROCEDURE — 87591 N.GONORRHOEAE DNA AMP PROB: CPT | Performed by: OBSTETRICS & GYNECOLOGY

## 2023-12-28 PROCEDURE — 81002 URINALYSIS NONAUTO W/O SCOPE: CPT | Performed by: OBSTETRICS & GYNECOLOGY

## 2023-12-28 PROCEDURE — PNV: Performed by: OBSTETRICS & GYNECOLOGY

## 2023-12-28 RX ORDER — BUSPIRONE HYDROCHLORIDE 5 MG/1
5 TABLET ORAL AS NEEDED
COMMUNITY

## 2023-12-28 RX ORDER — PNV NO.95/FERROUS FUM/FOLIC AC 28MG-0.8MG
325 TABLET ORAL ONCE
COMMUNITY

## 2023-12-28 RX ORDER — AMILORIDE HYDROCHLORIDE 5 MG/1
5 TABLET ORAL
COMMUNITY

## 2023-12-28 NOTE — ASSESSMENT & PLAN NOTE
Had recent admission overnight to [facility] secondary to acute worsening, likely viral gastroenteritis.  Finally feeling better today.   Encouraged continued aggressive oral hydration.

## 2023-12-28 NOTE — PROGRESS NOTES
PN1 @ 12w2d.  Denies ctxs, VB, LOF.  No FM yet.  S/p ED visit with overnight stay at [facility] secondary to acute worsening of nausea/vomiting and associated diarrhea, likely viral gastroenteritis.  Feels better now.      Problem List Items Addressed This Visit       Essential hypertension, benign (Chronic)     Continue amiloride - dose recently adjusted by nephorology.   Has consult with MFM next week for nuchal translucency, etc.           Relevant Medications    AMILoride 5 mg tablet    Anxiety     Continue Buspar.          Irritable bowel syndrome with diarrhea     Follows with GI.  Currently has constipation - discussed colace and fiber, increased hydration.          Nausea and vomiting in pregnancy     Had recent admission overnight to [facility] secondary to acute worsening, likely viral gastroenteritis.  Finally feeling better today.   Encouraged continued aggressive oral hydration.          Inappropriate sinus tachycardia     Normal heart rate today.         12 weeks gestation of pregnancy     Pap negative 4/7/2022  GC/chlamydia sent off urine today.  Nuchal translucency ultrasound scheduled 1/2/2024.  Bleeding precautions given.           Other Visit Diagnoses       Encounter for supervision of normal first pregnancy in first trimester    -  Primary    Relevant Orders    POCT urine dip (Completed)    Chlamydia/GC amplified DNA by PCR

## 2023-12-28 NOTE — ASSESSMENT & PLAN NOTE
Augie Ramirez is a 31 year old male presenting with right foot heel pain since this morning.  Pt reports no injury.  Pt wants to know if he can get a muscle relaxer for his chronic back pain and sciatica.  Pt reports no recent injury.    OTC medications used:    None  Denies  known Latex allergy or symptoms of Latex sensitivity.  Social History     Tobacco Use   Smoking Status Never Smoker   Smokeless Tobacco Never Used     All allergies and medications reviewed.  PCP verified:   [facility]  Pharmacy verified:    [facility] in [place]  Patient would like communication of their results via:        Cell Phone:   Telephone Information:   Mobile [phone]     Okay to leave a message containing results? Yes     Normal heart rate today.

## 2023-12-28 NOTE — ASSESSMENT & PLAN NOTE
Continue amiloride - dose recently adjusted by nephorology.   Has consult with [facility] next week for nuchal translucency, etc.

## 2023-12-28 NOTE — ASSESSMENT & PLAN NOTE
Pap negative 4/7/2022  GC/chlamydia sent off urine today.  Nuchal translucency ultrasound scheduled 1/2/2024.  Bleeding precautions given.

## 2023-12-29 LAB
BACTERIA UR CULT: NORMAL
C TRACH DNA SPEC QL NAA+PROBE: NEGATIVE
N GONORRHOEA DNA SPEC QL NAA+PROBE: NEGATIVE

## 2024-01-02 ENCOUNTER — ROUTINE PRENATAL (OUTPATIENT)
Age: 29
End: 2024-01-02
Payer: COMMERCIAL

## 2024-01-02 ENCOUNTER — APPOINTMENT (OUTPATIENT)
Dept: LAB | Facility: CLINIC | Age: 29
End: 2024-01-02
Payer: COMMERCIAL

## 2024-01-02 VITALS
BODY MASS INDEX: 28.82 KG/M2 | WEIGHT: 168.8 LBS | HEART RATE: 101 BPM | SYSTOLIC BLOOD PRESSURE: 138 MMHG | DIASTOLIC BLOOD PRESSURE: 70 MMHG | HEIGHT: 64 IN

## 2024-01-02 DIAGNOSIS — E87.6 HYPOKALEMIA: ICD-10-CM

## 2024-01-02 DIAGNOSIS — Z3A.13 13 WEEKS GESTATION OF PREGNANCY: ICD-10-CM

## 2024-01-02 DIAGNOSIS — Z36.82 ENCOUNTER FOR (NT) NUCHAL TRANSLUCENCY SCAN: Primary | ICD-10-CM

## 2024-01-02 DIAGNOSIS — M35.9 UNDIFFERENTIATED CONNECTIVE TISSUE DISEASE (HCC): ICD-10-CM

## 2024-01-02 DIAGNOSIS — I10 ESSENTIAL HYPERTENSION, BENIGN: Chronic | ICD-10-CM

## 2024-01-02 DIAGNOSIS — N91.1 AMENORRHEA, SECONDARY: ICD-10-CM

## 2024-01-02 DIAGNOSIS — E83.39 HYPOPHOSPHATEMIA: ICD-10-CM

## 2024-01-02 DIAGNOSIS — I47.10 SVT (SUPRAVENTRICULAR TACHYCARDIA): ICD-10-CM

## 2024-01-02 DIAGNOSIS — M45.0 ANKYLOSING SPONDYLITIS OF MULTIPLE SITES IN SPINE (HCC): ICD-10-CM

## 2024-01-02 DIAGNOSIS — Z36.9 UNSPECIFIED ANTENATAL SCREENING: ICD-10-CM

## 2024-01-02 DIAGNOSIS — Z33.1 PREGNANT STATE, INCIDENTAL: ICD-10-CM

## 2024-01-02 PROCEDURE — 83735 ASSAY OF MAGNESIUM: CPT

## 2024-01-02 PROCEDURE — 76813 OB US NUCHAL MEAS 1 GEST: CPT | Performed by: STUDENT IN AN ORGANIZED HEALTH CARE EDUCATION/TRAINING PROGRAM

## 2024-01-02 PROCEDURE — 36415 COLL VENOUS BLD VENIPUNCTURE: CPT

## 2024-01-02 PROCEDURE — 80048 BASIC METABOLIC PNL TOTAL CA: CPT

## 2024-01-02 PROCEDURE — 99243 OFF/OP CNSLTJ NEW/EST LOW 30: CPT | Performed by: STUDENT IN AN ORGANIZED HEALTH CARE EDUCATION/TRAINING PROGRAM

## 2024-01-02 NOTE — PROGRESS NOTES
"[facility]: Ms. Berry was seen today for nuchal translucency ultrasound.  See ultrasound report under \"OB Procedures\" tab.        Physical Exam  Constitutional:       General: She is not in acute distress.     Appearance: Normal appearance.   HENT:      Head: Normocephalic and atraumatic.   Eyes:      Extraocular Movements: Extraocular movements intact.   Cardiovascular:      Rate and Rhythm: Normal rate.   Pulmonary:      Effort: Pulmonary effort is normal. No respiratory distress.   Skin:     Findings: No erythema or rash.   Neurological:      Mental Status: She is alert and oriented to person, place, and time.   Psychiatric:         Mood and Affect: Mood normal.         Behavior: Behavior normal.         Please don't hesitate to contact our office with any concerns or questions.  -Suzanne Vail MD      "

## 2024-01-02 NOTE — PROGRESS NOTES
"Patient chose to have [facility] Non-invasive Prenatal Screen with fetal sex (per pt request).   Patient choose billed through insurance.   Patient assistance program (PAP) application provided to patient no.  PAP sent with specimen No.     Patient given brochure and is aware [employer] will contact patients insurance and coordinate coverage.  Patient made aware she will receive a text message and e-mail from [employer].   Patient informed text message will come from area code  \"415\".   Provided [employer] Client Services # [phone] and web site at [email or web address].    Blood collection tubes labeled with patient identifiers (name, date of birth).     Printed [employer] lab order and test kit given with FED EX packaging (Tracking # [id number]). Patient instructed to take to a [facility] outpatient lab for blood collection (per pt request as she has additional labs to be drawn).  Requested patient notify MFM (via phone call or [employer] message) when blood collected so office can follow up on results.     Copy of lab order scanned to Epic media.    Maternal Fetal Medicine will have results in approximately 7-10 business days and will call patient or notify via [employer].  Patient aware viewing lab result online will reveal fetal sex If ordered.    Patient verbalized understanding of all instructions and no questions at this time.           "

## 2024-01-02 NOTE — LETTER
"2024     Meaghan Reardon MD  [address]  [address]  [facility] [address]    Patient: Julita Berry   YOB: 1995   Date of Visit: 2024       Dear Dr. Reardon:    Thank you for referring Julita Berry to me for evaluation. Below are my notes for this consultation.    If you have questions, please do not hesitate to call me. I look forward to following your patient along with you.         Sincerely,        Suzanne Vail MD        CC: No Recipients    Suzanne Vail MD  2024  2:17 PM  Signed  [facility]: Ms. Berry was seen today for nuchal translucency ultrasound.  See ultrasound report under \"OB Procedures\" tab.        Physical Exam  Constitutional:       General: She is not in acute distress.     Appearance: Normal appearance.   HENT:      Head: Normocephalic and atraumatic.   Eyes:      Extraocular Movements: Extraocular movements intact.   Cardiovascular:      Rate and Rhythm: Normal rate.   Pulmonary:      Effort: Pulmonary effort is normal. No respiratory distress.   Skin:     Findings: No erythema or rash.   Neurological:      Mental Status: She is alert and oriented to person, place, and time.   Psychiatric:         Mood and Affect: Mood normal.         Behavior: Behavior normal.         Please don't hesitate to contact our office with any concerns or questions.  -MD Pelon Mark  2024  2:17 PM  Signed  Patient chose to have [facility] Non-invasive Prenatal Screen with fetal sex (per pt request).   Patient choose billed through insurance.   Patient assistance program (PAP) application provided to patient no.  PAP sent with specimen No.     Patient given brochure and is aware [employer] will contact patients insurance and coordinate coverage.  Patient made aware she will receive a text message and e-mail from [employer].   Patient informed text message will come from area code  \"415\". "   Provided [employer] Client Services # [phone] and web site at [email or web address].    Blood collection tubes labeled with patient identifiers (name, date of birth).     Printed [employer] lab order and test kit given with FED EX packaging (Tracking # [id number]). Patient instructed to take to a [facility] outpatient lab for blood collection (per pt request as she has additional labs to be drawn).  Requested patient notify MFM (via phone call or [employer] message) when blood collected so office can follow up on results.     Copy of lab order scanned to Epic media.    Maternal Fetal Medicine will have results in approximately 7-10 business days and will call patient or notify via [employer].  Patient aware viewing lab result online will reveal fetal sex If ordered.    Patient verbalized understanding of all instructions and no questions at this time.

## 2024-01-03 ENCOUNTER — TREATMENT (OUTPATIENT)
Dept: NEPHROLOGY | Facility: CLINIC | Age: 29
End: 2024-01-03

## 2024-01-03 ENCOUNTER — TELEPHONE (OUTPATIENT)
Dept: NEPHROLOGY | Facility: CLINIC | Age: 29
End: 2024-01-03

## 2024-01-03 DIAGNOSIS — E87.6 HYPOKALEMIA: Primary | ICD-10-CM

## 2024-01-03 LAB
ANION GAP SERPL CALCULATED.3IONS-SCNC: 9 MMOL/L
BUN SERPL-MCNC: 7 MG/DL (ref 5–25)
CALCIUM SERPL-MCNC: 9 MG/DL (ref 8.4–10.2)
CHLORIDE SERPL-SCNC: 103 MMOL/L (ref 96–108)
CO2 SERPL-SCNC: 25 MMOL/L (ref 21–32)
CREAT SERPL-MCNC: 0.51 MG/DL (ref 0.6–1.3)
GFR SERPL CREATININE-BSD FRML MDRD: 130 ML/MIN/1.73SQ M
GLUCOSE P FAST SERPL-MCNC: 65 MG/DL (ref 65–99)
MAGNESIUM SERPL-MCNC: 1.9 MG/DL (ref 1.9–2.7)
POTASSIUM SERPL-SCNC: 3.4 MMOL/L (ref 3.5–5.3)
SODIUM SERPL-SCNC: 137 MMOL/L (ref 135–147)

## 2024-01-03 RX ORDER — POTASSIUM CHLORIDE 29.8 MG/ML
40 INJECTION INTRAVENOUS ONCE
Status: CANCELLED | OUTPATIENT
Start: 2024-01-03 | End: 2024-01-03

## 2024-01-03 NOTE — TELEPHONE ENCOUNTER
Patient scheduled for tomorrow at 11a at [facility].  Patient aware of date, time and location and to go for a BMP on Friday.

## 2024-01-03 NOTE — TELEPHONE ENCOUNTER
Spoke with patient and she is currently taking about 40-60 mEq daily of potassium, as best as she can, as well as supplement with foods high in potassium and electrolytes drinks.  Se is currently taking 25mg of amiloride a day but she is hesitant to increase amiloride as her blood pressure today is 88/52.  She has had some dizziness upon standing with it and is more tired than normal.    ----- Message from Abelardo Covarrubias MD sent at 1/3/2024  9:32 AM EST -----  Try to have her take a total of 6 potassium pills a day  How is her blood pressure?  Can we sneak up the amiloride to the maximum which should be a total of 6 pills a day  No matter what she needs repeat labs Friday  ----- Message -----  From: Lab, Background User  Sent: 1/3/2024   3:26 AM EST  To: Abelardo Covarrubias MD

## 2024-01-04 ENCOUNTER — HOSPITAL ENCOUNTER (OUTPATIENT)
Dept: INFUSION CENTER | Facility: HOSPITAL | Age: 29
End: 2024-01-04
Attending: INTERNAL MEDICINE
Payer: COMMERCIAL

## 2024-01-04 VITALS
RESPIRATION RATE: 18 BRPM | TEMPERATURE: 98.2 F | HEART RATE: 88 BPM | DIASTOLIC BLOOD PRESSURE: 86 MMHG | SYSTOLIC BLOOD PRESSURE: 131 MMHG

## 2024-01-04 DIAGNOSIS — E87.6 HYPOKALEMIA: Primary | ICD-10-CM

## 2024-01-04 PROCEDURE — 96365 THER/PROPH/DIAG IV INF INIT: CPT

## 2024-01-04 PROCEDURE — 96366 THER/PROPH/DIAG IV INF ADDON: CPT

## 2024-01-04 RX ORDER — POTASSIUM CHLORIDE 14.9 MG/ML
20 INJECTION INTRAVENOUS
Qty: 200 ML | Refills: 0 | Status: COMPLETED | OUTPATIENT
Start: 2024-01-04 | End: 2024-01-04

## 2024-01-04 RX ORDER — POTASSIUM CHLORIDE 29.8 MG/ML
40 INJECTION INTRAVENOUS ONCE
Status: CANCELLED | OUTPATIENT
Start: 2024-01-04 | End: 2024-01-04

## 2024-01-04 RX ADMIN — POTASSIUM CHLORIDE 20 MEQ: 14.9 INJECTION, SOLUTION INTRAVENOUS at 11:08

## 2024-01-04 RX ADMIN — POTASSIUM CHLORIDE 20 MEQ: 14.9 INJECTION, SOLUTION INTRAVENOUS at 13:12

## 2024-01-05 ENCOUNTER — APPOINTMENT (OUTPATIENT)
Dept: LAB | Facility: CLINIC | Age: 29
End: 2024-01-05
Payer: COMMERCIAL

## 2024-01-05 DIAGNOSIS — E83.39 HYPOPHOSPHATEMIA: ICD-10-CM

## 2024-01-05 DIAGNOSIS — E87.6 HYPOKALEMIA: ICD-10-CM

## 2024-01-05 LAB
ANION GAP SERPL CALCULATED.3IONS-SCNC: 10 MMOL/L
BUN SERPL-MCNC: 7 MG/DL (ref 5–25)
CALCIUM SERPL-MCNC: 9.6 MG/DL (ref 8.4–10.2)
CHLORIDE SERPL-SCNC: 102 MMOL/L (ref 96–108)
CO2 SERPL-SCNC: 24 MMOL/L (ref 21–32)
CREAT SERPL-MCNC: 0.62 MG/DL (ref 0.6–1.3)
GFR SERPL CREATININE-BSD FRML MDRD: 122 ML/MIN/1.73SQ M
GLUCOSE SERPL-MCNC: 126 MG/DL (ref 65–140)
MAGNESIUM SERPL-MCNC: 1.6 MG/DL (ref 1.9–2.7)
POTASSIUM SERPL-SCNC: 3.3 MMOL/L (ref 3.5–5.3)
SODIUM SERPL-SCNC: 136 MMOL/L (ref 135–147)

## 2024-01-05 PROCEDURE — 36415 COLL VENOUS BLD VENIPUNCTURE: CPT

## 2024-01-05 PROCEDURE — 80048 BASIC METABOLIC PNL TOTAL CA: CPT

## 2024-01-05 PROCEDURE — 83735 ASSAY OF MAGNESIUM: CPT

## 2024-01-09 ENCOUNTER — DOCUMENTATION (OUTPATIENT)
Dept: NEPHROLOGY | Facility: CLINIC | Age: 29
End: 2024-01-09

## 2024-01-09 ENCOUNTER — TELEPHONE (OUTPATIENT)
Dept: NEPHROLOGY | Facility: CLINIC | Age: 29
End: 2024-01-09

## 2024-01-09 DIAGNOSIS — E83.42 HYPOMAGNESEMIA: Primary | ICD-10-CM

## 2024-01-09 DIAGNOSIS — E87.6 HYPOKALEMIA: ICD-10-CM

## 2024-01-09 RX ORDER — MAGNESIUM GLYCINATE 100 MG
200 CAPSULE ORAL DAILY
COMMUNITY

## 2024-01-09 NOTE — TELEPHONE ENCOUNTER
Patient aware.    ----- Message from Abelardo Covarrubias MD sent at 1/8/2024  1:41 PM EST -----  Awesome  Lets get labs then this week and start her on magnesium supplement if not already  ----- Message -----  From: Moni Alvarez  Sent: 1/8/2024  10:50 AM EST  To: Abelardo Covarrubias MD    She had her potassium infusion on Thursday  ----- Message -----  From: Abelardo Covarrubias MD  Sent: 1/7/2024   2:47 PM EST  To: Nephrology [facility]    2 things  1.  Make sure the patient goes for her infusion of potassium  2.  She should start on Slow-Mag once a day over-the-counter watch for diarrhea 3.  Basic metabolic profile magnesium later this week oxamide Thursday which would be about 1/11  ----- Message -----  From: Lab, Background User  Sent: 1/5/2024  11:17 PM EST  To: Abelardo Covarrubias MD

## 2024-01-11 ENCOUNTER — APPOINTMENT (OUTPATIENT)
Dept: LAB | Facility: CLINIC | Age: 29
End: 2024-01-11
Payer: COMMERCIAL

## 2024-01-11 DIAGNOSIS — E83.42 HYPOMAGNESEMIA: ICD-10-CM

## 2024-01-11 DIAGNOSIS — E87.6 HYPOKALEMIA: ICD-10-CM

## 2024-01-11 LAB
ANION GAP SERPL CALCULATED.3IONS-SCNC: 8 MMOL/L
BUN SERPL-MCNC: 9 MG/DL (ref 5–25)
CALCIUM SERPL-MCNC: 8.9 MG/DL (ref 8.4–10.2)
CHLORIDE SERPL-SCNC: 103 MMOL/L (ref 96–108)
CO2 SERPL-SCNC: 26 MMOL/L (ref 21–32)
CREAT SERPL-MCNC: 0.57 MG/DL (ref 0.6–1.3)
GFR SERPL CREATININE-BSD FRML MDRD: 125 ML/MIN/1.73SQ M
GLUCOSE P FAST SERPL-MCNC: 77 MG/DL (ref 65–99)
MAGNESIUM SERPL-MCNC: 1.9 MG/DL (ref 1.9–2.7)
POTASSIUM SERPL-SCNC: 3.7 MMOL/L (ref 3.5–5.3)
SODIUM SERPL-SCNC: 137 MMOL/L (ref 135–147)

## 2024-01-11 PROCEDURE — 80048 BASIC METABOLIC PNL TOTAL CA: CPT

## 2024-01-11 PROCEDURE — 83735 ASSAY OF MAGNESIUM: CPT

## 2024-01-11 PROCEDURE — 36415 COLL VENOUS BLD VENIPUNCTURE: CPT

## 2024-01-12 ENCOUNTER — TELEPHONE (OUTPATIENT)
Dept: NEPHROLOGY | Facility: CLINIC | Age: 29
End: 2024-01-12

## 2024-01-12 DIAGNOSIS — E83.42 HYPOMAGNESEMIA: ICD-10-CM

## 2024-01-12 DIAGNOSIS — E87.6 HYPOKALEMIA: Primary | ICD-10-CM

## 2024-01-12 NOTE — TELEPHONE ENCOUNTER
Patient aware    ----- Message from Abelardo Covarrubias MD sent at 1/12/2024  7:08 AM EST -----  Labs look great no changes  Repeat a basic metabolic profile magnesium in 1 week  ----- Message -----  From: Lab, Background User  Sent: 1/11/2024   9:22 PM EST  To: Abelardo Covarrubias MD

## 2024-01-15 ENCOUNTER — TELEPHONE (OUTPATIENT)
Facility: HOSPITAL | Age: 29
End: 2024-01-15

## 2024-01-15 NOTE — TELEPHONE ENCOUNTER
PT called office to schedule appointment for NIPS redraw.  Pt requests to  a kit as she has other blood work to be drawn.  PT requests [facility] on 1/18/2023 @ 1100.  Pt declines further needs at this time.

## 2024-01-18 ENCOUNTER — TELEPHONE (OUTPATIENT)
Dept: NEPHROLOGY | Facility: CLINIC | Age: 29
End: 2024-01-18

## 2024-01-18 ENCOUNTER — CLINICAL SUPPORT (OUTPATIENT)
Facility: HOSPITAL | Age: 29
End: 2024-01-18

## 2024-01-18 ENCOUNTER — APPOINTMENT (OUTPATIENT)
Dept: LAB | Facility: CLINIC | Age: 29
End: 2024-01-18
Payer: COMMERCIAL

## 2024-01-18 DIAGNOSIS — Z36.9 UNSPECIFIED ANTENATAL SCREENING: ICD-10-CM

## 2024-01-18 DIAGNOSIS — Z33.1 PREGNANT STATE, INCIDENTAL: ICD-10-CM

## 2024-01-18 DIAGNOSIS — E83.42 HYPOMAGNESEMIA: Primary | ICD-10-CM

## 2024-01-18 DIAGNOSIS — E87.6 HYPOKALEMIA: ICD-10-CM

## 2024-01-18 DIAGNOSIS — Z36.9 UNSPECIFIED ANTENATAL SCREENING: Primary | ICD-10-CM

## 2024-01-18 LAB
ANION GAP SERPL CALCULATED.3IONS-SCNC: 9 MMOL/L
BUN SERPL-MCNC: 8 MG/DL (ref 5–25)
CALCIUM SERPL-MCNC: 9 MG/DL (ref 8.4–10.2)
CHLORIDE SERPL-SCNC: 105 MMOL/L (ref 96–108)
CO2 SERPL-SCNC: 23 MMOL/L (ref 21–32)
CREAT SERPL-MCNC: 0.6 MG/DL (ref 0.6–1.3)
GFR SERPL CREATININE-BSD FRML MDRD: 123 ML/MIN/1.73SQ M
GLUCOSE P FAST SERPL-MCNC: 81 MG/DL (ref 65–99)
MAGNESIUM SERPL-MCNC: 1.7 MG/DL (ref 1.9–2.7)
POTASSIUM SERPL-SCNC: 3.3 MMOL/L (ref 3.5–5.3)
SODIUM SERPL-SCNC: 137 MMOL/L (ref 135–147)

## 2024-01-18 PROCEDURE — 36415 COLL VENOUS BLD VENIPUNCTURE: CPT

## 2024-01-18 PROCEDURE — 80048 BASIC METABOLIC PNL TOTAL CA: CPT

## 2024-01-18 PROCEDURE — 83735 ASSAY OF MAGNESIUM: CPT

## 2024-01-18 RX ORDER — POTASSIUM CHLORIDE 14.9 MG/ML
20 INJECTION INTRAVENOUS ONCE
Status: CANCELLED
Start: 2024-01-18

## 2024-01-18 RX ORDER — POTASSIUM CHLORIDE 14.9 MG/ML
20 INJECTION INTRAVENOUS ONCE
Start: 2024-01-18 | End: 2024-01-18

## 2024-01-18 NOTE — PROGRESS NOTES
"Patient chose to have [employer] [facility] Non-invasive Prenatal Screen with fetal sex.   Patient choose billed through insurance.   Patient assistance program (PAP) application provided to patient no.  PAP sent with specimen No.     Patient given brochure and is aware [employer] will contact patients insurance and coordinate coverage.  Patient made aware she will receive a text message and e-mail from [employer].   Patient informed text message will come from area code  \"415\".   Provided [employer] Client Services # [phone] and web site at [email or web address].    Blood collection tubes labeled with patient identifiers (name, date of birth).     Printed [employer] lab order and test kit given with FED EX packaging . Patient instructed to take to a [facility] outpatient lab for blood collection.  Requested patient notify MFM (via phone call or [employer] message) when blood collected so office can follow up on results.     Copy of lab order scanned to Epic media.    Maternal Fetal Medicine will have results in approximately 7-10 business days and will call patient or notify via [employer].  Patient aware viewing lab result online will reveal fetal sex If ordered.    Patient verbalized understanding of all instructions and no questions at this time.           "

## 2024-01-18 NOTE — TELEPHONE ENCOUNTER
Patient scheduled for 1/23 at 8am at [facility].  Patient aware of date, time and location.  Infusion center will call if something opens up on Monday.    ----- Message from Abelardo Covarrubias MD sent at 1/18/2024  1:45 PM EST -----  Not sure if I did this correctly but I placed orders for potassium 60 mill equivalents magnesium sulfate 3 g over 6 hours at the infusion center I gave today's date she can go tomorrow  ----- Message -----  From: Lab, Background User  Sent: 1/18/2024   1:13 PM EST  To: Abelardo Covarrubias MD

## 2024-01-19 LAB — MISCELLANEOUS LAB TEST RESULT: NORMAL

## 2024-01-19 RX ORDER — POTASSIUM CHLORIDE 14.9 MG/ML
20 INJECTION INTRAVENOUS ONCE
Status: CANCELLED
Start: 2024-01-23

## 2024-01-23 ENCOUNTER — HOSPITAL ENCOUNTER (OUTPATIENT)
Dept: INFUSION CENTER | Facility: HOSPITAL | Age: 29
Discharge: HOME | End: 2024-01-23

## 2024-01-23 ENCOUNTER — HOSPITAL ENCOUNTER (OUTPATIENT)
Dept: INFUSION CENTER | Facility: HOSPITAL | Age: 29
Discharge: HOME | End: 2024-01-23
Attending: INTERNAL MEDICINE
Payer: COMMERCIAL

## 2024-01-23 VITALS
TEMPERATURE: 97.2 F | DIASTOLIC BLOOD PRESSURE: 72 MMHG | RESPIRATION RATE: 18 BRPM | SYSTOLIC BLOOD PRESSURE: 110 MMHG | HEART RATE: 94 BPM

## 2024-01-23 DIAGNOSIS — E83.42 HYPOMAGNESEMIA: Primary | ICD-10-CM

## 2024-01-23 PROCEDURE — 96368 THER/DIAG CONCURRENT INF: CPT

## 2024-01-23 PROCEDURE — 96365 THER/PROPH/DIAG IV INF INIT: CPT

## 2024-01-23 PROCEDURE — 96366 THER/PROPH/DIAG IV INF ADDON: CPT

## 2024-01-23 RX ORDER — POTASSIUM CHLORIDE 14.9 MG/ML
20 INJECTION INTRAVENOUS ONCE
Status: CANCELLED
Start: 2024-01-23 | End: 2024-01-23

## 2024-01-23 RX ORDER — POTASSIUM CHLORIDE 14.9 MG/ML
20 INJECTION INTRAVENOUS ONCE
Status: COMPLETED | OUTPATIENT
Start: 2024-01-23 | End: 2024-01-23

## 2024-01-23 RX ADMIN — MAGNESIUM SULFATE HEPTAHYDRATE 3 G: 500 INJECTION, SOLUTION INTRAMUSCULAR; INTRAVENOUS at 08:37

## 2024-01-23 RX ADMIN — POTASSIUM CHLORIDE 20 MEQ: 14.9 INJECTION, SOLUTION INTRAVENOUS at 12:52

## 2024-01-23 RX ADMIN — POTASSIUM CHLORIDE 20 MEQ: 14.9 INJECTION, SOLUTION INTRAVENOUS at 08:34

## 2024-01-23 RX ADMIN — POTASSIUM CHLORIDE 20 MEQ: 14.9 INJECTION, SOLUTION INTRAVENOUS at 10:38

## 2024-01-23 NOTE — PROGRESS NOTES
Pt tolerated infusion of 60 mEq potassium and 3G Mg without difficulty.  Pt aware of next lab draw.  AVS declined.  Left ambulatory in stable condition.

## 2024-01-24 ENCOUNTER — ROUTINE PRENATAL (OUTPATIENT)
Dept: OBGYN CLINIC | Facility: CLINIC | Age: 29
End: 2024-01-24
Payer: COMMERCIAL

## 2024-01-24 VITALS
SYSTOLIC BLOOD PRESSURE: 110 MMHG | OXYGEN SATURATION: 97 % | WEIGHT: 170.4 LBS | DIASTOLIC BLOOD PRESSURE: 62 MMHG | HEART RATE: 106 BPM | BODY MASS INDEX: 29.25 KG/M2

## 2024-01-24 DIAGNOSIS — E87.6 HYPOKALEMIA: ICD-10-CM

## 2024-01-24 DIAGNOSIS — Z3A.16 16 WEEKS GESTATION OF PREGNANCY: ICD-10-CM

## 2024-01-24 DIAGNOSIS — I10 ESSENTIAL HYPERTENSION, BENIGN: Chronic | ICD-10-CM

## 2024-01-24 DIAGNOSIS — I47.10 SVT (SUPRAVENTRICULAR TACHYCARDIA): ICD-10-CM

## 2024-01-24 DIAGNOSIS — Z34.02 SUPERVISION OF NORMAL FIRST PREGNANCY IN SECOND TRIMESTER: Primary | ICD-10-CM

## 2024-01-24 LAB
SL AMB  POCT GLUCOSE, UA: NORMAL
SL AMB POCT URINE PROTEIN: NORMAL

## 2024-01-24 PROCEDURE — PNV: Performed by: STUDENT IN AN ORGANIZED HEALTH CARE EDUCATION/TRAINING PROGRAM

## 2024-01-24 PROCEDURE — 81002 URINALYSIS NONAUTO W/O SCOPE: CPT | Performed by: STUDENT IN AN ORGANIZED HEALTH CARE EDUCATION/TRAINING PROGRAM

## 2024-01-24 NOTE — ASSESSMENT & PLAN NOTE
29yo  at 16.1wks presents for routine prenatal visit     Pt denies pelvic cramping, vaginal bleeding, leakage of fluid.     -continue PNVs   -AFP ordered today   -Level 2    -bleeding precautions provided   -return in 4 weeks

## 2024-01-24 NOTE — ASSESSMENT & PLAN NOTE
-receiving potassium and magnesium infusions per nephrology recommendations.   -most recent infusion 1/23/24

## 2024-01-24 NOTE — PROGRESS NOTES
16 weeks gestation of pregnancy  29yo  at 16.1wks presents for routine prenatal visit     Pt denies pelvic cramping, vaginal bleeding, leakage of fluid.     -continue PNVs   -AFP ordered today   -Level 2    -bleeding precautions provided   -return in 4 weeks     Hypokalemia  -receiving potassium and magnesium infusions per nephrology recommendations.   -most recent infusion 24    Essential hypertension, benign  -continue amiloride per nephrology   -BP today wnl   -baseline HELLP labs wnl   -continue BP monitoring   -preeclampsia precautions provided     SVT (supraventricular tachycardia) (HCC)  -history of SVT controlled on metoprolol   -recently had one episode of palpitations that resolved within seconds, did not need to take extra dose of metoprolol   -cardiology appointment 24  -per [facility], consider cardiac monitoring during labor if indicated

## 2024-01-24 NOTE — ASSESSMENT & PLAN NOTE
-continue amiloride per nephrology   -BP today wnl   -baseline HELLP labs wnl   -continue BP monitoring   -preeclampsia precautions provided

## 2024-01-24 NOTE — ASSESSMENT & PLAN NOTE
-history of SVT controlled on metoprolol   -recently had one episode of palpitations that resolved within seconds, did not need to take extra dose of metoprolol   -cardiology appointment 2/14/24  -per [facility], consider cardiac monitoring during labor if indicated

## 2024-01-25 ENCOUNTER — APPOINTMENT (OUTPATIENT)
Dept: LAB | Facility: CLINIC | Age: 29
End: 2024-01-25
Payer: COMMERCIAL

## 2024-01-25 DIAGNOSIS — Z34.02 SUPERVISION OF NORMAL FIRST PREGNANCY IN SECOND TRIMESTER: ICD-10-CM

## 2024-01-25 PROCEDURE — 82105 ALPHA-FETOPROTEIN SERUM: CPT

## 2024-01-25 PROCEDURE — 36415 COLL VENOUS BLD VENIPUNCTURE: CPT

## 2024-01-26 ENCOUNTER — TELEPHONE (OUTPATIENT)
Dept: NEPHROLOGY | Facility: CLINIC | Age: 29
End: 2024-01-26

## 2024-01-26 NOTE — TELEPHONE ENCOUNTER
----- Message from Abelardo Covarrubias MD sent at 1/26/2024  8:42 AM EST -----  Placed orders for infusion of magnesium and potassium  Make sure repeat basic metabolic profile magnesium for now weekly

## 2024-01-26 NOTE — TELEPHONE ENCOUNTER
Left voicemail for the patient requesting a call back with scheduling restrictions and preferred infusion centers.

## 2024-01-30 ENCOUNTER — TELEPHONE (OUTPATIENT)
Facility: HOSPITAL | Age: 29
End: 2024-01-30

## 2024-01-30 DIAGNOSIS — Z36.0 ENCOUNTER FOR ANTENATAL SCREENING FOR CHROMOSOMAL ANOMALIES: Primary | ICD-10-CM

## 2024-01-30 LAB
2ND TRIMESTER 4 SCREEN SERPL-IMP: NORMAL
AFP ADJ MOM SERPL: 1.44
AFP INTERP AMN-IMP: NORMAL
AFP INTERP SERPL-IMP: NORMAL
AFP INTERP SERPL-IMP: NORMAL
AFP SERPL-MCNC: 46.8 NG/ML
AGE AT DELIVERY: 30.3 YR
GA METHOD: NORMAL
GA: 16.3 WEEKS
IDDM PATIENT QL: NO
MULTIPLE PREGNANCY: NO
NEURAL TUBE DEFECT RISK FETUS: 3220 %

## 2024-01-30 NOTE — TELEPHONE ENCOUNTER
"Julita called me back at [phone]. We discussed that the test failure reason (\"does not meet quality metrics\") is not indicative of an increased risk for aneuploidy or other pregnancy complications, but is instead seen in circumstances such as a maternal autoimmune condition. Julita confirmed that she has rheumatoid arthritis, and I agreed that this was a likely etiology for the test failures in her case. She reports recently ordering the \"Sneak Peek\" over-the-counter test to predict fetal sex. We discussed that this test may also not be able to return a result; as its methodology is more limited (looking simply for the presence/absence of the Y chromosome) it is hard to say before hearing back from that company. We reviewed that the patient's upcoming level II anatomy ultrasound in February should allow fetal sex to be visualized. Julita understands this but would prefer to know sooner rather than later, as it is already taken longer than expected based on the initial NIPS timing.    We discussed that aneuploidy is possible in any pregnancy regardless of family history, as it is usually sporadic. Julita and her  are interested in additional aneuploidy screening, so we reviewed the benefits and limitations of quad screening. We discussed that it consists of biochemical analysis of four analytes. It is able to detect approximately 81% of pregnancies in which the fetus has Down syndrome, 75% of pregnancies in which the fetus has trisomy 18 and 85% of pregnancies which the fetus has an open neural tube defect. It can also indirectly identify other chromosomal abnormalities, copy number variants, genetic syndromes, or adverse pregnancy outcomes if serum analyte levels are abnormal. The patient elects quad screening; as she has regular blood work due to her nephrology appointments, she asks that it be added to her chart and drawn at her next lab visit. Quad screen order placed in Epic.    Julita had " no further questions at this time and has my callback number.    Christine Chavez, CGC

## 2024-01-31 ENCOUNTER — HOSPITAL ENCOUNTER (OUTPATIENT)
Dept: INFUSION CENTER | Facility: HOSPITAL | Age: 29
Discharge: HOME | End: 2024-01-31
Attending: INTERNAL MEDICINE
Payer: COMMERCIAL

## 2024-01-31 VITALS
RESPIRATION RATE: 20 BRPM | HEART RATE: 118 BPM | DIASTOLIC BLOOD PRESSURE: 77 MMHG | SYSTOLIC BLOOD PRESSURE: 144 MMHG | TEMPERATURE: 97.8 F

## 2024-01-31 DIAGNOSIS — E87.6 HYPOKALEMIA: ICD-10-CM

## 2024-01-31 DIAGNOSIS — E83.42 HYPOMAGNESEMIA: ICD-10-CM

## 2024-01-31 PROCEDURE — 96365 THER/PROPH/DIAG IV INF INIT: CPT

## 2024-01-31 PROCEDURE — 96366 THER/PROPH/DIAG IV INF ADDON: CPT

## 2024-01-31 RX ADMIN — MAGNESIUM SULFATE HEPTAHYDRATE: 500 INJECTION, SOLUTION INTRAMUSCULAR; INTRAVENOUS at 10:20

## 2024-01-31 NOTE — PROGRESS NOTES
Pt tolerated Magnesium and potassium infusion with no complications. Declined AVS. Left unit ambulatory with a steady gait.

## 2024-01-31 NOTE — PLAN OF CARE
Problem: Knowledge Deficit  Goal: Patient/family/caregiver demonstrates understanding of disease process, treatment plan, medications, and discharge instructions  Description: Complete learning assessment and assess knowledge base.  Interventions:  - Provide teaching at level of understanding  - Provide teaching via preferred learning methods  Outcome: Progressing      cross cradle/football hold

## 2024-02-01 ENCOUNTER — LAB (OUTPATIENT)
Dept: LAB | Facility: CLINIC | Age: 29
End: 2024-02-01
Payer: COMMERCIAL

## 2024-02-01 DIAGNOSIS — Z36.0 ENCOUNTER FOR ANTENATAL SCREENING FOR CHROMOSOMAL ANOMALIES: ICD-10-CM

## 2024-02-01 PROCEDURE — 36415 COLL VENOUS BLD VENIPUNCTURE: CPT

## 2024-02-01 PROCEDURE — 82677 ASSAY OF ESTRIOL: CPT

## 2024-02-01 PROCEDURE — 82105 ALPHA-FETOPROTEIN SERUM: CPT

## 2024-02-01 PROCEDURE — 86336 INHIBIN A: CPT

## 2024-02-01 PROCEDURE — 84702 CHORIONIC GONADOTROPIN TEST: CPT

## 2024-02-03 LAB
2ND TRIMESTER 4 SCREEN SERPL-IMP: NORMAL
2ND TRIMESTER 4 SCREEN SERPL-IMP: NORMAL
AFP ADJ MOM SERPL: 1.32
AFP SERPL-MCNC: 48.7 NG/ML
AGE AT DELIVERY: 30.3 YR
FET TS 18 RISK FROM MAT AGE: NORMAL
FET TS 21 RISK FROM MAT AGE: 673
GA METHOD: NORMAL
GA: 17.3 WEEKS
HCG ADJ MOM SERPL: 1.49
HCG SERPL-ACNC: NORMAL MIU/ML
IDDM PATIENT QL: NO
INHIBIN A ADJ MOM SERPL: 1.21
INHIBIN A SERPL-MCNC: 170.16 PG/ML
KARYOTYP BLD/T: NORMAL
MULTIPLE PREGNANCY: NO
NEURAL TUBE DEFECT RISK FETUS: 4529 %
SERVICE CMNT-IMP: NORMAL
TS 18 RISK FETUS: NORMAL
TS 21 RISK FETUS: 6511
U ESTRIOL ADJ MOM SERPL: 1.2
U ESTRIOL SERPL-MCNC: 1.46 NG/ML

## 2024-02-05 ENCOUNTER — TELEPHONE (OUTPATIENT)
Dept: NEPHROLOGY | Facility: CLINIC | Age: 29
End: 2024-02-05

## 2024-02-05 NOTE — TELEPHONE ENCOUNTER
----- Message from Abelardo Covarrubias MD sent at 2/3/2024  7:23 AM EST -----  Labs look good repeat in about a week or so BMP magnesium  ----- Message -----  From: Lab, Background User  Sent: 2/1/2024   8:59 PM EST  To: Abelardo Covarrubias MD

## 2024-02-08 ENCOUNTER — APPOINTMENT (OUTPATIENT)
Dept: LAB | Facility: CLINIC | Age: 29
End: 2024-02-08
Payer: COMMERCIAL

## 2024-02-09 ENCOUNTER — TELEPHONE (OUTPATIENT)
Dept: NEPHROLOGY | Facility: CLINIC | Age: 29
End: 2024-02-09

## 2024-02-09 NOTE — TELEPHONE ENCOUNTER
Patient aware    ----- Message from Abelardo Covarrubias MD sent at 2/9/2024  7:53 AM EST -----  Labs actually good no changes repeat a basic metabolic profile magnesium in 1 week  ----- Message -----  From: Lab, Background User  Sent: 2/9/2024  12:04 AM EST  To: Abelardo Covarrbuias MD

## 2024-02-14 ENCOUNTER — OFFICE VISIT (OUTPATIENT)
Dept: CARDIOLOGY CLINIC | Facility: CLINIC | Age: 29
End: 2024-02-14
Payer: COMMERCIAL

## 2024-02-14 VITALS
DIASTOLIC BLOOD PRESSURE: 80 MMHG | BODY MASS INDEX: 29.59 KG/M2 | HEIGHT: 64 IN | WEIGHT: 173.3 LBS | HEART RATE: 96 BPM | SYSTOLIC BLOOD PRESSURE: 140 MMHG

## 2024-02-14 DIAGNOSIS — I49.3 PVC'S (PREMATURE VENTRICULAR CONTRACTIONS): Primary | ICD-10-CM

## 2024-02-14 DIAGNOSIS — I47.10 SVT (SUPRAVENTRICULAR TACHYCARDIA): ICD-10-CM

## 2024-02-14 DIAGNOSIS — Z3A.19 19 WEEKS GESTATION OF PREGNANCY: ICD-10-CM

## 2024-02-14 PROCEDURE — 99214 OFFICE O/P EST MOD 30 MIN: CPT | Performed by: STUDENT IN AN ORGANIZED HEALTH CARE EDUCATION/TRAINING PROGRAM

## 2024-02-14 PROCEDURE — 93000 ELECTROCARDIOGRAM COMPLETE: CPT | Performed by: STUDENT IN AN ORGANIZED HEALTH CARE EDUCATION/TRAINING PROGRAM

## 2024-02-14 NOTE — PROGRESS NOTES
HEART AND VASCULAR  CARDIAC ELECTROPHYSIOLOGY   HEART RHYTHM CENTER  [facility]    Outpatient New Consult  to establish care for evaluation management of SVT in the setting of pregnancy  Today's Date: 02/14/24        Patient name: Julita Berry  YOB: 1995  Sex: female         Chief Complaint: SVT      ASSESSMENT:  Problem List Items Addressed This Visit       SVT (supraventricular tachycardia)    19 weeks gestation of pregnancy     Other Visit Diagnoses       PVC's (premature ventricular contractions)    -  Primary    Relevant Orders    POCT ECG                  PLAN:  SVT  -Continue metoprolol succinate 25 mg p.o. twice daily  -Continue with Toprol tartrate 25 mg as needed  -Patient will contact the office should her symptoms worsen in the duration or frequency     G1, P0 approximately 19 weeks gestation  -Follows with MFM  -Recommend cardiac monitoring during labor and delivery  -Will see her in May 2024 to make sure that she does not need further medication adjustment prior to labor/delivery    Hypertension  -Blood pressure 140/80  -Continue management as per MFM    History of PVCs  -Occasional PVCs on EKG  -Continue metoprolol as described above      Follow up in: May, 2024    Orders Placed This Encounter   Procedures    POCT ECG     There are no discontinued medications.      .............................................................................................    HPI/Subjective:     Ms. Julita Berry is a 28 year old female G1, P0 at approximately 19 weeks gestation with a history of hypokalemia, essential hypertension, history of SVT controlled metoprolol.  Per EMR, her last episode of SVT was around January 24, 2024 for which she did not have to take an extra dose of metoprolol.  She is followed by MFM.    Today, she states her palpitations are well-controlled.  The longest episode lasted about 3 minutes, but otherwise a 10 to go away within seconds.  She is  "able to recognize cues, and notes that she sits on the floor if she begins to feel dizzy or lightheaded during episodes (although this has not happened recently).  She continues to take metoprolol succinate 50 mg every 12 hours and metoprolol tartrate 25 mg as needed for prolonged episodes.  She has not had to use the metoprolol tartrate recently.      At this time, there is no electrophysiology reason to avoid vaginal birth.  She follows with maternal-fetal medicine, and the plan will be to induce in June 2024 to avoid full-term delivery.  During labor and delivery, do recommend that she be maintained on cardiac monitor.    Given how well-controlled her SVT has been currently, no changes to medications made at this time.  We will schedule follow-up in May 2024, but she should give us a call for sooner appointment should her symptoms worsen.       Complete 12 point ROS reviewed and otherwise non pertinent or negative except as per HPI pertinent positives in Cardiovascular and Respiratory emphasized. Please see paper chart for outpatient clinic patients where the patient completed the 12 point ROS survey.           Past Medical History:   Diagnosis Date    Anxiety     Eczema     Exercises 5 to 6 times per week     per pt prior to knee issue -enjoyed running and horseback riding    Family history of reaction to anesthesia     per pt--\"Mom also gets high anxiety with surgeries and wakes up nasty from anesthesia\"    Gastroparesis 2012    Hypertension     last assessed 3/12/14    Hypokalemia     per pt per doctors possibly related renal problem    Hypomagnesemia     Irritable bowel syndrome     with diarrhea    Rheumatoid arthritis (HCC) 2022    Sinus tachycardia     related to dehydration    Tooth missing     front upper tooth retainer-    Wears glasses     for computer use       Allergies   Allergen Reactions    Prednisone Hyperactivity     Medrol dose samantha only    Serotonin Reuptake Inhibitors (Ssris) Anaphylaxis and " Hives    Augmentin [Amoxicillin-Pot Clavulanate] Hives and Rash    Clindamycin Rash    Penicillins Rash    Sulfa Antibiotics Rash    Azithromycin Rash     I reviewed the Home Medication list and Allergies in the chart.   Scheduled Meds:  Current Outpatient Medications   Medication Sig Dispense Refill    AMILoride 5 mg tablet Take 5 mg by mouth 5 (five) times a day 2 in the morning.   3 at night      aspirin (ECOTRIN LOW STRENGTH) 81 mg EC tablet Take 162 mg by mouth daily      busPIRone (BUSPAR) 5 mg tablet Take 5 mg by mouth as needed      Ferrous Sulfate (Iron) 325 (65 Fe) MG TABS Take 325 mg by mouth 1 (one) time      Magnesium Glycinate 100 MG CAPS Take 200 mg by mouth in the morning      metoprolol succinate (TOPROL-XL) 50 mg 24 hr tablet Take 1 tablet (50 mg total) by mouth 2 (two) times a day 90 tablet 3    metoprolol tartrate (LOPRESSOR) 25 mg tablet Take 1 tablet (25 mg total) by mouth every 6 (six) hours as needed (palpitatins) 90 tablet 3    ondansetron (ZOFRAN) 4 mg tablet Take 1 tablet (4 mg total) by mouth every 8 (eight) hours as needed for nausea or vomiting 20 tablet 0    POTASSIUM CHLORIDE PO Take 20 mEq/100 mL by mouth 5 (five) times a day 5 a day      Prenatal Multivit-Min-Fe-FA (PRE-GORDON PO) Take by mouth      doxylamine (UNISOM) 25 MG tablet Take 0.5 tablets (12.5 mg total) by mouth daily as needed for nausea (Patient not taking: Reported on 2024) 30 tablet 0    pyridoxine (B-6) 25 MG tablet Take 1 tablet (25 mg total) by mouth daily as needed (nausea) (Patient not taking: Reported on 2024)       No current facility-administered medications for this visit.     PRN Meds:.        Family History   Problem Relation Age of Onset    Hypokalemia Mother     Hypotension Mother     Anxiety disorder Mother         NOS    Lung cancer Father     Skin cancer Father     Crohn's disease Father     Schizoaffective Disorder  Father     Alcohol abuse Father     Drug abuse Father     No Known Problems  "Sister     No Known Problems Sister     No Known Problems Brother     Coronary artery disease Maternal Grandmother     Heart disease Maternal Grandmother     Alzheimer's disease Maternal Grandmother     Arthritis Paternal Grandmother     Rheum arthritis Paternal Grandmother     Hypertension Paternal Grandmother     COPD Paternal Grandfather        Social History     Socioeconomic History    Marital status: /Civil Union     Spouse name: Not on file    Number of children: Not on file    Years of education: Not on file    Highest education level: Not on file   Occupational History    Occupation: medical assistant with nephrology    Tobacco Use    Smoking status: Never    Smokeless tobacco: Never   Vaping Use    Vaping status: Never Used   Substance and Sexual Activity    Alcohol use: Not Currently     Alcohol/week: 1.0 standard drink of alcohol     Types: 1 Standard drinks or equivalent per week    Drug use: No    Sexual activity: Yes     Partners: Male     Birth control/protection: None   Other Topics Concern    Not on file   Social History Narrative    Activities - horseback riding    Caffeine use    Drinks coffee- 1 a wk    Exercise - walking    Exercising regularly     Social Determinants of Health     Financial Resource Strain: Not on file   Food Insecurity: Not on file   Transportation Needs: Not on file   Physical Activity: Not on file   Stress: Not on file   Social Connections: Not on file   Intimate Partner Violence: Not on file   Housing Stability: Not on file         OBJECTIVE:    /80 (BP Location: Left arm, Patient Position: Sitting, Cuff Size: Standard)   Pulse 96   Ht 5' 4\" (1.626 m)   Wt 78.6 kg (173 lb 4.8 oz)   LMP 10/03/2023   BMI 29.75 kg/m²   Vitals:    02/14/24 1125   Weight: 78.6 kg (173 lb 4.8 oz)     GEN: No acute distress, Alert and oriented, well appearing  HEENT: Normocephalic, atraumatic  CARDIOVASCULAR:  RRR, No murmur, rub, gallops S1,S2  LUNGS: Clear To auscultation " "bilaterally, normal effort, no rales, rhonchi, crackles   ABDOMEN:  nondistended,  nontender  EXTREMITIES/VASCULAR:  No edema. warm an well perfused.  PSYCH: Normal Affect,  linear speech pattern without evidence of psychosis.   NEURO: Grossly intact, moving all extremiteis equal, face symmetric, alert and responsive, no obvious focal defecits   GAIT:  Ambulates normally without difficulty  HEME: No bleeding, bruising, petechia, purpura   SKIN: No significant rashes on visibile skin, warm, no diaphoresis or pallor.     Lab Results:       LABS:      Chemistry        Component Value Date/Time     (L) 01/07/2016 1654    K 3.6 02/08/2024 1042    K 3.3 (L) 01/07/2016 1654     02/08/2024 1042    CL 93 (L) 01/07/2016 1654    CO2 21 02/08/2024 1042    CO2 23.5 01/07/2016 1654    BUN 12 02/08/2024 1042    BUN 15 01/07/2016 1654    CREATININE 0.53 (L) 02/08/2024 1042    CREATININE 0.93 01/07/2016 1654        Component Value Date/Time    CALCIUM 9.5 02/08/2024 1042    CALCIUM 8.1 (L) 01/07/2016 1654    ALKPHOS 60 12/26/2023 2354    ALKPHOS 56 12/28/2015 1739    AST 19 12/26/2023 2354    AST 16 12/28/2015 1739    ALT 17 12/26/2023 2354    ALT 17 12/28/2015 1739    BILITOT 0.33 12/28/2015 1739            Lab Results   Component Value Date    CHOL 195 08/27/2015    CHOL 176 06/05/2015    CHOL 188 04/20/2015     Lab Results   Component Value Date    HDL 52 06/05/2023    HDL 60 10/12/2022    HDL 64 03/28/2022     Lab Results   Component Value Date    LDLCALC 89 06/05/2023    LDLCALC 95 10/12/2022    LDLCALC 96 03/28/2022     Lab Results   Component Value Date    TRIG 122 06/05/2023    TRIG 100 10/12/2022    TRIG 165 (H) 03/28/2022     No results found for: \"CHOLHDL\"    IMAGING: No results found.     Cardiac testing:     I reviewed and interpreted the following LABS/EKG/TELE/IMAGING and below is summary of my interpretation (if data available):      Current EKG performed at today's visit (2/14/24) and read by me " personally reveals sinus rhythm with sinus arrhythmia and occasional PVCs.    HOLTER/EVENT Monitor: Performed on 11/10/23  Patient had a min HR of 50 bpm, max HR of 182 bpm, and avg HR of 81  bpm. Predominant underlying rhythm was Sinus Rhythm. 1 run of  Supraventricular Tachycardia occurred lasting 8.1 secs with a max rate of  182 bpm (avg 161 bpm). Supraventricular Tachycardia was detected  within +/- 45 seconds of symptomatic patient event(s). Isolated SVEs were  rare (<1.0%), SVE Couplets were rare (<1.0%), and no SVE Triplets were  present. Isolated VEs were rare (<1.0%), VE Couplets were rare (<1.0%),  and no VE Triplets were present. Ventricular Bigeminy and Trigeminy were  present.        ECHO 7/21/23  Left Ventricle Left ventricular cavity size is normal. Wall thickness is normal. The left ventricular ejection fraction is 55%. Systolic function is normal.  Wall motion is normal. Diastolic function is normal.   Right Ventricle Right ventricular cavity size is normal. Systolic function is normal. Wall thickness is normal.   Left Atrium The atrium is normal in size.   Right Atrium The atrium is normal in size.   Aortic Valve The aortic valve is trileaflet. The leaflets are not thickened. The leaflets are not calcified. The leaflets exhibit normal mobility. There is no evidence of regurgitation. The aortic valve has no significant stenosis.   Mitral Valve Mitral valve structure is normal. There is trace regurgitation. There is no evidence of stenosis.   Tricuspid Valve Tricuspid valve structure is normal. There is trace to mild regurgitation. There is no evidence of stenosis. The right ventricular systolic pressure is normal. The estimated right ventricular systolic pressure is 22.00 mmHg.   Pulmonic Valve Pulmonic valve structure is normal. There is trace regurgitation. There is no evidence of stenosis.   Ascending Aorta The aortic root is normal in size.   IVC/SVC The right atrial pressure is estimated at 5.0  mmHg. The inferior vena cava is normal in size.   Pericardium There is no pericardial effusion. The pericardium is normal in appearance.

## 2024-02-15 ENCOUNTER — APPOINTMENT (OUTPATIENT)
Dept: LAB | Facility: CLINIC | Age: 29
End: 2024-02-15
Payer: COMMERCIAL

## 2024-02-19 ENCOUNTER — TELEPHONE (OUTPATIENT)
Dept: NEPHROLOGY | Facility: CLINIC | Age: 29
End: 2024-02-19

## 2024-02-19 DIAGNOSIS — I10 ESSENTIAL HYPERTENSION, BENIGN: Primary | Chronic | ICD-10-CM

## 2024-02-19 NOTE — PROGRESS NOTES
RENAL FOLLOW UP NOTE:.td    ASSESSMENT AND PLAN:  1.  Hypertension/hypokalemia/hyponatremia:  Negative secondary workup.  Genetic workup was negative. She had been seen by Dr. Malu Augustin as a 2nd opinion.     Blood pressure goal:  Less than 135/80     Current  Blood pressures:    AM: 96/62 without orthostatic changes  PM: 108/65 without orthostatic changes  Heart rate: None     Recommendations:  Nonmedical regimen including isotonic exercise and avoidance of salt  Medication changes today:  Potentially decrease metoprolol XL will discuss with Dr. Butler EP physician     Regarding diet:  Continue high potassium diet     In regards to labs: Patient has required infusions for both potassium magnesium  Potassium: 3.3 continue supplements/amiloride going for infusion which she gets every couple weeks now  creatinine normal at  0.57  Sodium normal 135  MBD:    Hypomagnesemia: 1.7 will get an infusion  Vitamin-D 38.8 acceptable  lipid profile:  LDL 95/HDL 60/triglycerides 100 at goal from prior  Hemoglobin normal at 13.3  UA:  No proteinuria no hematuria  Urine protein creatinine ratio 0.06 g at goal        We are holding off on spironolactone with the potential future pregnancy  May have to increase the potassium supplement at this time.  High potassium diet;  Continue current potassium  I would increase magnesium supplement to 2 a day which would be 200 mg twice a day  Infusions as needed with weekly labs        2.?  POTS syndrome: Doing well heart rate in the 70s now followed by Dr. Butler felt related to SVT     3.  Mixed connective tissue disease:  Followed by Rheumatology as needed:  Clearly now with rheumatoid arthritis being treated with Plaquenil.  Now off ibuprofen.     4. GI:  GERD/IBS/idiopathic gastroparesis followed by GI Dr. Amador     5. Celiac artery stenosis:  Based on arterial duplex 07/28/2022 for hypertensive workup:  No further workup recommended or intervention unless she were to develop symptoms  soon after eating        GI health maintenance: Patient followed by GI to young for requiring colonoscopy       PATIENT INSTRUCTIONS:    Patient Instructions   Visit summary:  - Overall labs look good potassium and magnesium both occasionally low for which we are giving your infusions  - Blood pressure is actually low in the morning I am going to reach out to Dr. Butler to discuss whether or not we could gently decrease her evening dose of Toprol-XL: Stay tuned        1. Medication changes today:  Please increase magnesium supplement to 2 pills a day morning and evening watch for diarrhea    2.  General instructions:  Continue high potassium diet  Go for the designated infusion    3.  Please go for nonfasting lab work weekly    4.  Please take 1 week a blood pressure readings if we make any change in your medications.  Probably try to take it daily while you are pregnant    AS FOLLOWS  MORNING AND EVENING, SITTING AND STANDING as follows:  TAKE THE MORNING READINGS BEFORE ANY MEDICATIONS AND WHEN YOU ARE RELAXED FOR SEVERAL MINUTES  TAKE THE EVENING READINGS:  BETWEEN 7-10 P.M.; PRIOR TO ANY MEDICATIONS; AT LEAST IN OUR  FROM DINNER; AND CERTAINLY AFTER RELAXING FOR A FEW MINUTES  PLEASE INCLUDE HEART RATE WITH YOUR BLOOD PRESSURE READINGS  When taking standing readings, keep your arm supported at heart level and not dangling  Make sure you are sitting with your back supported and feet on the ground and do not cross your legs or feet  Make sure you have not taken any coffee or caffeine products or exercised or smoke cigarettes at least 30 minutes before taking your blood pressure  Then please mail these readings into the office        5.  Follow-up in 4 months  Please bring in 1 week a blood pressure readings morning evening, sitting and standing is outlined above  PLEASE BRING AN YOUR BLOOD PRESSURE MACHINE TO CORRELATE WITH THE OFFICE MACHINE AT THIS NEXT SCHEDULED VISIT  Please go for fasting lab work 1-2  weeks prior to your appointment      6.  General non medical recommendations:  AVOID SALT BUT NOT ADDING AN READING LABELS TO MAKE SURE THERE IS LOW-SALT IN THE FOOD THAT YOU ARE EATING  Goal is less than 2 g of sodium intake or less than 5 g of sodium chloride intake per day    Avoid nonsteroidal anti-inflammatory drugs such as Naprosyn, ibuprofen, Aleve, Advil, Celebrex, Meloxicam (Mobic) etc.  You can use Tylenol as needed if you do not have any liver condition to be concerned about    Avoid medications such as Sudafed or decongestants and antihistamines that contained the D component which is the decongestant.  You can take antihistamines without the decongestant or D component.    Try to avoid medications such as pantoprazole or  Protonix/Nexium or Esomeprazole)/Prilosec or omeprazole/Prevacid or lansoprazole/AcipHex or Rabeprazole.  If you are able to, use Pepcid as this is safer for your kidneys.    Try to exercise at least 30 minutes 3 days a week to begin with with an ultimate goal of 5 days a week for at least 30 minutes    Please do not drink more than 2 glasses of alcohol/wine on a daily basis as this may contribute to your high blood pressure.            Subjective:   The patient is 21 weeks pregnant  The patient overall is feeling well.  No fevers, chills, or cough or colds.  Good appetite and good energy, eating large amounts of potassium  No hematuria, dysuria, voiding symptoms or foamy urine  No gastrointestinal symptoms except for occasional diarrhea  No  chest pain, occasional shortness of breath associated with POTS  No headaches, except for occasional dizziness or lightheadedness  Blood pressure medications:  Toprol-XL 50 mg twice a day  Amiloride 10 mg in the morning and 15 mg in the evening  As needed metoprolol tartrate 25 mg for breakthrough SVT    Renal pertinent medications:  Potassium supplement 40-60 daily goal is 60 mEq  Magnesium glycinate 200 mg daily    ROS:  See HPI, otherwise  "review of systems as completely reviewed with the patient are negative    Past Medical History:   Diagnosis Date    Anxiety     Eczema     Exercises 5 to 6 times per week     per pt prior to knee issue -enjoyed running and horseback riding    Family history of reaction to anesthesia     per pt--\"Mom also gets high anxiety with surgeries and wakes up nasty from anesthesia\"    Gastroparesis 2012    Hypertension     last assessed 3/12/14    Hypokalemia     per pt per doctors possibly related renal problem    Hypomagnesemia     Irritable bowel syndrome     with diarrhea    Rheumatoid arthritis (HCC) 2022    Sinus tachycardia     related to dehydration    Tooth missing     front upper tooth retainer-    Wears glasses     for computer use     Past Surgical History:   Procedure Laterality Date    SC ARTHROSCOPY KNEE LATERAL RELEASE Right 04/13/2023    Procedure: ARTHROSCOPIC RELEASE RETINACULAR;  Surgeon: Ilya Brizuela DO;  Location:  MAIN OR;  Service: Orthopedics    SC EGD TRANSORAL BIOPSY SINGLE/MULTIPLE N/A 05/04/2016    Procedure: ESOPHAGOGASTRODUODENOSCOPY (EGD);  Surgeon: Raji Monzon MD;  Location:  GI LAB;  Service: Gastroenterology    SC RCNSTJ DISLC PATELLA W/XTNSR RELIGNMT&/MUSC RL Right 04/13/2023    Procedure: ARTHROSCOPIC REALIGNMENT PATELLA;  Surgeon: Ilya Brizuela DO;  Location:  MAIN OR;  Service: Orthopedics    WISDOM TOOTH EXTRACTION       Family History   Problem Relation Age of Onset    Hypokalemia Mother     Hypotension Mother     Anxiety disorder Mother         NOS    Lung cancer Father     Skin cancer Father     Crohn's disease Father     Schizoaffective Disorder  Father     Alcohol abuse Father     Drug abuse Father     No Known Problems Sister     No Known Problems Sister     No Known Problems Brother     Coronary artery disease Maternal Grandmother     Heart disease Maternal Grandmother     Alzheimer's disease Maternal Grandmother     Arthritis Paternal Grandmother     Rheum arthritis " Paternal Grandmother     Hypertension Paternal Grandmother     COPD Paternal Grandfather       reports that she has never smoked. She has never used smokeless tobacco. She reports that she does not currently use alcohol after a past usage of about 1.0 standard drink of alcohol per week. She reports that she does not use drugs.    I COMPLETELY REVIEWED THE PAST MEDICAL HISTORY/PAST SURGICAL HISTORY/SOCIAL HISTORY/FAMILY HISTORY/AND MEDICATIONS  AND UPDATED ALL    Objective:     Vitals:   BP sitting on right: 132/78 with a heart rate of 96 and regular  BP standing on right: 140/84 with a heart rate of 112 and regular    Weight (last 2 days)       Date/Time Weight    02/27/24 1256 79.7 (175.6)          Wt Readings from Last 3 Encounters:   02/27/24 79.7 kg (175 lb 9.6 oz)   02/22/24 78.9 kg (174 lb)   02/20/24 78.7 kg (173 lb 9.6 oz)       Body mass index is 30.14 kg/m².    Physical Exam: General:  No acute distress  Skin:  No acute rash  Eyes:  No scleral icterus, noninjected, no discharge from eyes  ENT:  Moist mucous membranes  Neck:  Supple, no jugular venous distention, trachea is midline, no lymphadenopathy and no thyromegaly  Back   No CVAT  Chest:  Clear to auscultation and percussion, good respiratory effort  CVS:  Regular rate and rhythm without a rub, or gallops or murmurs  Abdomen:  Soft and nontender with normal bowel sounds; pregnant abdomen  Extremities:  No cyanosis and no edema, no arthritic changes, normal range of motion  Neuro:  Grossly intact  Psych:  Alert, oriented x3 and appropriate      Medications:    Current Outpatient Medications:     AMILoride 5 mg tablet, Take 5 mg by mouth 5 (five) times a day 2 in the morning.  3 at night, Disp: , Rfl:     aspirin (ECOTRIN LOW STRENGTH) 81 mg EC tablet, Take 162 mg by mouth daily, Disp: , Rfl:     busPIRone (BUSPAR) 5 mg tablet, Take 5 mg by mouth as needed, Disp: , Rfl:     doxylamine (UNISOM) 25 MG tablet, Take 0.5 tablets (12.5 mg total) by mouth  daily as needed for nausea, Disp: 30 tablet, Rfl: 0    Ferrous Sulfate (Iron) 325 (65 Fe) MG TABS, Take 325 mg by mouth 1 (one) time, Disp: , Rfl:     Magnesium Glycinate 100 MG CAPS, Take 200 mg by mouth 2 (two) times a day, Disp: , Rfl:     metoprolol succinate (TOPROL-XL) 50 mg 24 hr tablet, Take 1 tablet (50 mg total) by mouth 2 (two) times a day, Disp: 90 tablet, Rfl: 3    metoprolol tartrate (LOPRESSOR) 25 mg tablet, Take 1 tablet (25 mg total) by mouth every 6 (six) hours as needed (palpitatins), Disp: 90 tablet, Rfl: 3    ondansetron (ZOFRAN) 4 mg tablet, Take 1 tablet (4 mg total) by mouth every 8 (eight) hours as needed for nausea or vomiting, Disp: 20 tablet, Rfl: 0    POTASSIUM CHLORIDE PO, Take 40 mEq by mouth in the morning 40-60 daily, Disp: , Rfl:     Prenatal Multivit-Min-Fe-FA (PRE-GORDON PO), Take by mouth, Disp: , Rfl:     pyridoxine (B-6) 25 MG tablet, Take 1 tablet (25 mg total) by mouth daily as needed (nausea) (Patient not taking: Reported on 2024), Disp: , Rfl:     Lab, Imaging and other studies: I have personally reviewed pertinent labs.  Laboratory Results:  Results for orders placed or performed in visit on 24   CBC and differential   Result Value Ref Range    WBC 9.48 4.31 - 10.16 Thousand/uL    RBC 4.34 3.81 - 5.12 Million/uL    Hemoglobin 13.3 11.5 - 15.4 g/dL    Hematocrit 38.3 34.8 - 46.1 %    MCV 88 82 - 98 fL    MCH 30.6 26.8 - 34.3 pg    MCHC 34.7 31.4 - 37.4 g/dL    RDW 12.8 11.6 - 15.1 %    MPV 11.8 8.9 - 12.7 fL    Platelets 190 149 - 390 Thousands/uL    nRBC 0 /100 WBCs    Neutrophils Relative 78 (H) 43 - 75 %    Immat GRANS % 0 0 - 2 %    Lymphocytes Relative 15 14 - 44 %    Monocytes Relative 6 4 - 12 %    Eosinophils Relative 1 0 - 6 %    Basophils Relative 0 0 - 1 %    Neutrophils Absolute 7.27 1.85 - 7.62 Thousands/µL    Immature Grans Absolute 0.04 0.00 - 0.20 Thousand/uL    Lymphocytes Absolute 1.45 0.60 - 4.47 Thousands/µL    Monocytes Absolute 0.59 0.17 -  "1.22 Thousand/µL    Eosinophils Absolute 0.09 0.00 - 0.61 Thousand/µL    Basophils Absolute 0.04 0.00 - 0.10 Thousands/µL   Protein / creatinine ratio, urine   Result Value Ref Range    Creatinine, Ur 25.9 Reference range not established. mg/dL    Protein Urine Random <4 Reference range not established. mg/dL    Prot/Creat Ratio, Ur       *Note: Due to a large number of results and/or encounters for the requested time period, some results have not been displayed. A complete set of results can be found in Results Review.       Results from last 7 days   Lab Units 02/22/24  1108   WBC Thousand/uL 9.48   HEMOGLOBIN g/dL 13.3   HEMATOCRIT % 38.3   PLATELETS Thousands/uL 190   POTASSIUM mmol/L 3.3*   CHLORIDE mmol/L 103   CO2 mmol/L 25   BUN mg/dL 11   CREATININE mg/dL 0.57*   CALCIUM mg/dL 9.5   MAGNESIUM mg/dL 1.7*         Radiology review:   chest X-ray    Ultrasound      Portions of the record may have been created with voice recognition software.  Occasional wrong word or \"sound a like\" substitutions may have occurred due to the inherent limitations of voice recognition software.  Read the chart carefully and recognize, using context, where substitutions have occurred.                    "

## 2024-02-19 NOTE — TELEPHONE ENCOUNTER
Patient aware    ----- Message from Abelardo Covarrubias MD sent at 2/19/2024  7:47 AM EST -----  Repeat basic metabolic profile magnesium weekly for now  ----- Message -----  From: Lab, Background User  Sent: 2/15/2024   9:48 PM EST  To: Abelardo Covarrubias MD

## 2024-02-20 ENCOUNTER — ROUTINE PRENATAL (OUTPATIENT)
Age: 29
End: 2024-02-20
Payer: COMMERCIAL

## 2024-02-20 VITALS
WEIGHT: 173.6 LBS | SYSTOLIC BLOOD PRESSURE: 126 MMHG | DIASTOLIC BLOOD PRESSURE: 72 MMHG | HEIGHT: 64 IN | HEART RATE: 98 BPM | BODY MASS INDEX: 29.64 KG/M2

## 2024-02-20 DIAGNOSIS — O10.919 CHRONIC HYPERTENSION AFFECTING PREGNANCY: ICD-10-CM

## 2024-02-20 DIAGNOSIS — Z36.3 ENCOUNTER FOR ANTENATAL SCREENING FOR MALFORMATIONS: Primary | ICD-10-CM

## 2024-02-20 DIAGNOSIS — Z36.86 ENCOUNTER FOR ANTENATAL SCREENING FOR CERVICAL LENGTH: ICD-10-CM

## 2024-02-20 PROCEDURE — 76817 TRANSVAGINAL US OBSTETRIC: CPT | Performed by: OBSTETRICS & GYNECOLOGY

## 2024-02-20 PROCEDURE — 99213 OFFICE O/P EST LOW 20 MIN: CPT | Performed by: OBSTETRICS & GYNECOLOGY

## 2024-02-20 PROCEDURE — 76805 OB US >/= 14 WKS SNGL FETUS: CPT | Performed by: OBSTETRICS & GYNECOLOGY

## 2024-02-21 NOTE — PROGRESS NOTES
"[facility]: Ms. Berry was seen today for anatomic survey and cervical length screening ultrasound.  See ultrasound report under \"OB Procedures\" tab.   The time spent on this established patient on the encounter date included 5 minutes previsit service time reviewing records and precharting, 5 minutes face-to-face service time counseling regarding results and coordinating care, and  4 minutes charting, totalling 14 minutes.  Please don't hesitate to contact our office with any concerns or questions.  -Katy Brock MD    "

## 2024-02-22 ENCOUNTER — APPOINTMENT (OUTPATIENT)
Dept: LAB | Facility: CLINIC | Age: 29
End: 2024-02-22
Payer: COMMERCIAL

## 2024-02-22 ENCOUNTER — TELEPHONE (OUTPATIENT)
Dept: NEPHROLOGY | Facility: CLINIC | Age: 29
End: 2024-02-22

## 2024-02-22 ENCOUNTER — TREATMENT (OUTPATIENT)
Dept: OTHER | Facility: HOSPITAL | Age: 29
End: 2024-02-22

## 2024-02-22 ENCOUNTER — ROUTINE PRENATAL (OUTPATIENT)
Dept: OBGYN CLINIC | Facility: CLINIC | Age: 29
End: 2024-02-22
Payer: COMMERCIAL

## 2024-02-22 VITALS
DIASTOLIC BLOOD PRESSURE: 76 MMHG | WEIGHT: 174 LBS | BODY MASS INDEX: 29.87 KG/M2 | SYSTOLIC BLOOD PRESSURE: 122 MMHG | HEART RATE: 94 BPM

## 2024-02-22 DIAGNOSIS — I47.10 SVT (SUPRAVENTRICULAR TACHYCARDIA): ICD-10-CM

## 2024-02-22 DIAGNOSIS — Z3A.20 20 WEEKS GESTATION OF PREGNANCY: ICD-10-CM

## 2024-02-22 DIAGNOSIS — I10 ESSENTIAL HYPERTENSION, BENIGN: Chronic | ICD-10-CM

## 2024-02-22 DIAGNOSIS — E87.6 HYPOKALEMIA: Primary | ICD-10-CM

## 2024-02-22 DIAGNOSIS — E83.42 HYPOMAGNESEMIA: ICD-10-CM

## 2024-02-22 DIAGNOSIS — E87.6 HYPOKALEMIA: ICD-10-CM

## 2024-02-22 DIAGNOSIS — Z34.02 SUPERVISION OF NORMAL FIRST PREGNANCY IN SECOND TRIMESTER: Primary | ICD-10-CM

## 2024-02-22 PROBLEM — Z3A.19 19 WEEKS GESTATION OF PREGNANCY: Status: RESOLVED | Noted: 2024-02-14 | Resolved: 2024-02-22

## 2024-02-22 LAB
BASOPHILS # BLD AUTO: 0.04 THOUSANDS/ÂΜL (ref 0–0.1)
BASOPHILS NFR BLD AUTO: 0 % (ref 0–1)
CREAT UR-MCNC: 25.9 MG/DL
EOSINOPHIL # BLD AUTO: 0.09 THOUSAND/ÂΜL (ref 0–0.61)
EOSINOPHIL NFR BLD AUTO: 1 % (ref 0–6)
ERYTHROCYTE [DISTWIDTH] IN BLOOD BY AUTOMATED COUNT: 12.8 % (ref 11.6–15.1)
HCT VFR BLD AUTO: 38.3 % (ref 34.8–46.1)
HGB BLD-MCNC: 13.3 G/DL (ref 11.5–15.4)
IMM GRANULOCYTES # BLD AUTO: 0.04 THOUSAND/UL (ref 0–0.2)
IMM GRANULOCYTES NFR BLD AUTO: 0 % (ref 0–2)
LYMPHOCYTES # BLD AUTO: 1.45 THOUSANDS/ÂΜL (ref 0.6–4.47)
LYMPHOCYTES NFR BLD AUTO: 15 % (ref 14–44)
MCH RBC QN AUTO: 30.6 PG (ref 26.8–34.3)
MCHC RBC AUTO-ENTMCNC: 34.7 G/DL (ref 31.4–37.4)
MCV RBC AUTO: 88 FL (ref 82–98)
MONOCYTES # BLD AUTO: 0.59 THOUSAND/ÂΜL (ref 0.17–1.22)
MONOCYTES NFR BLD AUTO: 6 % (ref 4–12)
NEUTROPHILS # BLD AUTO: 7.27 THOUSANDS/ÂΜL (ref 1.85–7.62)
NEUTS SEG NFR BLD AUTO: 78 % (ref 43–75)
NRBC BLD AUTO-RTO: 0 /100 WBCS
PLATELET # BLD AUTO: 190 THOUSANDS/UL (ref 149–390)
PMV BLD AUTO: 11.8 FL (ref 8.9–12.7)
PROT UR-MCNC: <4 MG/DL
RBC # BLD AUTO: 4.34 MILLION/UL (ref 3.81–5.12)
SL AMB  POCT GLUCOSE, UA: NORMAL
SL AMB POCT URINE PROTEIN: NORMAL
WBC # BLD AUTO: 9.48 THOUSAND/UL (ref 4.31–10.16)

## 2024-02-22 PROCEDURE — 85025 COMPLETE CBC W/AUTO DIFF WBC: CPT

## 2024-02-22 PROCEDURE — 84156 ASSAY OF PROTEIN URINE: CPT

## 2024-02-22 PROCEDURE — PNV: Performed by: STUDENT IN AN ORGANIZED HEALTH CARE EDUCATION/TRAINING PROGRAM

## 2024-02-22 PROCEDURE — 81002 URINALYSIS NONAUTO W/O SCOPE: CPT | Performed by: STUDENT IN AN ORGANIZED HEALTH CARE EDUCATION/TRAINING PROGRAM

## 2024-02-22 PROCEDURE — 82570 ASSAY OF URINE CREATININE: CPT

## 2024-02-22 RX ORDER — POTASSIUM CHLORIDE 29.8 MG/ML
40 INJECTION INTRAVENOUS ONCE
Status: CANCELLED | OUTPATIENT
Start: 2024-02-22 | End: 2024-02-22

## 2024-02-22 RX ORDER — MAGNESIUM SULFATE HEPTAHYDRATE 40 MG/ML
4 INJECTION, SOLUTION INTRAVENOUS ONCE
Status: CANCELLED | OUTPATIENT
Start: 2024-02-22

## 2024-02-22 NOTE — ASSESSMENT & PLAN NOTE
27yo  at 20.2wks presents for routine prenatal visit     Pt denies contractions, vaginal bleeding, leakage of fluid. Endorses fetal movement.     -continue PNVs   -AFP negative   -Level II US wnl   - labor precautions provided   -return in 2 weeks

## 2024-02-22 NOTE — PROGRESS NOTES
20 weeks gestation of pregnancy  29yo  at 20.2wks presents for routine prenatal visit     Pt denies contractions, vaginal bleeding, leakage of fluid. Endorses fetal movement.     -continue PNVs   -AFP negative   -Level II US wnl   - labor precautions provided   -return in 2 weeks     Essential hypertension, benign  -continue amiloride per nephrology   -BP today wnl   -baseline HELLP labs wnl   -continue BP monitoring   -growth US q 4 wks   -preeclampsia precautions provided     SVT (supraventricular tachycardia) (HCC)  -s/p cardiology recommendations below:   Continue metoprolol succinate 25 mg p.o. twice daily  2. Continue with Toprol tartrate 25 mg as needed  3. Recommend cardiac monitoring during labor and delivery   -follow up appointment to be scheduled for May 2024 prior to delivery     Hypokalemia  -nephrology follow up scheduled 24  -most recent BMP wnl, per nephrology, pt to repeat BMP weekly  -potassium and magnesium infusions to be scheduled per nephrology prn

## 2024-02-22 NOTE — TELEPHONE ENCOUNTER
Patient scheduled at [place] on 2/28 @ 8am.  Patient aware of date, time and location:    ----- Message from Abelardo Covarrubias MD sent at 2/22/2024  1:03 PM EST -----  I ordered magnesium and potassium for Julita tomorrow would be fine over the next day  ----- Message -----  From: Lab, Background User  Sent: 2/22/2024  12:44 PM EST  To: Abelardo Covarrubias MD

## 2024-02-22 NOTE — ASSESSMENT & PLAN NOTE
-s/p cardiology recommendations below:   Continue metoprolol succinate 25 mg p.o. twice daily  2. Continue with Toprol tartrate 25 mg as needed  3. Recommend cardiac monitoring during labor and delivery   -follow up appointment to be scheduled for May 2024 prior to delivery

## 2024-02-22 NOTE — ASSESSMENT & PLAN NOTE
-continue amiloride per nephrology   -BP today wnl   -baseline HELLP labs wnl   -continue BP monitoring   -growth US q 4 wks   -preeclampsia precautions provided

## 2024-02-22 NOTE — ASSESSMENT & PLAN NOTE
-nephrology follow up scheduled 2/27/24  -most recent BMP wnl, per nephrology, pt to repeat BMP weekly  -potassium and magnesium infusions to be scheduled per nephrology prn

## 2024-02-27 ENCOUNTER — TELEPHONE (OUTPATIENT)
Dept: NEPHROLOGY | Facility: CLINIC | Age: 29
End: 2024-02-27

## 2024-02-27 ENCOUNTER — DOCUMENTATION (OUTPATIENT)
Dept: NEPHROLOGY | Facility: CLINIC | Age: 29
End: 2024-02-27

## 2024-02-27 ENCOUNTER — OFFICE VISIT (OUTPATIENT)
Dept: NEPHROLOGY | Facility: CLINIC | Age: 29
End: 2024-02-27
Payer: COMMERCIAL

## 2024-02-27 VITALS — WEIGHT: 175.6 LBS | BODY MASS INDEX: 29.98 KG/M2 | HEIGHT: 64 IN

## 2024-02-27 DIAGNOSIS — G90.A POTS (POSTURAL ORTHOSTATIC TACHYCARDIA SYNDROME): Chronic | ICD-10-CM

## 2024-02-27 DIAGNOSIS — E55.9 VITAMIN D DEFICIENCY: ICD-10-CM

## 2024-02-27 DIAGNOSIS — I10 ESSENTIAL HYPERTENSION, BENIGN: Primary | Chronic | ICD-10-CM

## 2024-02-27 DIAGNOSIS — E87.6 HYPOKALEMIA: ICD-10-CM

## 2024-02-27 PROCEDURE — 99214 OFFICE O/P EST MOD 30 MIN: CPT | Performed by: INTERNAL MEDICINE

## 2024-02-27 NOTE — PATIENT INSTRUCTIONS
Visit summary:  - Overall labs look good potassium and magnesium both occasionally low for which we are giving your infusions  - Blood pressure is actually low in the morning I am going to reach out to Dr. Butler to discuss whether or not we could gently decrease her evening dose of Toprol-XL: Stay tuned        1. Medication changes today:  Please increase magnesium supplement to 2 pills a day morning and evening watch for diarrhea    2.  General instructions:  Continue high potassium diet  Go for the designated infusion    3.  Please go for nonfasting lab work weekly    4.  Please take 1 week a blood pressure readings if we make any change in your medications.  Probably try to take it daily while you are pregnant    AS FOLLOWS  MORNING AND EVENING, SITTING AND STANDING as follows:  TAKE THE MORNING READINGS BEFORE ANY MEDICATIONS AND WHEN YOU ARE RELAXED FOR SEVERAL MINUTES  TAKE THE EVENING READINGS:  BETWEEN 7-10 P.M.; PRIOR TO ANY MEDICATIONS; AT LEAST IN OUR  FROM DINNER; AND CERTAINLY AFTER RELAXING FOR A FEW MINUTES  PLEASE INCLUDE HEART RATE WITH YOUR BLOOD PRESSURE READINGS  When taking standing readings, keep your arm supported at heart level and not dangling  Make sure you are sitting with your back supported and feet on the ground and do not cross your legs or feet  Make sure you have not taken any coffee or caffeine products or exercised or smoke cigarettes at least 30 minutes before taking your blood pressure  Then please mail these readings into the office        5.  Follow-up in 4 months  Please bring in 1 week a blood pressure readings morning evening, sitting and standing is outlined above  PLEASE BRING AN YOUR BLOOD PRESSURE MACHINE TO CORRELATE WITH THE OFFICE MACHINE AT THIS NEXT SCHEDULED VISIT  Please go for fasting lab work 1-2 weeks prior to your appointment      6.  General non medical recommendations:  AVOID SALT BUT NOT ADDING AN READING LABELS TO MAKE SURE THERE IS LOW-SALT IN  THE FOOD THAT YOU ARE EATING  Goal is less than 2 g of sodium intake or less than 5 g of sodium chloride intake per day    Avoid nonsteroidal anti-inflammatory drugs such as Naprosyn, ibuprofen, Aleve, Advil, Celebrex, Meloxicam (Mobic) etc.  You can use Tylenol as needed if you do not have any liver condition to be concerned about    Avoid medications such as Sudafed or decongestants and antihistamines that contained the D component which is the decongestant.  You can take antihistamines without the decongestant or D component.    Try to avoid medications such as pantoprazole or  Protonix/Nexium or Esomeprazole)/Prilosec or omeprazole/Prevacid or lansoprazole/AcipHex or Rabeprazole.  If you are able to, use Pepcid as this is safer for your kidneys.    Try to exercise at least 30 minutes 3 days a week to begin with with an ultimate goal of 5 days a week for at least 30 minutes    Please do not drink more than 2 glasses of alcohol/wine on a daily basis as this may contribute to your high blood pressure.

## 2024-02-27 NOTE — LETTER
February 27, 2024     ENMA Echavarria  [address] [address]    Patient: Julita Berry   YOB: 1995   Date of Visit: 2/27/2024       Dear Dr. Covarrubias:    Thank you for referring Julita Berry to me for evaluation. Below are my notes for this consultation.    If you have questions, please do not hesitate to call me. I look forward to following your patient along with you.         Sincerely,        Abelardo Covarrubias MD        CC: MD Gino Jeffers MD Joseph Jacobs, MD  2/27/2024  1:16 PM  Sign when Signing Visit  RENAL FOLLOW UP NOTE:.td    ASSESSMENT AND PLAN:  1.  Hypertension/hypokalemia/hyponatremia:  Negative secondary workup.  Genetic workup was negative. She had been seen by Dr. Malu Augustin as a 2nd opinion.     Blood pressure goal:  Less than 135/80     Current  Blood pressures:    AM: 96/62 without orthostatic changes  PM: 108/65 without orthostatic changes  Heart rate: None     Recommendations:  Nonmedical regimen including isotonic exercise and avoidance of salt  Medication changes today:  Potentially decrease metoprolol XL will discuss with Dr. Butler EP physician     Regarding diet:  Continue high potassium diet     In regards to labs: Patient has required infusions for both potassium magnesium  Potassium: 3.3 continue supplements/amiloride going for infusion which she gets every couple weeks now  creatinine normal at  0.57  Sodium normal 135  MBD:    Hypomagnesemia: 1.7 will get an infusion  Vitamin-D 38.8 acceptable  lipid profile:  LDL 95/HDL 60/triglycerides 100 at goal from prior  Hemoglobin normal at 13.3  UA:  No proteinuria no hematuria  Urine protein creatinine ratio 0.06 g at goal        We are holding off on spironolactone with the potential future pregnancy  May have to increase the potassium supplement at this time.  High potassium diet;  Continue current potassium  I would increase magnesium supplement to 2 a day  which would be 200 mg twice a day  Infusions as needed with weekly labs        2.?  POTS syndrome: Doing well heart rate in the 70s now followed by Dr. Butler felt related to SVT     3.  Mixed connective tissue disease:  Followed by Rheumatology as needed:  Clearly now with rheumatoid arthritis being treated with Plaquenil.  Now off ibuprofen.     4. GI:  GERD/IBS/idiopathic gastroparesis followed by GI Dr. Amador     5. Celiac artery stenosis:  Based on arterial duplex 07/28/2022 for hypertensive workup:  No further workup recommended or intervention unless she were to develop symptoms soon after eating        GI health maintenance: Patient followed by GI to young for requiring colonoscopy       PATIENT INSTRUCTIONS:    Patient Instructions   Visit summary:  - Overall labs look good potassium and magnesium both occasionally low for which we are giving your infusions  - Blood pressure is actually low in the morning I am going to reach out to Dr. Butler to discuss whether or not we could gently decrease her evening dose of Toprol-XL: Stay tuned        1. Medication changes today:  Please increase magnesium supplement to 2 pills a day morning and evening watch for diarrhea    2.  General instructions:  Continue high potassium diet  Go for the designated infusion    3.  Please go for nonfasting lab work weekly    4.  Please take 1 week a blood pressure readings if we make any change in your medications.  Probably try to take it daily while you are pregnant    AS FOLLOWS  MORNING AND EVENING, SITTING AND STANDING as follows:  TAKE THE MORNING READINGS BEFORE ANY MEDICATIONS AND WHEN YOU ARE RELAXED FOR SEVERAL MINUTES  TAKE THE EVENING READINGS:  BETWEEN 7-10 P.M.; PRIOR TO ANY MEDICATIONS; AT LEAST IN OUR  FROM DINNER; AND CERTAINLY AFTER RELAXING FOR A FEW MINUTES  PLEASE INCLUDE HEART RATE WITH YOUR BLOOD PRESSURE READINGS  When taking standing readings, keep your arm supported at heart level and not  dangling  Make sure you are sitting with your back supported and feet on the ground and do not cross your legs or feet  Make sure you have not taken any coffee or caffeine products or exercised or smoke cigarettes at least 30 minutes before taking your blood pressure  Then please mail these readings into the office        5.  Follow-up in 4 months  Please bring in 1 week a blood pressure readings morning evening, sitting and standing is outlined above  PLEASE BRING AN YOUR BLOOD PRESSURE MACHINE TO CORRELATE WITH THE OFFICE MACHINE AT THIS NEXT SCHEDULED VISIT  Please go for fasting lab work 1-2 weeks prior to your appointment      6.  General non medical recommendations:  AVOID SALT BUT NOT ADDING AN READING LABELS TO MAKE SURE THERE IS LOW-SALT IN THE FOOD THAT YOU ARE EATING  Goal is less than 2 g of sodium intake or less than 5 g of sodium chloride intake per day    Avoid nonsteroidal anti-inflammatory drugs such as Naprosyn, ibuprofen, Aleve, Advil, Celebrex, Meloxicam (Mobic) etc.  You can use Tylenol as needed if you do not have any liver condition to be concerned about    Avoid medications such as Sudafed or decongestants and antihistamines that contained the D component which is the decongestant.  You can take antihistamines without the decongestant or D component.    Try to avoid medications such as pantoprazole or  Protonix/Nexium or Esomeprazole)/Prilosec or omeprazole/Prevacid or lansoprazole/AcipHex or Rabeprazole.  If you are able to, use Pepcid as this is safer for your kidneys.    Try to exercise at least 30 minutes 3 days a week to begin with with an ultimate goal of 5 days a week for at least 30 minutes    Please do not drink more than 2 glasses of alcohol/wine on a daily basis as this may contribute to your high blood pressure.            Subjective:   The patient is 21 weeks pregnant  The patient overall is feeling well.  No fevers, chills, or cough or colds.  Good appetite and good energy,  "eating large amounts of potassium  No hematuria, dysuria, voiding symptoms or foamy urine  No gastrointestinal symptoms except for occasional diarrhea  No  chest pain, occasional shortness of breath associated with POTS  No headaches, except for occasional dizziness or lightheadedness  Blood pressure medications:  Toprol-XL 50 mg twice a day  Amiloride 10 mg in the morning and 15 mg in the evening  As needed metoprolol tartrate 25 mg for breakthrough SVT    Renal pertinent medications:  Potassium supplement 40-60 daily goal is 60 mEq  Magnesium glycinate 200 mg daily    ROS:  See HPI, otherwise review of systems as completely reviewed with the patient are negative    Past Medical History:   Diagnosis Date   • Anxiety    • Eczema    • Exercises 5 to 6 times per week     per pt prior to knee issue -enjoyed running and horseback riding   • Family history of reaction to anesthesia     per pt--\"Mom also gets high anxiety with surgeries and wakes up nasty from anesthesia\"   • Gastroparesis 2012   • Hypertension     last assessed 3/12/14   • Hypokalemia     per pt per doctors possibly related renal problem   • Hypomagnesemia    • Irritable bowel syndrome     with diarrhea   • Rheumatoid arthritis (HCC) 2022   • Sinus tachycardia     related to dehydration   • Tooth missing     front upper tooth retainer-   • Wears glasses     for computer use     Past Surgical History:   Procedure Laterality Date   • MN ARTHROSCOPY KNEE LATERAL RELEASE Right 04/13/2023    Procedure: ARTHROSCOPIC RELEASE RETINACULAR;  Surgeon: Ilya Brizuela DO;  Location:  MAIN OR;  Service: Orthopedics   • MN EGD TRANSORAL BIOPSY SINGLE/MULTIPLE N/A 05/04/2016    Procedure: ESOPHAGOGASTRODUODENOSCOPY (EGD);  Surgeon: Raji Monzon MD;  Location:  GI LAB;  Service: Gastroenterology   • MN RCNSTJ DISLC PATELLA W/XTNSR RELIGNMT&/MUSC RL Right 04/13/2023    Procedure: ARTHROSCOPIC REALIGNMENT PATELLA;  Surgeon: Ilya Brizuela DO;  Location:  MAIN OR;  " Service: Orthopedics   • WISDOM TOOTH EXTRACTION       Family History   Problem Relation Age of Onset   • Hypokalemia Mother    • Hypotension Mother    • Anxiety disorder Mother         NOS   • Lung cancer Father    • Skin cancer Father    • Crohn's disease Father    • Schizoaffective Disorder  Father    • Alcohol abuse Father    • Drug abuse Father    • No Known Problems Sister    • No Known Problems Sister    • No Known Problems Brother    • Coronary artery disease Maternal Grandmother    • Heart disease Maternal Grandmother    • Alzheimer's disease Maternal Grandmother    • Arthritis Paternal Grandmother    • Rheum arthritis Paternal Grandmother    • Hypertension Paternal Grandmother    • COPD Paternal Grandfather       reports that she has never smoked. She has never used smokeless tobacco. She reports that she does not currently use alcohol after a past usage of about 1.0 standard drink of alcohol per week. She reports that she does not use drugs.    I COMPLETELY REVIEWED THE PAST MEDICAL HISTORY/PAST SURGICAL HISTORY/SOCIAL HISTORY/FAMILY HISTORY/AND MEDICATIONS  AND UPDATED ALL    Objective:     Vitals:   BP sitting on right: 132/78 with a heart rate of 96 and regular  BP standing on right: 140/84 with a heart rate of 112 and regular    Weight (last 2 days)       Date/Time Weight    02/27/24 1256 79.7 (175.6)          Wt Readings from Last 3 Encounters:   02/27/24 79.7 kg (175 lb 9.6 oz)   02/22/24 78.9 kg (174 lb)   02/20/24 78.7 kg (173 lb 9.6 oz)       Body mass index is 30.14 kg/m².    Physical Exam: General:  No acute distress  Skin:  No acute rash  Eyes:  No scleral icterus, noninjected, no discharge from eyes  ENT:  Moist mucous membranes  Neck:  Supple, no jugular venous distention, trachea is midline, no lymphadenopathy and no thyromegaly  Back   No CVAT  Chest:  Clear to auscultation and percussion, good respiratory effort  CVS:  Regular rate and rhythm without a rub, or gallops or murmurs  Abdomen:   Soft and nontender with normal bowel sounds; pregnant abdomen  Extremities:  No cyanosis and no edema, no arthritic changes, normal range of motion  Neuro:  Grossly intact  Psych:  Alert, oriented x3 and appropriate      Medications:    Current Outpatient Medications:   •  AMILoride 5 mg tablet, Take 5 mg by mouth 5 (five) times a day 2 in the morning.  3 at night, Disp: , Rfl:   •  aspirin (ECOTRIN LOW STRENGTH) 81 mg EC tablet, Take 162 mg by mouth daily, Disp: , Rfl:   •  busPIRone (BUSPAR) 5 mg tablet, Take 5 mg by mouth as needed, Disp: , Rfl:   •  doxylamine (UNISOM) 25 MG tablet, Take 0.5 tablets (12.5 mg total) by mouth daily as needed for nausea, Disp: 30 tablet, Rfl: 0  •  Ferrous Sulfate (Iron) 325 (65 Fe) MG TABS, Take 325 mg by mouth 1 (one) time, Disp: , Rfl:   •  Magnesium Glycinate 100 MG CAPS, Take 200 mg by mouth 2 (two) times a day, Disp: , Rfl:   •  metoprolol succinate (TOPROL-XL) 50 mg 24 hr tablet, Take 1 tablet (50 mg total) by mouth 2 (two) times a day, Disp: 90 tablet, Rfl: 3  •  metoprolol tartrate (LOPRESSOR) 25 mg tablet, Take 1 tablet (25 mg total) by mouth every 6 (six) hours as needed (palpitatins), Disp: 90 tablet, Rfl: 3  •  ondansetron (ZOFRAN) 4 mg tablet, Take 1 tablet (4 mg total) by mouth every 8 (eight) hours as needed for nausea or vomiting, Disp: 20 tablet, Rfl: 0  •  POTASSIUM CHLORIDE PO, Take 40 mEq by mouth in the morning 40-60 daily, Disp: , Rfl:   •  Prenatal Multivit-Min-Fe-FA (PRE- PO), Take by mouth, Disp: , Rfl:   •  pyridoxine (B-6) 25 MG tablet, Take 1 tablet (25 mg total) by mouth daily as needed (nausea) (Patient not taking: Reported on 2024), Disp: , Rfl:     Lab, Imaging and other studies: I have personally reviewed pertinent labs.  Laboratory Results:  Results for orders placed or performed in visit on 24   CBC and differential   Result Value Ref Range    WBC 9.48 4.31 - 10.16 Thousand/uL    RBC 4.34 3.81 - 5.12 Million/uL    Hemoglobin 13.3  "11.5 - 15.4 g/dL    Hematocrit 38.3 34.8 - 46.1 %    MCV 88 82 - 98 fL    MCH 30.6 26.8 - 34.3 pg    MCHC 34.7 31.4 - 37.4 g/dL    RDW 12.8 11.6 - 15.1 %    MPV 11.8 8.9 - 12.7 fL    Platelets 190 149 - 390 Thousands/uL    nRBC 0 /100 WBCs    Neutrophils Relative 78 (H) 43 - 75 %    Immat GRANS % 0 0 - 2 %    Lymphocytes Relative 15 14 - 44 %    Monocytes Relative 6 4 - 12 %    Eosinophils Relative 1 0 - 6 %    Basophils Relative 0 0 - 1 %    Neutrophils Absolute 7.27 1.85 - 7.62 Thousands/µL    Immature Grans Absolute 0.04 0.00 - 0.20 Thousand/uL    Lymphocytes Absolute 1.45 0.60 - 4.47 Thousands/µL    Monocytes Absolute 0.59 0.17 - 1.22 Thousand/µL    Eosinophils Absolute 0.09 0.00 - 0.61 Thousand/µL    Basophils Absolute 0.04 0.00 - 0.10 Thousands/µL   Protein / creatinine ratio, urine   Result Value Ref Range    Creatinine, Ur 25.9 Reference range not established. mg/dL    Protein Urine Random <4 Reference range not established. mg/dL    Prot/Creat Ratio, Ur       *Note: Due to a large number of results and/or encounters for the requested time period, some results have not been displayed. A complete set of results can be found in Results Review.       Results from last 7 days   Lab Units 02/22/24  1108   WBC Thousand/uL 9.48   HEMOGLOBIN g/dL 13.3   HEMATOCRIT % 38.3   PLATELETS Thousands/uL 190   POTASSIUM mmol/L 3.3*   CHLORIDE mmol/L 103   CO2 mmol/L 25   BUN mg/dL 11   CREATININE mg/dL 0.57*   CALCIUM mg/dL 9.5   MAGNESIUM mg/dL 1.7*         Radiology review:   chest X-ray    Ultrasound      Portions of the record may have been created with voice recognition software.  Occasional wrong word or \"sound a like\" substitutions may have occurred due to the inherent limitations of voice recognition software.  Read the chart carefully and recognize, using context, where substitutions have occurred.                    "

## 2024-02-27 NOTE — TELEPHONE ENCOUNTER
Patient aware.    ----- Message from Abelardo Covarrubias MD sent at 2/27/2024  1:55 PM EST -----  She can try to decrease her Toprol XL 25 mg pm and keep the 50 mg in am daily  Please have her take bp 1-2 weeks after change and send in   Also if she develops any tachycardia or symptoms please let me know right away  ----- Message -----  From: Gino Butler MD  Sent: 2/27/2024   1:49 PM EST  To: MD Quentin oRb,    That is perfectly fine.     Thanks for checkingGino  ----- Message -----  From: Abelardo Covarrubias MD  Sent: 2/27/2024   1:04 PM EST  To: MD Quentin Farah  Hope all is well  I just saw Julita in the office her morning blood pressure readings are actually slightly low would you be amenable to decreasing the Toprol-XL in the evening to 25 mg she is currently on 50 mg twice a day.  I told Julita I would clear it with you first before making any changes  Thanks so much  Jim

## 2024-02-28 ENCOUNTER — HOSPITAL ENCOUNTER (OUTPATIENT)
Dept: INFUSION CENTER | Facility: HOSPITAL | Age: 29
Discharge: HOME | End: 2024-02-28
Attending: INTERNAL MEDICINE
Payer: COMMERCIAL

## 2024-02-28 VITALS
SYSTOLIC BLOOD PRESSURE: 107 MMHG | HEART RATE: 92 BPM | RESPIRATION RATE: 20 BRPM | DIASTOLIC BLOOD PRESSURE: 74 MMHG | TEMPERATURE: 97.5 F

## 2024-02-28 DIAGNOSIS — E83.42 HYPOMAGNESEMIA: Primary | ICD-10-CM

## 2024-02-28 DIAGNOSIS — E87.6 HYPOKALEMIA: ICD-10-CM

## 2024-02-28 PROCEDURE — 96365 THER/PROPH/DIAG IV INF INIT: CPT

## 2024-02-28 PROCEDURE — 96366 THER/PROPH/DIAG IV INF ADDON: CPT

## 2024-02-28 RX ORDER — MAGNESIUM SULFATE HEPTAHYDRATE 40 MG/ML
4 INJECTION, SOLUTION INTRAVENOUS ONCE
Status: DISCONTINUED | OUTPATIENT
Start: 2024-02-28 | End: 2024-03-03 | Stop reason: HOSPADM

## 2024-02-28 RX ORDER — MAGNESIUM SULFATE HEPTAHYDRATE 40 MG/ML
4 INJECTION, SOLUTION INTRAVENOUS ONCE
Status: CANCELLED | OUTPATIENT
Start: 2024-02-28

## 2024-02-28 RX ORDER — POTASSIUM CHLORIDE 29.8 MG/ML
40 INJECTION INTRAVENOUS ONCE
Status: CANCELLED | OUTPATIENT
Start: 2024-02-28 | End: 2024-02-28

## 2024-02-28 RX ORDER — POTASSIUM CHLORIDE 29.8 MG/ML
40 INJECTION INTRAVENOUS ONCE
Status: DISCONTINUED | OUTPATIENT
Start: 2024-02-28 | End: 2024-03-03 | Stop reason: HOSPADM

## 2024-02-28 RX ADMIN — MAGNESIUM SULFATE HEPTAHYDRATE: 500 INJECTION, SOLUTION INTRAMUSCULAR; INTRAVENOUS at 08:32

## 2024-02-28 NOTE — PROGRESS NOTES
Pt received mag & potassium infusion w/out any adverse effects, offers no complaints. Declined AVS

## 2024-03-01 ENCOUNTER — APPOINTMENT (OUTPATIENT)
Dept: LAB | Facility: CLINIC | Age: 29
End: 2024-03-01
Payer: COMMERCIAL

## 2024-03-04 ENCOUNTER — TELEPHONE (OUTPATIENT)
Dept: NEPHROLOGY | Facility: CLINIC | Age: 29
End: 2024-03-04

## 2024-03-04 NOTE — TELEPHONE ENCOUNTER
Patient aware    ----- Message from Abelardo Covarrubias MD sent at 3/2/2024  6:55 AM EST -----  Labs look improved  Repeat labs basic metabolic profile magnesium in about 1 week  ----- Message -----  From: Lab, Background User  Sent: 3/1/2024   8:33 PM EST  To: Abelardo Covarrubias MD

## 2024-03-07 ENCOUNTER — APPOINTMENT (OUTPATIENT)
Dept: LAB | Facility: CLINIC | Age: 29
End: 2024-03-07
Payer: COMMERCIAL

## 2024-03-07 DIAGNOSIS — E87.6 HYPOKALEMIA: ICD-10-CM

## 2024-03-07 DIAGNOSIS — E83.39 HYPOPHOSPHATEMIA: ICD-10-CM

## 2024-03-07 LAB
ANION GAP SERPL CALCULATED.3IONS-SCNC: 10 MMOL/L
BUN SERPL-MCNC: 14 MG/DL (ref 5–25)
CALCIUM SERPL-MCNC: 8.8 MG/DL (ref 8.4–10.2)
CHLORIDE SERPL-SCNC: 104 MMOL/L (ref 96–108)
CO2 SERPL-SCNC: 23 MMOL/L (ref 21–32)
CREAT SERPL-MCNC: 0.56 MG/DL (ref 0.6–1.3)
GFR SERPL CREATININE-BSD FRML MDRD: 126 ML/MIN/1.73SQ M
GLUCOSE P FAST SERPL-MCNC: 81 MG/DL (ref 65–99)
MAGNESIUM SERPL-MCNC: 1.8 MG/DL (ref 1.9–2.7)
POTASSIUM SERPL-SCNC: 3.8 MMOL/L (ref 3.5–5.3)
SODIUM SERPL-SCNC: 137 MMOL/L (ref 135–147)

## 2024-03-07 PROCEDURE — 36415 COLL VENOUS BLD VENIPUNCTURE: CPT

## 2024-03-07 PROCEDURE — 80048 BASIC METABOLIC PNL TOTAL CA: CPT

## 2024-03-07 PROCEDURE — 83735 ASSAY OF MAGNESIUM: CPT

## 2024-03-11 ENCOUNTER — TELEPHONE (OUTPATIENT)
Dept: NEPHROLOGY | Facility: CLINIC | Age: 29
End: 2024-03-11

## 2024-03-11 NOTE — TELEPHONE ENCOUNTER
Pt aware    ----- Message from Abelardo Covarrubias MD sent at 3/10/2024 10:37 AM EDT -----  Labs are acceptable magnesium 1.8 I would not treat but I would repeat a basic metabolic profile and magnesium next week  ----- Message -----  From: Lab, Background User  Sent: 3/7/2024   9:15 PM EDT  To: Abelardo Covarrubias MD

## 2024-03-14 ENCOUNTER — APPOINTMENT (OUTPATIENT)
Dept: LAB | Facility: CLINIC | Age: 29
End: 2024-03-14
Payer: COMMERCIAL

## 2024-03-14 DIAGNOSIS — E87.6 HYPOKALEMIA: ICD-10-CM

## 2024-03-14 DIAGNOSIS — E83.39 HYPOPHOSPHATEMIA: ICD-10-CM

## 2024-03-14 LAB
ANION GAP SERPL CALCULATED.3IONS-SCNC: 8 MMOL/L (ref 4–13)
BUN SERPL-MCNC: 8 MG/DL (ref 5–25)
CALCIUM SERPL-MCNC: 8.8 MG/DL (ref 8.4–10.2)
CHLORIDE SERPL-SCNC: 104 MMOL/L (ref 96–108)
CO2 SERPL-SCNC: 24 MMOL/L (ref 21–32)
CREAT SERPL-MCNC: 0.54 MG/DL (ref 0.6–1.3)
GFR SERPL CREATININE-BSD FRML MDRD: 127 ML/MIN/1.73SQ M
GLUCOSE P FAST SERPL-MCNC: 70 MG/DL (ref 65–99)
MAGNESIUM SERPL-MCNC: 1.8 MG/DL (ref 1.9–2.7)
POTASSIUM SERPL-SCNC: 3.9 MMOL/L (ref 3.5–5.3)
SODIUM SERPL-SCNC: 136 MMOL/L (ref 135–147)

## 2024-03-14 PROCEDURE — 36415 COLL VENOUS BLD VENIPUNCTURE: CPT

## 2024-03-14 PROCEDURE — 83735 ASSAY OF MAGNESIUM: CPT

## 2024-03-14 PROCEDURE — 80048 BASIC METABOLIC PNL TOTAL CA: CPT

## 2024-03-15 ENCOUNTER — TELEPHONE (OUTPATIENT)
Dept: NEPHROLOGY | Facility: CLINIC | Age: 29
End: 2024-03-15

## 2024-03-15 NOTE — TELEPHONE ENCOUNTER
Patient aware.    ----- Message from Abelardo Covarrubias MD sent at 3/15/2024  7:47 AM EDT -----  Labs look great repeat a basic metabolic profile magnesium 1 to 2 weeks  ----- Message -----  From: Lab, Background User  Sent: 3/14/2024   9:57 PM EDT  To: Abelardo Covarrubias MD

## 2024-03-19 ENCOUNTER — ULTRASOUND (OUTPATIENT)
Facility: HOSPITAL | Age: 29
End: 2024-03-19
Payer: COMMERCIAL

## 2024-03-19 VITALS
SYSTOLIC BLOOD PRESSURE: 132 MMHG | HEART RATE: 119 BPM | HEIGHT: 64 IN | BODY MASS INDEX: 30.93 KG/M2 | WEIGHT: 181.2 LBS | DIASTOLIC BLOOD PRESSURE: 74 MMHG

## 2024-03-19 DIAGNOSIS — Z3A.24 24 WEEKS GESTATION OF PREGNANCY: ICD-10-CM

## 2024-03-19 DIAGNOSIS — O10.912 CHRONIC HYPERTENSION IN OBSTETRIC CONTEXT IN SECOND TRIMESTER: Primary | ICD-10-CM

## 2024-03-19 PROCEDURE — 76816 OB US FOLLOW-UP PER FETUS: CPT | Performed by: OBSTETRICS & GYNECOLOGY

## 2024-03-19 NOTE — PROGRESS NOTES
"Today's ultrasound was read remotely.    The fetal anatomic survey is now complete. There is no sonographic evidence of fetal abnormalities at this time. The remainder of the survey was completed previously. Good fetal movement and tone are seen. The amniotic fluid volume appears normal.  Fetal growth is normal.    Given her history of hypertension, I recommend she return in 4 weeks to assess fetal growth    Thank you very much for allowing us to participate in the care of this very nice patient. Should you have any questions, please do not hesitate to contact our office.    Portions of the record may have been created with voice recognition software. Occasional wrong word or \"sound a like\" substitutions may have occurred due to the inherent limitations of voice recognition software. Read the chart carefully and recognize, using context, where substitutions have occurred.  "

## 2024-03-19 NOTE — PROGRESS NOTES
The patient was seen today for an ultrasound.  Please see ultrasound report (located under Ob Procedures) for additional details.   Thank you very much for allowing us to participate in the care of this very nice patient.  Should you have any questions, please do not hesitate to contact me.     León Gerard MD FACOG  Attending Physician, Maternal-Fetal Medicine  [facility]

## 2024-03-19 NOTE — LETTER
"March 19, 2024     Julita VAIBHAV Jamal    Patient: Julita Berry   YOB: 1995   Date of Visit: 3/19/2024       Dear Rubio Berry:    Below are my notes for today's visit..    If you have questions, please do not hesitate to call me.          Sincerely,        León Gerard MD        CC: No Recipients    León Gerard MD  3/19/2024  3:51 PM  Sign when Signing Visit  Today's ultrasound was read remotely.    The fetal anatomic survey is now complete. There is no sonographic evidence of fetal abnormalities at this time. The remainder of the survey was completed previously. Good fetal movement and tone are seen. The amniotic fluid volume appears normal.  Fetal growth is normal.    Given her history of hypertension, I recommend she return in 4 weeks to assess fetal growth    Thank you very much for allowing us to participate in the care of this very nice patient. Should you have any questions, please do not hesitate to contact our office.    Portions of the record may have been created with voice recognition software. Occasional wrong word or \"sound a like\" substitutions may have occurred due to the inherent limitations of voice recognition software. Read the chart carefully and recognize, using context, where substitutions have occurred.  "

## 2024-03-20 ENCOUNTER — ROUTINE PRENATAL (OUTPATIENT)
Dept: OBGYN CLINIC | Facility: CLINIC | Age: 29
End: 2024-03-20
Payer: COMMERCIAL

## 2024-03-20 VITALS — WEIGHT: 177 LBS | DIASTOLIC BLOOD PRESSURE: 76 MMHG | BODY MASS INDEX: 30.38 KG/M2 | SYSTOLIC BLOOD PRESSURE: 102 MMHG

## 2024-03-20 DIAGNOSIS — O10.912 CHRONIC HYPERTENSION IN OBSTETRIC CONTEXT IN SECOND TRIMESTER: ICD-10-CM

## 2024-03-20 DIAGNOSIS — Z34.02 ENCOUNTER FOR SUPERVISION OF NORMAL FIRST PREGNANCY IN SECOND TRIMESTER: Primary | ICD-10-CM

## 2024-03-20 DIAGNOSIS — Z3A.24 24 WEEKS GESTATION OF PREGNANCY: ICD-10-CM

## 2024-03-20 LAB
SL AMB  POCT GLUCOSE, UA: NEGATIVE
SL AMB POCT URINE PROTEIN: NEGATIVE

## 2024-03-20 PROCEDURE — PNV: Performed by: STUDENT IN AN ORGANIZED HEALTH CARE EDUCATION/TRAINING PROGRAM

## 2024-03-20 PROCEDURE — 81002 URINALYSIS NONAUTO W/O SCOPE: CPT | Performed by: STUDENT IN AN ORGANIZED HEALTH CARE EDUCATION/TRAINING PROGRAM

## 2024-03-20 NOTE — PROGRESS NOTES
29 y.o.  at 24w1d, here for routine OB visit. Feeling well overall, but with some MSK pain and LE swelling at the end of the day.     Good FM. Denies LOF, VB, contractions.      Problem List Items Addressed This Visit          Cardiovascular and Mediastinum    Chronic hypertension in obstetric context in second trimester     BP wnl today            Obstetrics/Gynecology    24 weeks gestation of pregnancy     -precautions reviewed  -prepregnancy BMI 28 with goal weight gain 15-25#: TWG = 8#          Other Visit Diagnoses       Encounter for supervision of normal first pregnancy in second trimester    -  Primary    Relevant Orders    POCT urine dip    Anemia Panel w/Reflex, OB    CBC and differential    Glucose, 1H PG    RPR-Syphilis Screening (Total Syphilis IGG/IGM)

## 2024-03-20 NOTE — PROGRESS NOTES
Pt is here for routine ob visit   Lower extremity swelling, a nurse in the ER   Mild cramping, right side rib pain   Urine neg/neg   No LOF,VB,Contractions  +FM   28wk labs ordered

## 2024-03-21 ENCOUNTER — APPOINTMENT (OUTPATIENT)
Dept: LAB | Facility: CLINIC | Age: 29
End: 2024-03-21
Payer: COMMERCIAL

## 2024-03-21 DIAGNOSIS — E83.39 HYPOPHOSPHATEMIA: ICD-10-CM

## 2024-03-21 DIAGNOSIS — E87.6 HYPOKALEMIA: ICD-10-CM

## 2024-03-21 LAB
ANION GAP SERPL CALCULATED.3IONS-SCNC: 7 MMOL/L (ref 4–13)
BUN SERPL-MCNC: 9 MG/DL (ref 5–25)
CALCIUM SERPL-MCNC: 8.2 MG/DL (ref 8.4–10.2)
CHLORIDE SERPL-SCNC: 105 MMOL/L (ref 96–108)
CO2 SERPL-SCNC: 25 MMOL/L (ref 21–32)
CREAT SERPL-MCNC: 0.6 MG/DL (ref 0.6–1.3)
GFR SERPL CREATININE-BSD FRML MDRD: 122 ML/MIN/1.73SQ M
GLUCOSE P FAST SERPL-MCNC: 81 MG/DL (ref 65–99)
MAGNESIUM SERPL-MCNC: 1.8 MG/DL (ref 1.9–2.7)
POTASSIUM SERPL-SCNC: 3.7 MMOL/L (ref 3.5–5.3)
SODIUM SERPL-SCNC: 137 MMOL/L (ref 135–147)

## 2024-03-21 PROCEDURE — 83735 ASSAY OF MAGNESIUM: CPT

## 2024-03-21 PROCEDURE — 36415 COLL VENOUS BLD VENIPUNCTURE: CPT

## 2024-03-21 PROCEDURE — 80048 BASIC METABOLIC PNL TOTAL CA: CPT

## 2024-03-22 ENCOUNTER — TELEPHONE (OUTPATIENT)
Dept: NEPHROLOGY | Facility: CLINIC | Age: 29
End: 2024-03-22

## 2024-03-22 NOTE — TELEPHONE ENCOUNTER
Patient aware    ----- Message from Abelardo Covarrubias MD sent at 3/22/2024  6:59 AM EDT -----  Labs look good repeat a basic metabolic profile in the next couple weeks  ----- Message -----  From: Lab, Background User  Sent: 3/21/2024   9:10 PM EDT  To: Abelardo Covarrubias MD

## 2024-04-04 ENCOUNTER — APPOINTMENT (OUTPATIENT)
Dept: LAB | Facility: CLINIC | Age: 29
End: 2024-04-04
Payer: COMMERCIAL

## 2024-04-04 DIAGNOSIS — Z34.02 ENCOUNTER FOR SUPERVISION OF NORMAL FIRST PREGNANCY IN SECOND TRIMESTER: ICD-10-CM

## 2024-04-04 LAB
BASOPHILS # BLD AUTO: 0.04 THOUSANDS/ÂΜL (ref 0–0.1)
BASOPHILS NFR BLD AUTO: 0 % (ref 0–1)
EOSINOPHIL # BLD AUTO: 0.07 THOUSAND/ÂΜL (ref 0–0.61)
EOSINOPHIL NFR BLD AUTO: 1 % (ref 0–6)
ERYTHROCYTE [DISTWIDTH] IN BLOOD BY AUTOMATED COUNT: 12.9 % (ref 11.6–15.1)
GLUCOSE 1H P 50 G GLC PO SERPL-MCNC: 165 MG/DL (ref 70–134)
HCT VFR BLD AUTO: 39.2 % (ref 34.8–46.1)
HGB BLD-MCNC: 13.5 G/DL (ref 11.5–15.4)
IMM GRANULOCYTES # BLD AUTO: 0.05 THOUSAND/UL (ref 0–0.2)
IMM GRANULOCYTES NFR BLD AUTO: 0 % (ref 0–2)
LYMPHOCYTES # BLD AUTO: 1.41 THOUSANDS/ÂΜL (ref 0.6–4.47)
LYMPHOCYTES NFR BLD AUTO: 12 % (ref 14–44)
MCH RBC QN AUTO: 31.2 PG (ref 26.8–34.3)
MCHC RBC AUTO-ENTMCNC: 34.4 G/DL (ref 31.4–37.4)
MCV RBC AUTO: 91 FL (ref 82–98)
MONOCYTES # BLD AUTO: 0.56 THOUSAND/ÂΜL (ref 0.17–1.22)
MONOCYTES NFR BLD AUTO: 5 % (ref 4–12)
NEUTROPHILS # BLD AUTO: 9.37 THOUSANDS/ÂΜL (ref 1.85–7.62)
NEUTS SEG NFR BLD AUTO: 82 % (ref 43–75)
NRBC BLD AUTO-RTO: 0 /100 WBCS
PLATELET # BLD AUTO: 178 THOUSANDS/UL (ref 149–390)
PMV BLD AUTO: 12.2 FL (ref 8.9–12.7)
RBC # BLD AUTO: 4.33 MILLION/UL (ref 3.81–5.12)
WBC # BLD AUTO: 11.5 THOUSAND/UL (ref 4.31–10.16)

## 2024-04-04 PROCEDURE — 36415 COLL VENOUS BLD VENIPUNCTURE: CPT

## 2024-04-04 PROCEDURE — 85025 COMPLETE CBC W/AUTO DIFF WBC: CPT

## 2024-04-04 PROCEDURE — 82950 GLUCOSE TEST: CPT

## 2024-04-04 PROCEDURE — 86780 TREPONEMA PALLIDUM: CPT

## 2024-04-05 ENCOUNTER — TELEPHONE (OUTPATIENT)
Age: 29
End: 2024-04-05

## 2024-04-05 ENCOUNTER — TELEPHONE (OUTPATIENT)
Dept: OBGYN CLINIC | Facility: CLINIC | Age: 29
End: 2024-04-05

## 2024-04-05 ENCOUNTER — NURSE TRIAGE (OUTPATIENT)
Age: 29
End: 2024-04-05

## 2024-04-05 ENCOUNTER — TELEPHONE (OUTPATIENT)
Dept: NEPHROLOGY | Facility: CLINIC | Age: 29
End: 2024-04-05

## 2024-04-05 DIAGNOSIS — Z34.02 ENCOUNTER FOR SUPERVISION OF NORMAL FIRST PREGNANCY IN SECOND TRIMESTER: ICD-10-CM

## 2024-04-05 DIAGNOSIS — R73.09 ABNORMAL GLUCOSE: Primary | ICD-10-CM

## 2024-04-05 DIAGNOSIS — R55 VASOVAGAL EPISODE: Primary | ICD-10-CM

## 2024-04-05 LAB — TREPONEMA PALLIDUM IGG+IGM AB [PRESENCE] IN SERUM OR PLASMA BY IMMUNOASSAY: NORMAL

## 2024-04-05 NOTE — TELEPHONE ENCOUNTER
"Patient calling in reporting that she is 26w3d and . She is stable right now, but has had 2 episodes in the past 2 weeks where she lost her vision and hearing, and felt light headed and faint that she had to lay down to regain her composure. During these episodes, should would experience increased sweating as well.      Tiger text sent to on call provider and recommendations for patient to obtain an EKG and CBC, TSH, urinalysis, CMP.      On call provider unavailable. Call transferred to clinical line at office for patient's further recommendations.  Patient voiced appreciation.          Reason for Disposition   Loss of vision or double vision (Exception: similar to previous migraines)    Answer Assessment - Initial Assessment Questions  1. DESCRIPTION: \"Describe your dizziness.\"      Feeling lightheaded  2. LIGHTHEADED: \"Do you feel lightheaded?\" (e.g., somewhat faint, woozy, weak upon standing)      Has episodes   3. VERTIGO: \"Do you feel like either you or the room is spinning or tilting?\" (i.e. vertigo)      Nothing feels like its spinning.    4. SEVERITY: \"How bad is it?\"  \"Do you feel like you are going to faint?\" \"Can you stand and walk?\"    - MILD: Feels slightly dizzy, but walking normally.    - MODERATE: Feels very unsteady when walking, but not falling; interferes with normal activities (e.g., school, work) .    - SEVERE: Unable to walk without falling, or requires assistance to walk without falling; feels like passing out now.       Severe light headedness   5. ONSET:  \"When did the dizziness begin?\"      Has random episodes   6. AGGRAVATING FACTORS: \"Does anything make it worse?\" (e.g., standing, change in head position)      Just had random episodes where she feels faint and needs to lay down   7. HEART RATE: \"Can you tell me your heart rate?\" \"How many beats in 15 seconds?\"  (Note: not all patients can do this)        It increases when she feels like she has to pass out   8. CAUSE: \"What do you " "think is causing the dizziness?\"      Unknown   9. RECURRENT SYMPTOM: \"Have you had dizziness before?\" If Yes, ask: \"When was the last time?\" \"What happened that time?\"      2 episodes   10. OTHER SYMPTOMS: \"Do you have any other symptoms?\" (e.g., fever, chest pain, vomiting, diarrhea, bleeding)       Begins to sweat, looses her vision and hearing.   11. PREGNANCY: \"Is there any chance you are pregnant?\" \"When was your last menstrual period?\"        26w3d    Protocols used: Dizziness-ADULT-[place]    "

## 2024-04-05 NOTE — TELEPHONE ENCOUNTER
Orders placed per Dr. Flores. Patient to hydrate, taking PNV, get up slowly, eat small frequent meals. Call if it happens again. Follow-up at next PN visit and with PNC. 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 She is feeling ok today. Aware to go for B/W and EKG. Will do this the day she does her 3 hour GTT.

## 2024-04-05 NOTE — TELEPHONE ENCOUNTER
----- Message from Gladis Duff MD sent at 4/5/2024  7:52 AM EDT -----  Plz call abnormal, will need 3hr

## 2024-04-05 NOTE — TELEPHONE ENCOUNTER
Patient aware and scheduled for 4/10 @ 11a in [place].  Patient notified of date, time and location.    ----- Message from Abelardo Covarrubias MD sent at 4/5/2024  7:10 AM EDT -----  I ordered potassium and magnesium infusion  ----- Message -----  From: Lab, Background User  Sent: 4/4/2024   6:07 PM EDT  To: Abelardo Covarrubias MD

## 2024-04-09 ENCOUNTER — APPOINTMENT (OUTPATIENT)
Dept: LAB | Facility: CLINIC | Age: 29
End: 2024-04-09
Payer: COMMERCIAL

## 2024-04-09 DIAGNOSIS — R55 VASOVAGAL EPISODE: ICD-10-CM

## 2024-04-09 DIAGNOSIS — Z34.02 ENCOUNTER FOR SUPERVISION OF NORMAL FIRST PREGNANCY IN SECOND TRIMESTER: ICD-10-CM

## 2024-04-09 DIAGNOSIS — R73.09 ABNORMAL GLUCOSE: ICD-10-CM

## 2024-04-09 LAB
ALBUMIN SERPL BCP-MCNC: 3.5 G/DL (ref 3.5–5)
ALP SERPL-CCNC: 68 U/L (ref 34–104)
ALT SERPL W P-5'-P-CCNC: 13 U/L (ref 7–52)
ANION GAP SERPL CALCULATED.3IONS-SCNC: 7 MMOL/L (ref 4–13)
AST SERPL W P-5'-P-CCNC: 11 U/L (ref 13–39)
ATRIAL RATE: 94 BPM
BACTERIA UR QL AUTO: ABNORMAL /HPF
BASOPHILS # BLD AUTO: 0.06 THOUSANDS/ÂΜL (ref 0–0.1)
BASOPHILS NFR BLD AUTO: 1 % (ref 0–1)
BILIRUB SERPL-MCNC: 0.3 MG/DL (ref 0.2–1)
BILIRUB UR QL STRIP: NEGATIVE
BUN SERPL-MCNC: 10 MG/DL (ref 5–25)
CALCIUM SERPL-MCNC: 8.7 MG/DL (ref 8.4–10.2)
CHLORIDE SERPL-SCNC: 103 MMOL/L (ref 96–108)
CLARITY UR: CLEAR
CO2 SERPL-SCNC: 26 MMOL/L (ref 21–32)
COLOR UR: ABNORMAL
CREAT SERPL-MCNC: 0.55 MG/DL (ref 0.6–1.3)
EOSINOPHIL # BLD AUTO: 0.1 THOUSAND/ÂΜL (ref 0–0.61)
EOSINOPHIL NFR BLD AUTO: 1 % (ref 0–6)
ERYTHROCYTE [DISTWIDTH] IN BLOOD BY AUTOMATED COUNT: 13 % (ref 11.6–15.1)
GFR SERPL CREATININE-BSD FRML MDRD: 126 ML/MIN/1.73SQ M
GLUCOSE 1H P 100 G GLC PO SERPL-MCNC: 138 MG/DL (ref 65–179)
GLUCOSE 2H P 100 G GLC PO SERPL-MCNC: 99 MG/DL (ref 65–154)
GLUCOSE 3H P 100 G GLC PO SERPL-MCNC: 93 MG/DL (ref 65–139)
GLUCOSE P FAST SERPL-MCNC: 80 MG/DL (ref 65–94)
GLUCOSE P FAST SERPL-MCNC: 80 MG/DL (ref 65–99)
GLUCOSE UR STRIP-MCNC: NEGATIVE MG/DL
HCT VFR BLD AUTO: 39 % (ref 34.8–46.1)
HGB BLD-MCNC: 13.3 G/DL (ref 11.5–15.4)
HGB UR QL STRIP.AUTO: NEGATIVE
IMM GRANULOCYTES # BLD AUTO: 0.05 THOUSAND/UL (ref 0–0.2)
IMM GRANULOCYTES NFR BLD AUTO: 0 % (ref 0–2)
KETONES UR STRIP-MCNC: NEGATIVE MG/DL
LEUKOCYTE ESTERASE UR QL STRIP: ABNORMAL
LYMPHOCYTES # BLD AUTO: 1.96 THOUSANDS/ÂΜL (ref 0.6–4.47)
LYMPHOCYTES NFR BLD AUTO: 17 % (ref 14–44)
MCH RBC QN AUTO: 30.5 PG (ref 26.8–34.3)
MCHC RBC AUTO-ENTMCNC: 34.1 G/DL (ref 31.4–37.4)
MCV RBC AUTO: 89 FL (ref 82–98)
MONOCYTES # BLD AUTO: 0.81 THOUSAND/ÂΜL (ref 0.17–1.22)
MONOCYTES NFR BLD AUTO: 7 % (ref 4–12)
MUCOUS THREADS UR QL AUTO: ABNORMAL
NEUTROPHILS # BLD AUTO: 8.39 THOUSANDS/ÂΜL (ref 1.85–7.62)
NEUTS SEG NFR BLD AUTO: 74 % (ref 43–75)
NITRITE UR QL STRIP: NEGATIVE
NON-SQ EPI CELLS URNS QL MICRO: ABNORMAL /HPF
NRBC BLD AUTO-RTO: 0 /100 WBCS
P AXIS: 56 DEGREES
PH UR STRIP.AUTO: 6 [PH]
PLATELET # BLD AUTO: 185 THOUSANDS/UL (ref 149–390)
PMV BLD AUTO: 11.9 FL (ref 8.9–12.7)
POTASSIUM SERPL-SCNC: 3.5 MMOL/L (ref 3.5–5.3)
PR INTERVAL: 126 MS
PROT SERPL-MCNC: 6.5 G/DL (ref 6.4–8.4)
PROT UR STRIP-MCNC: NEGATIVE MG/DL
QRS AXIS: 43 DEGREES
QRSD INTERVAL: 74 MS
QT INTERVAL: 384 MS
QTC INTERVAL: 480 MS
RBC # BLD AUTO: 4.36 MILLION/UL (ref 3.81–5.12)
RBC #/AREA URNS AUTO: ABNORMAL /HPF
SODIUM SERPL-SCNC: 136 MMOL/L (ref 135–147)
SP GR UR STRIP.AUTO: 1.02 (ref 1–1.03)
T WAVE AXIS: 11 DEGREES
TSH SERPL DL<=0.05 MIU/L-ACNC: 2.09 UIU/ML (ref 0.45–4.5)
UROBILINOGEN UR STRIP-ACNC: <2 MG/DL
VENTRICULAR RATE: 94 BPM
WBC # BLD AUTO: 11.37 THOUSAND/UL (ref 4.31–10.16)
WBC #/AREA URNS AUTO: ABNORMAL /HPF

## 2024-04-09 PROCEDURE — 84443 ASSAY THYROID STIM HORMONE: CPT

## 2024-04-09 PROCEDURE — 85025 COMPLETE CBC W/AUTO DIFF WBC: CPT

## 2024-04-09 PROCEDURE — 81001 URINALYSIS AUTO W/SCOPE: CPT

## 2024-04-09 PROCEDURE — 36415 COLL VENOUS BLD VENIPUNCTURE: CPT

## 2024-04-09 PROCEDURE — 82952 GTT-ADDED SAMPLES: CPT

## 2024-04-09 PROCEDURE — 82951 GLUCOSE TOLERANCE TEST (GTT): CPT

## 2024-04-09 PROCEDURE — 80053 COMPREHEN METABOLIC PANEL: CPT

## 2024-04-10 ENCOUNTER — HOSPITAL ENCOUNTER (OUTPATIENT)
Dept: INFUSION CENTER | Facility: HOSPITAL | Age: 29
Discharge: HOME | End: 2024-04-10
Attending: INTERNAL MEDICINE
Payer: COMMERCIAL

## 2024-04-10 VITALS
TEMPERATURE: 98.1 F | SYSTOLIC BLOOD PRESSURE: 142 MMHG | DIASTOLIC BLOOD PRESSURE: 79 MMHG | OXYGEN SATURATION: 98 % | RESPIRATION RATE: 18 BRPM | HEART RATE: 100 BPM

## 2024-04-10 DIAGNOSIS — E83.42 HYPOMAGNESEMIA: Primary | ICD-10-CM

## 2024-04-10 DIAGNOSIS — E87.6 HYPOKALEMIA: ICD-10-CM

## 2024-04-10 PROCEDURE — 96365 THER/PROPH/DIAG IV INF INIT: CPT

## 2024-04-10 PROCEDURE — 96366 THER/PROPH/DIAG IV INF ADDON: CPT

## 2024-04-10 RX ORDER — MAGNESIUM SULFATE HEPTAHYDRATE 40 MG/ML
4 INJECTION, SOLUTION INTRAVENOUS ONCE
Status: DISCONTINUED | OUTPATIENT
Start: 2024-04-10 | End: 2024-04-14 | Stop reason: HOSPADM

## 2024-04-10 RX ORDER — MAGNESIUM SULFATE HEPTAHYDRATE 40 MG/ML
4 INJECTION, SOLUTION INTRAVENOUS ONCE
Status: CANCELLED | OUTPATIENT
Start: 2024-04-10

## 2024-04-10 RX ORDER — POTASSIUM CHLORIDE 29.8 MG/ML
40 INJECTION INTRAVENOUS ONCE
Status: DISCONTINUED | OUTPATIENT
Start: 2024-04-10 | End: 2024-04-14 | Stop reason: HOSPADM

## 2024-04-10 RX ORDER — POTASSIUM CHLORIDE 29.8 MG/ML
40 INJECTION INTRAVENOUS ONCE
Status: CANCELLED | OUTPATIENT
Start: 2024-04-10 | End: 2024-04-10

## 2024-04-10 RX ADMIN — POTASSIUM CHLORIDE: 2 INJECTION, SOLUTION, CONCENTRATE INTRAVENOUS at 11:35

## 2024-04-10 NOTE — PROGRESS NOTES
Pt tolerated Magnesium and potassium infusion  today with no adverse reactions. Declined AVS.Makes apts as needed.  Left unit ambulatory with a steady gait.

## 2024-04-16 ENCOUNTER — ULTRASOUND (OUTPATIENT)
Facility: HOSPITAL | Age: 29
End: 2024-04-16
Payer: COMMERCIAL

## 2024-04-16 VITALS
SYSTOLIC BLOOD PRESSURE: 134 MMHG | BODY MASS INDEX: 31.51 KG/M2 | HEART RATE: 105 BPM | HEIGHT: 64 IN | DIASTOLIC BLOOD PRESSURE: 78 MMHG | WEIGHT: 184.6 LBS

## 2024-04-16 DIAGNOSIS — O99.413: ICD-10-CM

## 2024-04-16 DIAGNOSIS — O10.913 CHRONIC HYPERTENSION COMPLICATING OR REASON FOR CARE DURING PREGNANCY, THIRD TRIMESTER: Primary | ICD-10-CM

## 2024-04-16 DIAGNOSIS — I47.10 SVT (SUPRAVENTRICULAR TACHYCARDIA): ICD-10-CM

## 2024-04-16 DIAGNOSIS — G90.A POTS (POSTURAL ORTHOSTATIC TACHYCARDIA SYNDROME): Chronic | ICD-10-CM

## 2024-04-16 DIAGNOSIS — F41.9 ANXIETY DURING PREGNANCY, ANTEPARTUM, THIRD TRIMESTER: ICD-10-CM

## 2024-04-16 DIAGNOSIS — O99.343 ANXIETY DURING PREGNANCY, ANTEPARTUM, THIRD TRIMESTER: ICD-10-CM

## 2024-04-16 DIAGNOSIS — Z36.89 ENCOUNTER FOR ULTRASOUND TO ASSESS FETAL GROWTH: ICD-10-CM

## 2024-04-16 DIAGNOSIS — Z3A.28 28 WEEKS GESTATION OF PREGNANCY: ICD-10-CM

## 2024-04-16 PROBLEM — Z3A.24 24 WEEKS GESTATION OF PREGNANCY: Status: RESOLVED | Noted: 2023-12-15 | Resolved: 2024-04-16

## 2024-04-16 PROCEDURE — 76816 OB US FOLLOW-UP PER FETUS: CPT | Performed by: OBSTETRICS & GYNECOLOGY

## 2024-04-16 PROCEDURE — 99213 OFFICE O/P EST LOW 20 MIN: CPT | Performed by: OBSTETRICS & GYNECOLOGY

## 2024-04-16 NOTE — PROGRESS NOTES
"[facility]: Julita Berry was seen today at 28w0d for fetal growth assessment ultrasound.  See ultrasound report under \"OB Procedures\" tab.  Please don't hesitate to contact our office with any concerns or questions.  -Katlyn Spann MD      "

## 2024-04-16 NOTE — LETTER
"2024     Maia Tijerina MD  [address] [facility]  [address] [place], [place] [address]    Patient: Julita Berry   YOB: 1995   Date of Visit: 2024       Dear Dr. Livier Tijerina:    Thank you for referring Julita Berry to me for evaluation. Below are my notes for this consultation.    If you have questions, please do not hesitate to call me. I look forward to following your patient along with you.         Sincerely,        Katlyn Spann MD        CC: No Recipients    Katlyn Spann MD  2024  1:18 PM  Sign when Signing Visit  [facility]: Julita Berry was seen today at 28w0d for fetal growth assessment ultrasound.  See ultrasound report under \"OB Procedures\" tab.  Please don't hesitate to contact our office with any concerns or questions.  -MD Abelardo Osborne MD  2024 10:42 AM  Sign when Signing Visit  Note entered in error  "

## 2024-04-18 ENCOUNTER — ROUTINE PRENATAL (OUTPATIENT)
Dept: OBGYN CLINIC | Facility: CLINIC | Age: 29
End: 2024-04-18
Payer: COMMERCIAL

## 2024-04-18 VITALS
SYSTOLIC BLOOD PRESSURE: 128 MMHG | OXYGEN SATURATION: 99 % | HEART RATE: 94 BPM | DIASTOLIC BLOOD PRESSURE: 76 MMHG | BODY MASS INDEX: 31.58 KG/M2 | WEIGHT: 184 LBS

## 2024-04-18 DIAGNOSIS — G90.A POTS (POSTURAL ORTHOSTATIC TACHYCARDIA SYNDROME): Chronic | ICD-10-CM

## 2024-04-18 DIAGNOSIS — O10.912 CHRONIC HYPERTENSION IN OBSTETRIC CONTEXT IN SECOND TRIMESTER: ICD-10-CM

## 2024-04-18 DIAGNOSIS — Z34.03 ENCOUNTER FOR SUPERVISION OF NORMAL FIRST PREGNANCY IN THIRD TRIMESTER: Primary | ICD-10-CM

## 2024-04-18 DIAGNOSIS — Z3A.28 28 WEEKS GESTATION OF PREGNANCY: ICD-10-CM

## 2024-04-18 LAB
SL AMB  POCT GLUCOSE, UA: NEGATIVE
SL AMB POCT URINE PROTEIN: 1

## 2024-04-18 PROCEDURE — 81002 URINALYSIS NONAUTO W/O SCOPE: CPT | Performed by: STUDENT IN AN ORGANIZED HEALTH CARE EDUCATION/TRAINING PROGRAM

## 2024-04-18 PROCEDURE — PNV: Performed by: STUDENT IN AN ORGANIZED HEALTH CARE EDUCATION/TRAINING PROGRAM

## 2024-04-18 NOTE — PROGRESS NOTES
Chronic hypertension in obstetric context in second trimester  BP normal today    POTS (postural orthostatic tachycardia syndrome)  Has been able to decrease frequency of infusions  Continue to monitor symptoms    28 weeks gestation of pregnancy  30 yo G1 here for ob visit at 28+2. No contractions, leaking or bleeding. Good fetal movement. Having some increasing dizziness but mostly on hot days, following with nephro and cardiology. Return in 2 wks.     The patient was counseled about the labor process. She was counseled regarding potential reasons that she may need a  section including arrest of dilation/descent, non-reassuring fetal status, or worsening maternal status.      She was counseled on the risks of  including bleeding, infection, and injury to surrounding structures including the bowel and bladder. She was counseled that there are medical and surgical methods to manage excessive postpartum bleeding. She was counseled that in the event of excessive blood loss, she may require blood transfusion which includes a small risk of blood borne diseases such as hepatitis and HIV. The patient is OK with receiving a blood transfusion if necessary.  The patient had an opportunity to ask questions and signed consent.      She was counseled about the possible need for operative delivery using the vacuum or forceps and the indications for doing so. She was counseled that there is a small risk of  complications including intracranial hemorrhage, lacerations, nerve damage or fracture as well as the increased risk for more severe maternal laceration. The patient signed consent.

## 2024-04-18 NOTE — PROGRESS NOTES
Patient here for prenatal visit.   GA: 28w2d    Denies leaking of fluid, bleeding, cramping  Normal Fetal Movement  Labs-Utd  Yellow Folder given/reviewed  Delivery consent signed today  Breast pump ordered today  Peds referral placed today  Tdap-next visit  Patient complaining of dizziness some times, but has been monitoring her blood pressure at home. She does notice they drop here and there.

## 2024-04-18 NOTE — ASSESSMENT & PLAN NOTE
28 yo G1 here for ob visit at 28+2. No contractions, leaking or bleeding. Good fetal movement. Having some increasing dizziness but mostly on hot days, following with nephro and cardiology. Return in 2 wks.     The patient was counseled about the labor process. She was counseled regarding potential reasons that she may need a  section including arrest of dilation/descent, non-reassuring fetal status, or worsening maternal status.      She was counseled on the risks of  including bleeding, infection, and injury to surrounding structures including the bowel and bladder. She was counseled that there are medical and surgical methods to manage excessive postpartum bleeding. She was counseled that in the event of excessive blood loss, she may require blood transfusion which includes a small risk of blood borne diseases such as hepatitis and HIV. The patient is OK with receiving a blood transfusion if necessary.  The patient had an opportunity to ask questions and signed consent.      She was counseled about the possible need for operative delivery using the vacuum or forceps and the indications for doing so. She was counseled that there is a small risk of  complications including intracranial hemorrhage, lacerations, nerve damage or fracture as well as the increased risk for more severe maternal laceration. The patient signed consent.

## 2024-04-24 LAB
DME PARACHUTE DELIVERY DATE ACTUAL: NORMAL
DME PARACHUTE DELIVERY DATE REQUESTED: NORMAL
DME PARACHUTE ITEM DESCRIPTION: NORMAL
DME PARACHUTE ORDER STATUS: NORMAL
DME PARACHUTE SUPPLIER NAME: NORMAL
DME PARACHUTE SUPPLIER PHONE: NORMAL

## 2024-04-25 ENCOUNTER — APPOINTMENT (OUTPATIENT)
Dept: LAB | Facility: CLINIC | Age: 29
End: 2024-04-25
Payer: COMMERCIAL

## 2024-04-26 ENCOUNTER — TELEPHONE (OUTPATIENT)
Dept: NEPHROLOGY | Facility: CLINIC | Age: 29
End: 2024-04-26

## 2024-04-26 DIAGNOSIS — E83.42 HYPOMAGNESEMIA: ICD-10-CM

## 2024-04-26 DIAGNOSIS — E87.6 HYPOKALEMIA: Primary | ICD-10-CM

## 2024-04-26 RX ORDER — POTASSIUM CHLORIDE 29.8 MG/ML
40 INJECTION INTRAVENOUS ONCE
Status: CANCELLED | OUTPATIENT
Start: 2024-04-29 | End: 2024-04-29

## 2024-04-26 RX ORDER — MAGNESIUM SULFATE HEPTAHYDRATE 40 MG/ML
4 INJECTION, SOLUTION INTRAVENOUS ONCE
Status: CANCELLED | OUTPATIENT
Start: 2024-04-29

## 2024-04-26 NOTE — TELEPHONE ENCOUNTER
Patient scheduled for April 29th at 10a at [facility].  Patient aware of date, time and location.    ----- Message from Abelardo Covarrubias MD sent at 4/26/2024 11:10 AM EDT -----  I ordered electrolyte infusions she can start Monday

## 2024-04-29 ENCOUNTER — HOSPITAL ENCOUNTER (OUTPATIENT)
Dept: INFUSION CENTER | Facility: HOSPITAL | Age: 29
Discharge: HOME | End: 2024-04-29
Payer: COMMERCIAL

## 2024-04-29 VITALS
DIASTOLIC BLOOD PRESSURE: 82 MMHG | TEMPERATURE: 97.7 F | SYSTOLIC BLOOD PRESSURE: 135 MMHG | OXYGEN SATURATION: 98 % | HEART RATE: 91 BPM

## 2024-04-29 DIAGNOSIS — E83.42 HYPOMAGNESEMIA: ICD-10-CM

## 2024-04-29 DIAGNOSIS — E87.6 HYPOKALEMIA: Primary | ICD-10-CM

## 2024-04-29 PROCEDURE — 96367 TX/PROPH/DG ADDL SEQ IV INF: CPT

## 2024-04-29 PROCEDURE — 96368 THER/DIAG CONCURRENT INF: CPT

## 2024-04-29 PROCEDURE — 96365 THER/PROPH/DIAG IV INF INIT: CPT

## 2024-04-29 PROCEDURE — 96366 THER/PROPH/DIAG IV INF ADDON: CPT

## 2024-04-29 RX ORDER — POTASSIUM CHLORIDE 14.9 MG/ML
20 INJECTION INTRAVENOUS
Qty: 200 ML | Refills: 0 | Status: COMPLETED | OUTPATIENT
Start: 2024-04-29 | End: 2024-04-29

## 2024-04-29 RX ORDER — MAGNESIUM SULFATE HEPTAHYDRATE 40 MG/ML
4 INJECTION, SOLUTION INTRAVENOUS ONCE
Status: CANCELLED | OUTPATIENT
Start: 2024-04-29

## 2024-04-29 RX ORDER — MAGNESIUM SULFATE HEPTAHYDRATE 40 MG/ML
4 INJECTION, SOLUTION INTRAVENOUS ONCE
Status: COMPLETED | OUTPATIENT
Start: 2024-04-29 | End: 2024-04-29

## 2024-04-29 RX ORDER — POTASSIUM CHLORIDE 29.8 MG/ML
40 INJECTION INTRAVENOUS ONCE
Status: CANCELLED | OUTPATIENT
Start: 2024-04-29 | End: 2024-04-29

## 2024-04-29 RX ADMIN — POTASSIUM CHLORIDE 20 MEQ: 14.9 INJECTION, SOLUTION INTRAVENOUS at 12:35

## 2024-04-29 RX ADMIN — MAGNESIUM SULFATE HEPTAHYDRATE 4 G: 40 INJECTION, SOLUTION INTRAVENOUS at 10:25

## 2024-04-29 RX ADMIN — POTASSIUM CHLORIDE 20 MEQ: 14.9 INJECTION, SOLUTION INTRAVENOUS at 10:30

## 2024-04-29 NOTE — PROGRESS NOTES
Pt tolerated infusion without complications.  No further appts scheduled at this time.  Pt discharged in stable condition.

## 2024-05-02 ENCOUNTER — ROUTINE PRENATAL (OUTPATIENT)
Dept: OBGYN CLINIC | Facility: CLINIC | Age: 29
End: 2024-05-02
Payer: COMMERCIAL

## 2024-05-02 VITALS
BODY MASS INDEX: 32.54 KG/M2 | OXYGEN SATURATION: 97 % | HEART RATE: 114 BPM | DIASTOLIC BLOOD PRESSURE: 82 MMHG | WEIGHT: 189.6 LBS | SYSTOLIC BLOOD PRESSURE: 120 MMHG

## 2024-05-02 DIAGNOSIS — O10.912 CHRONIC HYPERTENSION IN OBSTETRIC CONTEXT IN SECOND TRIMESTER: ICD-10-CM

## 2024-05-02 DIAGNOSIS — Z34.03 ENCOUNTER FOR SUPERVISION OF NORMAL FIRST PREGNANCY IN THIRD TRIMESTER: Primary | ICD-10-CM

## 2024-05-02 DIAGNOSIS — Z23 ENCOUNTER FOR IMMUNIZATION: ICD-10-CM

## 2024-05-02 DIAGNOSIS — Z3A.30 30 WEEKS GESTATION OF PREGNANCY: ICD-10-CM

## 2024-05-02 DIAGNOSIS — G90.A POTS (POSTURAL ORTHOSTATIC TACHYCARDIA SYNDROME): Chronic | ICD-10-CM

## 2024-05-02 LAB
SL AMB  POCT GLUCOSE, UA: NORMAL
SL AMB POCT URINE PROTEIN: NORMAL

## 2024-05-02 PROCEDURE — 81002 URINALYSIS NONAUTO W/O SCOPE: CPT | Performed by: STUDENT IN AN ORGANIZED HEALTH CARE EDUCATION/TRAINING PROGRAM

## 2024-05-02 PROCEDURE — PNV: Performed by: STUDENT IN AN ORGANIZED HEALTH CARE EDUCATION/TRAINING PROGRAM

## 2024-05-02 NOTE — ASSESSMENT & PLAN NOTE
28yo  at 30.2wks presents for routine prenatal visit     Pt denies contractions, vaginal bleeding, leakage of fluid. Endorses fetal movement.     -continue PNVs   -delivery consent signed, birth plan signed and reviewed today   -has breast pump and pediatrician   -Tdap next visit   - labor precautions provided   -return in 3 weeks

## 2024-05-02 NOTE — PROGRESS NOTES
30 weeks gestation of pregnancy  30yo  at 30.2wks presents for routine prenatal visit     Pt denies contractions, vaginal bleeding, leakage of fluid. Endorses fetal movement.     -continue PNVs   -delivery consent signed, birth plan signed and reviewed today   -has breast pump and pediatrician   -Tdap next visit   - labor precautions provided   -return in 3 weeks     Chronic hypertension in obstetric context in second trimester  -Bp today wnl, asymptomatic   -preeclampsia precautions provided     POTS (postural orthostatic tachycardia syndrome)  -cardio and nephro appointments scheduled for    -labs q2 wks, infusions per nephro   -feels well, has been asymptomatic     FHR 140bpm   Fundal height 31cm   Fetal movement present

## 2024-05-02 NOTE — ASSESSMENT & PLAN NOTE
-cardio and nephro appointments scheduled for June   -labs q2 wks, infusions per nephro   -feels well, has been asymptomatic

## 2024-05-09 ENCOUNTER — APPOINTMENT (OUTPATIENT)
Dept: LAB | Facility: CLINIC | Age: 29
End: 2024-05-09
Payer: COMMERCIAL

## 2024-05-09 DIAGNOSIS — E55.9 VITAMIN D DEFICIENCY: ICD-10-CM

## 2024-05-09 DIAGNOSIS — G90.A POTS (POSTURAL ORTHOSTATIC TACHYCARDIA SYNDROME): Chronic | ICD-10-CM

## 2024-05-09 DIAGNOSIS — I10 ESSENTIAL HYPERTENSION, BENIGN: Chronic | ICD-10-CM

## 2024-05-09 DIAGNOSIS — E87.6 HYPOKALEMIA: ICD-10-CM

## 2024-05-09 LAB
25(OH)D3 SERPL-MCNC: 26 NG/ML (ref 30–100)
ALBUMIN SERPL BCP-MCNC: 3.5 G/DL (ref 3.5–5)
ALP SERPL-CCNC: 81 U/L (ref 34–104)
ALT SERPL W P-5'-P-CCNC: 19 U/L (ref 7–52)
ANION GAP SERPL CALCULATED.3IONS-SCNC: 9 MMOL/L (ref 4–13)
AST SERPL W P-5'-P-CCNC: 19 U/L (ref 13–39)
BILIRUB SERPL-MCNC: 0.27 MG/DL (ref 0.2–1)
BUN SERPL-MCNC: 9 MG/DL (ref 5–25)
CALCIUM SERPL-MCNC: 8.8 MG/DL (ref 8.4–10.2)
CHLORIDE SERPL-SCNC: 103 MMOL/L (ref 96–108)
CO2 SERPL-SCNC: 23 MMOL/L (ref 21–32)
CREAT SERPL-MCNC: 0.54 MG/DL (ref 0.6–1.3)
CREAT UR-MCNC: 121.1 MG/DL
ERYTHROCYTE [DISTWIDTH] IN BLOOD BY AUTOMATED COUNT: 13.2 % (ref 11.6–15.1)
GFR SERPL CREATININE-BSD FRML MDRD: 127 ML/MIN/1.73SQ M
GLUCOSE P FAST SERPL-MCNC: 87 MG/DL (ref 65–99)
HCT VFR BLD AUTO: 41.4 % (ref 34.8–46.1)
HGB BLD-MCNC: 14.1 G/DL (ref 11.5–15.4)
MAGNESIUM SERPL-MCNC: 1.6 MG/DL (ref 1.9–2.7)
MCH RBC QN AUTO: 30.5 PG (ref 26.8–34.3)
MCHC RBC AUTO-ENTMCNC: 34.1 G/DL (ref 31.4–37.4)
MCV RBC AUTO: 90 FL (ref 82–98)
PLATELET # BLD AUTO: 179 THOUSANDS/UL (ref 149–390)
PMV BLD AUTO: 12.2 FL (ref 8.9–12.7)
POTASSIUM SERPL-SCNC: 3.7 MMOL/L (ref 3.5–5.3)
PROT SERPL-MCNC: 6.7 G/DL (ref 6.4–8.4)
PROT UR-MCNC: 12 MG/DL
PROT/CREAT UR: 0.1 MG/G{CREAT} (ref 0–0.1)
RBC # BLD AUTO: 4.62 MILLION/UL (ref 3.81–5.12)
SODIUM SERPL-SCNC: 135 MMOL/L (ref 135–147)
WBC # BLD AUTO: 11.11 THOUSAND/UL (ref 4.31–10.16)

## 2024-05-09 PROCEDURE — 82570 ASSAY OF URINE CREATININE: CPT

## 2024-05-09 PROCEDURE — 83735 ASSAY OF MAGNESIUM: CPT

## 2024-05-09 PROCEDURE — 80053 COMPREHEN METABOLIC PANEL: CPT

## 2024-05-09 PROCEDURE — 85027 COMPLETE CBC AUTOMATED: CPT

## 2024-05-09 PROCEDURE — 82306 VITAMIN D 25 HYDROXY: CPT

## 2024-05-09 PROCEDURE — 36415 COLL VENOUS BLD VENIPUNCTURE: CPT

## 2024-05-09 PROCEDURE — 84156 ASSAY OF PROTEIN URINE: CPT

## 2024-05-10 ENCOUNTER — TELEPHONE (OUTPATIENT)
Dept: NEPHROLOGY | Facility: CLINIC | Age: 29
End: 2024-05-10

## 2024-05-10 ENCOUNTER — NURSE TRIAGE (OUTPATIENT)
Age: 29
End: 2024-05-10

## 2024-05-10 ENCOUNTER — HOSPITAL ENCOUNTER (OUTPATIENT)
Facility: HOSPITAL | Age: 29
Discharge: HOME | End: 2024-05-10
Attending: OBSTETRICS & GYNECOLOGY | Admitting: OBSTETRICS & GYNECOLOGY
Payer: COMMERCIAL

## 2024-05-10 VITALS
HEART RATE: 79 BPM | DIASTOLIC BLOOD PRESSURE: 58 MMHG | SYSTOLIC BLOOD PRESSURE: 107 MMHG | RESPIRATION RATE: 18 BRPM | TEMPERATURE: 98.4 F

## 2024-05-10 DIAGNOSIS — E55.9 VITAMIN D DEFICIENCY: Primary | ICD-10-CM

## 2024-05-10 DIAGNOSIS — E83.42 HYPOMAGNESEMIA: ICD-10-CM

## 2024-05-10 DIAGNOSIS — E87.6 HYPOKALEMIA: Primary | ICD-10-CM

## 2024-05-10 PROBLEM — Z3A.31 31 WEEKS GESTATION OF PREGNANCY: Status: ACTIVE | Noted: 2024-04-16

## 2024-05-10 PROBLEM — R51.9 HEADACHE: Status: ACTIVE | Noted: 2024-05-10

## 2024-05-10 LAB
ALBUMIN SERPL BCP-MCNC: 3.6 G/DL (ref 3.5–5)
ALP SERPL-CCNC: 82 U/L (ref 34–104)
ALT SERPL W P-5'-P-CCNC: 22 U/L (ref 7–52)
ANION GAP SERPL CALCULATED.3IONS-SCNC: 12 MMOL/L (ref 4–13)
AST SERPL W P-5'-P-CCNC: 20 U/L (ref 13–39)
BASOPHILS # BLD AUTO: 0.03 THOUSANDS/ÂΜL (ref 0–0.1)
BASOPHILS NFR BLD AUTO: 0 % (ref 0–1)
BILIRUB SERPL-MCNC: 0.29 MG/DL (ref 0.2–1)
BUN SERPL-MCNC: 7 MG/DL (ref 5–25)
CALCIUM SERPL-MCNC: 8.9 MG/DL (ref 8.4–10.2)
CHLORIDE SERPL-SCNC: 103 MMOL/L (ref 96–108)
CO2 SERPL-SCNC: 21 MMOL/L (ref 21–32)
CREAT SERPL-MCNC: 0.54 MG/DL (ref 0.6–1.3)
CREAT UR-MCNC: 42.5 MG/DL
EOSINOPHIL # BLD AUTO: 0.06 THOUSAND/ÂΜL (ref 0–0.61)
EOSINOPHIL NFR BLD AUTO: 1 % (ref 0–6)
ERYTHROCYTE [DISTWIDTH] IN BLOOD BY AUTOMATED COUNT: 13.2 % (ref 11.6–15.1)
FLUAV RNA RESP QL NAA+PROBE: NEGATIVE
FLUBV RNA RESP QL NAA+PROBE: NEGATIVE
GFR SERPL CREATININE-BSD FRML MDRD: 127 ML/MIN/1.73SQ M
GLUCOSE SERPL-MCNC: 80 MG/DL (ref 65–140)
GLUCOSE SERPL-MCNC: 95 MG/DL (ref 65–140)
HCT VFR BLD AUTO: 40.2 % (ref 34.8–46.1)
HGB BLD-MCNC: 13.7 G/DL (ref 11.5–15.4)
IMM GRANULOCYTES # BLD AUTO: 0.06 THOUSAND/UL (ref 0–0.2)
IMM GRANULOCYTES NFR BLD AUTO: 1 % (ref 0–2)
LYMPHOCYTES # BLD AUTO: 1.83 THOUSANDS/ÂΜL (ref 0.6–4.47)
LYMPHOCYTES NFR BLD AUTO: 15 % (ref 14–44)
MAGNESIUM SERPL-MCNC: 1.6 MG/DL (ref 1.9–2.7)
MCH RBC QN AUTO: 30 PG (ref 26.8–34.3)
MCHC RBC AUTO-ENTMCNC: 34.1 G/DL (ref 31.4–37.4)
MCV RBC AUTO: 88 FL (ref 82–98)
MONOCYTES # BLD AUTO: 0.76 THOUSAND/ÂΜL (ref 0.17–1.22)
MONOCYTES NFR BLD AUTO: 6 % (ref 4–12)
NEUTROPHILS # BLD AUTO: 9.38 THOUSANDS/ÂΜL (ref 1.85–7.62)
NEUTS SEG NFR BLD AUTO: 77 % (ref 43–75)
NRBC BLD AUTO-RTO: 0 /100 WBCS
PLATELET # BLD AUTO: 181 THOUSANDS/UL (ref 149–390)
PMV BLD AUTO: 11.6 FL (ref 8.9–12.7)
POTASSIUM SERPL-SCNC: 3.3 MMOL/L (ref 3.5–5.3)
PROT SERPL-MCNC: 7 G/DL (ref 6.4–8.4)
PROT UR-MCNC: 6 MG/DL
PROT/CREAT UR: 0.14 MG/G{CREAT} (ref 0–0.1)
RBC # BLD AUTO: 4.57 MILLION/UL (ref 3.81–5.12)
RSV RNA RESP QL NAA+PROBE: NEGATIVE
SARS-COV-2 RNA RESP QL NAA+PROBE: NEGATIVE
SODIUM SERPL-SCNC: 136 MMOL/L (ref 135–147)
WBC # BLD AUTO: 12.12 THOUSAND/UL (ref 4.31–10.16)

## 2024-05-10 PROCEDURE — 85025 COMPLETE CBC W/AUTO DIFF WBC: CPT

## 2024-05-10 PROCEDURE — 99213 OFFICE O/P EST LOW 20 MIN: CPT

## 2024-05-10 PROCEDURE — 83735 ASSAY OF MAGNESIUM: CPT

## 2024-05-10 PROCEDURE — 96368 THER/DIAG CONCURRENT INF: CPT

## 2024-05-10 PROCEDURE — 82570 ASSAY OF URINE CREATININE: CPT

## 2024-05-10 PROCEDURE — 96366 THER/PROPH/DIAG IV INF ADDON: CPT

## 2024-05-10 PROCEDURE — 84156 ASSAY OF PROTEIN URINE: CPT

## 2024-05-10 PROCEDURE — 96367 TX/PROPH/DG ADDL SEQ IV INF: CPT

## 2024-05-10 PROCEDURE — 80053 COMPREHEN METABOLIC PANEL: CPT

## 2024-05-10 PROCEDURE — 82948 REAGENT STRIP/BLOOD GLUCOSE: CPT

## 2024-05-10 PROCEDURE — 0241U HB NFCT DS VIR RESP RNA 4 TRGT: CPT

## 2024-05-10 PROCEDURE — 99214 OFFICE O/P EST MOD 30 MIN: CPT | Performed by: OBSTETRICS & GYNECOLOGY

## 2024-05-10 PROCEDURE — 96375 TX/PRO/DX INJ NEW DRUG ADDON: CPT

## 2024-05-10 PROCEDURE — 96365 THER/PROPH/DIAG IV INF INIT: CPT

## 2024-05-10 PROCEDURE — 96360 HYDRATION IV INFUSION INIT: CPT

## 2024-05-10 RX ORDER — MAGNESIUM SULFATE HEPTAHYDRATE 40 MG/ML
2 INJECTION, SOLUTION INTRAVENOUS ONCE
Status: COMPLETED | OUTPATIENT
Start: 2024-05-10 | End: 2024-05-10

## 2024-05-10 RX ORDER — ONDANSETRON 2 MG/ML
4 INJECTION INTRAMUSCULAR; INTRAVENOUS EVERY 6 HOURS PRN
Status: DISCONTINUED | OUTPATIENT
Start: 2024-05-10 | End: 2024-05-10 | Stop reason: HOSPADM

## 2024-05-10 RX ORDER — POTASSIUM CHLORIDE 14.9 MG/ML
20 INJECTION INTRAVENOUS
Qty: 200 ML | Refills: 0 | Status: COMPLETED | OUTPATIENT
Start: 2024-05-10 | End: 2024-05-10

## 2024-05-10 RX ORDER — MAGNESIUM SULFATE HEPTAHYDRATE 40 MG/ML
4 INJECTION, SOLUTION INTRAVENOUS ONCE
OUTPATIENT
Start: 2024-05-13

## 2024-05-10 RX ORDER — ACETAMINOPHEN 325 MG/1
975 TABLET ORAL EVERY 6 HOURS PRN
Status: DISCONTINUED | OUTPATIENT
Start: 2024-05-10 | End: 2024-05-10 | Stop reason: HOSPADM

## 2024-05-10 RX ORDER — ERGOCALCIFEROL 1.25 MG/1
50000 CAPSULE ORAL WEEKLY
Qty: 12 CAPSULE | Refills: 0 | Status: SHIPPED | OUTPATIENT
Start: 2024-05-10 | End: 2024-07-27

## 2024-05-10 RX ORDER — POTASSIUM CHLORIDE 29.8 MG/ML
40 INJECTION INTRAVENOUS ONCE
OUTPATIENT
Start: 2024-05-13 | End: 2024-05-13

## 2024-05-10 RX ADMIN — POTASSIUM CHLORIDE 20 MEQ: 14.9 INJECTION, SOLUTION INTRAVENOUS at 16:23

## 2024-05-10 RX ADMIN — ONDANSETRON 4 MG: 2 INJECTION INTRAMUSCULAR; INTRAVENOUS at 15:15

## 2024-05-10 RX ADMIN — MAGNESIUM SULFATE HEPTAHYDRATE 2 G: 2 INJECTION, SOLUTION INTRAVENOUS at 16:30

## 2024-05-10 RX ADMIN — POTASSIUM CHLORIDE 20 MEQ: 14.9 INJECTION, SOLUTION INTRAVENOUS at 18:05

## 2024-05-10 RX ADMIN — SODIUM CHLORIDE, SODIUM LACTATE, POTASSIUM CHLORIDE, AND CALCIUM CHLORIDE 1000 ML: .6; .31; .03; .02 INJECTION, SOLUTION INTRAVENOUS at 15:15

## 2024-05-10 RX ADMIN — ACETAMINOPHEN 975 MG: 325 TABLET, FILM COATED ORAL at 15:15

## 2024-05-10 NOTE — TELEPHONE ENCOUNTER
Patient aware and scheduled for infusion on 5/16 at 11:30a @ [facility].  Patient aware:      ----- Message from Abelardo Covarrubias MD sent at 5/10/2024  6:36 AM EDT -----  Although labs look good in general I did order infusion plan  Then repeat a basic metabolic profile magnesium in 2 weeks  Vitamin D ergocalciferol 50,000 units weekly for 12 weeks and vitamin D 2000 units daily  ----- Message -----  From: Lab, Background User  Sent: 5/9/2024   8:12 PM EDT  To: Abelardo Covarrubias MD

## 2024-05-10 NOTE — TELEPHONE ENCOUNTER
"Spoke with patient who is 31w3d,  who reports headache not relieved with tylenol since Wednesday.  She also reports significant drop in BP when going from sitting to standing with vision changes.  She reports good fetal movement, no contractions or leaking of fluids. Does report some nausea today.   She was advised to go to L&D at [facility].  She is currently at work at [employer] and will go [employer].    TT to on-call provider and L&D Charge RN @ [facility].    Reason for Disposition   Pregnant 20 or more weeks and SEVERE headache (e.g., excruciating) that is not improved after 2 hours of pain medicine    Answer Assessment - Initial Assessment Questions  1. LOCATION: \"Where does it hurt?\"       Headache / Cramping when moving.  2. ONSET: \"When did the headache start?\" (Minutes, hours or days)       Wednesday, no relieved with tylenol. Last took it this morning.  3. PATTERN: \"Does the pain come and go, or has it been constant since it started?\"      Constant  4. SEVERITY: \"How bad is the pain?\" and \"What does it keep you from doing?\"     - MILD - doesn't interfere with normal activities     - MODERATE - interferes with normal activities or awakens from sleep     - SEVERE - excruciating pain, unable to do any normal activities         4/10  5. RECURRENT SYMPTOM: \"Have you ever had headaches before?\" If Yes, ask: \"When was the last time?\" and \"What happened that time?\"       Not typical  6. CAUSE: \"What do you think is causing the headache?\"      unknown  8. HEAD INJURY: \"Has there been any recent injury to the head?\"       No  9. OTHER SYMPTOMS: \"Do you have any other symptoms?\" (e.g., fever, stiff neck, blurred vision; swelling of hands, face, or feet)      Dizzy if stands too long, noticed drop in her BP when going from sitting to standing, swelling in fingers but does have RA so not abnormal, standing causes vision changes.  10. PREGNANCY: \"How many weeks pregnant are you?\"        31w3d  11. ESTELLE: \"What date are you " "expecting to deliver?\"        7/9/24    Protocols used: Pregnancy - Headache-ADULT-[place]    "

## 2024-05-10 NOTE — PROGRESS NOTES
L&D Triage Note - OB/GYN  Julita Berry 29 y.o. female MRN: [id number]  Unit/Bed#: LD TRIAGE 2 Encounter: [id number]      ASSESSMENT:    Julita Berry is a 29 y.o.  at 31w3d w/ history of chronic hypertension and postural orthostatic tachycardia syndrome  presenting for headache and dizziness. Her rule out Pre-Eclampsia workup was negative with P:C of 0.14. Her headache resolved after Tylenol and fluid infusion. She was discharged home in stable condition with return precautions.     PLAN:    1) Rule out Chornic hypertension with superimposed preeclampsia with severe features  Home regimen: Metoprolol 50mg BID and 25mg PRN   Headache: Tylenol 975mg    Systolic (12hrs), Av , Min:104 , Max:147     Diastolic (12hrs), Av, Min:58, Max:77    CBC wnl  P:C0.14  CMP : hypokalemia and hypomagnesemia    2) Hx of POTS( Postural orthostatic tachycardia syndrome )  Reports episode of feeling dizzy after standing up   IV fluid hydration 1L NS    3)  HypoKalemia ad Hypomagnesemia:  Repleted in triage today    3) Continue routine prenatal care    4) Discharge from OB triage with  labor precautions    - Reviewed rupture of membranes, false vs true labor, decreased fetal movement, and vaginal bleeding   - Pt to call provider with any concerns and follow up at her next scheduled prenatal appointment on 24   - Case discussed with Dr. Sanket Tobar    SUBJECTIVE:    Julita Berry 29 y.o.  at 31w3d with an Estimated Date of Delivery: 24 presented to triage complaining of headache and nausea. She does have a history of chronic hypertension controlled with metoprolol and POTS. She states that she has had persistent headache since Wednesday that waxes and wanes. She also reports episodes of feeling dizziness when changing position quickly and when she checks her BP she notes decrease by atleast 20 points. She denies chest pain, shortness of breath, ruq pain. She denies abdominal  pain, vaginal bleeding and leakage of fluid.        She denies contractions, leakage of fluid and vaginal bleeding. She endorses good fetal movement.     Her current obstetrical history is significant for pregnancy at 31 weeks complicated by CHTN, POTS, Anxiety, Celiac artery stenosis    Her past obstetrical history NA        OBJECTIVE:    Vitals:    05/10/24 1727   BP: 107/58   Pulse: 79   Resp:    Temp:        ROS:  Constitutional: dizziness  Respiratory: Negative  Cardiovascular: Negative    Gastrointestinal: Negative  Neuro: headache    General Physical Exam:  General: in no apparent distress  Cardiovascular: normal rate  Lungs: non-labored breathing  Abdomen: abdomen is soft without significant tenderness, masses, organomegaly or guarding  Lower extremeties: nontender      Carla Martinez MD  OBGYN PGY-2  5/10/2024 11:40 PM

## 2024-05-13 ENCOUNTER — ROUTINE PRENATAL (OUTPATIENT)
Dept: OBGYN CLINIC | Facility: CLINIC | Age: 29
End: 2024-05-13
Payer: COMMERCIAL

## 2024-05-13 VITALS — BODY MASS INDEX: 32.44 KG/M2 | SYSTOLIC BLOOD PRESSURE: 130 MMHG | WEIGHT: 189 LBS | DIASTOLIC BLOOD PRESSURE: 82 MMHG

## 2024-05-13 DIAGNOSIS — Z34.03 ENCOUNTER FOR SUPERVISION OF NORMAL FIRST PREGNANCY IN THIRD TRIMESTER: Primary | ICD-10-CM

## 2024-05-13 DIAGNOSIS — Z3A.31 31 WEEKS GESTATION OF PREGNANCY: ICD-10-CM

## 2024-05-13 DIAGNOSIS — G90.A POTS (POSTURAL ORTHOSTATIC TACHYCARDIA SYNDROME): Chronic | ICD-10-CM

## 2024-05-13 DIAGNOSIS — Z23 NEED FOR TDAP VACCINATION: ICD-10-CM

## 2024-05-13 PROBLEM — O10.919 CHRONIC HYPERTENSION AFFECTING PREGNANCY: Status: ACTIVE | Noted: 2024-05-13

## 2024-05-13 LAB
SL AMB  POCT GLUCOSE, UA: NEGATIVE
SL AMB POCT URINE PROTEIN: NEGATIVE

## 2024-05-13 PROCEDURE — PNV: Performed by: STUDENT IN AN ORGANIZED HEALTH CARE EDUCATION/TRAINING PROGRAM

## 2024-05-13 PROCEDURE — 81002 URINALYSIS NONAUTO W/O SCOPE: CPT | Performed by: STUDENT IN AN ORGANIZED HEALTH CARE EDUCATION/TRAINING PROGRAM

## 2024-05-13 PROCEDURE — 90715 TDAP VACCINE 7 YRS/> IM: CPT | Performed by: STUDENT IN AN ORGANIZED HEALTH CARE EDUCATION/TRAINING PROGRAM

## 2024-05-13 PROCEDURE — 90471 IMMUNIZATION ADMIN: CPT | Performed by: STUDENT IN AN ORGANIZED HEALTH CARE EDUCATION/TRAINING PROGRAM

## 2024-05-13 NOTE — LETTER
"  May 13, 2024     Patient: Julita Berry  YOB: 1995  Date of Visit: 5/13/2024      To Whom it May Concern:    Julita Berry is under my professional care. Julita was seen in my office on 5/13/2024. We recommend that Julita is on \"light duty\" for the remainder of her pregnancy.     If you have any questions or concerns, please don't hesitate to call.         Sincerely,          Gladis Duff MD        CC: No Recipients  "

## 2024-05-13 NOTE — PROGRESS NOTES
29 y.o.  at 31w6d, here for routine OB visit. Feeling ok. Still struggling with POTS at work. Was seen in triage on Friday, recommended to be put on light duty.   Good FM. Denies LOF, VB, contractions. Having pelvic discomfort at time.     Problem List Items Addressed This Visit          Cardiovascular and Mediastinum    POTS (postural orthostatic tachycardia syndrome) (Chronic)     Follows with cardiology. Symptomatic in pregnancy, recommended light duty            Obstetrics/Gynecology    31 weeks gestation of pregnancy     -precautions reviewed  -s/p Tdap today  -prepregnancy BMI 28 with goal weight gain 15-25#: TWG = 20#          Other Visit Diagnoses       Encounter for supervision of normal first pregnancy in third trimester    -  Primary    Relevant Orders    POCT urine dip (Completed)    Need for Tdap vaccination        Relevant Orders    TDAP VACCINE GREATER THAN OR EQUAL TO 6YO IM (Completed)             The patient is a 31 y.o. female who returns today for follow up.    Manuelito Arredondo is s/p     Laparoscopic sleeve gastrectomy    We discussed how her weight affects her overall health including:  Patient Active Problem List   Diagnosis    Asthma    Iron deficiency anemia    Lichen simplex    Menorrhagia with regular cycle    Generalized anxiety disorder    Seasonal allergic rhinitis due to pollen    Hidradenitis    Menorrhagia    Prediabetes    Obstructive sleep apnea    Postprandial abdominal bloating    Chronic GERD    Elevated blood pressure reading    PVC (premature ventricular contraction)    Morbid obesity with BMI of 40.0-44.9, adult ([facility] [address]    S/P laparoscopic sleeve gastrectomy        Vitals:    06/07/22 1022   BP: 123/63   Pulse: 81   Resp: 18   SpO2: 98%   Weight: 273 lb (123.8 kg)   Height: 5' 8\" (1.727 m)       Lab Results   Component Value Date    WBC 13.9 05/24/2022    RBC 4.91 05/24/2022    RBC 4.87 02/14/2020    HGB 12.8 05/24/2022    HCT 39.6 05/24/2022    MCV 80.6 05/24/2022    MCH 26.1 05/24/2022    MCHC 32.4 05/24/2022    MPV 8.2 05/24/2022    NEUTOPHILPCT 51.7 12/15/2021    LYMPHOPCT 39.7 12/15/2021    LYMPHOPCT 31.9 02/14/2020    MONOPCT 6.7 12/15/2021    EOSRELPCT 1.7 12/15/2021    BASOPCT 0.2 12/15/2021    NEUTROABS 2.8 12/15/2021    LYMPHSABS 2.2 12/15/2021    MONOSABS 0.4 12/15/2021    EOSABS 0.1 12/15/2021     Lab Results   Component Value Date     05/24/2022    K 4.1 05/24/2022    K 3.8 11/13/2020     05/24/2022    CO2 18 05/24/2022    ANIONGAP 17 05/24/2022    GLUCOSE 122 05/24/2022    BUN 4 05/24/2022    CREATININE 0.5 05/24/2022    LABGLOM >60 05/24/2022    GFRAA >60 05/24/2022    CALCIUM 10.0 05/24/2022    PROT 7.5 05/03/2022    LABALBU 4.3 05/03/2022    AGRATIO 1.3 05/03/2022    BILITOT 0.9 05/03/2022    ALKPHOS 65 05/03/2022    ALT 18 05/03/2022    AST 15 05/03/2022    GLOB 3.4 07/29/2021     Lab Results   Component Value Date    CHOL 162 12/15/2021 TRIG 92 12/15/2021    HDL 45 12/15/2021    LDLCALC 99 12/15/2021    LABVLDL 18 12/15/2021     Lab Results   Component Value Date    TSHREFLEX 1.06 12/15/2021     Lab Results   Component Value Date    IRON 25 12/15/2021    TIBC 382 12/15/2021    LABIRON 7 12/15/2021     Lab Results   Component Value Date    QCLNDTOR58 122 12/15/2021    FOLATE 15.09 12/15/2021     Lab Results   Component Value Date    VITD25 6.4 12/15/2021     Lab Results   Component Value Date    LABA1C 6.3 12/15/2021    .1 12/15/2021        The patient's current Body mass index is 41.51 kg/m². (6/7/22). Since her last visit she has lost 21 lbs since last visit and total of 24 lbs. Justice Arreaga underwent dietary counseling, and I have reviewed and agree with the dietary counseling, and I have reviewed and agree with the diet plan. There are no changes in the patients medical history or physical exam.     Denies nausea, vomiting, fevers, chills, hiccups, shoulder pain, heartburn, dysphagia wound drainage/bulge nor change in color around incision. Bowels working ok and making urine. The incisions healing well. Overall I'm really pleased with Justice Arreaga recovery. Pathology results were discussed with the patient. Justice Arreaga advised to sign  release form for utilizing the 3 months complimentary membership in the [address] [place] [address] starting after 6 weeks post op. I did explain thoroughly to the patient that compliance with  post op diet and other recommendations are integral part to improve the chances of successful weight loss and also not following it could end with serious health complications. I advised Justice Arreaga to gradually advance activity and  to call if there are any questions or concerns. Justice Arreaga will follow up in 4 weeks. Please note that some or all of this report was generated using voice recognition software.  Please notify me in case of any questions about the content of this document, as some errors in transcription may have occurred .

## 2024-05-13 NOTE — PROGRESS NOTES
Pt is here for routine ob visit   No concerns at this time  Urine  No LOF,VB,Contractions  +FM   Tdap given today/pt tolerated well   Delivery consent signed at previous visit

## 2024-05-13 NOTE — ASSESSMENT & PLAN NOTE
"Subjective:       Patient ID: Christine Abadie Daigle is a 56 y.o. female.    Chief Complaint: Follow-up  This is a 56-year-old who presents today for follow-up.  Patient reports that she has been doing better since starting the Paxil she feels 10 mg doses okay for now she has better improvement in her anxiety and depression has been less tearful .  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  She has been able to stay in touch with her grandchildren as her daughter moved back into town .  She did have issue with her leg and went to pain management although her symptoms resolved quickly she is not had recurrence.  She does have some difficulty sleeping uses melatonin.  She did reach out to behavioral health but had high deductible plan and did could not afford the co-pay days at this time she may seek other options that her less expensive and she has family she can talk to as well    Follow-up      Review of Systems   Psychiatric/Behavioral:  Positive for sleep disturbance.         Improved anxiety mood better  Less tearrful        Objective:    Blood pressure 130/76, pulse 60, height 5' 1.5" (1.562 m), weight 81.6 kg (179 lb 14.3 oz), last menstrual period 08/06/2012, SpO2 98 %.   Physical Exam  Constitutional:       General: She is not in acute distress.  Cardiovascular:      Rate and Rhythm: Normal rate and regular rhythm.      Heart sounds: Normal heart sounds. No murmur heard.     No friction rub. No gallop.   Pulmonary:      Effort: Pulmonary effort is normal. No respiratory distress.      Breath sounds: Normal breath sounds.   Skin:     Findings: No erythema.   Neurological:      Mental Status: She is alert.         Assessment:       1. Adjustment reaction with anxiety and depression        Plan:       Agnes was seen today for follow-up.    Diagnoses and all orders for this " -precautions reviewed  -s/p Tdap today  -prepregnancy BMI 28 with goal weight gain 15-25#: TWG = 20#   visit:    Adjustment reaction with anxiety and depression  Discussed she is tolerating Paxil without difficulty for now would like to maintain the 10 mg dose she may consider other counseling avenues if she feel would be beneficial we discussed journaling and exercising to help with her mood    She will follow-up sooner if needs adjustment or concerns otherwise she will keep scheulde her annual physical when due

## 2024-05-14 ENCOUNTER — ULTRASOUND (OUTPATIENT)
Dept: PERINATAL CARE | Facility: OTHER | Age: 29
End: 2024-05-14
Payer: COMMERCIAL

## 2024-05-14 VITALS
HEART RATE: 94 BPM | HEIGHT: 64 IN | SYSTOLIC BLOOD PRESSURE: 130 MMHG | BODY MASS INDEX: 32.54 KG/M2 | DIASTOLIC BLOOD PRESSURE: 64 MMHG | WEIGHT: 190.6 LBS

## 2024-05-14 DIAGNOSIS — O10.919 CHRONIC HYPERTENSION AFFECTING PREGNANCY: Primary | ICD-10-CM

## 2024-05-14 DIAGNOSIS — Z36.89 ENCOUNTER FOR ULTRASOUND TO ASSESS FETAL GROWTH: ICD-10-CM

## 2024-05-14 DIAGNOSIS — Z3A.32 32 WEEKS GESTATION OF PREGNANCY: ICD-10-CM

## 2024-05-14 PROCEDURE — 59025 FETAL NON-STRESS TEST: CPT | Performed by: NURSE PRACTITIONER

## 2024-05-14 PROCEDURE — 76816 OB US FOLLOW-UP PER FETUS: CPT | Performed by: OBSTETRICS & GYNECOLOGY

## 2024-05-14 PROCEDURE — 99213 OFFICE O/P EST LOW 20 MIN: CPT | Performed by: NURSE PRACTITIONER

## 2024-05-14 NOTE — PROGRESS NOTES
"[facility]: Ms. Berry was seen today at 32w0d gestational age for NST (found under the pregnancy episode) which I reviewed the RN assessment and agree, and fetal growth ultrasound (see ultrasound report under OB procedures tab).  See ultrasound report under \"OB Procedures\" tab.   Denies headache, visual changes or abdominal pain. NST is reactive. Growth in 4 weeks.   Neva NORWOOD    Medical decision making in low (diagnosis low, data limited and risk kow).   "

## 2024-05-14 NOTE — LETTER
"  .Date: 2024    Meaghan Reardon MD  [address]  [address]  [facility] [address]    Patient: Julita Berry   YOB: 1995   Date of Visit: 2024   Gestational age 32w0d   Nature of this communication: Routine       This patient was seen recently in our  office.  Please see ultrasound report under \"OB Procedures\" tab.  Please don't hesitate to contact our office with any concerns or questions.      Sincerely,      Ktay Brock MD/VAIBHAV NORWOOD  Attending Physician, Maternal-Fetal Medicine  [facility]         "

## 2024-05-14 NOTE — PROGRESS NOTES
Non-Stress Testing:    Non-Stress test, equipment, procedure, and expected outcomes explained. Reviewed fetal kick counts and when to call OB.Verified patient understanding of fetal kick counts with teach back method. Patient reports feeling daily fetal movements. Patient has no questions or concerns.   NST reviewed by ENMA Shay

## 2024-05-15 ENCOUNTER — TELEPHONE (OUTPATIENT)
Dept: OBGYN CLINIC | Facility: CLINIC | Age: 29
End: 2024-05-15

## 2024-05-21 ENCOUNTER — ULTRASOUND (OUTPATIENT)
Facility: HOSPITAL | Age: 29
End: 2024-05-21
Payer: COMMERCIAL

## 2024-05-21 VITALS
WEIGHT: 192 LBS | HEART RATE: 88 BPM | HEIGHT: 64 IN | DIASTOLIC BLOOD PRESSURE: 80 MMHG | BODY MASS INDEX: 32.78 KG/M2 | SYSTOLIC BLOOD PRESSURE: 134 MMHG

## 2024-05-21 DIAGNOSIS — O10.912 CHRONIC HYPERTENSION IN OBSTETRIC CONTEXT IN SECOND TRIMESTER: ICD-10-CM

## 2024-05-21 DIAGNOSIS — Z3A.33 33 WEEKS GESTATION OF PREGNANCY: Primary | ICD-10-CM

## 2024-05-21 PROCEDURE — 76815 OB US LIMITED FETUS(S): CPT | Performed by: OBSTETRICS & GYNECOLOGY

## 2024-05-21 PROCEDURE — 59025 FETAL NON-STRESS TEST: CPT | Performed by: OBSTETRICS & GYNECOLOGY

## 2024-05-21 NOTE — PROGRESS NOTES
Repeat Non-Stress Testing:    Patient verbalizes +FM. Pt denies ALL:               Leaking of fluid   Contractions   Vaginal bleeding   Decreased fetal movement    Patient is performing daily kick counts. Patient has no questions or concerns.   NST strip reviewed by Dr. Benavides.

## 2024-05-22 NOTE — PROGRESS NOTES
HEART AND VASCULAR  CARDIAC ELECTROPHYSIOLOGY   HEART RHYTHM CENTER  [facility]    Outpatient New Consult   Follow-up for SVT in pregnancy  Today's Date: 05/23/24         Patient name: Julita Berry  YOB: 1995  Sex: female         Chief Complaint: As above      ASSESSMENT:  Problem List Items Addressed This Visit       SVT (supraventricular tachycardia)     Other Visit Diagnoses       PVC's (premature ventricular contractions)    -  Primary    Relevant Orders    POCT ECG (Completed)                  PLAN:  SVT  -Continue metoprolol succinate 25 mg p.o. twice daily  -Continue with Toprol tartrate 25 mg as needed  -Postdelivery, if has episodes of SVT, would utilize diltiazem or verapamil as long as patient's blood pressures support the use of these medications     G1, P0 approximately 33 weeks gestation  -Follows with M  -Recommend cardiac monitoring during labor and delivery  -Will see her in May 2024 to make sure that she does not need further medication adjustment prior to labor/delivery    PVCs  -Patient has not had PVCs evidenced on EKG     Hypertension  -Blood pressure 140/80  -Continue management as per MFM     History of PVCs  -Occasional PVCs on EKG  -Continue metoprolol as described above         Follow up in: 3 months    Orders Placed This Encounter   Procedures    POCT ECG     There are no discontinued medications.      .............................................................................................    HPI/Subjective:     Ms. Julita Berry is a 28 year old female G1, P0 at approximately 19 weeks gestation with a history of hypokalemia, essential hypertension, history of SVT controlled metoprolol.  Per EMR, her last episode of SVT was around January 24, 2024 for which she did not have to take an extra dose of metoprolol.  She is followed by M.  She was seen in outpatient electrophysiology clinic for palpitations.  She noted that she was doing well  "at that time.  She has a planned induction for delivery in June and presents today prior to that induction.     She continues to do well.  She had a very brief episode of fluttering lasting seconds in April that resolved on its own.  She continues to follow with maternal-fetal medicine who are comfortable with the plan of cardiac monitoring before during and after delivery.  She had to report to labor and delivery for orthostatic blood pressures, but it was felt that the baby was likely pressing on the vagal nerve.  She is just taking precautions at this point.  She currently denies chest pain, palpitations, shortness of breath, dizziness, lightheadedness, syncope, near syncope.      Complete 12 point ROS reviewed and otherwise non pertinent or negative except as per HPI pertinent positives in Cardiovascular and Respiratory emphasized. Please see paper chart for outpatient clinic patients where the patient completed the 12 point ROS survey.           Past Medical History:   Diagnosis Date    Anxiety     Eczema     Exercises 5 to 6 times per week     per pt prior to knee issue -enjoyed running and horseback riding    Family history of reaction to anesthesia     per pt--\"Mom also gets high anxiety with surgeries and wakes up nasty from anesthesia\"    Gastroparesis 2012    Hypertension     last assessed 3/12/14    Hypokalemia     per pt per doctors possibly related renal problem    Hypomagnesemia     Irritable bowel syndrome     with diarrhea    Rheumatoid arthritis (HCC) 2022    Sinus tachycardia     related to dehydration    Tooth missing     front upper tooth retainer-    Wears glasses     for computer use       Allergies   Allergen Reactions    Prednisone Hyperactivity     Medrol dose samantha only    Serotonin Reuptake Inhibitors (Ssris) Anaphylaxis and Hives    Augmentin [Amoxicillin-Pot Clavulanate] Hives and Rash    Clindamycin Rash    Penicillins Rash    Sulfa Antibiotics Rash    Azithromycin Rash     I reviewed " the Home Medication list and Allergies in the chart.   Scheduled Meds:  Current Outpatient Medications   Medication Sig Dispense Refill    AMILoride 5 mg tablet Take 5 mg by mouth 2 (two) times a day 2 in the morning.   3 at night      aspirin (ECOTRIN LOW STRENGTH) 81 mg EC tablet Take 162 mg by mouth daily      doxylamine (UNISOM) 25 MG tablet Take 0.5 tablets (12.5 mg total) by mouth daily as needed for nausea 30 tablet 0    ergocalciferol (VITAMIN D2) 50,000 units Take 1 capsule (50,000 Units total) by mouth once a week for 12 doses 12 capsule 0    Ferrous Sulfate (Iron) 325 (65 Fe) MG TABS Take 325 mg by mouth 1 (one) time      Magnesium Glycinate 100 MG CAPS Take 200 mg by mouth 2 (two) times a day      metoprolol succinate (TOPROL-XL) 50 mg 24 hr tablet Take 1 tablet (50 mg total) by mouth 2 (two) times a day 90 tablet 3    metoprolol tartrate (LOPRESSOR) 25 mg tablet Take 1 tablet (25 mg total) by mouth every 6 (six) hours as needed (palpitatins) 90 tablet 3    POTASSIUM CHLORIDE PO Take 40 mEq by mouth in the morning 40-60 daily      Prenatal Multivit-Min-Fe-FA (PRE- PO) Take by mouth      ondansetron (ZOFRAN) 4 mg tablet Take 1 tablet (4 mg total) by mouth every 8 (eight) hours as needed for nausea or vomiting (Patient not taking: Reported on 2024) 20 tablet 0     No current facility-administered medications for this visit.     PRN Meds:.        Family History   Problem Relation Age of Onset    Hypokalemia Mother     Hypotension Mother     Anxiety disorder Mother         NOS    Lung cancer Father     Skin cancer Father     Crohn's disease Father     Schizoaffective Disorder  Father     Alcohol abuse Father     Drug abuse Father     No Known Problems Sister     No Known Problems Sister     No Known Problems Brother     Coronary artery disease Maternal Grandmother     Heart disease Maternal Grandmother     Alzheimer's disease Maternal Grandmother     Arthritis Paternal Grandmother     Rheum  "arthritis Paternal Grandmother     Hypertension Paternal Grandmother     COPD Paternal Grandfather        Social History     Socioeconomic History    Marital status: /Civil Union     Spouse name: Not on file    Number of children: Not on file    Years of education: Not on file    Highest education level: Not on file   Occupational History    Occupation: medical assistant with nephrology    Tobacco Use    Smoking status: Never    Smokeless tobacco: Never   Vaping Use    Vaping status: Never Used   Substance and Sexual Activity    Alcohol use: Not Currently     Alcohol/week: 1.0 standard drink of alcohol     Types: 1 Standard drinks or equivalent per week    Drug use: No    Sexual activity: Yes     Partners: Male     Birth control/protection: None   Other Topics Concern    Not on file   Social History Narrative    Activities - horseback riding    Caffeine use    Drinks coffee- 1 a wk    Exercise - walking    Exercising regularly     Social Determinants of Health     Financial Resource Strain: Not on file   Food Insecurity: Not on file   Transportation Needs: Not on file   Physical Activity: Not on file   Stress: Not on file   Social Connections: Not on file   Intimate Partner Violence: Not on file   Housing Stability: Not on file         OBJECTIVE:    /80 (BP Location: Left arm, Patient Position: Sitting, Cuff Size: Standard)   Pulse 92   Ht 5' 4\" (1.626 m)   Wt 87.3 kg (192 lb 6.4 oz)   LMP 10/03/2023   BMI 33.03 kg/m²   Vitals:    05/23/24 0927   Weight: 87.3 kg (192 lb 6.4 oz)     GEN: No acute distress, Alert and oriented, well appearing  HEENT: Normocephalic, atraumatic.  CARDIOVASCULAR:  RRR, No murmur, rub, gallops S1,S2  LUNGS: Clear To auscultation bilaterally, normal effort, no rales, rhonchi, crackles   ABDOMEN: 33 weeks gestation  EXTREMITIES/VASCULAR:  No edema. warm an well perfused.  PSYCH: Normal Affect,  linear speech pattern without evidence of psychosis.   NEURO: Grossly intact, " "moving all extremiteis equal, face symmetric, alert and responsive, no obvious focal defecits   GAIT:  Ambulates normally without difficulty  HEME: No bleeding, bruising, petechia, purpura   SKIN: No significant rashes on visibile skin, warm, no diaphoresis or pallor.     Lab Results:       LABS:      Chemistry        Component Value Date/Time     (L) 01/07/2016 1654    K 3.3 (L) 05/10/2024 1507    K 3.3 (L) 01/07/2016 1654     05/10/2024 1507    CL 93 (L) 01/07/2016 1654    CO2 21 05/10/2024 1507    CO2 23.5 01/07/2016 1654    BUN 7 05/10/2024 1507    BUN 15 01/07/2016 1654    CREATININE 0.54 (L) 05/10/2024 1507    CREATININE 0.93 01/07/2016 1654        Component Value Date/Time    CALCIUM 8.9 05/10/2024 1507    CALCIUM 8.1 (L) 01/07/2016 1654    ALKPHOS 82 05/10/2024 1507    ALKPHOS 56 12/28/2015 1739    AST 20 05/10/2024 1507    AST 16 12/28/2015 1739    ALT 22 05/10/2024 1507    ALT 17 12/28/2015 1739    BILITOT 0.33 12/28/2015 1739            Lab Results   Component Value Date    CHOL 195 08/27/2015    CHOL 176 06/05/2015    CHOL 188 04/20/2015     Lab Results   Component Value Date    HDL 52 06/05/2023    HDL 60 10/12/2022    HDL 64 03/28/2022     Lab Results   Component Value Date    LDLCALC 89 06/05/2023    LDLCALC 95 10/12/2022    LDLCALC 96 03/28/2022     Lab Results   Component Value Date    TRIG 122 06/05/2023    TRIG 100 10/12/2022    TRIG 165 (H) 03/28/2022     No results found for: \"CHOLHDL\"    IMAGING: No results found.     Cardiac testing:           I reviewed and interpreted the following LABS/EKG/TELE/IMAGING and below is summary of my interpretation (if data available):    LABS:    Current EKG performed at today's visit and read by me personally reveals normal sinus rhythm with nonspecific ST/T wave abnormalities    ECHO 7/21/2023  Left Ventricle Left ventricular cavity size is normal. Wall thickness is normal. The left ventricular ejection fraction is 55%. Systolic function is " normal.  Wall motion is normal. Diastolic function is normal.   Right Ventricle Right ventricular cavity size is normal. Systolic function is normal. Wall thickness is normal.   Left Atrium The atrium is normal in size.   Right Atrium The atrium is normal in size.   Aortic Valve The aortic valve is trileaflet. The leaflets are not thickened. The leaflets are not calcified. The leaflets exhibit normal mobility. There is no evidence of regurgitation. The aortic valve has no significant stenosis.   Mitral Valve Mitral valve structure is normal. There is trace regurgitation. There is no evidence of stenosis.   Tricuspid Valve Tricuspid valve structure is normal. There is trace to mild regurgitation. There is no evidence of stenosis. The right ventricular systolic pressure is normal. The estimated right ventricular systolic pressure is 22.00 mmHg.   Pulmonic Valve Pulmonic valve structure is normal. There is trace regurgitation. There is no evidence of stenosis.   Ascending Aorta The aortic root is normal in size.   IVC/SVC The right atrial pressure is estimated at 5.0 mmHg. The inferior vena cava is normal in size.   Pericardium There is no pericardial effusion. The pericardium is normal in appearance.

## 2024-05-23 ENCOUNTER — OFFICE VISIT (OUTPATIENT)
Dept: CARDIOLOGY CLINIC | Facility: CLINIC | Age: 29
End: 2024-05-23
Payer: COMMERCIAL

## 2024-05-23 ENCOUNTER — APPOINTMENT (OUTPATIENT)
Dept: LAB | Facility: CLINIC | Age: 29
End: 2024-05-23
Payer: COMMERCIAL

## 2024-05-23 VITALS
DIASTOLIC BLOOD PRESSURE: 80 MMHG | BODY MASS INDEX: 32.85 KG/M2 | HEIGHT: 64 IN | WEIGHT: 192.4 LBS | HEART RATE: 92 BPM | SYSTOLIC BLOOD PRESSURE: 110 MMHG

## 2024-05-23 DIAGNOSIS — I47.10 SVT (SUPRAVENTRICULAR TACHYCARDIA): ICD-10-CM

## 2024-05-23 DIAGNOSIS — I49.3 PVC'S (PREMATURE VENTRICULAR CONTRACTIONS): Primary | ICD-10-CM

## 2024-05-23 PROCEDURE — 93000 ELECTROCARDIOGRAM COMPLETE: CPT | Performed by: STUDENT IN AN ORGANIZED HEALTH CARE EDUCATION/TRAINING PROGRAM

## 2024-05-23 PROCEDURE — 99213 OFFICE O/P EST LOW 20 MIN: CPT | Performed by: STUDENT IN AN ORGANIZED HEALTH CARE EDUCATION/TRAINING PROGRAM

## 2024-05-28 ENCOUNTER — ULTRASOUND (OUTPATIENT)
Facility: HOSPITAL | Age: 29
End: 2024-05-28
Payer: COMMERCIAL

## 2024-05-28 ENCOUNTER — TELEPHONE (OUTPATIENT)
Dept: NEPHROLOGY | Facility: CLINIC | Age: 29
End: 2024-05-28

## 2024-05-28 ENCOUNTER — TELEPHONE (OUTPATIENT)
Dept: OBGYN CLINIC | Facility: CLINIC | Age: 29
End: 2024-05-28

## 2024-05-28 ENCOUNTER — TREATMENT (OUTPATIENT)
Dept: NEPHROLOGY | Facility: CLINIC | Age: 29
End: 2024-05-28

## 2024-05-28 VITALS
BODY MASS INDEX: 32.74 KG/M2 | SYSTOLIC BLOOD PRESSURE: 128 MMHG | HEART RATE: 101 BPM | HEIGHT: 64 IN | DIASTOLIC BLOOD PRESSURE: 78 MMHG | WEIGHT: 191.8 LBS

## 2024-05-28 DIAGNOSIS — E87.6 HYPOKALEMIA: Primary | ICD-10-CM

## 2024-05-28 DIAGNOSIS — Z3A.34 34 WEEKS GESTATION OF PREGNANCY: ICD-10-CM

## 2024-05-28 DIAGNOSIS — E83.42 HYPOMAGNESEMIA: ICD-10-CM

## 2024-05-28 DIAGNOSIS — O10.919 CHRONIC HYPERTENSION AFFECTING PREGNANCY: Primary | ICD-10-CM

## 2024-05-28 PROCEDURE — 59025 FETAL NON-STRESS TEST: CPT | Performed by: STUDENT IN AN ORGANIZED HEALTH CARE EDUCATION/TRAINING PROGRAM

## 2024-05-28 PROCEDURE — 76815 OB US LIMITED FETUS(S): CPT | Performed by: STUDENT IN AN ORGANIZED HEALTH CARE EDUCATION/TRAINING PROGRAM

## 2024-05-28 RX ORDER — MAGNESIUM SULFATE HEPTAHYDRATE 40 MG/ML
4 INJECTION, SOLUTION INTRAVENOUS ONCE
OUTPATIENT
Start: 2024-05-28

## 2024-05-28 RX ORDER — POTASSIUM CHLORIDE 29.8 MG/ML
40 INJECTION INTRAVENOUS ONCE
OUTPATIENT
Start: 2024-05-28 | End: 2024-05-28

## 2024-05-28 NOTE — TELEPHONE ENCOUNTER
----- Message from Abelardo Covarrubias MD sent at 5/28/2024  6:48 AM EDT -----  I released the orders so she can get her infusion of potassium and magnesium at this time  ----- Message -----  From: Lab, Background User  Sent: 5/23/2024   4:50 PM EDT  To: Abelardo Covarrubias MD

## 2024-05-28 NOTE — TELEPHONE ENCOUNTER
Called patient and left a voicemail going over the following information:    Dr. Covarrubias released the orders so she can get her infusion of potassium and magnesium at this time.    I asked the patient please call the office with further questions.

## 2024-05-28 NOTE — PROGRESS NOTES
Repeat Non-Stress Testing:    Patient verbalizes +FM. Pt denies ALL:               Leaking of fluid   Contractions   Vaginal bleeding   Decreased fetal movement    Patient is performing daily kick counts. Patient has no questions or concerns.   NST strip reviewed by Dr. Vail.

## 2024-05-28 NOTE — TELEPHONE ENCOUNTER
We had to move patient's appt for tomorrow to Sherin's schedule for 10:30 am     Left vm for patient to conf the changes work for her

## 2024-05-28 NOTE — PROGRESS NOTES
This patient received  care under my supervision on 24 at 34w0d gestational age at [facility].  NST is reactive.  -Suzanne Vail MD

## 2024-05-29 ENCOUNTER — TELEPHONE (OUTPATIENT)
Dept: OBGYN CLINIC | Facility: CLINIC | Age: 29
End: 2024-05-29

## 2024-05-29 ENCOUNTER — ROUTINE PRENATAL (OUTPATIENT)
Dept: OBGYN CLINIC | Facility: CLINIC | Age: 29
End: 2024-05-29
Payer: COMMERCIAL

## 2024-05-29 VITALS — SYSTOLIC BLOOD PRESSURE: 110 MMHG | BODY MASS INDEX: 32.99 KG/M2 | DIASTOLIC BLOOD PRESSURE: 74 MMHG | WEIGHT: 192.2 LBS

## 2024-05-29 DIAGNOSIS — Z34.03 ENCOUNTER FOR SUPERVISION OF NORMAL FIRST PREGNANCY IN THIRD TRIMESTER: Primary | ICD-10-CM

## 2024-05-29 DIAGNOSIS — Z3A.34 34 WEEKS GESTATION OF PREGNANCY: ICD-10-CM

## 2024-05-29 PROBLEM — O10.913 CHRONIC HYPERTENSION IN OBSTETRIC CONTEXT IN THIRD TRIMESTER: Status: ACTIVE | Noted: 2024-03-19

## 2024-05-29 PROBLEM — O10.913 CHRONIC HYPERTENSION IN OBSTETRIC CONTEXT, THIRD TRIMESTER: Status: ACTIVE | Noted: 2024-05-13

## 2024-05-29 LAB
SL AMB  POCT GLUCOSE, UA: NEGATIVE
SL AMB POCT URINE PROTEIN: POSITIVE

## 2024-05-29 PROCEDURE — PNV: Performed by: STUDENT IN AN ORGANIZED HEALTH CARE EDUCATION/TRAINING PROGRAM

## 2024-05-29 PROCEDURE — 81002 URINALYSIS NONAUTO W/O SCOPE: CPT | Performed by: STUDENT IN AN ORGANIZED HEALTH CARE EDUCATION/TRAINING PROGRAM

## 2024-05-29 NOTE — TELEPHONE ENCOUNTER
----- Message from Gladis Duff MD sent at 5/29/2024 12:40 PM EDT -----  Regarding: Scheduling  Procedure to be scheduled: IOL  ESTELLE: Estimated Date of Delivery: Estimated Date of Delivery: 7/9/24  Indication for delivery: cHTN on meds  Requested date (s) of delivery: 6/18/24         If requested date is unavailable, is there a date by which the pt must be delivered?  Physician preference: no     If IOL, anticipated method: ripening

## 2024-05-29 NOTE — TELEPHONE ENCOUNTER
Griselle Bermudez Danielle Warger, RN  SCHEDULED: 6/18/24@8p CHTN, Griselle.    Patient notified of IOL date,time,and location. Advised patient she may eat a light breakfast/dinner prior to going to L&D. In the interim please report any vaginal bleeding,leakage of fluid,decreased fetal movement or contractions.Reviewed fetal kick counts. Advised to keep all upcoming prenatal visits.

## 2024-05-29 NOTE — ASSESSMENT & PLAN NOTE
-precautions reviewed  -s/p Tdap/flu  -IOL timing reviewed and scheduling initiated today  -prepregnancy BMI 28 with goal weight gain 15-25#: TWG = 23#

## 2024-05-29 NOTE — PROGRESS NOTES
Pt is here for routine ob visit   No concerns at this time  Urine +1 protein/neg glucose   No LOF,VB,Contractions  +FM   UTD tdap   Delivery consent signed at previous visit

## 2024-05-29 NOTE — PROGRESS NOTES
29 y.o.  at 34w1d, here for routine OB visit. Feeling well overall and without concerns. Good FM. Denies LOF, VB, contractions.     Problem   Chronic Hypertension in Obstetric Context, Third Trimester   34 Weeks Gestation of Pregnancy   Chronic Hypertension in Obstetric Context in Third Trimester       Problem List Items Addressed This Visit          Obstetrics/Gynecology    34 weeks gestation of pregnancy     -precautions reviewed  -s/p Tdap/flu  -IOL timing reviewed and scheduling initiated today  -prepregnancy BMI 28 with goal weight gain 15-25#: TWG = 23#          Other Visit Diagnoses       Encounter for supervision of normal first pregnancy in third trimester    -  Primary    Relevant Orders    POCT urine dip (Completed)

## 2024-06-04 ENCOUNTER — TELEPHONE (OUTPATIENT)
Dept: NEPHROLOGY | Facility: CLINIC | Age: 29
End: 2024-06-04

## 2024-06-04 ENCOUNTER — ULTRASOUND (OUTPATIENT)
Facility: HOSPITAL | Age: 29
End: 2024-06-04
Payer: COMMERCIAL

## 2024-06-04 VITALS
BODY MASS INDEX: 33.32 KG/M2 | DIASTOLIC BLOOD PRESSURE: 78 MMHG | WEIGHT: 195.2 LBS | SYSTOLIC BLOOD PRESSURE: 122 MMHG | HEIGHT: 64 IN | HEART RATE: 85 BPM

## 2024-06-04 DIAGNOSIS — Z36.89 ENCOUNTER FOR ULTRASOUND TO CHECK FETAL GROWTH: ICD-10-CM

## 2024-06-04 DIAGNOSIS — Z3A.35 35 WEEKS GESTATION OF PREGNANCY: ICD-10-CM

## 2024-06-04 DIAGNOSIS — O10.913 CHRONIC HYPERTENSION IN OBSTETRIC CONTEXT IN THIRD TRIMESTER: Primary | ICD-10-CM

## 2024-06-04 PROBLEM — M45.9 ANKYLOSING SPONDYLITIS (HCC): Status: RESOLVED | Noted: 2017-07-14 | Resolved: 2024-06-04

## 2024-06-04 PROCEDURE — 76816 OB US FOLLOW-UP PER FETUS: CPT | Performed by: OBSTETRICS & GYNECOLOGY

## 2024-06-04 PROCEDURE — 59025 FETAL NON-STRESS TEST: CPT | Performed by: OBSTETRICS & GYNECOLOGY

## 2024-06-04 PROCEDURE — 99212 OFFICE O/P EST SF 10 MIN: CPT | Performed by: OBSTETRICS & GYNECOLOGY

## 2024-06-04 NOTE — TELEPHONE ENCOUNTER
LVM to return call to see if pt would like to come in for a sooner appt with Dr. Covarrubias on 6/5/24. Pt scheduled to come in on 6/18/24.

## 2024-06-04 NOTE — PATIENT INSTRUCTIONS
Thank you for choosing us for your  care today.  If you have any questions about your ultrasound or care, please do not hesitate to contact us or your primary obstetrician.        Some general instructions for your pregnancy are:    Exercise: Aim for 22 minutes per day (150 minutes per week) of regular exercise.  Walking is great!  Nutrition: Choose healthy sources of calcium, iron, and protein.  Learn about Preeclampsia: preeclampsia is a common, potentially serious high blood pressure complication in pregnancy.  A blood pressure of 140mmHg (systolic or top number) or 90mmHg (diastolic or bottom number) should be evaluated by your doctor.  Aspirin is sometimes prescribed in early pregnancy to prevent preeclampsia in women with risk factors - ask your obstetrician if you should be on this medication.  For more resources, visit:  https://www.highriskpregnancyinfo.org/preeclampsia  If you smoke, try to reduce how many cigarettes you smoke or try to quit completely.  Do not vape.    Other warning signs to watch out for in pregnancy or postpartum: chest pain, obstructed breathing or shortness of breath, seizures, thoughts of hurting yourself or your baby, bleeding, a painful or swollen leg, fever, or headache (see [facility] POST-BIRTH Warning Signs campaign).  If these happen call 911.  Itching is also not normal in pregnancy and if you experience this, especially over your hands and feet, potentially worse at night, notify your doctors.

## 2024-06-04 NOTE — PROGRESS NOTES
[facility]: Ms. Berry was seen today for NST (found under the pregnancy episode) which I reviewed the RN assessment and agree, and fetal growth ultrasound (see ultrasound report under OB procedures tab).   The time spent on this established patient on the encounter date included 5 minutes previsit service time reviewing records and precharting, 5 minutes face-to-face service time counseling regarding results and coordinating care, and  4 minutes charting, totalling 14 minutes.  Please don't hesitate to contact our office with any concerns or questions.  -Katy Brock MD

## 2024-06-04 NOTE — PROGRESS NOTES
Repeat Non-Stress Testing:    Patient verbalizes +FM. Pt denies ALL:               Leaking of fluid   Contractions   Vaginal bleeding   Decreased fetal movement    Patient is performing daily kick counts. Patient has no questions or concerns.   NST strip reviewed by Dr. Brock.

## 2024-06-10 ENCOUNTER — TELEPHONE (OUTPATIENT)
Dept: NEPHROLOGY | Facility: CLINIC | Age: 29
End: 2024-06-10

## 2024-06-10 DIAGNOSIS — O10.913 CHRONIC HYPERTENSION IN OBSTETRIC CONTEXT IN THIRD TRIMESTER: ICD-10-CM

## 2024-06-10 DIAGNOSIS — I10 ESSENTIAL HYPERTENSION, BENIGN: Primary | Chronic | ICD-10-CM

## 2024-06-10 DIAGNOSIS — E87.6 HYPOKALEMIA: ICD-10-CM

## 2024-06-10 DIAGNOSIS — O10.913 CHRONIC HYPERTENSION IN OBSTETRIC CONTEXT, THIRD TRIMESTER: ICD-10-CM

## 2024-06-10 NOTE — TELEPHONE ENCOUNTER
----- Message from Abelardo Covarrubias MD sent at 6/10/2024  7:51 AM EDT -----  She will definitely need a basic metabolic profile/urine protein creatinine ratio/magnesium level prior to the appointment  A week of blood pressures

## 2024-06-11 ENCOUNTER — ULTRASOUND (OUTPATIENT)
Dept: PERINATAL CARE | Facility: OTHER | Age: 29
End: 2024-06-11
Payer: COMMERCIAL

## 2024-06-11 ENCOUNTER — APPOINTMENT (OUTPATIENT)
Dept: LAB | Facility: CLINIC | Age: 29
End: 2024-06-11
Payer: COMMERCIAL

## 2024-06-11 VITALS
HEIGHT: 64 IN | HEART RATE: 109 BPM | WEIGHT: 196.4 LBS | BODY MASS INDEX: 33.53 KG/M2 | SYSTOLIC BLOOD PRESSURE: 130 MMHG | DIASTOLIC BLOOD PRESSURE: 68 MMHG

## 2024-06-11 DIAGNOSIS — Z3A.36 36 WEEKS GESTATION OF PREGNANCY: ICD-10-CM

## 2024-06-11 DIAGNOSIS — O10.913 CHRONIC HYPERTENSION IN OBSTETRIC CONTEXT IN THIRD TRIMESTER: Primary | ICD-10-CM

## 2024-06-11 DIAGNOSIS — R30.0 DYSURIA: ICD-10-CM

## 2024-06-11 DIAGNOSIS — I10 ESSENTIAL HYPERTENSION, BENIGN: Chronic | ICD-10-CM

## 2024-06-11 DIAGNOSIS — R30.0 DYSURIA: Primary | ICD-10-CM

## 2024-06-11 DIAGNOSIS — O10.913 CHRONIC HYPERTENSION IN OBSTETRIC CONTEXT IN THIRD TRIMESTER: ICD-10-CM

## 2024-06-11 DIAGNOSIS — E87.6 HYPOKALEMIA: ICD-10-CM

## 2024-06-11 DIAGNOSIS — O10.913 CHRONIC HYPERTENSION IN OBSTETRIC CONTEXT, THIRD TRIMESTER: ICD-10-CM

## 2024-06-11 LAB
CREAT UR-MCNC: 213.7 MG/DL
PROT UR-MCNC: 20 MG/DL
PROT/CREAT UR: 0.09 MG/G{CREAT} (ref 0–0.1)

## 2024-06-11 PROCEDURE — 76815 OB US LIMITED FETUS(S): CPT | Performed by: OBSTETRICS & GYNECOLOGY

## 2024-06-11 PROCEDURE — 59025 FETAL NON-STRESS TEST: CPT | Performed by: OBSTETRICS & GYNECOLOGY

## 2024-06-11 PROCEDURE — 82570 ASSAY OF URINE CREATININE: CPT

## 2024-06-11 PROCEDURE — 84156 ASSAY OF PROTEIN URINE: CPT

## 2024-06-11 PROCEDURE — 87086 URINE CULTURE/COLONY COUNT: CPT

## 2024-06-11 NOTE — PROGRESS NOTES
Repeat Non-Stress Testing:    Patient verbalizes +FM. Pt denies ALL:               Leaking of fluid   Contractions   Vaginal bleeding   Decreased fetal movement    Patient is performing daily kick counts. Patient has no questions or concerns.   NST strip reviewed by Dr. Gerard.

## 2024-06-12 ENCOUNTER — ROUTINE PRENATAL (OUTPATIENT)
Dept: OBGYN CLINIC | Facility: CLINIC | Age: 29
End: 2024-06-12
Payer: COMMERCIAL

## 2024-06-12 VITALS
WEIGHT: 196.4 LBS | BODY MASS INDEX: 33.71 KG/M2 | DIASTOLIC BLOOD PRESSURE: 76 MMHG | HEART RATE: 70 BPM | OXYGEN SATURATION: 97 % | SYSTOLIC BLOOD PRESSURE: 124 MMHG

## 2024-06-12 DIAGNOSIS — Z34.03 ENCOUNTER FOR SUPERVISION OF NORMAL FIRST PREGNANCY IN THIRD TRIMESTER: Primary | ICD-10-CM

## 2024-06-12 DIAGNOSIS — Z3A.36 36 WEEKS GESTATION OF PREGNANCY: ICD-10-CM

## 2024-06-12 DIAGNOSIS — O10.913 CHRONIC HYPERTENSION IN OBSTETRIC CONTEXT IN THIRD TRIMESTER: ICD-10-CM

## 2024-06-12 LAB
SL AMB  POCT GLUCOSE, UA: NORMAL
SL AMB POCT URINE PROTEIN: NORMAL

## 2024-06-12 PROCEDURE — 81002 URINALYSIS NONAUTO W/O SCOPE: CPT | Performed by: STUDENT IN AN ORGANIZED HEALTH CARE EDUCATION/TRAINING PROGRAM

## 2024-06-12 PROCEDURE — PNV: Performed by: STUDENT IN AN ORGANIZED HEALTH CARE EDUCATION/TRAINING PROGRAM

## 2024-06-12 PROCEDURE — 87150 DNA/RNA AMPLIFIED PROBE: CPT | Performed by: STUDENT IN AN ORGANIZED HEALTH CARE EDUCATION/TRAINING PROGRAM

## 2024-06-12 NOTE — ASSESSMENT & PLAN NOTE
-precautions reviewed  -s/p Tdap/flu  -GBS collected today  -prepregnancy BMI 28 with goal weight gain 15-25#: TWG = 27#

## 2024-06-12 NOTE — PROGRESS NOTES
29 y.o.  at 36w1d, here for routine OB visit. Feeling well overall and without concerns. Good FM. Denies LOF, VB, contractions. Has some dysuria, with Ucx pending, no hematuria.      Problem List Items Addressed This Visit          Cardiovascular and Mediastinum    Chronic hypertension in obstetric context in third trimester     IOL scheduled, following with M            Obstetrics/Gynecology    36 weeks gestation of pregnancy     -precautions reviewed  -s/p Tdap/flu  -GBS collected today  -prepregnancy BMI 28 with goal weight gain 15-25#: TWG = 27#          Other Visit Diagnoses       Encounter for supervision of normal first pregnancy in third trimester    -  Primary    Relevant Orders    Strep B DNA probe, amplification    POCT urine dip

## 2024-06-12 NOTE — PROGRESS NOTES
Prenatal visit 36w1d  Denies cramping, bleeding, leakage of fluid. Normal fetal movement.  GBS completed today - pt allergic to PCN  Delivery consent and induction consent signed  Breast pump ordered  Birth plan returned and scanned  IOL scheduled 6/19/24.

## 2024-06-13 LAB — BACTERIA UR CULT: NORMAL

## 2024-06-14 LAB — GP B STREP DNA SPEC QL NAA+PROBE: NEGATIVE

## 2024-06-18 ENCOUNTER — HOSPITAL ENCOUNTER (INPATIENT)
Facility: HOSPITAL | Age: 29
LOS: 3 days | Discharge: HOME | End: 2024-06-21
Attending: OBSTETRICS & GYNECOLOGY | Admitting: OBSTETRICS & GYNECOLOGY
Payer: COMMERCIAL

## 2024-06-18 ENCOUNTER — HOSPITAL ENCOUNTER (OUTPATIENT)
Dept: LABOR AND DELIVERY | Facility: HOSPITAL | Age: 29
Discharge: HOME | End: 2024-06-18
Payer: COMMERCIAL

## 2024-06-18 PROBLEM — Z3A.37 37 WEEKS GESTATION OF PREGNANCY: Status: ACTIVE | Noted: 2024-04-16

## 2024-06-18 PROBLEM — Z34.90 ENCOUNTER FOR INDUCTION OF LABOR: Status: ACTIVE | Noted: 2024-06-18

## 2024-06-18 LAB
ALBUMIN SERPL BCG-MCNC: 3.6 G/DL (ref 3.5–5)
ALP SERPL-CCNC: 116 U/L (ref 34–104)
ALT SERPL W P-5'-P-CCNC: 12 U/L (ref 7–52)
ANION GAP SERPL CALCULATED.3IONS-SCNC: 10 MMOL/L (ref 4–13)
AST SERPL W P-5'-P-CCNC: 16 U/L (ref 13–39)
BILIRUB SERPL-MCNC: 0.28 MG/DL (ref 0.2–1)
BUN SERPL-MCNC: 8 MG/DL (ref 5–25)
CALCIUM SERPL-MCNC: 9 MG/DL (ref 8.4–10.2)
CHLORIDE SERPL-SCNC: 105 MMOL/L (ref 96–108)
CO2 SERPL-SCNC: 21 MMOL/L (ref 21–32)
CREAT SERPL-MCNC: 0.68 MG/DL (ref 0.6–1.3)
ERYTHROCYTE [DISTWIDTH] IN BLOOD BY AUTOMATED COUNT: 13.7 % (ref 11.6–15.1)
GFR SERPL CREATININE-BSD FRML MDRD: 117 ML/MIN/1.73SQ M
GLUCOSE SERPL-MCNC: 93 MG/DL (ref 65–140)
HCT VFR BLD AUTO: 42.5 % (ref 34.8–46.1)
HGB BLD-MCNC: 14.7 G/DL (ref 11.5–15.4)
MCH RBC QN AUTO: 30.8 PG (ref 26.8–34.3)
MCHC RBC AUTO-ENTMCNC: 34.6 G/DL (ref 31.4–37.4)
MCV RBC AUTO: 89 FL (ref 82–98)
PLATELET # BLD AUTO: 174 THOUSANDS/UL (ref 149–390)
PMV BLD AUTO: 12.9 FL (ref 8.9–12.7)
POTASSIUM SERPL-SCNC: 3.2 MMOL/L (ref 3.5–5.3)
PROT SERPL-MCNC: 6.6 G/DL (ref 6.4–8.4)
RBC # BLD AUTO: 4.78 MILLION/UL (ref 3.81–5.12)
SODIUM SERPL-SCNC: 136 MMOL/L (ref 135–147)
WBC # BLD AUTO: 11.44 THOUSAND/UL (ref 4.31–10.16)

## 2024-06-18 PROCEDURE — TCMXX: Performed by: NURSE PRACTITIONER

## 2024-06-18 PROCEDURE — NC001 PR NO CHARGE: Performed by: OBSTETRICS & GYNECOLOGY

## 2024-06-18 PROCEDURE — 86900 BLOOD TYPING SEROLOGIC ABO: CPT

## 2024-06-18 PROCEDURE — 82570 ASSAY OF URINE CREATININE: CPT

## 2024-06-18 PROCEDURE — 85027 COMPLETE CBC AUTOMATED: CPT

## 2024-06-18 PROCEDURE — 84156 ASSAY OF PROTEIN URINE: CPT

## 2024-06-18 PROCEDURE — 86850 RBC ANTIBODY SCREEN: CPT

## 2024-06-18 PROCEDURE — 86901 BLOOD TYPING SEROLOGIC RH(D): CPT

## 2024-06-18 PROCEDURE — 3E033VJ INTRODUCTION OF OTHER HORMONE INTO PERIPHERAL VEIN, PERCUTANEOUS APPROACH: ICD-10-PCS | Performed by: STUDENT IN AN ORGANIZED HEALTH CARE EDUCATION/TRAINING PROGRAM

## 2024-06-18 PROCEDURE — 80053 COMPREHEN METABOLIC PANEL: CPT

## 2024-06-18 PROCEDURE — 4A1HXCZ MONITORING OF PRODUCTS OF CONCEPTION, CARDIAC RATE, EXTERNAL APPROACH: ICD-10-PCS | Performed by: STUDENT IN AN ORGANIZED HEALTH CARE EDUCATION/TRAINING PROGRAM

## 2024-06-18 PROCEDURE — 86780 TREPONEMA PALLIDUM: CPT

## 2024-06-18 RX ORDER — POTASSIUM CHLORIDE 20 MEQ/1
40 TABLET, EXTENDED RELEASE ORAL DAILY
Status: DISCONTINUED | OUTPATIENT
Start: 2024-06-19 | End: 2024-06-21 | Stop reason: HOSPADM

## 2024-06-18 RX ORDER — ONDANSETRON 2 MG/ML
4 INJECTION INTRAMUSCULAR; INTRAVENOUS EVERY 6 HOURS PRN
Status: DISCONTINUED | OUTPATIENT
Start: 2024-06-18 | End: 2024-06-20

## 2024-06-18 RX ORDER — LANOLIN ALCOHOL/MO/W.PET/CERES
200 CREAM (GRAM) TOPICAL 2 TIMES DAILY
Status: DISCONTINUED | OUTPATIENT
Start: 2024-06-18 | End: 2024-06-21 | Stop reason: HOSPADM

## 2024-06-18 RX ORDER — CALCIUM CARBONATE 500 MG/1
1000 TABLET, CHEWABLE ORAL 3 TIMES DAILY PRN
Status: DISCONTINUED | OUTPATIENT
Start: 2024-06-18 | End: 2024-06-20

## 2024-06-18 RX ORDER — AMILORIDE HYDROCHLORIDE 5 MG/1
5 TABLET ORAL 2 TIMES DAILY
Status: DISCONTINUED | OUTPATIENT
Start: 2024-06-18 | End: 2024-06-21 | Stop reason: HOSPADM

## 2024-06-18 RX ORDER — METOPROLOL SUCCINATE 50 MG/1
50 TABLET, EXTENDED RELEASE ORAL 2 TIMES DAILY
Status: DISCONTINUED | OUTPATIENT
Start: 2024-06-18 | End: 2024-06-21 | Stop reason: HOSPADM

## 2024-06-18 RX ORDER — SODIUM CHLORIDE, SODIUM LACTATE, POTASSIUM CHLORIDE, CALCIUM CHLORIDE 600; 310; 30; 20 MG/100ML; MG/100ML; MG/100ML; MG/100ML
125 INJECTION, SOLUTION INTRAVENOUS CONTINUOUS
Status: DISCONTINUED | OUTPATIENT
Start: 2024-06-18 | End: 2024-06-20

## 2024-06-18 RX ORDER — BUPIVACAINE HYDROCHLORIDE 2.5 MG/ML
30 INJECTION, SOLUTION EPIDURAL; INFILTRATION; INTRACAUDAL ONCE AS NEEDED
Status: DISCONTINUED | OUTPATIENT
Start: 2024-06-18 | End: 2024-06-20

## 2024-06-18 RX ORDER — ACETAMINOPHEN 325 MG/1
975 TABLET ORAL EVERY 8 HOURS PRN
Status: DISCONTINUED | OUTPATIENT
Start: 2024-06-18 | End: 2024-06-20

## 2024-06-18 RX ADMIN — METOPROLOL SUCCINATE 50 MG: 50 TABLET, EXTENDED RELEASE ORAL at 23:23

## 2024-06-18 RX ADMIN — AMILORIDE HYDROCLORIDE 5 MG: 5 TABLET ORAL at 23:22

## 2024-06-18 RX ADMIN — Medication 200 MG: at 23:24

## 2024-06-18 RX ADMIN — SODIUM CHLORIDE, SODIUM LACTATE, POTASSIUM CHLORIDE, AND CALCIUM CHLORIDE 125 ML/HR: .6; .31; .03; .02 INJECTION, SOLUTION INTRAVENOUS at 23:23

## 2024-06-19 ENCOUNTER — ANESTHESIA EVENT (INPATIENT)
Dept: ANESTHESIOLOGY | Facility: HOSPITAL | Age: 29
End: 2024-06-19
Payer: COMMERCIAL

## 2024-06-19 ENCOUNTER — ANESTHESIA (INPATIENT)
Dept: ANESTHESIOLOGY | Facility: HOSPITAL | Age: 29
End: 2024-06-19
Payer: COMMERCIAL

## 2024-06-19 LAB
ABO GROUP BLD: NORMAL
BASE EXCESS BLDCOA CALC-SCNC: -9.8 MMOL/L (ref 3–11)
BASE EXCESS BLDCOV CALC-SCNC: -8.6 MMOL/L (ref 1–9)
BLD GP AB SCN SERPL QL: NEGATIVE
CREAT UR-MCNC: 209.9 MG/DL
HCO3 BLDCOA-SCNC: 20.4 MMOL/L (ref 17.3–27.3)
HCO3 BLDCOV-SCNC: 19.4 MMOL/L (ref 12.2–28.6)
HOLD SPECIMEN: NORMAL
O2 CT VFR BLDCOA CALC: 3.6 ML/DL
OXYHGB MFR BLDCOA: 15.5 %
OXYHGB MFR BLDCOV: 31.1 %
PCO2 BLDCOA: 62.3 MM[HG] (ref 30–60)
PCO2 BLDCOV: 49.5 MM HG (ref 27–43)
PH BLDCOA: 7.13 [PH] (ref 7.23–7.43)
PH BLDCOV: 7.21 [PH] (ref 7.19–7.49)
PO2 BLDCOA: 11.7 MM HG (ref 5–25)
PO2 BLDCOV: 16.6 MM HG (ref 15–45)
PROT UR-MCNC: 27 MG/DL
PROT/CREAT UR: 0.13 MG/G{CREAT} (ref 0–0.1)
RH BLD: POSITIVE
SAO2 % BLDCOV: 7.1 ML/DL
SPECIMEN EXPIRATION DATE: NORMAL
TREPONEMA PALLIDUM IGG+IGM AB [PRESENCE] IN SERUM OR PLASMA BY IMMUNOASSAY: NORMAL

## 2024-06-19 PROCEDURE — 0UQMXZZ REPAIR VULVA, EXTERNAL APPROACH: ICD-10-PCS | Performed by: STUDENT IN AN ORGANIZED HEALTH CARE EDUCATION/TRAINING PROGRAM

## 2024-06-19 PROCEDURE — 10907ZC DRAINAGE OF AMNIOTIC FLUID, THERAPEUTIC FROM PRODUCTS OF CONCEPTION, VIA NATURAL OR ARTIFICIAL OPENING: ICD-10-PCS | Performed by: STUDENT IN AN ORGANIZED HEALTH CARE EDUCATION/TRAINING PROGRAM

## 2024-06-19 PROCEDURE — 59400 OBSTETRICAL CARE: CPT | Performed by: STUDENT IN AN ORGANIZED HEALTH CARE EDUCATION/TRAINING PROGRAM

## 2024-06-19 PROCEDURE — 82805 BLOOD GASES W/O2 SATURATION: CPT | Performed by: OBSTETRICS & GYNECOLOGY

## 2024-06-19 PROCEDURE — NC001 PR NO CHARGE: Performed by: OBSTETRICS & GYNECOLOGY

## 2024-06-19 RX ORDER — TERBUTALINE SULFATE 1 MG/ML
INJECTION, SOLUTION SUBCUTANEOUS
Status: DISPENSED
Start: 2024-06-19 | End: 2024-06-19

## 2024-06-19 RX ORDER — METOCLOPRAMIDE HYDROCHLORIDE 5 MG/ML
10 INJECTION INTRAMUSCULAR; INTRAVENOUS ONCE
Status: COMPLETED | OUTPATIENT
Start: 2024-06-19 | End: 2024-06-19

## 2024-06-19 RX ORDER — ONDANSETRON 2 MG/ML
4 INJECTION INTRAMUSCULAR; INTRAVENOUS ONCE
Status: COMPLETED | OUTPATIENT
Start: 2024-06-19 | End: 2024-06-19

## 2024-06-19 RX ORDER — DIPHENHYDRAMINE HYDROCHLORIDE 50 MG/ML
25 INJECTION INTRAMUSCULAR; INTRAVENOUS EVERY 6 HOURS PRN
Status: DISCONTINUED | OUTPATIENT
Start: 2024-06-19 | End: 2024-06-20

## 2024-06-19 RX ORDER — LIDOCAINE HYDROCHLORIDE AND EPINEPHRINE 15; 5 MG/ML; UG/ML
INJECTION, SOLUTION EPIDURAL AS NEEDED
Status: DISCONTINUED | OUTPATIENT
Start: 2024-06-19 | End: 2024-06-21 | Stop reason: HOSPADM

## 2024-06-19 RX ORDER — OXYTOCIN/RINGER'S LACTATE 30/500 ML
1-30 PLASTIC BAG, INJECTION (ML) INTRAVENOUS
Status: DISCONTINUED | OUTPATIENT
Start: 2024-06-19 | End: 2024-06-20

## 2024-06-19 RX ORDER — PROMETHAZINE HYDROCHLORIDE 25 MG/ML
25 INJECTION, SOLUTION INTRAMUSCULAR; INTRAVENOUS ONCE
Status: COMPLETED | OUTPATIENT
Start: 2024-06-19 | End: 2024-06-19

## 2024-06-19 RX ADMIN — MORPHINE SULFATE 2 MG: 2 INJECTION, SOLUTION INTRAMUSCULAR; INTRAVENOUS at 02:49

## 2024-06-19 RX ADMIN — ONDANSETRON 4 MG: 2 INJECTION INTRAMUSCULAR; INTRAVENOUS at 19:22

## 2024-06-19 RX ADMIN — METOCLOPRAMIDE 10 MG: 5 INJECTION, SOLUTION INTRAMUSCULAR; INTRAVENOUS at 23:20

## 2024-06-19 RX ADMIN — SODIUM CHLORIDE, SODIUM LACTATE, POTASSIUM CHLORIDE, AND CALCIUM CHLORIDE 125 ML/HR: .6; .31; .03; .02 INJECTION, SOLUTION INTRAVENOUS at 10:37

## 2024-06-19 RX ADMIN — Medication 200 MG: at 08:38

## 2024-06-19 RX ADMIN — ROPIVACAINE HYDROCHLORIDE: 2 INJECTION, SOLUTION EPIDURAL; INFILTRATION at 11:15

## 2024-06-19 RX ADMIN — METOPROLOL SUCCINATE 50 MG: 50 TABLET, EXTENDED RELEASE ORAL at 19:08

## 2024-06-19 RX ADMIN — POTASSIUM CHLORIDE 40 MEQ: 1500 TABLET, EXTENDED RELEASE ORAL at 08:40

## 2024-06-19 RX ADMIN — MORPHINE SULFATE 2 MG: 2 INJECTION, SOLUTION INTRAMUSCULAR; INTRAVENOUS at 04:17

## 2024-06-19 RX ADMIN — METOPROLOL SUCCINATE 50 MG: 50 TABLET, EXTENDED RELEASE ORAL at 08:38

## 2024-06-19 RX ADMIN — ONDANSETRON 4 MG: 2 INJECTION INTRAMUSCULAR; INTRAVENOUS at 14:30

## 2024-06-19 RX ADMIN — Medication 25 MCG: at 00:05

## 2024-06-19 RX ADMIN — SODIUM CHLORIDE, SODIUM LACTATE, POTASSIUM CHLORIDE, AND CALCIUM CHLORIDE 125 ML/HR: .6; .31; .03; .02 INJECTION, SOLUTION INTRAVENOUS at 16:38

## 2024-06-19 RX ADMIN — OXYTOCIN 2 MILLI-UNITS/MIN: 10 INJECTION INTRAVENOUS at 06:16

## 2024-06-19 RX ADMIN — ROPIVACAINE HYDROCHLORIDE: 2 INJECTION, SOLUTION EPIDURAL; INFILTRATION at 16:38

## 2024-06-19 RX ADMIN — SODIUM CHLORIDE, SODIUM LACTATE, POTASSIUM CHLORIDE, AND CALCIUM CHLORIDE 525 ML/HR: .6; .31; .03; .02 INJECTION, SOLUTION INTRAVENOUS at 14:50

## 2024-06-19 RX ADMIN — Medication 200 MG: at 18:50

## 2024-06-19 RX ADMIN — ROPIVACAINE HYDROCHLORIDE: 2 INJECTION, SOLUTION EPIDURAL; INFILTRATION at 22:42

## 2024-06-19 RX ADMIN — LIDOCAINE HYDROCHLORIDE AND EPINEPHRINE 5 ML: 15; 5 INJECTION, SOLUTION EPIDURAL at 11:11

## 2024-06-19 RX ADMIN — AMILORIDE HYDROCLORIDE 5 MG: 5 TABLET ORAL at 08:39

## 2024-06-19 RX ADMIN — SODIUM CHLORIDE, SODIUM LACTATE, POTASSIUM CHLORIDE, AND CALCIUM CHLORIDE 125 ML/HR: .6; .31; .03; .02 INJECTION, SOLUTION INTRAVENOUS at 08:37

## 2024-06-19 RX ADMIN — PROMETHAZINE HYDROCHLORIDE 25 MG: 25 INJECTION INTRAMUSCULAR; INTRAVENOUS at 04:17

## 2024-06-19 NOTE — OB LABOR/OXYTOCIN SAFETY PROGRESS
Labor Progress Note - Julita Berry 29 y.o. female MRN: [id number]    Unit/Bed#: -01 Encounter: [id number]                Cervical Dilation: Fingertip        Cervical Effacement: 50  Fetal Station: -3        FHR Category: I               Vital Signs:   Vitals:    06/18/24 2051   BP: 138/90   Pulse: (!) 114   Resp: 18   Temp: 98.4 °F (36.9 °C)   SpO2: 98%       Notes/comments:   Patient is feeling well at this time, just mildly nervous.  SVE as above.  She is antwon every 3 to 4 minutes on the monitor but not feeling any of these contractions.  PROCEDURE:  GERARDO BALLOON PLACEMENT    A 24F gerardo with a 30cc balloon was selected. SVE was performed and cervix was located. Gerardo was introduced over sterile gloved hands. Balloon advanced through cervix beyond the internal cervical os. A small amount amount of sterile saline solution was instilled in the balloon to confirm placement. Placement was confirmed to be beyond the internal cervical os. A total of 60cc of sterile saline solution was placed into the balloon. Pt tolerated well. Instructions left with RN to place gerardo to gravity with a 1L bag of IV fluid. Notify MD when gerardo dislodged. Will place vaginal cytotec. Discussed with Dr. Chris Youssef MD 6/18/2024 11:21 PM

## 2024-06-19 NOTE — PLAN OF CARE
Problem: BIRTH - VAGINAL/ SECTION  Goal: Fetal and maternal status remain reassuring during the birth process  Description: INTERVENTIONS:  - Monitor vital signs  - Monitor fetal heart rate  - Monitor uterine activity  - Monitor labor progression (vaginal delivery)  - DVT prophylaxis  - Antibiotic prophylaxis  Outcome: Progressing  Goal: Emotionally satisfying birthing experience for mother/fetus  Description: Interventions:  - Assess, plan, implement and evaluate the nursing care given to the patient in labor  - Advocate the philosophy that each childbirth experience is a unique experience and support the family's chosen level of involvement and control during the labor process   - Actively participate in both the patient's and family's teaching of the birth process  - Consider cultural, Worship and age-specific factors and plan care for the patient in labor  Outcome: Progressing     Problem: POSTPARTUM  Goal: Experiences normal postpartum course  Description: INTERVENTIONS:  - Monitor maternal vital signs  - Assess uterine involution and lochia  Outcome: Progressing  Goal: Appropriate maternal -  bonding  Description: INTERVENTIONS:  - Identify family support  - Assess for appropriate maternal/infant bonding   -Encourage maternal/infant bonding opportunities  - Referral to  or  as needed  Outcome: Progressing  Goal: Establishment of infant feeding pattern  Description: INTERVENTIONS:  - Assess breast/bottle feeding  - Refer to lactation as needed  Outcome: Progressing  Goal: Incision(s), wounds(s) or drain site(s) healing without S/S of infection  Description: INTERVENTIONS  - Assess and document dressing, incision, wound bed, drain sites and surrounding tissue  - Provide patient and family education  Outcome: Progressing

## 2024-06-19 NOTE — ANESTHESIA PREPROCEDURE EVALUATION
Procedure:  LABOR ANALGESIA        Relevant Problems   CARDIO   (+) Celiac artery stenosis (HCC)   (+) Chronic hypertension in obstetric context in third trimester   (+) Essential hypertension, benign   (+) Inappropriate sinus tachycardia   (+) SVT (supraventricular tachycardia)      GI/HEPATIC   (+) Acid reflux      GYN   (+) 37 weeks gestation of pregnancy      NEURO/PSYCH   (+) Anxiety   (+) RIYA (generalized anxiety disorder)   (+) Headache      Cardiovascular/Peripheral Vascular   (+) POTS (postural orthostatic tachycardia syndrome)        Physical Exam    Airway    Mallampati score: III  TM Distance: >3 FB  Neck ROM: full     Dental       Cardiovascular      Pulmonary      Other Findings  post-pubertal.      Anesthesia Plan  ASA Score- 3     Anesthesia Type- epidural with ASA Monitors.         Additional Monitors:     Airway Plan:     Comment: Recent labs personally reviewed:  Lab Results       Component                Value               Date                       WBC                      11.44 (H)           2024                 HGB                      14.7                2024                 PLT                      174                 2024            Lab Results       Component                Value               Date                       NA                       126 (L)             2016                 K                        3.2 (L)             2024                 BUN                      8                   2024                 CREATININE               0.68                2024                 GLUCOSE                  96                  2016            Lab Results       Component                Value               Date                       PTT                      31                  2021             Lab Results       Component                Value               Date                       INR                      1.01                2021               Blood type O      I, Priyanka Martinez MD, have personally seen and evaluated the patient prior to anesthetic care.  I have reviewed the pre-anesthetic record, and other medical records if appropriate to the anesthetic care.  If a CRNA is involved in the case, I have reviewed the CRNA assessment, if present, and agree.I discussed the risks associated with neuroaxial anesthesia including PDPH, total spinal anesthesia, hypotension associated with neuraxial block that could result in changes in fetal heart tracing, and need for replacement of epidural if insufficient analgesia. Discussed anesthetic plan in event of , including possibility of general anesthesia. All questions and concerns were addressed.     .       Plan Factors-Exercise tolerance (METS): >4 METS.    Chart reviewed.   Existing labs reviewed. Patient summary reviewed.    Patient is not a current smoker.  Patient did not smoke on day of surgery.    Obstructive sleep apnea risk education given perioperatively.        Induction- intravenous.    Postoperative Plan-     Perioperative Resuscitation Plan - Level 1 - Full Code.       Informed Consent- Anesthetic plan and risks discussed with patient.  I personally reviewed this patient with the CRNA. Discussed and agreed on the Anesthesia Plan with the CRNA..

## 2024-06-19 NOTE — OB LABOR/OXYTOCIN SAFETY PROGRESS
Oxytocin Safety Progress Check Note - Julita Berry 29 y.o. female MRN: [id number]    Unit/Bed#: -01 Encounter: [id number]    Dose (sarahy-units/min) Oxytocin: 8 sarahy-units/min  Contraction Frequency (minutes): 2-4  Contraction Intensity: Moderate  Uterine Activity Characteristics: Regular  Cervical Dilation: 4        Cervical Effacement: 70  Fetal Station: -3  Baseline Rate (FHR): 120 bpm  Fetal Heart Rate (FHT): 120 BPM  FHR Category: 1  Oxytocin Safety Progress Check: Safety check completed            Vital Signs:   Vitals:    06/19/24 1150   BP: 126/81   Pulse: 80   Resp:    Temp:    SpO2:        Notes/comments:   -AROM clear fluid   -continue pitocin   -recheck prdanielle Whelan DO 6/19/2024 12:14 PM

## 2024-06-19 NOTE — H&P
H & P- Obstetrics   Julita Berry 29 y.o. female MRN: [id number]  Unit/Bed#: -01 Encounter: [id number]    Assessment: 29 y.o.  at 37w0d admitted for IOL in the setting of cHTN.  SVE: 0.5/50/-3  FHT: cat I  Clinical EFW: 73% ; Cephalic confirmed by TAUS  GBS status: neg   Postpartum contraception plan: undecided     Plan:   37 weeks gestation of pregnancy  Assessment & Plan  Admit to OBGYN   Clear liquid diet   F/u T&S, CBC, RPR   IVF LR 125cc/hr   Continuous fetal monitoring and tocometry   Analgesia at maternal request   Vertex by TAUS  Induction plan FB pitocin      Chronic hypertension in obstetric context in third trimester  Assessment & Plan  Continue home meds  F/u CBC, CMP, P:C    SVT (supraventricular tachycardia)  Assessment & Plan  Home metoprolol ordered    POTS (postural orthostatic tachycardia syndrome)  Assessment & Plan  Home Kcl ordred   Home metoprolol ordered          Discussed case and plan w/ Dr. Sanket Tobar       Chief Complaint: I'm here for my induction     HPI: Julita Berry is a 29 y.o.  with an ESTELLE of 2024, by Last Menstrual Period at 37w0d who is being admitted for IOL. She denies having uterine contractions, has no LOF, and reports no VB. She states she has felt good FM.    Patient Active Problem List   Diagnosis    Essential hypertension, benign    POTS (postural orthostatic tachycardia syndrome)    Hypomagnesemia    Vitamin D deficiency    Anxiety    Acid reflux    Gastroparesis    Irritable bowel syndrome with diarrhea    Histone antibody positive    Eczema    Undifferentiated connective tissue disease (HCC)    RIYA (generalized anxiety disorder)    Heart palpitations    Hypophosphatemia    Celiac artery stenosis (HCC)    Mass of soft tissue of lower leg    Patellofemoral disorder of right knee    Patella ayesha    Chronic pain of both ankles    Pes anserine bursitis    Patellar instability of right knee    Patellar dislocation, right, initial  "encounter    SVT (supraventricular tachycardia)    Nausea and vomiting in pregnancy    Inappropriate sinus tachycardia    Hypokalemia    Diarrhea    Chronic hypertension in obstetric context in third trimester    37 weeks gestation of pregnancy    Headache    Encounter for induction of labor       Baby complications/comments: cHTN    Review of Systems   Constitutional:  Negative for chills and fever.   HENT:  Negative for congestion, rhinorrhea, sneezing and sore throat.    Eyes:  Negative for photophobia and visual disturbance.   Respiratory:  Negative for cough and shortness of breath.    Cardiovascular:  Negative for chest pain.   Gastrointestinal:  Negative for diarrhea, nausea and vomiting.   Genitourinary:  Negative for dysuria and frequency.   Neurological:  Negative for dizziness, numbness and headaches.   Psychiatric/Behavioral: Negative.         OB Hx:  OB History    Para Term  AB Living   1 0 0 0 0 0   SAB IAB Ectopic Multiple Live Births   0 0 0 0 0      # Outcome Date GA Lbr Myles/2nd Weight Sex Type Anes PTL Lv   1 Current                Past Medical Hx:  Past Medical History:   Diagnosis Date    Anxiety     Eczema     Exercises 5 to 6 times per week     per pt prior to knee issue -enjoyed running and horseback riding    Family history of reaction to anesthesia     per pt--\"Mom also gets high anxiety with surgeries and wakes up nasty from anesthesia\"    Gastroparesis     Hypertension     last assessed 3/12/14    Hypokalemia     per pt per doctors possibly related renal problem    Hypomagnesemia     Irritable bowel syndrome     with diarrhea    Rheumatoid arthritis (HCC)     Sinus tachycardia     related to dehydration    Tooth missing     front upper tooth retainer-    Wears glasses     for computer use       Past Surgical hx:  Past Surgical History:   Procedure Laterality Date    MT ARTHROSCOPY KNEE LATERAL RELEASE Right 2023    Procedure: ARTHROSCOPIC RELEASE RETINACULAR;  " Surgeon: Ilya Brizuela DO;  Location:  MAIN OR;  Service: Orthopedics    DE EGD TRANSORAL BIOPSY SINGLE/MULTIPLE N/A 05/04/2016    Procedure: ESOPHAGOGASTRODUODENOSCOPY (EGD);  Surgeon: Raji Monzon MD;  Location:  GI LAB;  Service: Gastroenterology    DE RCNSTJ DISLC PATELLA W/XTNSR RELIGNMT&/MUSC RL Right 04/13/2023    Procedure: ARTHROSCOPIC REALIGNMENT PATELLA;  Surgeon: Ilya Brizuela DO;  Location:  MAIN OR;  Service: Orthopedics    WISDOM TOOTH EXTRACTION         Social Hx:  Alcohol use: denies  Tobacco use: denies  Other substance use: denies      Allergies   Allergen Reactions    Prednisone Hyperactivity     Medrol dose samantha only    Serotonin Reuptake Inhibitors (Ssris) Anaphylaxis and Hives    Augmentin [Amoxicillin-Pot Clavulanate] Hives and Rash    Clindamycin Rash    Penicillins Rash    Sulfa Antibiotics Rash    Azithromycin Rash         Medications Prior to Admission:     AMILoride 5 mg tablet    Magnesium Glycinate 100 MG CAPS    metoprolol succinate (TOPROL-XL) 50 mg 24 hr tablet    metoprolol tartrate (LOPRESSOR) 25 mg tablet    Objective:  Temp:  [98.4 °F (36.9 °C)] 98.4 °F (36.9 °C)  HR:  [114] 114  Resp:  [18] 18  BP: (138)/(90) 138/90  Body mass index is 33.64 kg/m².     Physical Exam:  Physical Exam  Constitutional:       General: She is not in acute distress.     Appearance: Normal appearance. She is not ill-appearing.   Genitourinary:      Genitourinary Comments: SVE as above   HENT:      Head: Normocephalic and atraumatic.      Mouth/Throat:      Mouth: Mucous membranes are moist. No oral lesions.   Eyes:      General: No scleral icterus.     Extraocular Movements: Extraocular movements intact.   Cardiovascular:      Rate and Rhythm: Normal rate and regular rhythm.      Pulses: Normal pulses.   Pulmonary:      Effort: Pulmonary effort is normal. No respiratory distress.   Abdominal:      Palpations: Abdomen is soft.      Tenderness: There is no abdominal tenderness.      Comments:  Gravid   Musculoskeletal:      Right lower leg: No edema.      Left lower leg: No edema.   Neurological:      General: No focal deficit present.      Mental Status: She is alert.   Skin:     General: Skin is warm and dry.   Psychiatric:         Attention and Perception: Attention normal.         Mood and Affect: Mood normal.         Speech: Speech normal.         Behavior: Behavior normal.         Thought Content: Thought content normal.         Judgment: Judgment normal.   Vitals and nursing note reviewed. Exam conducted with a chaperone present.            FHT:  125 bpm, moderate variability, spontaneous accelerations, no decelerations     TOCO:   Cx every 5 minutes patient is feeling     Lab Results   Component Value Date    WBC 12.12 (H) 05/10/2024    HGB 13.7 05/10/2024    HCT 40.2 05/10/2024     05/10/2024     Lab Results   Component Value Date     (L) 01/07/2016    K 3.7 06/11/2024     06/11/2024    CO2 22 06/11/2024    BUN 9 06/11/2024    CREATININE 0.67 06/11/2024    GLUCOSE 96 01/07/2016    AST 20 05/10/2024    ALT 22 05/10/2024     Prenatal Labs: Reviewed      Blood type: O pos  Antibody: neg  GBS: neg  HIV: NR  Rubella: immune  Syphilis IgM/IgG: NR  HBsAg: NR  HCAb: NR  Chlamydia: neg  Gonorrhea: neg  Diabetes 1 hour screen: elevated  3 hour glucose: wnl  Platelets: f/u admission CBC  Hgb: f/u admission CBC  >2 Midnights  INPATIENT     Signature/Title: Ai Youssef MD  Date: 6/18/2024  Time: 11:02 PM

## 2024-06-19 NOTE — OB LABOR/OXYTOCIN SAFETY PROGRESS
Oxytocin Safety Progress Check Note - Julita Berry 29 y.o. female MRN: [id number]    Unit/Bed#: -01 Encounter: [id number]    Dose (sarahy-units/min) Oxytocin: 8 sarahy-units/min  Contraction Frequency (minutes): 1-3  Contraction Intensity: Moderate/Strong  Uterine Activity Characteristics: Regular  Cervical Dilation: 4        Cervical Effacement: 80  Fetal Station: -2  Baseline Rate (FHR): 115 bpm  Fetal Heart Rate (FHT): 120 BPM  FHR Category: I  Oxytocin Safety Progress Check: Safety check completed  Provider Notified: Yes  Provider Name: Dr. Mittal      Vital Signs:   Vitals:    06/19/24 1730   BP: 123/67   Pulse:    Resp:    Temp:    SpO2:        Notes/comments:   Patient is feeling well. FHT cat I. Slade every 2-3 minutes. SVE deferred at this time. Continue pitocin titration.      Ai Youssef MD 6/19/2024 5:56 PM

## 2024-06-19 NOTE — OB LABOR/OXYTOCIN SAFETY PROGRESS
Oxytocin Safety Progress Check Note - Julita Berry 29 y.o. female MRN: [id number]    Unit/Bed#: -01 Encounter: [id number]    Dose (sarahy-units/min) Oxytocin: 8 sarahy-units/min  Contraction Frequency (minutes): 1.5-2  Contraction Intensity: Moderate/Strong  Uterine Activity Characteristics: Regular  Cervical Dilation: 4        Cervical Effacement: 80  Fetal Station: -2  Baseline Rate (FHR): 115 bpm  Fetal Heart Rate (FHT): 130 BPM  FHR Category: II  Oxytocin Safety Progress Check: Safety check completed            Vital Signs:   Vitals:    06/19/24 1430   BP: 134/78   Pulse:    Resp:    Temp: 97.8 °F (36.6 °C)   SpO2:        Notes/comments:   FHT cat II with intermittent late decelerations, moderate variability and 15x15 accelerations. Fetus appears to be OP. Will continue repositioning. Continue to monitor.       Celestina Luna MD 6/19/2024 3:09 PM

## 2024-06-19 NOTE — PLAN OF CARE
Problem: BIRTH - VAGINAL/ SECTION  Goal: Fetal and maternal status remain reassuring during the birth process  Description: INTERVENTIONS:  - Monitor vital signs  - Monitor fetal heart rate  - Monitor uterine activity  - Monitor labor progression (vaginal delivery)  - DVT prophylaxis  - Antibiotic prophylaxis  Outcome: Progressing  Goal: Emotionally satisfying birthing experience for mother/fetus  Description: Interventions:  - Assess, plan, implement and evaluate the nursing care given to the patient in labor  - Advocate the philosophy that each childbirth experience is a unique experience and support the family's chosen level of involvement and control during the labor process   - Actively participate in both the patient's and family's teaching of the birth process  - Consider cultural, Confucianism and age-specific factors and plan care for the patient in labor  Outcome: Progressing     Problem: POSTPARTUM  Goal: Experiences normal postpartum course  Description: INTERVENTIONS:  - Monitor maternal vital signs  - Assess uterine involution and lochia  Outcome: Progressing  Goal: Appropriate maternal -  bonding  Description: INTERVENTIONS:  - Identify family support  - Assess for appropriate maternal/infant bonding   -Encourage maternal/infant bonding opportunities  - Referral to  or  as needed  Outcome: Progressing  Goal: Establishment of infant feeding pattern  Description: INTERVENTIONS:  - Assess breast/bottle feeding  - Refer to lactation as needed  Outcome: Progressing  Goal: Incision(s), wounds(s) or drain site(s) healing without S/S of infection  Description: INTERVENTIONS  - Assess and document dressing, incision, wound bed, drain sites and surrounding tissue  - Provide patient and family education  Outcome: Progressing

## 2024-06-19 NOTE — OB LABOR/OXYTOCIN SAFETY PROGRESS
Oxytocin Safety Progress Check Note - Julita Berry 29 y.o. female MRN: [id number]    Unit/Bed#: -01 Encounter: [id number]    Dose (sarahy-units/min) Oxytocin: 8 sarahy-units/min  Contraction Frequency (minutes): 1.5-2  Contraction Intensity: Moderate/Strong  Uterine Activity Characteristics: Regular  Cervical Dilation: 4        Cervical Effacement: 70  Fetal Station: -3  Baseline Rate (FHR): 115 bpm  Fetal Heart Rate (FHT): 130 BPM  FHR Category: 1  Oxytocin Safety Progress Check: Safety check completed            Vital Signs:   Vitals:    06/19/24 1415   BP: 132/71   Pulse:    Resp:    Temp:    SpO2:        SVE deferred. Pitocin at 8, continue titration as tolerated per Dr. Whelan.     Toshia Rajput MD 6/19/2024 2:33 PM

## 2024-06-19 NOTE — ASSESSMENT & PLAN NOTE
Continue routine post partum care  Pain well controlled: tylenol/motrin scheduled  Lochia within normal limits: continue to monitor   OOB: as able, encourage ambulation  Contraception: declines at this time  Passing flatus  Voiding spontaneously  Breastfeeding  Dispo: anticipate 6/21

## 2024-06-19 NOTE — OB LABOR/OXYTOCIN SAFETY PROGRESS
Oxytocin Safety Progress Check Note - Julita Berry 29 y.o. female MRN: [id number]    Unit/Bed#: -01 Encounter: [id number]       Contraction Frequency (minutes): 3-4  Contraction Intensity: Mild  Uterine Activity Characteristics: Irritability  Cervical Dilation: Fingertip        Cervical Effacement: 50  Fetal Station: -3  Baseline Rate (FHR): 130 bpm  Fetal Heart Rate (FHT): 130 BPM  FHR Category: II               Vital Signs:   Vitals:    06/18/24 2051   BP: 138/90   Pulse: (!) 114   Resp: 18   Temp: 98.4 °F (36.9 °C)   SpO2: 98%       Notes/comments:   Patient got up to use the bathroom and when she was returning to bed started to feel a very strong contraction.  Her belly was palpated and she was felt to be hard for several minutes.  She was having late decelerations during this prolonged contraction.  FHT returned to baseline with maternal repositioning.  Plan to give fluid bolus and continue to monitor.  Dr. Sanket forrest.    Ai Youssef MD 6/19/2024 1:21 AM

## 2024-06-19 NOTE — OB LABOR/OXYTOCIN SAFETY PROGRESS
Oxytocin Safety Progress Check Note - Julita Berry 29 y.o. female MRN: [id number]    Unit/Bed#: -01 Encounter: [id number]    Dose (sarahy-units/min) Oxytocin: 8 sarahy-units/min  Contraction Frequency (minutes): 2  Contraction Intensity: Moderate/Strong  Uterine Activity Characteristics: Regular  Cervical Dilation: 10  Dilation Complete Date: 06/19/24  Dilation Complete Time: 0750  Cervical Effacement: 100  Fetal Station: 1  Baseline Rate (FHR): 120 bpm  Fetal Heart Rate (FHT): 134 BPM  FHR Category: I  Oxytocin Safety Progress Check: Safety check completed  Provider Notified: Yes  Provider Name: Dr. Mittal      Vital Signs:   Vitals:    06/19/24 1908   BP: 127/96   Pulse: 98   Resp:    Temp: 98 °F (36.7 °C)   SpO2: (!) 84%       Notes/comments:   Patient called out with pressure. SVE as above. Plan to start pushing.       Ai Youssef MD 6/19/2024 7:53 PM

## 2024-06-19 NOTE — ANESTHESIA PROCEDURE NOTES
Epidural Block    Patient location during procedure: OB/L&D  Start time: 6/19/2024 11:08 AM  Reason for block: procedure for pain  Staffing  Performed by: Priyanka Martinez MD  Authorized by: Priyanka Martinez MD    Preanesthetic Checklist  Completed: patient identified, IV checked, site marked, risks and benefits discussed, surgical consent, monitors and equipment checked, pre-op evaluation and timeout performed  Epidural  Patient position: sitting  Prep: ChloraPrep  Sedation Level: no sedation  Patient monitoring: continuous pulse oximetry, frequent blood pressure checks and heart rate  Approach: midline  Location: lumbar, L3-4  Injection technique: SIRENA saline  Needle  Needle type: Tuohy   Needle gauge: 17 G  Needle insertion depth: 7 cm  Catheter type: side hole  Catheter size: 19 G  Catheter at skin depth: 12 cm  Catheter securement method: clear occlusive dressing  Test dose: negative  Assessment  Sensory level: T4  Number of attempts: 1negative aspiration for CSF, negative aspiration for heme and no paresthesia on injection  patient tolerated the procedure well with no immediate complications

## 2024-06-20 PROCEDURE — 99024 POSTOP FOLLOW-UP VISIT: CPT | Performed by: STUDENT IN AN ORGANIZED HEALTH CARE EDUCATION/TRAINING PROGRAM

## 2024-06-20 RX ORDER — IBUPROFEN 600 MG/1
600 TABLET ORAL EVERY 6 HOURS
Status: DISCONTINUED | OUTPATIENT
Start: 2024-06-20 | End: 2024-06-21 | Stop reason: HOSPADM

## 2024-06-20 RX ORDER — ACETAMINOPHEN 325 MG/1
650 TABLET ORAL EVERY 6 HOURS
Status: DISCONTINUED | OUTPATIENT
Start: 2024-06-20 | End: 2024-06-21 | Stop reason: HOSPADM

## 2024-06-20 RX ORDER — ONDANSETRON 2 MG/ML
4 INJECTION INTRAMUSCULAR; INTRAVENOUS EVERY 8 HOURS PRN
Status: DISCONTINUED | OUTPATIENT
Start: 2024-06-20 | End: 2024-06-21 | Stop reason: HOSPADM

## 2024-06-20 RX ORDER — BENZOCAINE/MENTHOL 6 MG-10 MG
1 LOZENGE MUCOUS MEMBRANE DAILY PRN
Status: DISCONTINUED | OUTPATIENT
Start: 2024-06-20 | End: 2024-06-21 | Stop reason: HOSPADM

## 2024-06-20 RX ORDER — OXYTOCIN/RINGER'S LACTATE 30/500 ML
250 PLASTIC BAG, INJECTION (ML) INTRAVENOUS ONCE
Status: DISCONTINUED | OUTPATIENT
Start: 2024-06-20 | End: 2024-06-21 | Stop reason: HOSPADM

## 2024-06-20 RX ORDER — SIMETHICONE 80 MG
80 TABLET,CHEWABLE ORAL 4 TIMES DAILY PRN
Status: DISCONTINUED | OUTPATIENT
Start: 2024-06-20 | End: 2024-06-21 | Stop reason: HOSPADM

## 2024-06-20 RX ORDER — OXYCODONE HYDROCHLORIDE 5 MG/1
5 TABLET ORAL ONCE
Status: DISCONTINUED | OUTPATIENT
Start: 2024-06-20 | End: 2024-06-21 | Stop reason: HOSPADM

## 2024-06-20 RX ORDER — CALCIUM CARBONATE 500 MG/1
1000 TABLET, CHEWABLE ORAL DAILY PRN
Status: DISCONTINUED | OUTPATIENT
Start: 2024-06-20 | End: 2024-06-21 | Stop reason: HOSPADM

## 2024-06-20 RX ADMIN — HYDROCORTISONE 1 APPLICATION: 1 CREAM TOPICAL at 00:45

## 2024-06-20 RX ADMIN — POTASSIUM CHLORIDE 40 MEQ: 1500 TABLET, EXTENDED RELEASE ORAL at 10:25

## 2024-06-20 RX ADMIN — AMILORIDE HYDROCLORIDE 5 MG: 5 TABLET ORAL at 10:28

## 2024-06-20 RX ADMIN — IBUPROFEN 600 MG: 600 TABLET, FILM COATED ORAL at 13:04

## 2024-06-20 RX ADMIN — ACETAMINOPHEN 650 MG: 325 TABLET, FILM COATED ORAL at 13:04

## 2024-06-20 RX ADMIN — AMILORIDE HYDROCLORIDE 5 MG: 5 TABLET ORAL at 00:39

## 2024-06-20 RX ADMIN — IBUPROFEN 600 MG: 600 TABLET, FILM COATED ORAL at 01:36

## 2024-06-20 RX ADMIN — ACETAMINOPHEN 650 MG: 325 TABLET, FILM COATED ORAL at 19:58

## 2024-06-20 RX ADMIN — Medication 200 MG: at 10:27

## 2024-06-20 RX ADMIN — WITCH HAZEL 1 PAD: 500 SOLUTION RECTAL; TOPICAL at 00:44

## 2024-06-20 RX ADMIN — ACETAMINOPHEN 650 MG: 325 TABLET, FILM COATED ORAL at 07:41

## 2024-06-20 RX ADMIN — IBUPROFEN 600 MG: 600 TABLET, FILM COATED ORAL at 07:41

## 2024-06-20 RX ADMIN — ACETAMINOPHEN 650 MG: 325 TABLET, FILM COATED ORAL at 01:36

## 2024-06-20 RX ADMIN — IBUPROFEN 600 MG: 600 TABLET, FILM COATED ORAL at 19:58

## 2024-06-20 RX ADMIN — BENZOCAINE AND LEVOMENTHOL 1 APPLICATION: 200; 5 SPRAY TOPICAL at 00:45

## 2024-06-20 RX ADMIN — METOPROLOL SUCCINATE 50 MG: 50 TABLET, EXTENDED RELEASE ORAL at 10:27

## 2024-06-20 RX ADMIN — AMILORIDE HYDROCLORIDE 5 MG: 5 TABLET ORAL at 23:26

## 2024-06-20 NOTE — TELEPHONE ENCOUNTER
----- Message from Julita Berry sent at 1/13/2024 10:44 AM EST -----  Regarding: DNA results   Contact: [phone]  Good morning,   Hoping all is well I got my results for my dna and it came back that they couldn’t run anything from it. Is there a way I can get a new kit and get it drawn again? Thank you for everything and amazing care have a great weekend.   
Received a My Chart message from pt about needing a re-draw on NIPS.  After record review, pt will need re-draw of specimen.  Called and left [facility] for pt informing her to contact office to schedule a time to  a kit or for our office to draw the specimen again.   Nurse line # of [phone] provided.  
24

## 2024-06-20 NOTE — ANESTHESIA POSTPROCEDURE EVALUATION
Post-Op Assessment Note    CV Status:  Stable  Pain Score: 0    Pain management: adequate      Post-op block assessment: no complications and catheter intact   Mental Status:  Alert and awake   Hydration Status:  Euvolemic   PONV Controlled:  Controlled   Airway Patency:  Patent     Post Op Vitals Reviewed: Yes    No anethesia notable event occurred.    Staff: OLGA           BP   115/56   Temp      Pulse  70   Resp      SpO2

## 2024-06-20 NOTE — PLAN OF CARE
Problem: POSTPARTUM  Goal: Experiences normal postpartum course  Description: INTERVENTIONS:  - Monitor maternal vital signs  - Assess uterine involution and lochia  2024 by Kenny Chowdary RN  Outcome: Progressing  2024 by Kenny Chowdary RN  Outcome: Progressing  Goal: Appropriate maternal -  bonding  Description: INTERVENTIONS:  - Identify family support  - Assess for appropriate maternal/infant bonding   -Encourage maternal/infant bonding opportunities  - Referral to  or  as needed  2024 by Kenny Chowdary RN  Outcome: Progressing  2024 by Kenny Chowdary RN  Outcome: Progressing  Goal: Establishment of infant feeding pattern  Description: INTERVENTIONS:  - Assess breast/bottle feeding  - Refer to lactation as needed  2024 by Kenny Chowdary RN  Outcome: Progressing  2024 by Kenny Chowdary RN  Outcome: Progressing  Goal: Incision(s), wounds(s) or drain site(s) healing without S/S of infection  Description: INTERVENTIONS  - Assess and document dressing, incision, wound bed, drain sites and surrounding tissue  - Provide patient and family education  - Perform skin care/dressing changes every  2024 by Kenny Chowdary RN  Outcome: Progressing  2024 by Kenny Chowdary RN  Outcome: Progressing

## 2024-06-20 NOTE — DISCHARGE SUMMARY
Discharge Summary - OB/GYN  Julita Berry 29 y.o. female MRN: [id number]  Unit/Bed#: -01 Encounter: [id number]    Admission Date: 2024     Discharge Date:     Admitting Attending: Chris    Delivering Attending: Tip    Discharging Attending: Caron    Principal Diagnosis: Pregnancy at 37w1d    Secondary Diagnosis:   1. CHTN  2. SVT  3. POTS    Procedures: spontaneous vaginal delivery    Anesthesia: epidural    Hospital course: Julita Berry is a 29 y.o.  at 37w0d who was initially admitted for IOL in the setting of CHTN. She was induced with neville balloon and pitocin. She received an epidural for analgesia. Amniotomy was performed for clear fluid. She progressed to complete and started pushing.     She delivered a viable female  on 2024 at 2214. Weight 7lbs 0oz via normal spontaneous vaginal delivery. She sustained a left periurethral laceration during delivery which was adequately repaired. Apgars were 8 (1 min) and 9 (5 min).  was transferred to  nursery. Patient tolerated the procedure well.     Her post-delivery course was uncomplicated. Her postpartum pain was well controlled with oral analgesics.    On day of discharge, she was ambulating and able to reasonably perform all ADLs. She was voiding and had appropriate bowel function. Pain was well controlled. She was discharged home on postpartum day #2 without complications. Patient was instructed to follow up with her OB as an outpatient and was given appropriate warnings to call provider if she develops signs of infection or uncontrolled pain.    Complications: none apparent    Condition at discharge: good     Discharge instructions/Information to patient and family:   See after visit summary for information provided to patient and family.      Provisions for Follow-Up Care:  See after visit summary for information related to follow-up care and any pertinent home health orders.       Disposition: See After Visit Summary for discharge disposition information.    Planned Readmission: No    Discharge medications and instructions:   Please see AVS for full list of medications upon discharge.      Irwin Mesa MD  [facility] PGY-1      Stable

## 2024-06-20 NOTE — L&D DELIVERY NOTE
Vaginal Delivery Summary - OB/GYN   Julita Berry 29 y.o. female MRN: [id number]  Unit/Bed#: -01 Encounter: [id number]    Pre-delivery Diagnosis:   Pregnancy at 37.1wks   Chronic HTN   SVT   POTS    Post-delivery Diagnosis: same, delivered    Procedure: Spontaneous Vaginal Delivery     [facility]    Attending Physician: Dr. Whelan  Resident Physician: Dr. Youssef, Dr. Sutton  Other Assistant: none    Anesthesia: Epidural,     QBL:      pending    Complications: none apparent    Specimens:   1. Arterial and venous cord gases  2. Cord blood  3. Segment of umbilical cord  4. Placenta to storage     Findings:  1. Viable female  on 2024 10:14 PM via   . with APGAR scores of 8  and 9  at 1 and 5 minutes, respectively. Weight pending at time of dictation for skin to skin bonding.  2. Spontaneous delivery of intact placenta  3. Left periurethral laceration repaired with 3-0 Vicryl Rapide      Gases:  Umbilical Artery  Recent Labs     24  2219   PHCART 7.134*   BECART -9.8*       Umbilical Vein  Recent Labs     24  2219   PHCVEN 7.212   BECVEN -8.6*           Brief history and labor course:  Julita Berry is a 29 y.o. [id number] 37w1d . She presented to labor and delivery for induction of labor secondary to chronic HTN. Her pregnancy was complicated by chronic Htn, SVT, and POTS. On exam in triage she was noted to be 0.5/50/-3. She was admitted for induction. She was induced with cytotec, pitocin, and amniotomy.    Description of delivery:    After pushing, Julita delivered a viable female , wt pending as mother is doing skin to skin bonding. The fetal vertex delivered OA position spontaneously. There was no nuchal cord. The  anterior shoulder delivered atraumatically with maternal expulsive forces and the assistance of gentle downward traction. The  posterior shoulder delivered with maternal expulsive forces and the assistance of gentle upward  traction. The remainder of the fetus delivered spontaneously.      Upon delivery, the infant was placed on Julita's abdomen and the cord was doubly clamped and cut. Delayed cord clamping was achieved. The infant was noted to cry spontaneously and was moving all extremities appropriately. There was no evidence for injury. Awaiting nurse resuscitators evaluated the . Arterial and venous cord blood gases and cord blood was collected for analysis. These were promptly sent to the lab. In the immediate post-partum, active management of the 3rd stage of labor was performed with massage, the administration of 30 units of IV pitocin, and gentle traction on the umbilical cord. The placenta delivered spontaneously and was noted to have a centrally inserted 3 vessel cord.  The placenta was sent to storage.     Laceration Repair  The vagina, cervix, perineum, and rectum were inspected and there was noted to be a left periurethral laceration.    The patient was comfortable with epidural for analgesia. 3-0 vicryl was used to repair the dead space in two layers. The superficial mucosa was re approximated in running fashion with 3-0 vicryl. Hemostasis was achieved at end of procedure    At the conclusion of the procedure, all needle, sponge, and instrument counts were noted to be correct. Dr. Whelan was present and participated in all key portions of the case.    Disposition:  The patient and the  both tolerated the procedure well and are recovering in labor and delivery room       Charo Whelan DO  2024  11:15 PM

## 2024-06-20 NOTE — PROGRESS NOTES
"Progress Note - OB/GYN  Julita Berry 29 y.o. female MRN: [id number]  Unit/Bed#:  313-01 Encounter: [id number]    Assessment and Plan     Julita Berry is a patient of: [facility]. She is PPD# 1 s/p . Recovering well and is stable.     *  (spontaneous vaginal delivery)  Assessment & Plan       Continue routine post partum care  Pain well controlled: tylenol/motrin scheduled  Lochia within normal limits: continue to monitor   OOB: as able, encourage ambulation  Passing flatus  Voiding spontaneously  Breastfeeding  Dispo: anticipate      Chronic hypertension in obstetric context in third trimester  Assessment & Plan  Continue home meds  F/u CBC, CMP, P:C    SVT (supraventricular tachycardia)  Assessment & Plan  Home metoprolol ordered    POTS (postural orthostatic tachycardia syndrome)  Assessment & Plan  Home Kcl ordred   Home metoprolol ordered        Disposition    -  Anticipate discharge home on PPD# 1-2      Subjective/Objective     Chief Complaint: Postpartum State     Subjective:    Julita Berry is PPD#1 s/p . She has no current complaints.  Pain is well controlled with medications.  Patient is currently voiding and ambulating without difficulty.  Patient is currently passing flatus, tolerating PO diet, and denies nausea or vomitting. Patient denies fever, chills, chest pain, shortness of breath, or calf tenderness. Lochia is moderate. She is Breastfeeding. She is recovering well and is stable.       Vitals:   /56 (BP Location: Right arm)   Pulse 70   Temp 98.3 °F (36.8 °C) (Oral)   Resp 17   Ht 5' 4\" (1.626 m)   Wt 88.9 kg (196 lb)   LMP 10/03/2023   SpO2 98%   Breastfeeding Yes   BMI 33.64 kg/m²       Intake/Output Summary (Last 24 hours) at 2024 0734  Last data filed at 2024 0330  Gross per 24 hour   Intake 164.85 ml   Output 2148 ml   Net -1983.15 ml       Invasive Devices       Peripheral Intravenous Line  Duration             " Peripheral IV 06/18/24 Left;Ventral (anterior) Forearm 1 day              Epidural Line  Duration             Epidural Catheter 06/19/24 <1 day                    Physical Exam:   GEN: Julita Berry appears well, alert and oriented x 3, pleasant and cooperative   CARDIO: RRR, no murmurs or rubs  RESP:  CTAB, no wheezes or rales  ABDOMEN: soft, no tenderness, no distention, fundus @ U-3  EXTREMITIES: SCDs on, non tender, no erythema    Labs:     Hemoglobin   Date Value Ref Range Status   06/18/2024 14.7 11.5 - 15.4 g/dL Final   05/10/2024 13.7 11.5 - 15.4 g/dL Final   12/28/2015 15.8 (H) 11.5 - 15.4 g/dL Final   10/12/2015 15.9 (H) 11.5 - 15.4 g/dL Final     WBC   Date Value Ref Range Status   06/18/2024 11.44 (H) 4.31 - 10.16 Thousand/uL Final   05/10/2024 12.12 (H) 4.31 - 10.16 Thousand/uL Final   12/28/2015 8.18 4.31 - 10.16 Thousand/uL Final   10/12/2015 8.06 4.31 - 10.16 Thousand/uL Final     Platelets   Date Value Ref Range Status   06/18/2024 174 149 - 390 Thousands/uL Final   05/10/2024 181 149 - 390 Thousands/uL Final   12/28/2015 251 149 - 390 Thousand/uL Final   10/12/2015 221 149 - 390 Thousand/uL Final     Creatinine   Date Value Ref Range Status   06/18/2024 0.68 0.60 - 1.30 mg/dL Final     Comment:     Standardized to IDMS reference method   06/11/2024 0.67 0.60 - 1.30 mg/dL Final     Comment:     Standardized to IDMS reference method   01/07/2016 0.93 0.60 - 1.30 mg/dL Final     Comment:     Standardized to IDMS reference method   12/28/2015 0.99 0.60 - 1.30 mg/dL Final     Comment:     Standardized to IDMS reference method     AST   Date Value Ref Range Status   06/18/2024 16 13 - 39 U/L Final   05/10/2024 20 13 - 39 U/L Final   12/28/2015 16 5 - 45 U/L Final   10/12/2015 19 5 - 45 U/L Final     ALT   Date Value Ref Range Status   06/18/2024 12 7 - 52 U/L Final     Comment:     Specimen collection should occur prior to Sulfasalazine administration due to the potential for falsely depressed  results.    05/10/2024 22 7 - 52 U/L Final     Comment:     Specimen collection should occur prior to Sulfasalazine administration due to the potential for falsely depressed results.    12/28/2015 17 12 - 78 U/L Final   10/12/2015 22 12 - 78 U/L Final          Irwin Mesa MD  6/20/2024  7:34 AM

## 2024-06-21 VITALS
SYSTOLIC BLOOD PRESSURE: 113 MMHG | HEIGHT: 64 IN | HEART RATE: 70 BPM | TEMPERATURE: 98.2 F | OXYGEN SATURATION: 98 % | RESPIRATION RATE: 16 BRPM | DIASTOLIC BLOOD PRESSURE: 92 MMHG | BODY MASS INDEX: 33.46 KG/M2 | WEIGHT: 196 LBS

## 2024-06-21 PROCEDURE — 99024 POSTOP FOLLOW-UP VISIT: CPT | Performed by: OBSTETRICS & GYNECOLOGY

## 2024-06-21 RX ORDER — IBUPROFEN 600 MG/1
600 TABLET ORAL EVERY 6 HOURS
Start: 2024-06-21

## 2024-06-21 RX ORDER — BENZOCAINE/MENTHOL 6 MG-10 MG
1 LOZENGE MUCOUS MEMBRANE DAILY PRN
Start: 2024-06-21

## 2024-06-21 RX ORDER — ACETAMINOPHEN 325 MG/1
650 TABLET ORAL EVERY 6 HOURS
Start: 2024-06-21

## 2024-06-21 RX ADMIN — POTASSIUM CHLORIDE 40 MEQ: 1500 TABLET, EXTENDED RELEASE ORAL at 09:55

## 2024-06-21 RX ADMIN — IBUPROFEN 600 MG: 600 TABLET, FILM COATED ORAL at 04:07

## 2024-06-21 RX ADMIN — AMILORIDE HYDROCLORIDE 5 MG: 5 TABLET ORAL at 09:55

## 2024-06-21 RX ADMIN — IBUPROFEN 600 MG: 600 TABLET, FILM COATED ORAL at 09:55

## 2024-06-21 RX ADMIN — Medication 200 MG: at 09:55

## 2024-06-21 RX ADMIN — ACETAMINOPHEN 650 MG: 325 TABLET, FILM COATED ORAL at 09:55

## 2024-06-21 RX ADMIN — ACETAMINOPHEN 650 MG: 325 TABLET, FILM COATED ORAL at 04:07

## 2024-06-21 NOTE — LACTATION NOTE
This note was copied from a baby's chart.  CONSULT - LACTATION  Baby Girl Berry (Danielle) 2 days female MRN: [id number]    [facility] AN NURSERY Room / Bed: (N)/(N) Encounter: [id number]    Maternal Information     MOTHER:  Julita Berry  Maternal Age: 29 y.o.  OB History: # 1 - Date: 24, Sex: Female, Weight: 3190 g (7 lb 0.5 oz), GA: 37w1d, Type: Vaginal, Spontaneous, Apgar1: 8, Apgar5: 9, Living: Living, Birth Comments: None   Previouse breast reduction surgery? No    Lactation history:   Has patient previously breast fed: No   How long had patient previously breast fed:     Previous breast feeding complications:       Past Surgical History:   Procedure Laterality Date    MD ARTHROSCOPY KNEE LATERAL RELEASE Right 2023    Procedure: ARTHROSCOPIC RELEASE RETINACULAR;  Surgeon: Ilya Brizuela DO;  Location:  MAIN OR;  Service: Orthopedics    MD EGD TRANSORAL BIOPSY SINGLE/MULTIPLE N/A 2016    Procedure: ESOPHAGOGASTRODUODENOSCOPY (EGD);  Surgeon: Raji Monzon MD;  Location:  GI LAB;  Service: Gastroenterology    MD RCNSTJ DISLC PATELLA W/XTNSR RELIGNMT&/MUSC RL Right 2023    Procedure: ARTHROSCOPIC REALIGNMENT PATELLA;  Surgeon: Ilya Brizuela DO;  Location:  MAIN OR;  Service: Orthopedics    WISDOM TOOTH EXTRACTION         Birth information:  YOB: 2025   Time of birth: 10:14 PM   Sex: female   Delivery type: Vaginal, Spontaneous   Birth Weight: 3190 g (7 lb 0.5 oz)   Percent of Weight Change: -3%     Gestational Age: 37w1d   [unfilled]    Assessment     Breast and nipple assessment:  not assessed physically at this time.    [place] Assessment: sleepy    Feeding assessment: no latch     24 1037   Lactation Consultation   Reason for Consult 20 m;10 minutes   Lactation Consultant Total Time 30   Breasts/Nipples   Left Breast Soft;Filling   Right Breast Soft;Filling   Left Nipple Everted;Tender   Right Nipple  Everted;Tender   Breastfeeding Status Yes   Breastfeeding Progress Breastfeeding well  (Per Julita, breastfeeding is going much better.)   Patient Follow-Up   Lactation Consult Status 2   Follow-Up Type Inpatient;Call as needed   Other OB Lactation Documentation    Additional Problem Noted Denies questions for discharge. Denies need for follow up outpatient for lactation at this time.       Feeding recommendations:  breast feed on demand    Explained five different general reasons for pumpin)   to add more stimulation to the breast if baby is not latching,  2)   to protect supply when supplementation is being utilized.   3)   for engorgement that is not relieved by hand expression or baby feeding.   4)   for giving to baby after breast feeding is well established to train them to paced bottle feeding.   5)   to build up a supply/bank of expressed breastmilk to go back to work.      Encouraged parents to call for assistance, questions, and concerns about breastfeeding.  Extension provided.      Odilia Guillen RN 2024 10:51 AM

## 2024-06-21 NOTE — PROGRESS NOTES
"Progress Note - OB/GYN  Julita Berry 29 y.o. female MRN: [id number]  Unit/Bed#: -01 Encounter: [id number]    Assessment and Plan     Julita Berry is a patient of: [facility]. She is PPD# 2 s/p . Recovering well and is stable.     *  (spontaneous vaginal delivery)  Assessment & Plan       Continue routine post partum care  Pain well controlled: tylenol/motrin scheduled  Lochia within normal limits: continue to monitor   OOB: as able, encourage ambulation  Contraception: declines at this time  Passing flatus  Voiding spontaneously  Breastfeeding  Dispo: anticipate      Chronic hypertension in obstetric context in third trimester  Assessment & Plan  Continue home meds  F/u CBC, CMP, P:C    SVT (supraventricular tachycardia)  Assessment & Plan  Home metoprolol ordered    POTS (postural orthostatic tachycardia syndrome)  Assessment & Plan  Home Kcl ordred   Home metoprolol ordered        Disposition    -  Anticipate discharge home on PPD# 2      Subjective/Objective     Chief Complaint: Postpartum State     Subjective:    Julita Berry is PPD#2 s/p . She has no current complaints.  Pain is well controlled with medications.  Patient is currently voiding and ambulating without difficulty.  Patient is currently passing flatus, tolerating PO diet, and denies nausea or vomitting. Patient denies fever, chills, chest pain, shortness of breath, or calf tenderness. Lochia is minimal. She is Breastfeeding. She is recovering well and is stable.       Vitals:   /92 (BP Location: Right arm)   Pulse 70   Temp 98.2 °F (36.8 °C) (Oral)   Resp 16   Ht 5' 4\" (1.626 m)   Wt 88.9 kg (196 lb)   LMP 10/03/2023   SpO2 98%   Breastfeeding Yes   BMI 33.64 kg/m²     No intake or output data in the 24 hours ending 24 0658    Invasive Devices       Peripheral Intravenous Line  Duration             Peripheral IV 24 Left;Ventral (anterior) Forearm 2 days              "       Physical Exam:   GEN: Julita Berry appears well, alert and oriented x 3, pleasant and cooperative   CARDIO: RRR, no murmurs or rubs  RESP:  CTAB, no wheezes or rales  ABDOMEN: soft, no tenderness, no distention, fundus @ U-2  EXTREMITIES: SCDs on, non tender, no erythema    Labs:     Hemoglobin   Date Value Ref Range Status   06/18/2024 14.7 11.5 - 15.4 g/dL Final   05/10/2024 13.7 11.5 - 15.4 g/dL Final   12/28/2015 15.8 (H) 11.5 - 15.4 g/dL Final   10/12/2015 15.9 (H) 11.5 - 15.4 g/dL Final     WBC   Date Value Ref Range Status   06/18/2024 11.44 (H) 4.31 - 10.16 Thousand/uL Final   05/10/2024 12.12 (H) 4.31 - 10.16 Thousand/uL Final   12/28/2015 8.18 4.31 - 10.16 Thousand/uL Final   10/12/2015 8.06 4.31 - 10.16 Thousand/uL Final     Platelets   Date Value Ref Range Status   06/18/2024 174 149 - 390 Thousands/uL Final   05/10/2024 181 149 - 390 Thousands/uL Final   12/28/2015 251 149 - 390 Thousand/uL Final   10/12/2015 221 149 - 390 Thousand/uL Final     Creatinine   Date Value Ref Range Status   06/18/2024 0.68 0.60 - 1.30 mg/dL Final     Comment:     Standardized to IDMS reference method   06/11/2024 0.67 0.60 - 1.30 mg/dL Final     Comment:     Standardized to IDMS reference method   01/07/2016 0.93 0.60 - 1.30 mg/dL Final     Comment:     Standardized to IDMS reference method   12/28/2015 0.99 0.60 - 1.30 mg/dL Final     Comment:     Standardized to IDMS reference method     AST   Date Value Ref Range Status   06/18/2024 16 13 - 39 U/L Final   05/10/2024 20 13 - 39 U/L Final   12/28/2015 16 5 - 45 U/L Final   10/12/2015 19 5 - 45 U/L Final     ALT   Date Value Ref Range Status   06/18/2024 12 7 - 52 U/L Final     Comment:     Specimen collection should occur prior to Sulfasalazine administration due to the potential for falsely depressed results.    05/10/2024 22 7 - 52 U/L Final     Comment:     Specimen collection should occur prior to Sulfasalazine administration due to the potential for  falsely depressed results.    12/28/2015 17 12 - 78 U/L Final   10/12/2015 22 12 - 78 U/L Final          Irwin Mesa MD  6/21/2024  6:58 AM

## 2024-06-21 NOTE — UTILIZATION REVIEW
"Mother and baby discharged on 2024      NOTIFICATION OF INPATIENT ADMISSION   MATERNITY/DELIVERY AUTHORIZATION REQUEST   SERVICING FACILITY:   [facility] - L&D, , NICU  [address] [place] [address]  Tax ID: [id number]  NPI: [id number]   ATTENDING PROVIDER:  Attending Name and NPI#: Charo Whelan Do [[id number]]  Address: [address] [place] [address]  Phone: [phone]   ADMISSION INFORMATION:  Place of Service: Inpatient [facility]  Place of Service Code: 21  Inpatient Admission Date/Time: 24  7:55 PM  Discharge Date/Time: 2024  2:00 PM  Admitting Diagnosis Code/Description:  37 weeks gestation of pregnancy [Z3A.37]  Encounter for induction of labor [Z34.90]  Encounter for full-term uncomplicated delivery [O80]     Mother: Julita Berry 1995 Estimated Date of Delivery: 24  Delivering clinician:     OB History          1    Para   1    Term   1       0    AB   0    Living   1         SAB   0    IAB   0    Ectopic   0    Multiple   0    Live Births   1               [place] Name & MRN:   Information for the patient's :  Jamal, Baby Girl (Julita) [[id number]]    Delivery Information:  Sex: female  Delivered 2024 10:14 PM by Vaginal, Spontaneous; Gestational Age: 37w1d     Measurements:  Weight: 7 lb 0.5 oz (3190 g);  Height: 18\"    APGAR 1 minute 5 minutes 10 minutes   Totals: 8 9       UTILIZATION REVIEW CONTACT:  Meme Lawson Utilization   Network Utilization Review Department  Phone: [phone]  Fax [phone]  Email: [email or web address]  Contact for approvals/pending authorizations, clinical reviews, and discharge.     PHYSICIAN ADVISORY SERVICES:  Medical Necessity Denial & Mbzn-sj-Bgjj Review  Phone: [phone]  Fax: [phone]  Email: [email or web address]     DISCHARGE SUPPORT TEAM:  For Patients Discharge Needs " & Updates  Phone: [phone] opt. 2 Fax: [phone]  Email: [email or web address]

## 2024-06-21 NOTE — LACTATION NOTE
This note was copied from a baby's chart.  CONSULT - LACTATION  Baby Girl Berry (Danielle) 1 days female MRN: [id number]    [facility] AN NURSERY Room / Bed: (N)/(N) Encounter: [id number]    Maternal Information     MOTHER:  Julita Berry  Maternal Age: 29 y.o.  OB History: # 1 - Date: 24, Sex: Female, Weight: 3190 g (7 lb 0.5 oz), GA: 37w1d, Type: Vaginal, Spontaneous, Apgar1: 8, Apgar5: 9, Living: Living, Birth Comments: None   Previouse breast reduction surgery? No    Lactation history:   Has patient previously breast fed: No   How long had patient previously breast fed:     Previous breast feeding complications:       Past Surgical History:   Procedure Laterality Date    WI ARTHROSCOPY KNEE LATERAL RELEASE Right 2023    Procedure: ARTHROSCOPIC RELEASE RETINACULAR;  Surgeon: Ilya Brizuela DO;  Location:  MAIN OR;  Service: Orthopedics    WI EGD TRANSORAL BIOPSY SINGLE/MULTIPLE N/A 2016    Procedure: ESOPHAGOGASTRODUODENOSCOPY (EGD);  Surgeon: Raji Monzon MD;  Location:  GI LAB;  Service: Gastroenterology    WI RCNSTJ DISLC PATELLA W/XTNSR RELIGNMT&/MUSC RL Right 2023    Procedure: ARTHROSCOPIC REALIGNMENT PATELLA;  Surgeon: Ilya Brizuela DO;  Location:  MAIN OR;  Service: Orthopedics    WISDOM TOOTH EXTRACTION         Birth information:  YOB: 2025   Time of birth: 10:14 PM   Sex: female   Delivery type: Vaginal, Spontaneous   Birth Weight: 3190 g (7 lb 0.5 oz)   Percent of Weight Change: 0%     Gestational Age: 37w1d   [unfilled]    Assessment     Breast and nipple assessment: flat nipple and everts with stimulation, right nipple everted    [place] Assessment: normal assessment, no digital oral exam performed    Feeding assessment: feeding well  LATCH:  Latch: Grasps breast, tongue down, lips flanged, rhythmic sucking   Audible Swallowing: Spontaneous and intermittent (24 hours old)   Type of Nipple: Everted  (After stimulation)   Comfort (Breast/Nipple): Soft/non-tender   Hold (Positioning): Full assist, staff holds infant at breast   LATCH Score: 8        Having latch problems? No   Position(s) Used Cross Cradle;Football   Breasts/Nipples   Left Breast Soft   Right Breast Soft   Left Nipple Flat;Everted  (Everts with stimulation)   Right Nipple Everted   Breastfeeding Status Yes   Breastfeeding Progress Not yet established   Breast Pump   Pump 3;1  (Spectra S1 through insurance)   Patient Follow-Up   Lactation Consult Status 2   Follow-Up Type Inpatient;Call as needed   Other OB Lactation Documentation    Additional Problem Noted RSB/DC, handpump to lynn left nipple, alignment in football on left and cross-cradle on right.       Feeding recommendations:  breast feed on demand    Baby unswaddled and brought to mother in cross-cradle on left breast, Noted mom's nipple to be flat on left breast and obtained hand pump. Educated on hand pump and nipple roll prior to attempting to latch baby. Baby brought into cross-cradle again and continues to tip chin to chest. Baby brought into football hold on left breast and obtained deep latch. Baby remained latched for 5 minutes with continuous audible swallows heard. Baby became unlatched due to nipple being pulled out of mouth. Baby attempted to re-latch self multiple times and fell asleep.     Baby brought back to [facility] and diaper changed by .     Baby brought back to mother in cross-cradle on right breast. Edc. On alignment nipple to nose and deep latch obtained.     Mother declined any questions at this time, enc. To call for additional questions or latch assess, ext. Provided.         RSB and DC booklets reviewed.     Mother has Spectra S1 through insurance.       Met with mother. Provided mother with Ready, Set, Baby booklet.    Information on hand expression given. Discussed benefits of knowing how to manually express breast including stimulating milk supply, softening  nipple for latch and evacuating breast in the event of engorgement.    Mom is encouraged to place baby skin to skin for feedings. Skin to skin education provided for baby placement on mother's chest, baby only in diaper, blankets below shoulders on baby's back. Skin to skin is encouraged to continue at home for feedings and between feedings.    - Start feedings on breast that last feeding ended   - allow no more than 3 hours between breast feeding sessions   - time between feedings is counted from the beginning of the first feed to the beginning of the next feeding session    Reviewed early signs of hunger, including tensing of hands and shoulders - no need to wait for open eyes.  Crying is a late hunger sign.  If baby is crying, soothe baby first and then attempt to latch.  Reviewed normal sucking patterns: transition from stimulation to nutritive to release or non-nutritive. The goal is to see and hear lots of swallowing.    Reviewed normal nursing pattern: infant could latch on one breast up to 30 minutes or until releases on own. Signs of satiation is open hand with fingers that do not grab your finger.  Discussed difference in sensation of non-nutritive v nutritive sucking    Discussed Skin to Skin contact an benefits to mom and baby.  Talked about the delay of the first bath until baby has adjusted. Spoke about the benefits of rooming in. Feeding on cue and what that means for recognizing infant's hunger. Avoidance of pacifiers for the first month discussed. Talked about exclusive breastfeeding for the first 6 months.    Positioning and latch reviewed as well as showing images of other feeding positions.  Discussed the properties of a good latch in any position. Reviewed hand/manual expression.  Discussed s/s that baby is getting enough milk and some s/s that breastfeeding dyad may need further help.    Gave information on common concerns, what to expect the first few weeks after delivery, preparing for other  caregivers, and how partners can help. Resources for support also provided.    Encouraged parents to call for assistance, questions, and concerns about breastfeeding.  Extension provided.      Suzanne Burk RN 6/20/2024 9:33 PM

## 2024-06-25 ENCOUNTER — TELEPHONE (OUTPATIENT)
Dept: NEPHROLOGY | Facility: CLINIC | Age: 29
End: 2024-06-25

## 2024-06-26 LAB — PLACENTA IN STORAGE: NORMAL

## 2024-07-22 ENCOUNTER — TELEPHONE (OUTPATIENT)
Dept: NEPHROLOGY | Facility: CLINIC | Age: 29
End: 2024-07-22

## 2024-07-22 DIAGNOSIS — E87.6 HYPOKALEMIA: Primary | ICD-10-CM

## 2024-07-22 DIAGNOSIS — E83.42 HYPOMAGNESEMIA: ICD-10-CM

## 2024-07-22 NOTE — TELEPHONE ENCOUNTER
Patient reached out wondering if Dr. Covarrubias would like her to have lab work done sooner rather than later.  She just wants to make sure her electrolytes are doing well since giving birth.

## 2024-07-22 NOTE — TELEPHONE ENCOUNTER
Patient made aware of the followin things   1.  Lets get a basic metabolic profile magnesium now   2.  A week of blood pressures thank you

## 2024-07-26 ENCOUNTER — POSTPARTUM VISIT (OUTPATIENT)
Dept: OBGYN CLINIC | Facility: CLINIC | Age: 29
End: 2024-07-26

## 2024-07-26 ENCOUNTER — APPOINTMENT (OUTPATIENT)
Dept: LAB | Facility: CLINIC | Age: 29
End: 2024-07-26
Payer: COMMERCIAL

## 2024-07-26 VITALS — WEIGHT: 180.8 LBS | DIASTOLIC BLOOD PRESSURE: 84 MMHG | BODY MASS INDEX: 31.03 KG/M2 | SYSTOLIC BLOOD PRESSURE: 112 MMHG

## 2024-07-26 DIAGNOSIS — E83.42 HYPOMAGNESEMIA: ICD-10-CM

## 2024-07-26 DIAGNOSIS — E87.6 HYPOKALEMIA: ICD-10-CM

## 2024-07-26 LAB
ANION GAP SERPL CALCULATED.3IONS-SCNC: 9 MMOL/L (ref 4–13)
BUN SERPL-MCNC: 14 MG/DL (ref 5–25)
CALCIUM SERPL-MCNC: 9.4 MG/DL (ref 8.4–10.2)
CHLORIDE SERPL-SCNC: 103 MMOL/L (ref 96–108)
CO2 SERPL-SCNC: 28 MMOL/L (ref 21–32)
CREAT SERPL-MCNC: 0.82 MG/DL (ref 0.6–1.3)
GFR SERPL CREATININE-BSD FRML MDRD: 96 ML/MIN/1.73SQ M
GLUCOSE SERPL-MCNC: 109 MG/DL (ref 65–140)
MAGNESIUM SERPL-MCNC: 1.8 MG/DL (ref 1.9–2.7)
POTASSIUM SERPL-SCNC: 3.6 MMOL/L (ref 3.5–5.3)
SODIUM SERPL-SCNC: 140 MMOL/L (ref 135–147)

## 2024-07-26 PROCEDURE — 99024 POSTOP FOLLOW-UP VISIT: CPT | Performed by: PHYSICIAN ASSISTANT

## 2024-07-26 PROCEDURE — 36415 COLL VENOUS BLD VENIPUNCTURE: CPT

## 2024-07-26 PROCEDURE — 80048 BASIC METABOLIC PNL TOTAL CA: CPT

## 2024-07-26 NOTE — PROGRESS NOTES
"Julita Jeffersonjoelle  1995    S:  The patient is a 29 y.o. who presents for a postpartum visit. She is 5 weeks postpartum following a  delivery. The delivery was at 37 gestational weeks. I have fully reviewed the prenatal and intrapartum course. Complications include: IOL in the setting of cHTN, left periurethral laceration with delivery     Postpartum course has been uncomplicated . Baby \" Millicent \" is doing well. Baby is feeding by formula. Bleeding stopped.     Bowel function is normal. Bladder function is normal. Patient has not been sexually active.          She denies postpartum blues/depression.    Morven Scale= 0    Last Pap: 2022 negative    We discussed contraceptive options in detail including birth control pills, patches, NuvaRing, DepoProvera, Mirena/Kyleena IUD, Nexplanon in detail.  At this point she would like to use condoms    Review of Systems   Respiratory: Negative.    Cardiovascular: Negative.    Gastrointestinal: Negative for constipation and diarrhea.   Genitourinary: Negative for difficulty urinating, pelvic pain, vaginal bleeding, vaginal discharge, itching or odor.      Past Medical History:   Diagnosis Date    Anxiety     Eczema     Exercises 5 to 6 times per week     per pt prior to knee issue -enjoyed running and horseback riding    Family history of reaction to anesthesia     per pt--\"Mom also gets high anxiety with surgeries and wakes up nasty from anesthesia\"    Gastroparesis     Hypertension     last assessed 3/12/14    Hypokalemia     per pt per doctors possibly related renal problem    Hypomagnesemia     Irritable bowel syndrome     with diarrhea    Rheumatoid arthritis (HCC)     Sinus tachycardia     related to dehydration    Tooth missing     front upper tooth retainer-    Wears glasses     for computer use     Family History   Problem Relation Age of Onset    Hypokalemia Mother     Hypotension Mother     Anxiety disorder Mother         NOS    Lung cancer Father  "    Skin cancer Father     Crohn's disease Father     Schizoaffective Disorder  Father     Alcohol abuse Father     Drug abuse Father     No Known Problems Sister     No Known Problems Sister     No Known Problems Brother     Coronary artery disease Maternal Grandmother     Heart disease Maternal Grandmother     Alzheimer's disease Maternal Grandmother     Arthritis Paternal Grandmother     Rheum arthritis Paternal Grandmother     Hypertension Paternal Grandmother     COPD Paternal Grandfather      Social History     Socioeconomic History    Marital status: /Civil Union     Spouse name: None    Number of children: None    Years of education: None    Highest education level: None   Occupational History    Occupation: medical assistant with nephrology    Tobacco Use    Smoking status: Never    Smokeless tobacco: Never   Vaping Use    Vaping status: Never Used   Substance and Sexual Activity    Alcohol use: Not Currently     Alcohol/week: 1.0 standard drink of alcohol     Types: 1 Standard drinks or equivalent per week    Drug use: No    Sexual activity: Yes     Partners: Male     Birth control/protection: None   Other Topics Concern    None   Social History Narrative    Activities - horseback riding    Caffeine use    Drinks coffee- 1 a wk    Exercise - walking    Exercising regularly     Social Determinants of Health     Financial Resource Strain: Not on file   Food Insecurity: No Food Insecurity (6/21/2024)    Hunger Vital Sign     Worried About Running Out of Food in the Last Year: Never true     Ran Out of Food in the Last Year: Never true   Transportation Needs: No Transportation Needs (6/21/2024)    PRAPARE - Transportation     Lack of Transportation (Medical): No     Lack of Transportation (Non-Medical): No   Physical Activity: Not on file   Stress: Not on file   Social Connections: Not on file   Intimate Partner Violence: Not on file   Housing Stability: Low Risk  (6/21/2024)    Housing Stability Vital  Sign     Unable to Pay for Housing in the Last Year: No     Number of Times Moved in the Last Year: 0     Homeless in the Last Year: No       O:   /84 (BP Location: Left arm, Patient Position: Sitting, Cuff Size: Standard)   Wt 82 kg (180 lb 12.8 oz)   LMP 10/03/2023   Breastfeeding No   BMI 31.03 kg/m²     She appears well and in no distress  Abdomen is soft and nontender  External genitals are normal    A/P:  Postpartum visit.    Contraception -      Pap due     She will return in 3 months for her yearly exam, sooner PRN.

## 2024-07-29 ENCOUNTER — TELEPHONE (OUTPATIENT)
Dept: NEPHROLOGY | Facility: CLINIC | Age: 29
End: 2024-07-29

## 2024-07-29 DIAGNOSIS — E87.6 HYPOKALEMIA: Primary | ICD-10-CM

## 2024-07-29 DIAGNOSIS — E83.42 HYPOMAGNESEMIA: ICD-10-CM

## 2024-07-29 NOTE — TELEPHONE ENCOUNTER
Patient aware:    ----- Message from Abelardo Covarrubias MD sent at 7/29/2024 11:59 AM EDT -----  Is she on any amiloride?  I would definitely increase potassium to 3 times a day 20 mill equivalents  Repeat a basic metabolic profile magnesium 1 to 2 weeks  ----- Message -----  From: Moni Alvarez  Sent: 7/29/2024  11:57 AM EDT  To: Abelardo oCvarrubias MD    She's currently taking magnesium glycinate 200mg daily and potassium 20mEq twice a day.  ----- Message -----  From: Abelardo Covarrubias MD  Sent: 7/28/2024   2:41 PM EDT  To: Ne[facility]    In general labs look good I would continue all the supplements for please find out exactly which she is taking regarding meds review that and then we can adjust accordingly thank you  ----- Message -----  From: Lab, Background User  Sent: 7/26/2024   9:41 PM EDT  To: Abelardo Covarrubias MD

## 2024-08-16 ENCOUNTER — APPOINTMENT (OUTPATIENT)
Dept: LAB | Facility: CLINIC | Age: 29
End: 2024-08-16
Payer: COMMERCIAL

## 2024-08-16 ENCOUNTER — TRANSCRIBE ORDERS (OUTPATIENT)
Dept: LAB | Facility: CLINIC | Age: 29
End: 2024-08-16

## 2024-08-16 DIAGNOSIS — Z00.00 ROUTINE GENERAL MEDICAL EXAMINATION AT A HEALTH CARE FACILITY: Primary | ICD-10-CM

## 2024-08-16 DIAGNOSIS — E83.42 HYPOMAGNESEMIA: ICD-10-CM

## 2024-08-16 DIAGNOSIS — Z00.00 ROUTINE GENERAL MEDICAL EXAMINATION AT A HEALTH CARE FACILITY: ICD-10-CM

## 2024-08-16 DIAGNOSIS — E87.6 HYPOKALEMIA: ICD-10-CM

## 2024-08-16 LAB
ANION GAP SERPL CALCULATED.3IONS-SCNC: 11 MMOL/L (ref 4–13)
BUN SERPL-MCNC: 15 MG/DL (ref 5–25)
CALCIUM SERPL-MCNC: 9.5 MG/DL (ref 8.4–10.2)
CHLORIDE SERPL-SCNC: 102 MMOL/L (ref 96–108)
CHOLEST SERPL-MCNC: 199 MG/DL
CO2 SERPL-SCNC: 26 MMOL/L (ref 21–32)
CREAT SERPL-MCNC: 0.81 MG/DL (ref 0.6–1.3)
EST. AVERAGE GLUCOSE BLD GHB EST-MCNC: 94 MG/DL
GFR SERPL CREATININE-BSD FRML MDRD: 97 ML/MIN/1.73SQ M
GLUCOSE P FAST SERPL-MCNC: 102 MG/DL (ref 65–99)
HBA1C MFR BLD: 4.9 %
HDLC SERPL-MCNC: 47 MG/DL
LDLC SERPL CALC-MCNC: 123 MG/DL (ref 0–100)
MAGNESIUM SERPL-MCNC: 1.8 MG/DL (ref 1.9–2.7)
NONHDLC SERPL-MCNC: 152 MG/DL
POTASSIUM SERPL-SCNC: 3.6 MMOL/L (ref 3.5–5.3)
SODIUM SERPL-SCNC: 139 MMOL/L (ref 135–147)
TRIGL SERPL-MCNC: 144 MG/DL

## 2024-08-16 PROCEDURE — 83036 HEMOGLOBIN GLYCOSYLATED A1C: CPT

## 2024-08-16 PROCEDURE — 36415 COLL VENOUS BLD VENIPUNCTURE: CPT

## 2024-08-16 PROCEDURE — 80061 LIPID PANEL: CPT

## 2024-08-16 PROCEDURE — 80048 BASIC METABOLIC PNL TOTAL CA: CPT

## 2024-08-16 PROCEDURE — 83735 ASSAY OF MAGNESIUM: CPT

## 2024-08-20 ENCOUNTER — TELEPHONE (OUTPATIENT)
Dept: NEPHROLOGY | Facility: CLINIC | Age: 29
End: 2024-08-20

## 2024-08-20 DIAGNOSIS — E87.6 HYPOKALEMIA: ICD-10-CM

## 2024-08-20 DIAGNOSIS — E83.42 HYPOMAGNESEMIA: Primary | ICD-10-CM

## 2024-08-20 NOTE — TELEPHONE ENCOUNTER
Patient aware    ----- Message from Abelardo Covarrubias MD sent at 8/20/2024  6:22 AM EDT -----  Labs are acceptable I would just get a BMP and magnesium in 1 month  ----- Message -----  From: Lab, Background User  Sent: 8/16/2024   5:25 PM EDT  To: Abelardo Covarrubias MD

## 2024-08-27 ENCOUNTER — OFFICE VISIT (OUTPATIENT)
Dept: CARDIOLOGY CLINIC | Facility: CLINIC | Age: 29
End: 2024-08-27
Payer: COMMERCIAL

## 2024-08-27 VITALS
BODY MASS INDEX: 31.36 KG/M2 | HEART RATE: 98 BPM | HEIGHT: 64 IN | DIASTOLIC BLOOD PRESSURE: 84 MMHG | WEIGHT: 183.7 LBS | SYSTOLIC BLOOD PRESSURE: 138 MMHG

## 2024-08-27 DIAGNOSIS — I49.3 PVC'S (PREMATURE VENTRICULAR CONTRACTIONS): Primary | ICD-10-CM

## 2024-08-27 DIAGNOSIS — I10 ESSENTIAL HYPERTENSION, BENIGN: Chronic | ICD-10-CM

## 2024-08-27 DIAGNOSIS — I47.10 SVT (SUPRAVENTRICULAR TACHYCARDIA): ICD-10-CM

## 2024-08-27 PROCEDURE — 93000 ELECTROCARDIOGRAM COMPLETE: CPT | Performed by: STUDENT IN AN ORGANIZED HEALTH CARE EDUCATION/TRAINING PROGRAM

## 2024-08-27 PROCEDURE — 99214 OFFICE O/P EST MOD 30 MIN: CPT | Performed by: STUDENT IN AN ORGANIZED HEALTH CARE EDUCATION/TRAINING PROGRAM

## 2024-08-27 RX ORDER — POTASSIUM CHLORIDE 1500 MG/1
20 TABLET, EXTENDED RELEASE ORAL 3 TIMES DAILY
COMMUNITY

## 2024-08-27 NOTE — PROGRESS NOTES
HEART AND VASCULAR  CARDIAC ELECTROPHYSIOLOGY   [facility]  [facility]    Outpatient New Consult   Follow-up for SVT in pregnancy  Today's Date: 08/27/24         Patient name: Julita Berry  YOB: 1995  Sex: female         Chief Complaint: As above      ASSESSMENT:  Problem List Items Addressed This Visit       Essential hypertension, benign (Chronic)    SVT (supraventricular tachycardia)     Other Visit Diagnoses       PVC's (premature ventricular contractions)    -  Primary    Relevant Orders    POCT ECG            PLAN:  SVT  -Continue metoprolol succinate 50 mg p.o. twice daily  -Continue with metoprolol tartrate 25 mg as needed up to a daily dose of 200mg daily  -Postdelivery, has done well however has had an increase in frequency/episodes as described  -Continues to be responsive to metoprolol/vagal maneuvers     G1, P1 with successful delivery  -Follows with OBGYN/MFM    PVCs  -Patient has not had PVCs evidenced on EKG     Hypertension  -Blood pressure 138/84  -Continue management as per PCP     History of PVCs  -No PVCs on EKG  -Continue metoprolol as described above         Follow up in: 3 months    Orders Placed This Encounter   Procedures    POCT ECG     There are no discontinued medications.      .............................................................................................    HPI/Subjective:     Ms. Julita Berry is a 28 year old female G1, P0 at approximately 19 weeks gestation with a history of hypokalemia, essential hypertension, history of SVT controlled metoprolol.  Per EMR, her last episode of SVT was around January 24, 2024 for which she did not have to take an extra dose of metoprolol.  She is followed by MFM.  She was seen in outpatient electrophysiology clinic for palpitations.  She noted that she was doing well at that time.  She had a planned induction for delivery in June and did well without intervention.    Today she notes  "she feels well overall.  She has an increasing frequency in her SVT episodes.  She states that mostly last about 10 to 15 minutes and are responsive to vagal maneuvers and or extra dose of metoprolol.  She currently denies chest pain, palpitations, shortness of breath, dizziness, lightheadedness, syncope, near syncope.    We discussed options for management including EP study and ablation as well as antiarrhythmics.  She notes that for now, she would like to wait on an intervention as she has had an uptick in episodes since delivery and she suspects they may be due to hormonal imbalances.  Given this, we will continue therapies as it is.  She was advised to contact the office should she have continued worsening frequency or duration with regards to episodes.  We discussed the possibility EP study and ablation at that time to which the patient noted she would like to trial an antiarrhythmic first.  Would likely trial flecainide first.      Complete 12 point ROS reviewed and otherwise non pertinent or negative except as per HPI pertinent positives in Cardiovascular and Respiratory emphasized. Please see paper chart for outpatient clinic patients where the patient completed the 12 point ROS survey.           Past Medical History:   Diagnosis Date    Anxiety     Eczema     Exercises 5 to 6 times per week     per pt prior to knee issue -enjoyed running and horseback riding    Family history of reaction to anesthesia     per pt--\"Mom also gets high anxiety with surgeries and wakes up nasty from anesthesia\"    Gastroparesis 2012    Hypertension     last assessed 3/12/14    Hypokalemia     per pt per doctors possibly related renal problem    Hypomagnesemia     Irritable bowel syndrome     with diarrhea    Rheumatoid arthritis (HCC) 2022    Sinus tachycardia     related to dehydration    Tooth missing     front upper tooth retainer-    Wears glasses     for computer use       Allergies   Allergen Reactions    Prednisone " Hyperactivity     Medrol dose samantha only    Serotonin Reuptake Inhibitors (Ssris) Anaphylaxis and Hives    Augmentin [Amoxicillin-Pot Clavulanate] Hives and Rash    Clindamycin Rash    Penicillins Rash    Sulfa Antibiotics Rash    Azithromycin Rash     I reviewed the Home Medication list and Allergies in the chart.   Scheduled Meds:  Current Outpatient Medications   Medication Sig Dispense Refill    AMILoride 5 mg tablet Take 5 mg by mouth 2 (two) times a day 2 in the morning.   3 at night      Magnesium Glycinate 100 MG CAPS Take 200 mg by mouth 2 (two) times a day      metoprolol succinate (TOPROL-XL) 50 mg 24 hr tablet Take 1 tablet (50 mg total) by mouth 2 (two) times a day 90 tablet 3    metoprolol tartrate (LOPRESSOR) 25 mg tablet Take 1 tablet (25 mg total) by mouth every 6 (six) hours as needed (palpitatins) 90 tablet 3    potassium chloride (Klor-Con M20) 20 mEq tablet Take 20 mEq by mouth 3 (three) times a day      acetaminophen (TYLENOL) 325 mg tablet Take 2 tablets (650 mg total) by mouth every 6 (six) hours (Patient not taking: Reported on 8/27/2024)      benzocaine-menthol-lanolin-aloe (DERMOPLAST) 20-0.5 % topical spray Apply 1 Application topically every 6 (six) hours as needed for mild pain (Patient not taking: Reported on 7/26/2024)      hydrocortisone 1 % cream Apply 1 Application topically daily as needed for irritation (Patient not taking: Reported on 7/26/2024)      ibuprofen (MOTRIN) 600 mg tablet Take 1 tablet (600 mg total) by mouth every 6 (six) hours (Patient not taking: Reported on 8/27/2024)      witch hazel-glycerin (TUCKS) topical pad Apply 1 Pad topically every 4 (four) hours as needed for irritation (Patient not taking: Reported on 7/26/2024)       No current facility-administered medications for this visit.     PRN Meds:.        Family History   Problem Relation Age of Onset    Hypokalemia Mother     Hypotension Mother     Anxiety disorder Mother         NOS    Lung cancer Father      Skin cancer Father     Crohn's disease Father     Schizoaffective Disorder  Father     Alcohol abuse Father     Drug abuse Father     No Known Problems Sister     No Known Problems Sister     No Known Problems Brother     Coronary artery disease Maternal Grandmother     Heart disease Maternal Grandmother     Alzheimer's disease Maternal Grandmother     Arthritis Paternal Grandmother     Rheum arthritis Paternal Grandmother     Hypertension Paternal Grandmother     COPD Paternal Grandfather        Social History     Socioeconomic History    Marital status: /Civil Union     Spouse name: Not on file    Number of children: Not on file    Years of education: Not on file    Highest education level: Not on file   Occupational History    Occupation: medical assistant with nephrology    Tobacco Use    Smoking status: Never    Smokeless tobacco: Never   Vaping Use    Vaping status: Never Used   Substance and Sexual Activity    Alcohol use: Not Currently     Alcohol/week: 1.0 standard drink of alcohol     Types: 1 Standard drinks or equivalent per week    Drug use: No    Sexual activity: Yes     Partners: Male     Birth control/protection: None   Other Topics Concern    Not on file   Social History Narrative    Activities - horseback riding    Caffeine use    Drinks coffee- 1 a wk    Exercise - walking    Exercising regularly     Social Determinants of Health     Financial Resource Strain: Not on file   Food Insecurity: No Food Insecurity (6/21/2024)    Hunger Vital Sign     Worried About Running Out of Food in the Last Year: Never true     Ran Out of Food in the Last Year: Never true   Transportation Needs: No Transportation Needs (6/21/2024)    PRAPARE - Transportation     Lack of Transportation (Medical): No     Lack of Transportation (Non-Medical): No   Physical Activity: Not on file   Stress: Not on file   Social Connections: Not on file   Intimate Partner Violence: Not on file   Housing Stability: Low Risk   "(6/21/2024)    Housing Stability Vital Sign     Unable to Pay for Housing in the Last Year: No     Number of Times Moved in the Last Year: 0     Homeless in the Last Year: No         OBJECTIVE:    /84 (BP Location: Left arm, Patient Position: Sitting, Cuff Size: Standard)   Pulse 98   Ht 5' 4\" (1.626 m)   Wt 83.3 kg (183 lb 11.2 oz)   BMI 31.53 kg/m²   Vitals:    08/27/24 0954   Weight: 83.3 kg (183 lb 11.2 oz)       GEN: No acute distress, Alert and oriented, well appearing  HEENT: Normocephalic, atraumatic.  CARDIOVASCULAR:  RRR, No murmur, rub, gallops S1,S2  LUNGS: Clear To auscultation bilaterally, normal effort, no rales, rhonchi, crackles   ABDOMEN: 33 weeks gestation  EXTREMITIES/VASCULAR:  No edema. warm an well perfused.  PSYCH: Normal Affect,  linear speech pattern without evidence of psychosis.   NEURO: Grossly intact, moving all extremiteis equal, face symmetric, alert and responsive, no obvious focal defecits   GAIT:  Ambulates normally without difficulty  HEME: No bleeding, bruising, petechia, purpura   SKIN: No significant rashes on visibile skin, warm, no diaphoresis or pallor.     Lab Results:       LABS:      Chemistry        Component Value Date/Time     (L) 01/07/2016 1654    K 3.6 08/16/2024 0940    K 3.3 (L) 01/07/2016 1654     08/16/2024 0940    CL 93 (L) 01/07/2016 1654    CO2 26 08/16/2024 0940    CO2 23.5 01/07/2016 1654    BUN 15 08/16/2024 0940    BUN 15 01/07/2016 1654    CREATININE 0.81 08/16/2024 0940    CREATININE 0.93 01/07/2016 1654        Component Value Date/Time    CALCIUM 9.5 08/16/2024 0940    CALCIUM 8.1 (L) 01/07/2016 1654    ALKPHOS 116 (H) 06/18/2024 2317    ALKPHOS 56 12/28/2015 1739    AST 16 06/18/2024 2317    AST 16 12/28/2015 1739    ALT 12 06/18/2024 2317    ALT 17 12/28/2015 1739    BILITOT 0.33 12/28/2015 1739            Lab Results   Component Value Date    CHOL 195 08/27/2015    CHOL 176 06/05/2015    CHOL 188 04/20/2015     Lab Results " "  Component Value Date    HDL 47 (L) 08/16/2024    HDL 52 06/05/2023    HDL 60 10/12/2022     Lab Results   Component Value Date    LDLCALC 123 (H) 08/16/2024    LDLCALC 89 06/05/2023    LDLCALC 95 10/12/2022     Lab Results   Component Value Date    TRIG 144 08/16/2024    TRIG 122 06/05/2023    TRIG 100 10/12/2022     No results found for: \"CHOLHDL\"    IMAGING: No results found.     Cardiac testing:           I reviewed and interpreted the following LABS/EKG/TELE/IMAGING and below is summary of my interpretation (if data available):    LABS:    Current EKG performed at today's visit and read by me personally reveals normal sinus rhythm     ECHO 7/21/2023  Left Ventricle Left ventricular cavity size is normal. Wall thickness is normal. The left ventricular ejection fraction is 55%. Systolic function is normal.  Wall motion is normal. Diastolic function is normal.   Right Ventricle Right ventricular cavity size is normal. Systolic function is normal. Wall thickness is normal.   Left Atrium The atrium is normal in size.   Right Atrium The atrium is normal in size.   Aortic Valve The aortic valve is trileaflet. The leaflets are not thickened. The leaflets are not calcified. The leaflets exhibit normal mobility. There is no evidence of regurgitation. The aortic valve has no significant stenosis.   Mitral Valve Mitral valve structure is normal. There is trace regurgitation. There is no evidence of stenosis.   Tricuspid Valve Tricuspid valve structure is normal. There is trace to mild regurgitation. There is no evidence of stenosis. The right ventricular systolic pressure is normal. The estimated right ventricular systolic pressure is 22.00 mmHg.   Pulmonic Valve Pulmonic valve structure is normal. There is trace regurgitation. There is no evidence of stenosis.   Ascending Aorta The aortic root is normal in size.   IVC/SVC The right atrial pressure is estimated at 5.0 mmHg. The inferior vena cava is normal in size. "   Pericardium There is no pericardial effusion. The pericardium is normal in appearance.

## 2024-09-04 ENCOUNTER — TELEPHONE (OUTPATIENT)
Dept: NEPHROLOGY | Facility: CLINIC | Age: 29
End: 2024-09-04

## 2024-09-04 NOTE — TELEPHONE ENCOUNTER
LM with pt to have a sooner appt with Dr Covarrubias 09/10 or 09/11 in [facility].

## 2024-09-07 ENCOUNTER — HOSPITAL ENCOUNTER (EMERGENCY)
Facility: HOSPITAL | Age: 29
Discharge: HOME | End: 2024-09-07
Attending: EMERGENCY MEDICINE
Payer: COMMERCIAL

## 2024-09-07 ENCOUNTER — APPOINTMENT (EMERGENCY)
Dept: RADIOLOGY | Facility: HOSPITAL | Age: 29
End: 2024-09-07
Payer: COMMERCIAL

## 2024-09-07 VITALS
HEART RATE: 109 BPM | TEMPERATURE: 97.3 F | RESPIRATION RATE: 18 BRPM | DIASTOLIC BLOOD PRESSURE: 92 MMHG | SYSTOLIC BLOOD PRESSURE: 169 MMHG | OXYGEN SATURATION: 100 %

## 2024-09-07 DIAGNOSIS — R07.9 CHEST PAIN: ICD-10-CM

## 2024-09-07 DIAGNOSIS — E83.42 HYPOMAGNESEMIA: ICD-10-CM

## 2024-09-07 DIAGNOSIS — R55 NEAR SYNCOPE: Primary | ICD-10-CM

## 2024-09-07 DIAGNOSIS — E87.6 HYPOKALEMIA: ICD-10-CM

## 2024-09-07 LAB
ALBUMIN SERPL BCG-MCNC: 4.6 G/DL (ref 3.5–5)
ALP SERPL-CCNC: 107 U/L (ref 34–104)
ALT SERPL W P-5'-P-CCNC: 15 U/L (ref 7–52)
ANION GAP SERPL CALCULATED.3IONS-SCNC: 10 MMOL/L (ref 4–13)
AST SERPL W P-5'-P-CCNC: 16 U/L (ref 13–39)
ATRIAL RATE: 118 BPM
ATRIAL RATE: 140 BPM
BASOPHILS # BLD AUTO: 0.05 THOUSANDS/ÂΜL (ref 0–0.1)
BASOPHILS NFR BLD AUTO: 1 % (ref 0–1)
BILIRUB SERPL-MCNC: 0.27 MG/DL (ref 0.2–1)
BUN SERPL-MCNC: 14 MG/DL (ref 5–25)
CALCIUM SERPL-MCNC: 9.7 MG/DL (ref 8.4–10.2)
CARDIAC TROPONIN I PNL SERPL HS: 3 NG/L
CHLORIDE SERPL-SCNC: 100 MMOL/L (ref 96–108)
CO2 SERPL-SCNC: 27 MMOL/L (ref 21–32)
CREAT SERPL-MCNC: 0.81 MG/DL (ref 0.6–1.3)
EOSINOPHIL # BLD AUTO: 0.07 THOUSAND/ÂΜL (ref 0–0.61)
EOSINOPHIL NFR BLD AUTO: 1 % (ref 0–6)
ERYTHROCYTE [DISTWIDTH] IN BLOOD BY AUTOMATED COUNT: 12.2 % (ref 11.6–15.1)
GFR SERPL CREATININE-BSD FRML MDRD: 97 ML/MIN/1.73SQ M
GLUCOSE SERPL-MCNC: 106 MG/DL (ref 65–140)
HCT VFR BLD AUTO: 42.1 % (ref 34.8–46.1)
HGB BLD-MCNC: 14.6 G/DL (ref 11.5–15.4)
IMM GRANULOCYTES # BLD AUTO: 0.03 THOUSAND/UL (ref 0–0.2)
IMM GRANULOCYTES NFR BLD AUTO: 0 % (ref 0–2)
LYMPHOCYTES # BLD AUTO: 2.5 THOUSANDS/ÂΜL (ref 0.6–4.47)
LYMPHOCYTES NFR BLD AUTO: 28 % (ref 14–44)
MAGNESIUM SERPL-MCNC: 1.7 MG/DL (ref 1.9–2.7)
MCH RBC QN AUTO: 29.3 PG (ref 26.8–34.3)
MCHC RBC AUTO-ENTMCNC: 34.7 G/DL (ref 31.4–37.4)
MCV RBC AUTO: 85 FL (ref 82–98)
MONOCYTES # BLD AUTO: 0.85 THOUSAND/ÂΜL (ref 0.17–1.22)
MONOCYTES NFR BLD AUTO: 9 % (ref 4–12)
NEUTROPHILS # BLD AUTO: 5.56 THOUSANDS/ÂΜL (ref 1.85–7.62)
NEUTS SEG NFR BLD AUTO: 61 % (ref 43–75)
NRBC BLD AUTO-RTO: 0 /100 WBCS
P AXIS: 28 DEGREES
P AXIS: 57 DEGREES
PLATELET # BLD AUTO: 250 THOUSANDS/UL (ref 149–390)
PMV BLD AUTO: 11.7 FL (ref 8.9–12.7)
POTASSIUM SERPL-SCNC: 3.2 MMOL/L (ref 3.5–5.3)
PR INTERVAL: 120 MS
PR INTERVAL: 164 MS
PROT SERPL-MCNC: 7.6 G/DL (ref 6.4–8.4)
QRS AXIS: 52 DEGREES
QRS AXIS: 55 DEGREES
QRSD INTERVAL: 68 MS
QRSD INTERVAL: 76 MS
QT INTERVAL: 314 MS
QT INTERVAL: 368 MS
QTC INTERVAL: 440 MS
QTC INTERVAL: 561 MS
RBC # BLD AUTO: 4.98 MILLION/UL (ref 3.81–5.12)
SODIUM SERPL-SCNC: 137 MMOL/L (ref 135–147)
T WAVE AXIS: 26 DEGREES
T WAVE AXIS: 33 DEGREES
TSH SERPL DL<=0.05 MIU/L-ACNC: 3.95 UIU/ML (ref 0.45–4.5)
VENTRICULAR RATE: 118 BPM
VENTRICULAR RATE: 140 BPM
WBC # BLD AUTO: 9.06 THOUSAND/UL (ref 4.31–10.16)

## 2024-09-07 PROCEDURE — 85025 COMPLETE CBC W/AUTO DIFF WBC: CPT

## 2024-09-07 PROCEDURE — 99285 EMERGENCY DEPT VISIT HI MDM: CPT | Performed by: EMERGENCY MEDICINE

## 2024-09-07 PROCEDURE — 80053 COMPREHEN METABOLIC PANEL: CPT

## 2024-09-07 PROCEDURE — 96361 HYDRATE IV INFUSION ADD-ON: CPT

## 2024-09-07 PROCEDURE — 96375 TX/PRO/DX INJ NEW DRUG ADDON: CPT

## 2024-09-07 PROCEDURE — 84443 ASSAY THYROID STIM HORMONE: CPT

## 2024-09-07 PROCEDURE — 96365 THER/PROPH/DIAG IV INF INIT: CPT

## 2024-09-07 PROCEDURE — 36415 COLL VENOUS BLD VENIPUNCTURE: CPT

## 2024-09-07 PROCEDURE — 93010 ELECTROCARDIOGRAM REPORT: CPT | Performed by: INTERNAL MEDICINE

## 2024-09-07 PROCEDURE — 93005 ELECTROCARDIOGRAM TRACING: CPT

## 2024-09-07 PROCEDURE — 99284 EMERGENCY DEPT VISIT MOD MDM: CPT

## 2024-09-07 PROCEDURE — 96368 THER/DIAG CONCURRENT INF: CPT

## 2024-09-07 PROCEDURE — 84484 ASSAY OF TROPONIN QUANT: CPT

## 2024-09-07 PROCEDURE — 71046 X-RAY EXAM CHEST 2 VIEWS: CPT

## 2024-09-07 PROCEDURE — 83735 ASSAY OF MAGNESIUM: CPT

## 2024-09-07 RX ORDER — POTASSIUM CHLORIDE 14.9 MG/ML
20 INJECTION INTRAVENOUS ONCE
Status: COMPLETED | OUTPATIENT
Start: 2024-09-07 | End: 2024-09-07

## 2024-09-07 RX ORDER — POTASSIUM CHLORIDE 1500 MG/1
20 TABLET, EXTENDED RELEASE ORAL ONCE
Status: COMPLETED | OUTPATIENT
Start: 2024-09-07 | End: 2024-09-07

## 2024-09-07 RX ORDER — METOPROLOL TARTRATE 1 MG/ML
2.5 INJECTION, SOLUTION INTRAVENOUS ONCE
Status: COMPLETED | OUTPATIENT
Start: 2024-09-07 | End: 2024-09-07

## 2024-09-07 RX ORDER — MAGNESIUM SULFATE HEPTAHYDRATE 40 MG/ML
2 INJECTION, SOLUTION INTRAVENOUS ONCE
Status: COMPLETED | OUTPATIENT
Start: 2024-09-07 | End: 2024-09-07

## 2024-09-07 RX ORDER — ASPIRIN 81 MG/1
324 TABLET, CHEWABLE ORAL ONCE
Status: COMPLETED | OUTPATIENT
Start: 2024-09-07 | End: 2024-09-07

## 2024-09-07 RX ORDER — POTASSIUM CHLORIDE 20MEQ/15ML
40 LIQUID (ML) ORAL ONCE
Status: COMPLETED | OUTPATIENT
Start: 2024-09-07 | End: 2024-09-07

## 2024-09-07 RX ADMIN — POTASSIUM CHLORIDE 20 MEQ: 1500 TABLET, EXTENDED RELEASE ORAL at 18:58

## 2024-09-07 RX ADMIN — ASPIRIN 81 MG CHEWABLE TABLET 324 MG: 81 TABLET CHEWABLE at 17:28

## 2024-09-07 RX ADMIN — MAGNESIUM SULFATE HEPTAHYDRATE 2 G: 40 INJECTION, SOLUTION INTRAVENOUS at 18:43

## 2024-09-07 RX ADMIN — POTASSIUM CHLORIDE 20 MEQ: 14.9 INJECTION, SOLUTION INTRAVENOUS at 18:52

## 2024-09-07 RX ADMIN — SODIUM CHLORIDE 1000 ML: 0.9 INJECTION, SOLUTION INTRAVENOUS at 18:09

## 2024-09-07 RX ADMIN — POTASSIUM CHLORIDE 20 MEQ: 1.5 SOLUTION ORAL at 18:41

## 2024-09-07 RX ADMIN — METOROPROLOL TARTRATE 2.5 MG: 5 INJECTION, SOLUTION INTRAVENOUS at 18:12

## 2024-09-07 NOTE — DISCHARGE INSTRUCTIONS
You were seen in the ED for near syncope and chest pain. You have hypokalemia and hypomagnesemia.     Please follow up with PCP, EP and nephrology.     Return for worsening symptoms.     Thank you for choosing [facility] for your care.

## 2024-09-07 NOTE — ED PROVIDER NOTES
History  Chief Complaint   Patient presents with    Dizziness     With nausea and near syncope ongoing for a few days.  HR rate varies     HPI  Patient is 29 y.o. female with PMH htn, hypokalemia, hypomagnesemia, paroxysmal atrial tachycardia presenting to ED for evaluation of near syncope. Patient reports several days of flushed feeling along with dizziness. 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 Patient also reports sharp left sided chest pain and SOB during episode. Patient denies blurred/double vision, headache, focal motor weakness, abdominal pain, v/d.   Prior to Admission Medications   Prescriptions Last Dose Informant Patient Reported? Taking?   AMILoride 5 mg tablet  Self Yes No   Sig: Take 5 mg by mouth 2 (two) times a day 2 in the morning.   3 at night   Magnesium Glycinate 100 MG CAPS  Self Yes No   Sig: Take 200 mg by mouth 2 (two) times a day   acetaminophen (TYLENOL) 325 mg tablet  Self No No   Sig: Take 2 tablets (650 mg total) by mouth every 6 (six) hours   Patient not taking: Reported on 8/27/2024   benzocaine-menthol-lanolin-aloe (DERMOPLAST) 20-0.5 % topical spray  Self No No   Sig: Apply 1 Application topically every 6 (six) hours as needed for mild pain   Patient not taking: Reported on 7/26/2024   hydrocortisone 1 % cream  Self No No   Sig: Apply 1 Application topically daily as needed for irritation   Patient not taking: Reported on 7/26/2024   ibuprofen (MOTRIN) 600 mg tablet  Self No No   Sig: Take 1 tablet (600 mg total) by mouth every 6 (six) hours   Patient not taking: Reported on 8/27/2024   metoprolol succinate (TOPROL-XL) 50 mg 24 hr tablet  Self No No   Sig: Take 1 tablet (50 mg total) by mouth 2 (two) times a day   metoprolol tartrate (LOPRESSOR) 25 mg tablet  Self No No   Sig: Take 1 tablet (25 mg total) by mouth every 6 (six) hours as needed (palpitatins)   potassium chloride (Klor-Con M20) 20 mEq  "tablet   Yes No   Sig: Take 20 mEq by mouth 3 (three) times a day   witch hazel-glycerin (TUCKS) topical pad  Self No No   Sig: Apply 1 Pad topically every 4 (four) hours as needed for irritation   Patient not taking: Reported on 7/26/2024      Facility-Administered Medications: None       Past Medical History:   Diagnosis Date    Anxiety     Eczema     Exercises 5 to 6 times per week     per pt prior to knee issue -enjoyed running and horseback riding    Family history of reaction to anesthesia     per pt--\"Mom also gets high anxiety with surgeries and wakes up nasty from anesthesia\"    Gastroparesis 2012    Hypertension     last assessed 3/12/14    Hypokalemia     per pt per doctors possibly related renal problem    Hypomagnesemia     Irritable bowel syndrome     with diarrhea    Rheumatoid arthritis (HCC) 2022    Sinus tachycardia     related to dehydration    Tooth missing     front upper tooth retainer-    Wears glasses     for computer use       Past Surgical History:   Procedure Laterality Date    ME ARTHROSCOPY KNEE LATERAL RELEASE Right 04/13/2023    Procedure: ARTHROSCOPIC RELEASE RETINACULAR;  Surgeon: Ilya Brizuela DO;  Location:  MAIN OR;  Service: Orthopedics    ME EGD TRANSORAL BIOPSY SINGLE/MULTIPLE N/A 05/04/2016    Procedure: ESOPHAGOGASTRODUODENOSCOPY (EGD);  Surgeon: Raji Monzon MD;  Location:  GI LAB;  Service: Gastroenterology    ME RCNSTJ DISLC PATELLA W/XTNSR RELIGNMT&/MUSC RL Right 04/13/2023    Procedure: ARTHROSCOPIC REALIGNMENT PATELLA;  Surgeon: Ilya Brizuela DO;  Location:  MAIN OR;  Service: Orthopedics    WISDOM TOOTH EXTRACTION         Family History   Problem Relation Age of Onset    Hypokalemia Mother     Hypotension Mother     Anxiety disorder Mother         NOS    Lung cancer Father     Skin cancer Father     Crohn's disease Father     Schizoaffective Disorder  Father     Alcohol abuse Father     Drug abuse Father     No Known Problems Sister     No Known Problems " Sister     No Known Problems Brother     Coronary artery disease Maternal Grandmother     Heart disease Maternal Grandmother     Alzheimer's disease Maternal Grandmother     Arthritis Paternal Grandmother     Rheum arthritis Paternal Grandmother     Hypertension Paternal Grandmother     COPD Paternal Grandfather      I have reviewed and agree with the history as documented.    E-Cigarette/Vaping    E-Cigarette Use Never User      E-Cigarette/Vaping Substances    Nicotine No     THC No     CBD No     Flavoring No     Other No     Unknown No      Social History     Tobacco Use    Smoking status: Never    Smokeless tobacco: Never   Vaping Use    Vaping status: Never Used   Substance Use Topics    Alcohol use: Not Currently     Alcohol/week: 1.0 standard drink of alcohol     Types: 1 Standard drinks or equivalent per week    Drug use: No        Review of Systems   Constitutional:  Negative for chills and fever.   HENT:  Negative for congestion and sore throat.    Respiratory:  Positive for shortness of breath. Negative for cough.    Cardiovascular:  Positive for chest pain. Negative for leg swelling.   Gastrointestinal:  Negative for abdominal pain, nausea and vomiting.   Genitourinary:  Negative for dysuria and hematuria.   Skin:  Negative for rash.   Neurological:  Positive for dizziness and light-headedness. Negative for headaches.       Physical Exam  ED Triage Vitals   Temperature Pulse Respirations Blood Pressure SpO2   09/07/24 1719 09/07/24 1703 09/07/24 1703 09/07/24 1703 09/07/24 1703   (!) 97.3 °F (36.3 °C) (!) 109 18 169/92 100 %      Temp Source Heart Rate Source Patient Position - Orthostatic VS BP Location FiO2 (%)   09/07/24 1719 09/07/24 1703 -- -- --   Oral Monitor         Pain Score       09/07/24 1703       5             Orthostatic Vital Signs  Vitals:    09/07/24 1703   BP: 169/92   Pulse: (!) 109       Physical Exam  Vitals reviewed.   Constitutional:       General: She is awake.   HENT:       Head: Normocephalic and atraumatic.      Mouth/Throat:      Mouth: Mucous membranes are moist.   Eyes:      Extraocular Movements: Extraocular movements intact.      Right eye: No nystagmus.      Left eye: No nystagmus.      Conjunctiva/sclera: Conjunctivae normal.      Pupils: Pupils are equal, round, and reactive to light.   Cardiovascular:      Rate and Rhythm: Regular rhythm. Tachycardia present.      Pulses: Normal pulses.      Heart sounds: Normal heart sounds, S1 normal and S2 normal. Heart sounds not distant. No murmur heard.     No friction rub. No gallop.   Pulmonary:      Breath sounds: No stridor. No wheezing, rhonchi or rales.      Comments: CTA b/l   Abdominal:      General: Bowel sounds are normal.      Palpations: Abdomen is soft.      Tenderness: There is no abdominal tenderness.   Musculoskeletal:      Right lower leg: No edema.      Left lower leg: No edema.   Skin:     General: Skin is warm and dry.      Capillary Refill: Capillary refill takes less than 2 seconds.   Neurological:      Mental Status: She is alert and oriented to person, place, and time.      GCS: GCS eye subscore is 4. GCS verbal subscore is 5. GCS motor subscore is 6.      Cranial Nerves: Cranial nerves 2-12 are intact.      Sensory: Sensation is intact.      Motor: No weakness or pronator drift.      Coordination: Coordination normal. Finger-Nose-Finger Test normal.         ED Medications  Medications   aspirin chewable tablet 324 mg (324 mg Oral Given 9/7/24 1728)   metoprolol (LOPRESSOR) injection 2.5 mg (2.5 mg Intravenous Given 9/7/24 1812)   sodium chloride 0.9 % bolus 1,000 mL (0 mL Intravenous Stopped 9/7/24 1909)   magnesium sulfate 2 g/50 mL IVPB (premix) 2 g (0 g Intravenous Stopped 9/7/24 1920)   potassium chloride oral solution 40 mEq (20 mEq Oral Given 9/7/24 1841)   potassium chloride 20 mEq IVPB (premix) (0 mEq Intravenous Stopped 9/7/24 1920)   potassium chloride (Klor-Con M20) CR tablet 20 mEq (20 mEq Oral  Given 9/7/24 1858)       Diagnostic Studies  Results Reviewed       Procedure Component Value Units Date/Time    Comprehensive metabolic panel [[id number]]  (Abnormal) Collected: 09/07/24 1728    Lab Status: Final result Specimen: Blood from Arm, Right Updated: 09/07/24 1822     Sodium 137 mmol/L      Potassium 3.2 mmol/L      Chloride 100 mmol/L      CO2 27 mmol/L      ANION GAP 10 mmol/L      BUN 14 mg/dL      Creatinine 0.81 mg/dL      Glucose 106 mg/dL      Calcium 9.7 mg/dL      AST 16 U/L      ALT 15 U/L      Alkaline Phosphatase 107 U/L      Total Protein 7.6 g/dL      Albumin 4.6 g/dL      Total Bilirubin 0.27 mg/dL      eGFR 97 ml/min/1.73sq m     Narrative:      National Kid[employer] Disease Foundation guidelines for Chronic Kidney Disease (CKD):     Stage 1 with normal or high GFR (GFR > 90 mL/min/1.73 square meters)    Stage 2 Mild CKD (GFR = 60-89 mL/min/1.73 square meters)    Stage 3A Moderate CKD (GFR = 45-59 mL/min/1.73 square meters)    Stage 3B Moderate CKD (GFR = 30-44 mL/min/1.73 square meters)    Stage 4 Severe CKD (GFR = 15-29 mL/min/1.73 square meters)    Stage 5 End Stage CKD (GFR <15 mL/min/1.73 square meters)  Note: GFR calculation is accurate only with a steady state creatinine    Magnesium [[id number]]  (Abnormal) Collected: 09/07/24 1728    Lab Status: Final result Specimen: Blood from Arm, Right Updated: 09/07/24 1822     Magnesium 1.7 mg/dL     TSH, 3rd generation [[id number]]  (Normal) Collected: 09/07/24 1728    Lab Status: Final result Specimen: Blood from Arm, Right Updated: 09/07/24 1822     TSH 3RD GENERATON 3.953 uIU/mL     HS Troponin 0hr (reflex protocol) [[id number]]  (Normal) Collected: 09/07/24 1728    Lab Status: Final result Specimen: Blood from Arm, Right Updated: 09/07/24 1811     hs TnI 0hr 3 ng/L     CBC and differential [[id number]] Collected: 09/07/24 1728    Lab Status: Final result Specimen: Blood from Arm, Right Updated: 09/07/24 1746     WBC 9.06 Thousand/uL      RBC 4.98  Million/uL      Hemoglobin 14.6 g/dL      Hematocrit 42.1 %      MCV 85 fL      MCH 29.3 pg      MCHC 34.7 g/dL      RDW 12.2 %      MPV 11.7 fL      Platelets 250 Thousands/uL      nRBC 0 /100 WBCs      Segmented % 61 %      Immature Grans % 0 %      Lymphocytes % 28 %      Monocytes % 9 %      Eosinophils Relative 1 %      Basophils Relative 1 %      Absolute Neutrophils 5.56 Thousands/µL      Absolute Immature Grans 0.03 Thousand/uL      Absolute Lymphocytes 2.50 Thousands/µL      Absolute Monocytes 0.85 Thousand/µL      Eosinophils Absolute 0.07 Thousand/µL      Basophils Absolute 0.05 Thousands/µL                    X-ray chest 2 views   ED Interpretation by Suzanne Esparza DO (09/07 1814)   No acute cardiopulmonary disease             Procedures  Procedures      ED Course  ED Course as of 09/07/24 2300   Sat Sep 07, 2024   1810 WBC: 9.06   1810 Hemoglobin: 14.6   1810 Platelet Count: 250   1812 hs TnI 0hr: 3   1832 MAGNESIUM(!): 1.7  Will replete    1833 Potassium(!): 3.2  Will replete              HEART Risk Score      Flowsheet Row Most Recent Value   Heart Score Risk Calculator    History 1 Filed at: 09/07/2024 1911   ECG 1 Filed at: 09/07/2024 1911   Age 0 Filed at: 09/07/2024 1911   Risk Factors 1 Filed at: 09/07/2024 1911   Troponin 0 Filed at: 09/07/2024 1911   HEART Score 3 Filed at: 09/07/2024 1911                        SBIRT 20yo+      Flowsheet Row Most Recent Value   Initial Alcohol Screen: US AUDIT-C     1. How often do you have a drink containing alcohol? 0 Filed at: 09/07/2024 1704   2. How many drinks containing alcohol do you have on a typical day you are drinking?  0 Filed at: 09/07/2024 1704   3a. Male UNDER 65: How often do you have five or more drinks on one occasion? 0 Filed at: 09/07/2024 1704   3b. FEMALE Any Age, or MALE 65+: How often do you have 4 or more drinks on one occassion? 0 Filed at: 09/07/2024 1704   Audit-C Score 0 Filed at: 09/07/2024 1704   RAZ: How many times in the  past year have you...    Used an illegal drug or used a prescription medication for non-medical reasons? Never Filed at: 09/07/2024 6945                  Medical Decision Making  Amount and/or Complexity of Data Reviewed  Labs: ordered. Decision-making details documented in ED Course.  Radiology: ordered and independent interpretation performed.    Risk  OTC drugs.  Prescription drug management.        Patient is 29 y.o. female with PMH of htn, hypokalemia, hypomagnesemia, paroxysmal atrial tachycardia presenting to ED for evaluation of near syncope.. See history and physical documented above.       Differential diagnosis included but not limited to arrhythmia, anemia, electrolyte disturbance, ACS, hyperthyroid, doubt pneumothorax. Low wells, cannot PERC out due to tachycardia but low clinical suspicion. . Plan CBC, CMP, Mg, TSH, troponin, EKG and CXR.     On my interpretation of initial EKG sinus tachycardia with occasional PVCs, normal axis, normal intervals, no ORLY/STD.     On my interpretation of repeat EKG sinus tachycardia with frequent PVCs in quadrigeminy, normal axis, normal intervals, no ORLY/STD.     View ED course above for further discussion on patient workup.       All labs reviewed and utilized in the medical decision making process  All radiology studies independently viewed by me and interpreted by the radiologist.  I reviewed all testing with the patient.     Upon re-evaluation patient's symptoms improved after fluids, electrolyte repletion and dose of metoprolol, requesting to go home. Recommended close follow up with PCP, EP and nephrologist. Patient amenable to plan.     Strict return precautions given.        Disposition  Final diagnoses:   Near syncope   Hypokalemia   Hypomagnesemia   Chest pain     Time reflects when diagnosis was documented in both MDM as applicable and the Disposition within this note       Time User Action Codes Description Comment    9/7/2024  7:11 PM Suzanne Esparza Add  [R55] Near syncope     9/7/2024  7:11 PM Suzanne Esparza [E87.6] Hypokalemia     9/7/2024  7:11 PM Suzanne Esparza Add [E83.42] Hypomagnesemia     9/7/2024  7:11 PM Suzanne Esparza Add [R07.9] Chest pain           ED Disposition       ED Disposition   Discharge    Condition   Stable    Date/Time   Sat Sep 7, 2024 1911    Comment   Julita Berry discharge to home/self care.                   Follow-up Information       Follow up With Specialties Details Why Contact Info Additional Information    [facility], Internal Medicine, Nurse Practitioner   [facility] [address] [address] [phone]       [facility] Emergency Department Emergency Medicine Go to  If symptoms worsen [address] [place] [id number] [phone] [facility] Emergency Department, [address] [place] [address], [address] [phone]            Discharge Medication List as of 9/7/2024  7:22 PM        CONTINUE these medications which have NOT CHANGED    Details   acetaminophen (TYLENOL) 325 mg tablet Take 2 tablets (650 mg total) by mouth every 6 (six) hours, Starting Fri 6/21/2024, No Print      AMILoride 5 mg tablet Take 5 mg by mouth 2 (two) times a day 2 in the morning.   3 at night, Historical Med      benzocaine-menthol-lanolin-aloe (DERMOPLAST) 20-0.5 % topical spray Apply 1 Application topically every 6 (six) hours as needed for mild pain, Starting Fri 6/21/2024, No Print      hydrocortisone 1 % cream Apply 1 Application topically daily as needed for irritation, Starting Fri 6/21/2024, No Print      ibuprofen (MOTRIN) 600 mg tablet Take 1 tablet (600 mg total) by mouth every 6 (six) hours, Starting Fri 6/21/2024, No Print      Magnesium Glycinate 100 MG CAPS Take 200 mg by mouth 2 (two) times a day, Historical Med      metoprolol succinate (TOPROL-XL) 50 mg 24 hr tablet Take 1 tablet (50 mg total) by mouth 2 (two) times a  day, Starting Thu 10/12/2023, Normal      metoprolol tartrate (LOPRESSOR) 25 mg tablet Take 1 tablet (25 mg total) by mouth every 6 (six) hours as needed (palpitatins), Starting Thu 10/12/2023, Normal      potassium chloride (Klor-Con M20) 20 mEq tablet Take 20 mEq by mouth 3 (three) times a day, Historical Med      witch hazel-glycerin (TUCKS) topical pad Apply 1 Pad topically every 4 (four) hours as needed for irritation, Starting Fri 6/21/2024, No Print           No discharge procedures on file.    PDMP Review         Value Time User    PDMP Reviewed  Yes 7/27/2023  3:53 PM EN[facility] Echavarria             ED Provider  Attending physically available and evaluated Julita Berry. I managed the patient along with the ED Attending.    Electronically Signed by           Suzanne Esparza DO  09/07/24 5517

## 2024-09-09 LAB
ATRIAL RATE: 140 BPM
P AXIS: 28 DEGREES
PR INTERVAL: 120 MS
QRS AXIS: 52 DEGREES
QRSD INTERVAL: 68 MS
QT INTERVAL: 368 MS
QTC INTERVAL: 561 MS
T WAVE AXIS: 33 DEGREES
VENTRICULAR RATE: 140 BPM

## 2024-09-10 ENCOUNTER — OFFICE VISIT (OUTPATIENT)
Dept: FAMILY MEDICINE CLINIC | Facility: CLINIC | Age: 29
End: 2024-09-10
Payer: COMMERCIAL

## 2024-09-10 DIAGNOSIS — I77.4 CELIAC ARTERY STENOSIS (HCC): ICD-10-CM

## 2024-09-10 DIAGNOSIS — G90.A POTS (POSTURAL ORTHOSTATIC TACHYCARDIA SYNDROME): Chronic | ICD-10-CM

## 2024-09-10 DIAGNOSIS — I10 ESSENTIAL HYPERTENSION, BENIGN: Primary | Chronic | ICD-10-CM

## 2024-09-10 DIAGNOSIS — K31.84 GASTROPARESIS: ICD-10-CM

## 2024-09-10 PROBLEM — R19.7 DIARRHEA: Status: RESOLVED | Noted: 2023-12-27 | Resolved: 2024-09-10

## 2024-09-10 PROBLEM — R51.9 HEADACHE: Status: RESOLVED | Noted: 2024-05-10 | Resolved: 2024-09-10

## 2024-09-10 PROBLEM — F41.1 GAD (GENERALIZED ANXIETY DISORDER): Status: RESOLVED | Noted: 2021-11-30 | Resolved: 2024-09-10

## 2024-09-10 PROCEDURE — 99214 OFFICE O/P EST MOD 30 MIN: CPT | Performed by: NURSE PRACTITIONER

## 2024-09-10 NOTE — PROGRESS NOTES
FAMILY PRACTICE OFFICE VISIT       NAME: Julita Berry  AGE: 29 y.o. SEX: female       : 1995        MRN: [id number]    DATE: 10/4/2024  TIME: 3:42 PM    Assessment and Plan   1. Essential hypertension, benign  Assessment & Plan:  Stable  Cont meds    2. POTS (postural orthostatic tachycardia syndrome)  Assessment & Plan:  Cont meds    3. Celiac artery stenosis (HCC)  Assessment & Plan:  Stable    4. Gastroparesis  Assessment & Plan:  Stable  Small frequent meals         There are no Patient Instructions on file for this visit.        Chief Complaint     Chief Complaint   Patient presents with    Follow-up     Patient being seen for 6 month follow up visit       History of Present Illness   Julita Berry is a 29 y.o.-year-old female who is here for f/u to chronic medical conditions  My recommendation would be for her to get ablation instead of taking flecainide given her history and her wanting to conceive next year  She feels well at present  Was just recently in the ED as a patient for low potassium and magnesium and near syncope  Feeling well at present  Had zio patch a few months ago  Following with cardiology  Follows with nephrology for her POTS and electrolyte problem  Gastroparesis under control not on meds at present  Gerd under control not on meds  Bp stable on meds        Review of Systems   Review of Systems   Constitutional:  Negative for fatigue and fever.   HENT:  Negative for congestion, postnasal drip and rhinorrhea.    Eyes:  Negative for photophobia and visual disturbance.   Respiratory:  Negative for cough, shortness of breath and wheezing.    Cardiovascular:  Negative for chest pain and palpitations.   Gastrointestinal:  Negative for constipation, diarrhea, nausea and vomiting.   Genitourinary:  Negative for dysuria and frequency.   Musculoskeletal:  Negative for arthralgias and myalgias.   Skin:  Negative for rash.   Neurological:  Negative for dizziness, light-headedness and  "headaches.   Hematological:  Negative for adenopathy.   Psychiatric/Behavioral:  Negative for dysphoric mood and sleep disturbance. The patient is not nervous/anxious.        Active Problem List     Patient Active Problem List   Diagnosis    Essential hypertension, benign    POTS (postural orthostatic tachycardia syndrome)    Hypomagnesemia    Vitamin D deficiency    Acid reflux    Gastroparesis    Irritable bowel syndrome with diarrhea    Histone antibody positive    Eczema    Undifferentiated connective tissue disease (HCC)    Heart palpitations    Hypophosphatemia    Celiac artery stenosis (HCC)    Mass of soft tissue of lower leg    Patellofemoral disorder of right knee    Patella ayesha    Chronic pain of both ankles    Pes anserine bursitis    Patellar instability of right knee    Patellar dislocation, right, initial encounter    SVT (supraventricular tachycardia) (HCC)    Nausea and vomiting in pregnancy    Inappropriate sinus tachycardia (HCC)    Hypokalemia     (spontaneous vaginal delivery)    Encounter for induction of labor         Past Medical History:  Past Medical History:   Diagnosis Date    Anxiety     Eczema     Exercises 5 to 6 times per week     per pt prior to knee issue -enjoyed running and horseback riding    Family history of reaction to anesthesia     per pt--\"Mom also gets high anxiety with surgeries and wakes up nasty from anesthesia\"    Gastroparesis     Hypertension     last assessed 3/12/14    Hypokalemia     per pt per doctors possibly related renal problem    Hypomagnesemia     Irritable bowel syndrome     with diarrhea    Rheumatoid arthritis (HCC)     Sinus tachycardia     related to dehydration    Tooth missing     front upper tooth retainer-    Wears glasses     for computer use       Past Surgical History:  Past Surgical History:   Procedure Laterality Date    CARDIAC ELECTROPHYSIOLOGY PROCEDURE N/A 2024    Procedure: Cardiac eps/svt ablation;  Surgeon: Gino Butler, " MD;  Location:  CARDIAC CATH LAB;  Service: Cardiology    TX ARTHROSCOPY KNEE LATERAL RELEASE Right 04/13/2023    Procedure: ARTHROSCOPIC RELEASE RETINACULAR;  Surgeon: Ilya Brizuela DO;  Location:  MAIN OR;  Service: Orthopedics    TX EGD TRANSORAL BIOPSY SINGLE/MULTIPLE N/A 05/04/2016    Procedure: ESOPHAGOGASTRODUODENOSCOPY (EGD);  Surgeon: Raji Monzon MD;  Location:  GI LAB;  Service: Gastroenterology    TX RCNSTJ DISLC PATELLA W/XTNSR RELIGNMT&/MUSC RL Right 04/13/2023    Procedure: ARTHROSCOPIC REALIGNMENT PATELLA;  Surgeon: Ilya Brizuela DO;  Location:  MAIN OR;  Service: Orthopedics    WISDOM TOOTH EXTRACTION         Family History:  Family History   Problem Relation Age of Onset    Hypokalemia Mother     Hypotension Mother     Anxiety disorder Mother         NOS    Lung cancer Father     Skin cancer Father     Crohn's disease Father     Schizoaffective Disorder  Father     Alcohol abuse Father     Drug abuse Father     No Known Problems Sister     No Known Problems Sister     No Known Problems Brother     Coronary artery disease Maternal Grandmother     Heart disease Maternal Grandmother     Alzheimer's disease Maternal Grandmother     Arthritis Paternal Grandmother     Rheum arthritis Paternal Grandmother     Hypertension Paternal Grandmother     COPD Paternal Grandfather        Social History:  Social History     Socioeconomic History    Marital status: /Civil Union     Spouse name: Not on file    Number of children: Not on file    Years of education: Not on file    Highest education level: Not on file   Occupational History    Occupation: medical assistant with nephrology    Tobacco Use    Smoking status: Never    Smokeless tobacco: Never   Vaping Use    Vaping status: Never Used   Substance and Sexual Activity    Alcohol use: Not Currently     Alcohol/week: 1.0 standard drink of alcohol     Types: 1 Standard drinks or equivalent per week    Drug use: No    Sexual activity: Yes      Partners: Male     Birth control/protection: None   Other Topics Concern    Not on file   Social History Narrative    Activities - horseback riding    Caffeine use    Drinks coffee- 1 a wk    Exercise - walking    Exercising regularly     Social Determinants of Health     Financial Resource Strain: Not on file   Food Insecurity: No Food Insecurity (6/21/2024)    Hunger Vital Sign     Worried About Running Out of Food in the Last Year: Never true     Ran Out of Food in the Last Year: Never true   Transportation Needs: No Transportation Needs (6/21/2024)    PRAPARE - Transportation     Lack of Transportation (Medical): No     Lack of Transportation (Non-Medical): No   Physical Activity: Not on file   Stress: Not on file   Social Connections: Not on file   Intimate Partner Violence: Not on file   Housing Stability: Low Risk  (6/21/2024)    Housing Stability Vital Sign     Unable to Pay for Housing in the Last Year: No     Number of Times Moved in the Last Year: 0     Homeless in the Last Year: No       Objective     Vitals:    09/10/24 1109   BP: 144/80   Pulse: 94   Resp: 16   Temp: 98 °F (36.7 °C)   SpO2: 97%     Wt Readings from Last 3 Encounters:   09/23/24 82.5 kg (181 lb 14.1 oz)   09/10/24 82.5 kg (181 lb 12.8 oz)   08/27/24 83.3 kg (183 lb 11.2 oz)       Physical Exam  Vitals and nursing note reviewed.   Constitutional:       Appearance: Normal appearance.   HENT:      Head: Normocephalic and atraumatic.      Right Ear: Tympanic membrane, ear canal and external ear normal.      Left Ear: Tympanic membrane, ear canal and external ear normal.      Nose: Nose normal.      Mouth/Throat:      Mouth: Mucous membranes are moist.   Eyes:      Extraocular Movements: Extraocular movements intact.      Conjunctiva/sclera: Conjunctivae normal.   Cardiovascular:      Rate and Rhythm: Normal rate and regular rhythm.      Heart sounds: Normal heart sounds.   Pulmonary:      Effort: Pulmonary effort is normal.      Breath  "sounds: Normal breath sounds.   Abdominal:      General: Bowel sounds are normal.      Palpations: Abdomen is soft.   Musculoskeletal:         General: Normal range of motion.      Cervical back: Normal range of motion and neck supple.   Skin:     General: Skin is warm and dry.      Capillary Refill: Capillary refill takes less than 2 seconds.   Neurological:      General: No focal deficit present.      Mental Status: She is alert and oriented to person, place, and time.   Psychiatric:         Mood and Affect: Mood normal.         Behavior: Behavior normal.         Thought Content: Thought content normal.         Judgment: Judgment normal.         Pertinent Laboratory/Diagnostic Studies:  Lab Results   Component Value Date    GLUCOSE 96 01/07/2016    BUN 12 09/23/2024    CREATININE 0.77 09/23/2024    CALCIUM 9.3 09/23/2024     (L) 01/07/2016    K 3.3 (L) 09/23/2024    CO2 26 09/23/2024     09/23/2024     Lab Results   Component Value Date    ALT 15 09/07/2024    AST 16 09/07/2024    GGT 8 10/28/2019    ALKPHOS 107 (H) 09/07/2024    BILITOT 0.33 12/28/2015       Lab Results   Component Value Date    WBC 7.19 09/23/2024    HGB 15.0 09/23/2024    HCT 43.7 09/23/2024    MCV 85 09/23/2024     09/23/2024       No results found for: \"TSH\"    Lab Results   Component Value Date    CHOL 195 08/27/2015     Lab Results   Component Value Date    TRIG 144 08/16/2024     Lab Results   Component Value Date    HDL 47 (L) 08/16/2024     Lab Results   Component Value Date    LDLCALC 123 (H) 08/16/2024     Lab Results   Component Value Date    HGBA1C 4.9 08/16/2024       Results for orders placed or performed during the hospital encounter of 09/07/24   ECG 12 lead    Collection Time: 09/07/24  5:18 PM   Result Value Ref Range    Ventricular Rate 118 BPM    Atrial Rate 118 BPM    MD Interval 164 ms    QRSD Interval 76 ms    QT Interval 314 ms    QTC Interval 440 ms    P Vardaman 57 degrees    QRS Axis 55 degrees    T Wave " "Axis 26 degrees   CBC and differential    Collection Time: 09/07/24  5:28 PM   Result Value Ref Range    WBC 9.06 4.31 - 10.16 Thousand/uL    RBC 4.98 3.81 - 5.12 Million/uL    Hemoglobin 14.6 11.5 - 15.4 g/dL    Hematocrit 42.1 34.8 - 46.1 %    MCV 85 82 - 98 fL    MCH 29.3 26.8 - 34.3 pg    MCHC 34.7 31.4 - 37.4 g/dL    RDW 12.2 11.6 - 15.1 %    MPV 11.7 8.9 - 12.7 fL    Platelets 250 149 - 390 Thousands/uL    nRBC 0 /100 WBCs    Segmented % 61 43 - 75 %    Immature Grans % 0 0 - 2 %    Lymphocytes % 28 14 - 44 %    Monocytes % 9 4 - 12 %    Eosinophils Relative 1 0 - 6 %    Basophils Relative 1 0 - 1 %    Absolute Neutrophils 5.56 1.85 - 7.62 Thousands/µL    Absolute Immature Grans 0.03 0.00 - 0.20 Thousand/uL    Absolute Lymphocytes 2.50 0.60 - 4.47 Thousands/µL    Absolute Monocytes 0.85 0.17 - 1.22 Thousand/µL    Eosinophils Absolute 0.07 0.00 - 0.61 Thousand/µL    Basophils Absolute 0.05 0.00 - 0.10 Thousands/µL   HS Troponin 0hr (reflex protocol)    Collection Time: 09/07/24  5:28 PM   Result Value Ref Range    hs TnI 0hr 3 \"Refer to ACS Flowchart\"- see link ng/L   Comprehensive metabolic panel    Collection Time: 09/07/24  5:28 PM   Result Value Ref Range    Sodium 137 135 - 147 mmol/L    Potassium 3.2 (L) 3.5 - 5.3 mmol/L    Chloride 100 96 - 108 mmol/L    CO2 27 21 - 32 mmol/L    ANION GAP 10 4 - 13 mmol/L    BUN 14 5 - 25 mg/dL    Creatinine 0.81 0.60 - 1.30 mg/dL    Glucose 106 65 - 140 mg/dL    Calcium 9.7 8.4 - 10.2 mg/dL    AST 16 13 - 39 U/L    ALT 15 7 - 52 U/L    Alkaline Phosphatase 107 (H) 34 - 104 U/L    Total Protein 7.6 6.4 - 8.4 g/dL    Albumin 4.6 3.5 - 5.0 g/dL    Total Bilirubin 0.27 0.20 - 1.00 mg/dL    eGFR 97 ml/min/1.73sq m   Magnesium    Collection Time: 09/07/24  5:28 PM   Result Value Ref Range    Magnesium 1.7 (L) 1.9 - 2.7 mg/dL   TSH, 3rd generation    Collection Time: 09/07/24  5:28 PM   Result Value Ref Range    TSH 3RD GENERATON 3.953 0.450 - 4.500 uIU/mL   ECG 12 lead    " Collection Time: 09/07/24  6:00 PM   Result Value Ref Range    Ventricular Rate 140 BPM    Atrial Rate 140 BPM    WY Interval 120 ms    QRSD Interval 68 ms    QT Interval 368 ms    QTC Interval 561 ms    P Driscoll 28 degrees    QRS Axis 52 degrees    T Wave Axis 33 degrees     *Note: Due to a large number of results and/or encounters for the requested time period, some results have not been displayed. A complete set of results can be found in Results Review.       No orders of the defined types were placed in this encounter.      ALLERGIES:  Allergies   Allergen Reactions    Prednisone Hyperactivity     Medrol dose samantha only    Serotonin Reuptake Inhibitors (Ssris) Anaphylaxis and Hives    Augmentin [Amoxicillin-Pot Clavulanate] Hives and Rash    Clindamycin Rash    Penicillins Rash    Sulfa Antibiotics Rash    Azithromycin Rash       Current Medications     Current Outpatient Medications   Medication Sig Dispense Refill    AMILoride 5 mg tablet Take 5 mg by mouth 2 (two) times a day 2 in the morning.   3 at night      Magnesium Glycinate 100 MG CAPS Take 200 mg by mouth 2 (two) times a day      metoprolol tartrate (LOPRESSOR) 25 mg tablet Take 1 tablet (25 mg total) by mouth every 6 (six) hours as needed (palpitatins) 90 tablet 3    potassium chloride (Klor-Con M20) 20 mEq tablet Take 20 mEq by mouth 3 (three) times a day      LORazepam (Ativan) 0.5 mg tablet Take 1 tablet (0.5 mg total) by mouth every 8 (eight) hours as needed for anxiety 4 tablet 0    metoprolol succinate (TOPROL-XL) 50 mg 24 hr tablet Take 75 mg in the AM, and 50 mg in the PM       No current facility-administered medications for this visit.         Health Maintenance     Health Maintenance   Topic Date Due    Annual Physical  12/02/2023    Influenza Vaccine (1) 09/01/2024    COVID-19 Vaccine (4 - 2023-24 season) 09/01/2024    Depression Screening  09/10/2025    Cervical Cancer Screening  04/07/2027    DTaP,Tdap,and Td Vaccines (4 - Td or Tdap)  05/13/2034    Zoster Vaccine (1 of 2) 03/13/2044    RSV Vaccine Age 60+ Years (1 - 1-dose 60+ series) 03/13/2054    HIV Screening  Completed    Hepatitis C Screening  Completed    RSV Vaccine age 0-20 Months  Aged Out    Pneumococcal Vaccine: Pediatrics (0 to 5 Years) and At-Risk Patients (6 to 64 Years)  Aged Out    HIB Vaccine  Aged Out    IPV Vaccine  Aged Out    Hepatitis A Vaccine  Aged Out    Meningococcal ACWY Vaccine  Aged Out    HPV Vaccine  Aged Out     Immunization History   Administered Date(s) Administered    COVID-19 PFIZER VACCINE 0.3 ML IM 02/27/2021, 03/20/2021, 12/29/2021    Hep B, Adolescent or Pediatric [birth year], 03/20/1996    INFLUENZA 10/06/2014, 10/08/2015, 10/05/2016, 10/03/2017, 10/11/2018, 10/14/2019, 06/30/2021, 10/21/2021, 11/10/2022    Influenza, seasonal, injectable, preservative free 10/14/2019    Tdap 06/14/2013, 04/25/2017, 05/13/2024       Depression Screening and Follow-up Plan: Patient was screened for depression during today's encounter. They screened negative with a PHQ-2 score of 0.        ENMA Echavarria

## 2024-09-11 ENCOUNTER — TELEPHONE (OUTPATIENT)
Dept: CARDIOLOGY CLINIC | Facility: CLINIC | Age: 29
End: 2024-09-11

## 2024-09-11 NOTE — TELEPHONE ENCOUNTER
----- Message from Gino Butler MD sent at 9/10/2024  5:55 PM EDT -----  Can we please schedule Ms. Berry for an EPS and ablation for SVT    CARTO mapping system

## 2024-09-11 NOTE — TELEPHONE ENCOUNTER
Called patient and LVM in regard cardiac ablation procedure referred by Dr Butler to be schedule.

## 2024-09-11 NOTE — ED ATTENDING ATTESTATION
9/7/2024  I, Brady Marin MD, saw and evaluated the patient. I have discussed the patient with the resident/non-physician practitioner and agree with the resident's/non-physician practitioner's findings, Plan of Care, and MDM as documented in the resident's/non-physician practitioner's note, except where noted. All available labs and Radiology studies were reviewed.  I was present for key portions of any procedure(s) performed by the resident/non-physician practitioner and I was immediately available to provide assistance.       At this point I agree with the current assessment done in the Emergency Department.  I have conducted an independent evaluation of this patient a history and physical is as follows:    ED Course       Impression: Dizziness, near syncope history of atrial tachycardia    Differential diagnosis: Dehydration, PSVT, symptomatic anemia, electrolyte abnormality    Plan to check ECG screening labs chest x-ray reassess IV fluids, electrolyte repletion    ECG independently interpreted by me: Sinus tach with PVCs left atrial abnormality no acute ischemic changes.    Labs reviewed: CBC within normal limitsTroponin was 3.  Remarkable for mild hypokalemia magnesium markable for mild hypomagnesemia TSH within normal limits.    Chest x-ray independently interpreted by me: No acute car pulmonary disease.    Discussed results and findings with patient at bedside will discharge to home repeat electrolytes IV mag oral potassium follow-up with PCP as outpatient.        Critical Care Time  Procedures

## 2024-09-12 ENCOUNTER — PREP FOR PROCEDURE (OUTPATIENT)
Dept: CARDIOLOGY CLINIC | Facility: CLINIC | Age: 29
End: 2024-09-12

## 2024-09-12 DIAGNOSIS — I47.10 SVT (SUPRAVENTRICULAR TACHYCARDIA): Primary | ICD-10-CM

## 2024-09-12 DIAGNOSIS — I49.3 PVC'S (PREMATURE VENTRICULAR CONTRACTIONS): ICD-10-CM

## 2024-09-12 NOTE — TELEPHONE ENCOUNTER
I rescheduled the pacer to the following lab day for you.  I called patient and LVM to call me back to set up her ablation on 9/23/2024.  Thank you.

## 2024-09-12 NOTE — TELEPHONE ENCOUNTER
I haven't done many PFA procedures so that could take a while.  I would rather schedule it on a day with less going on.  I would say she should be off the day of the procedure and the following day. Light duty is OK as long as it is light duty.

## 2024-09-12 NOTE — TELEPHONE ENCOUNTER
Pt called back stating that 9/23/24 date does work for her. She does have additional questions.     Please advise

## 2024-09-12 NOTE — TELEPHONE ENCOUNTER
Dr Butler, for this patient procedure, Im wonder if you are able to perform her Svt on 9/23/2024 you are on L4 with a DC PM and one PFA she is off on Monday through Thursday so will be convenience for her to recover.    The other day can be /26 Thursday but after she will need to work, she mentioned wont do any heavy lifting but will be ion the floor and computer desk duties.  Please advise.

## 2024-09-16 NOTE — TELEPHONE ENCOUNTER
Patient scheduled for EPS/SVT  at [facility] on   9/23/24 with Dr Butler.    Patient aware of general instructions.  Labs in chart from 9/07/24.    No Meds holds.

## 2024-09-22 ENCOUNTER — ANESTHESIA EVENT (OUTPATIENT)
Dept: NON INVASIVE DIAGNOSTICS | Facility: HOSPITAL | Age: 29
End: 2024-09-22
Payer: COMMERCIAL

## 2024-09-23 ENCOUNTER — HOSPITAL ENCOUNTER (OUTPATIENT)
Facility: HOSPITAL | Age: 29
Setting detail: OUTPATIENT SURGERY
Discharge: HOME | End: 2024-09-23
Attending: STUDENT IN AN ORGANIZED HEALTH CARE EDUCATION/TRAINING PROGRAM | Admitting: STUDENT IN AN ORGANIZED HEALTH CARE EDUCATION/TRAINING PROGRAM
Payer: COMMERCIAL

## 2024-09-23 ENCOUNTER — ANESTHESIA (OUTPATIENT)
Dept: NON INVASIVE DIAGNOSTICS | Facility: HOSPITAL | Age: 29
End: 2024-09-23
Payer: COMMERCIAL

## 2024-09-23 VITALS
SYSTOLIC BLOOD PRESSURE: 103 MMHG | RESPIRATION RATE: 16 BRPM | TEMPERATURE: 98.2 F | WEIGHT: 181.88 LBS | HEART RATE: 83 BPM | OXYGEN SATURATION: 97 % | BODY MASS INDEX: 31.05 KG/M2 | HEIGHT: 64 IN | DIASTOLIC BLOOD PRESSURE: 63 MMHG

## 2024-09-23 DIAGNOSIS — I49.3 PVC'S (PREMATURE VENTRICULAR CONTRACTIONS): ICD-10-CM

## 2024-09-23 DIAGNOSIS — I47.10 SVT (SUPRAVENTRICULAR TACHYCARDIA) (HCC): ICD-10-CM

## 2024-09-23 DIAGNOSIS — I47.11 INAPPROPRIATE SINUS TACHYCARDIA (HCC): ICD-10-CM

## 2024-09-23 LAB
ANION GAP SERPL CALCULATED.3IONS-SCNC: 8 MMOL/L (ref 4–13)
ATRIAL RATE: 114 BPM
ATRIAL RATE: 91 BPM
ATRIAL RATE: 99 BPM
BUN SERPL-MCNC: 12 MG/DL (ref 5–25)
CALCIUM SERPL-MCNC: 9.3 MG/DL (ref 8.4–10.2)
CHLORIDE SERPL-SCNC: 104 MMOL/L (ref 96–108)
CO2 SERPL-SCNC: 26 MMOL/L (ref 21–32)
CREAT SERPL-MCNC: 0.77 MG/DL (ref 0.6–1.3)
ERYTHROCYTE [DISTWIDTH] IN BLOOD BY AUTOMATED COUNT: 12.3 % (ref 11.6–15.1)
EXT PREGNANCY TEST URINE: NEGATIVE
EXT. CONTROL: NORMAL
GFR SERPL CREATININE-BSD FRML MDRD: 103 ML/MIN/1.73SQ M
GLUCOSE P FAST SERPL-MCNC: 97 MG/DL (ref 65–99)
GLUCOSE SERPL-MCNC: 97 MG/DL (ref 65–140)
HCT VFR BLD AUTO: 43.7 % (ref 34.8–46.1)
HGB BLD-MCNC: 15 G/DL (ref 11.5–15.4)
INR PPP: 0.98 (ref 0.85–1.19)
KCT BLD-ACNC: 327 SEC (ref 89–137)
KCT BLD-ACNC: 361 SEC (ref 89–137)
KCT BLD-ACNC: 361 SEC (ref 89–137)
MCH RBC QN AUTO: 29.1 PG (ref 26.8–34.3)
MCHC RBC AUTO-ENTMCNC: 34.3 G/DL (ref 31.4–37.4)
MCV RBC AUTO: 85 FL (ref 82–98)
P AXIS: 3 DEGREES
P AXIS: 3 DEGREES
P AXIS: 62 DEGREES
PLATELET # BLD AUTO: 222 THOUSANDS/UL (ref 149–390)
PMV BLD AUTO: 11.5 FL (ref 8.9–12.7)
POTASSIUM SERPL-SCNC: 3.3 MMOL/L (ref 3.5–5.3)
PR INTERVAL: 144 MS
PR INTERVAL: 154 MS
PR INTERVAL: 160 MS
PROTHROMBIN TIME: 13.3 SECONDS (ref 12.3–15)
QRS AXIS: 13 DEGREES
QRS AXIS: 16 DEGREES
QRS AXIS: 53 DEGREES
QRSD INTERVAL: 74 MS
QRSD INTERVAL: 82 MS
QRSD INTERVAL: 84 MS
QT INTERVAL: 358 MS
QT INTERVAL: 370 MS
QT INTERVAL: 406 MS
QTC INTERVAL: 474 MS
QTC INTERVAL: 493 MS
QTC INTERVAL: 499 MS
RBC # BLD AUTO: 5.15 MILLION/UL (ref 3.81–5.12)
SODIUM SERPL-SCNC: 138 MMOL/L (ref 135–147)
SPECIMEN SOURCE: ABNORMAL
T WAVE AXIS: 20 DEGREES
T WAVE AXIS: 37 DEGREES
T WAVE AXIS: 52 DEGREES
VENTRICULAR RATE: 114 BPM
VENTRICULAR RATE: 91 BPM
VENTRICULAR RATE: 99 BPM
WBC # BLD AUTO: 7.19 THOUSAND/UL (ref 4.31–10.16)

## 2024-09-23 PROCEDURE — 93620 COMP EP EVL R AT VEN PAC&REC: CPT | Performed by: STUDENT IN AN ORGANIZED HEALTH CARE EDUCATION/TRAINING PROGRAM

## 2024-09-23 PROCEDURE — C1730 CATH, EP, 19 OR FEW ELECT: HCPCS | Performed by: STUDENT IN AN ORGANIZED HEALTH CARE EDUCATION/TRAINING PROGRAM

## 2024-09-23 PROCEDURE — 93662 INTRACARDIAC ECG (ICE): CPT | Performed by: STUDENT IN AN ORGANIZED HEALTH CARE EDUCATION/TRAINING PROGRAM

## 2024-09-23 PROCEDURE — C1894 INTRO/SHEATH, NON-LASER: HCPCS | Performed by: STUDENT IN AN ORGANIZED HEALTH CARE EDUCATION/TRAINING PROGRAM

## 2024-09-23 PROCEDURE — 93613 INTRACARDIAC EPHYS 3D MAPG: CPT | Performed by: STUDENT IN AN ORGANIZED HEALTH CARE EDUCATION/TRAINING PROGRAM

## 2024-09-23 PROCEDURE — C1760 CLOSURE DEV, VASC: HCPCS | Performed by: STUDENT IN AN ORGANIZED HEALTH CARE EDUCATION/TRAINING PROGRAM

## 2024-09-23 PROCEDURE — 93623 PRGRMD STIMJ&PACG IV RX NFS: CPT | Performed by: STUDENT IN AN ORGANIZED HEALTH CARE EDUCATION/TRAINING PROGRAM

## 2024-09-23 PROCEDURE — 81025 URINE PREGNANCY TEST: CPT | Performed by: STUDENT IN AN ORGANIZED HEALTH CARE EDUCATION/TRAINING PROGRAM

## 2024-09-23 PROCEDURE — 85347 COAGULATION TIME ACTIVATED: CPT

## 2024-09-23 PROCEDURE — C1759 CATH, INTRA ECHOCARDIOGRAPHY: HCPCS | Performed by: STUDENT IN AN ORGANIZED HEALTH CARE EDUCATION/TRAINING PROGRAM

## 2024-09-23 PROCEDURE — 76937 US GUIDE VASCULAR ACCESS: CPT | Performed by: STUDENT IN AN ORGANIZED HEALTH CARE EDUCATION/TRAINING PROGRAM

## 2024-09-23 PROCEDURE — 93005 ELECTROCARDIOGRAM TRACING: CPT

## 2024-09-23 PROCEDURE — C1732 CATH, EP, DIAG/ABL, 3D/VECT: HCPCS | Performed by: STUDENT IN AN ORGANIZED HEALTH CARE EDUCATION/TRAINING PROGRAM

## 2024-09-23 PROCEDURE — 93010 ELECTROCARDIOGRAM REPORT: CPT | Performed by: INTERNAL MEDICINE

## 2024-09-23 PROCEDURE — NC001 PR NO CHARGE: Performed by: STUDENT IN AN ORGANIZED HEALTH CARE EDUCATION/TRAINING PROGRAM

## 2024-09-23 PROCEDURE — 85027 COMPLETE CBC AUTOMATED: CPT | Performed by: PHYSICIAN ASSISTANT

## 2024-09-23 PROCEDURE — 80048 BASIC METABOLIC PNL TOTAL CA: CPT | Performed by: PHYSICIAN ASSISTANT

## 2024-09-23 PROCEDURE — 85610 PROTHROMBIN TIME: CPT | Performed by: PHYSICIAN ASSISTANT

## 2024-09-23 DEVICE — DVC VASC CLSR VASCADE MVP 6-12FR: Type: IMPLANTABLE DEVICE | Site: GROIN | Status: FUNCTIONAL

## 2024-09-23 RX ORDER — ACETAMINOPHEN 325 MG/1
650 TABLET ORAL EVERY 4 HOURS PRN
Status: DISCONTINUED | OUTPATIENT
Start: 2024-09-23 | End: 2024-09-23 | Stop reason: HOSPADM

## 2024-09-23 RX ORDER — LIDOCAINE HYDROCHLORIDE 10 MG/ML
0.5 INJECTION, SOLUTION EPIDURAL; INFILTRATION; INTRACAUDAL; PERINEURAL ONCE AS NEEDED
Status: DISCONTINUED | OUTPATIENT
Start: 2024-09-23 | End: 2024-09-23 | Stop reason: HOSPADM

## 2024-09-23 RX ORDER — PROPOFOL 10 MG/ML
INJECTION, EMULSION INTRAVENOUS AS NEEDED
Status: DISCONTINUED | OUTPATIENT
Start: 2024-09-23 | End: 2024-09-23

## 2024-09-23 RX ORDER — ONDANSETRON 2 MG/ML
INJECTION INTRAMUSCULAR; INTRAVENOUS AS NEEDED
Status: DISCONTINUED | OUTPATIENT
Start: 2024-09-23 | End: 2024-09-23

## 2024-09-23 RX ORDER — HEPARIN SODIUM 1000 [USP'U]/ML
INJECTION, SOLUTION INTRAVENOUS; SUBCUTANEOUS CODE/TRAUMA/SEDATION MEDICATION
Status: DISCONTINUED | OUTPATIENT
Start: 2024-09-23 | End: 2024-09-23 | Stop reason: HOSPADM

## 2024-09-23 RX ORDER — SODIUM CHLORIDE 9 MG/ML
INJECTION, SOLUTION INTRAVENOUS CONTINUOUS PRN
Status: DISCONTINUED | OUTPATIENT
Start: 2024-09-23 | End: 2024-09-23

## 2024-09-23 RX ORDER — SODIUM CHLORIDE, SODIUM LACTATE, POTASSIUM CHLORIDE, CALCIUM CHLORIDE 600; 310; 30; 20 MG/100ML; MG/100ML; MG/100ML; MG/100ML
200 INJECTION, SOLUTION INTRAVENOUS CONTINUOUS
Status: DISCONTINUED | OUTPATIENT
Start: 2024-09-23 | End: 2024-09-23 | Stop reason: HOSPADM

## 2024-09-23 RX ORDER — PROPOFOL 10 MG/ML
INJECTION, EMULSION INTRAVENOUS CONTINUOUS PRN
Status: DISCONTINUED | OUTPATIENT
Start: 2024-09-23 | End: 2024-09-23

## 2024-09-23 RX ORDER — HEPARIN SODIUM 10000 [USP'U]/100ML
INJECTION, SOLUTION INTRAVENOUS
Status: DISCONTINUED | OUTPATIENT
Start: 2024-09-23 | End: 2024-09-23 | Stop reason: HOSPADM

## 2024-09-23 RX ORDER — SODIUM CHLORIDE 9 MG/ML
20 INJECTION, SOLUTION INTRAVENOUS CONTINUOUS
Status: DISCONTINUED | OUTPATIENT
Start: 2024-09-23 | End: 2024-09-23 | Stop reason: HOSPADM

## 2024-09-23 RX ORDER — METOPROLOL SUCCINATE 50 MG/1
TABLET, EXTENDED RELEASE ORAL
Start: 2024-09-23

## 2024-09-23 RX ORDER — MIDAZOLAM HYDROCHLORIDE 2 MG/2ML
INJECTION, SOLUTION INTRAMUSCULAR; INTRAVENOUS AS NEEDED
Status: DISCONTINUED | OUTPATIENT
Start: 2024-09-23 | End: 2024-09-23

## 2024-09-23 RX ORDER — PROTAMINE SULFATE 10 MG/ML
INJECTION, SOLUTION INTRAVENOUS AS NEEDED
Status: DISCONTINUED | OUTPATIENT
Start: 2024-09-23 | End: 2024-09-23

## 2024-09-23 RX ORDER — PANTOPRAZOLE SODIUM 40 MG/10ML
40 INJECTION, POWDER, LYOPHILIZED, FOR SOLUTION INTRAVENOUS ONCE
Status: DISCONTINUED | OUTPATIENT
Start: 2024-09-23 | End: 2024-09-23

## 2024-09-23 RX ORDER — POTASSIUM CHLORIDE 14.9 MG/ML
INJECTION INTRAVENOUS CONTINUOUS PRN
Status: DISCONTINUED | OUTPATIENT
Start: 2024-09-23 | End: 2024-09-23

## 2024-09-23 RX ORDER — SODIUM CHLORIDE 9 MG/ML
20 INJECTION, SOLUTION INTRAVENOUS ONCE
Status: DISCONTINUED | OUTPATIENT
Start: 2024-09-23 | End: 2024-09-23 | Stop reason: HOSPADM

## 2024-09-23 RX ORDER — LIDOCAINE HYDROCHLORIDE 10 MG/ML
INJECTION, SOLUTION EPIDURAL; INFILTRATION; INTRACAUDAL; PERINEURAL CODE/TRAUMA/SEDATION MEDICATION
Status: DISCONTINUED | OUTPATIENT
Start: 2024-09-23 | End: 2024-09-23 | Stop reason: HOSPADM

## 2024-09-23 RX ADMIN — SODIUM CHLORIDE: 0.9 INJECTION, SOLUTION INTRAVENOUS at 07:56

## 2024-09-23 RX ADMIN — PHENYLEPHRINE HYDROCHLORIDE 50 MCG: 10 INJECTION INTRAVENOUS at 09:33

## 2024-09-23 RX ADMIN — SODIUM CHLORIDE: 0.9 INJECTION, SOLUTION INTRAVENOUS at 08:53

## 2024-09-23 RX ADMIN — PHENYLEPHRINE HYDROCHLORIDE 100 MCG: 10 INJECTION INTRAVENOUS at 10:00

## 2024-09-23 RX ADMIN — ONDANSETRON 4 MG: 2 INJECTION INTRAMUSCULAR; INTRAVENOUS at 09:58

## 2024-09-23 RX ADMIN — PHENYLEPHRINE HYDROCHLORIDE 50 MCG: 10 INJECTION INTRAVENOUS at 09:16

## 2024-09-23 RX ADMIN — PROPOFOL 40 MG: 10 INJECTION, EMULSION INTRAVENOUS at 10:02

## 2024-09-23 RX ADMIN — POTASSIUM CHLORIDE: 14.9 INJECTION, SOLUTION INTRAVENOUS at 08:18

## 2024-09-23 RX ADMIN — PROPOFOL 100 MCG/KG/MIN: 10 INJECTION, EMULSION INTRAVENOUS at 08:08

## 2024-09-23 RX ADMIN — PROPOFOL 30 MG: 10 INJECTION, EMULSION INTRAVENOUS at 09:51

## 2024-09-23 RX ADMIN — PROTAMINE SULFATE 60 MG: 10 INJECTION, SOLUTION INTRAVENOUS at 09:59

## 2024-09-23 RX ADMIN — PHENYLEPHRINE HYDROCHLORIDE 50 MCG: 10 INJECTION INTRAVENOUS at 09:02

## 2024-09-23 RX ADMIN — MIDAZOLAM 2 MG: 1 INJECTION INTRAMUSCULAR; INTRAVENOUS at 08:02

## 2024-09-23 RX ADMIN — PROPOFOL 20 MG: 10 INJECTION, EMULSION INTRAVENOUS at 08:03

## 2024-09-23 RX ADMIN — PHENYLEPHRINE HYDROCHLORIDE 50 MCG: 10 INJECTION INTRAVENOUS at 09:26

## 2024-09-23 RX ADMIN — PROPOFOL 20 MG: 10 INJECTION, EMULSION INTRAVENOUS at 08:09

## 2024-09-23 NOTE — H&P
H&P - Electrophysiology   Name: Julita Berry 29 y.o. female I MRN: [id number]  Unit/Bed#: [facility] CATH LAB ROOM I Date of Admission: 9/23/2024   Date of Service: 9/23/2024 I Hospital Day: 0     Assessment & Plan      SVT  -Continue metoprolol succinate 50 mg p.o. twice daily  -Continue with metoprolol tartrate 25 mg as needed up to a daily dose of 200mg daily  -EPS and ablation today     G1, P1 with successful delivery  -Followed with OBGYN/MFM     PVCs  -Patient has not had PVCs evidenced on EKG     Hypertension  -Blood pressure 138/84  -Continue management as per PCP    HPI:  Ms. Julita Berry is a 28 year old female G1, P1with a history of hypokalemia, essential hypertension, history of SVT controlled metoprolol.  Per EMR, her last episode of SVT was around January 24, 2024 for which she did not have to take an extra dose of metoprolol.  She is followed by MFM.  She was seen in outpatient electrophysiology clinic for palpitations.  She noted that she was doing well at that time.  She had a planned induction for delivery in June and did well without intervention.     She has been having  an increasing frequency in her SVT episodes.  She states that mostly last about 10 to 15 minutes and are responsive to vagal maneuvers and or extra dose of metoprolol.  She currently denies chest pain, palpitations, shortness of breath, dizziness, lightheadedness, syncope, near syncope.     We discussed options for management including EP study and ablation as well as antiarrhythmics.  Initially she chose to proceed conservatively, however, continued to have episodes of SVT. Given this, she decided to proceed with EPS and ablation.  She presents today for that procedure.     Review of Systems A complete review of systems was negative in 10/10 systems or as listed above.     I have reviewed the patient's PMH, PSH, Social History, Family History, Meds, and Allergies    Objective               There were no vitals filed for this  visit.   No intake/output data recorded.  Lines/Drains/Airways       Active Status       None                  Physical Exam   GEN: No acute distress, Alert and oriented, well appearing  HEENT: Normocephalic, atraumatic.  CARDIOVASCULAR:  RRR, No murmur, rub, gallops S1,S2  LUNGS: Clear To auscultation bilaterally, normal effort, no rales, rhonchi, crackles   ABDOMEN:  soft, nontender  EXTREMITIES/VASCULAR:  No edema. warm an well perfused.  PSYCH: Normal Affect,  linear speech pattern without evidence of psychosis.   NEURO: Grossly intact, moving all extremiteis equal, face symmetric, alert and responsive, no obvious focal defecits   GAIT:  Ambulates normally without difficulty  HEME: No bleeding, bruising, petechia, purpura   SKIN: No significant rashes on visibile skin, warm, no diaphoresis or pallor.

## 2024-09-23 NOTE — DISCHARGE INSTR - AVS FIRST PAGE
PLEASE NOTE THE FOLLOWING MEDICATION RECOMMENDATIONS:  - increase Toprol XL to 75 mg in the morning and continue 50 mg at night (please call if tolerating this regimen so a new script can be sent in)      Our schedulers will reach out to you to arrange loop recorder implantation - if you do not hear from them, please call [phone].         RESTRICTIONS:  No heavy lifting or strenuous activity for one week.    No soaking in a bath tub/hot tub/swimming pool for one week or until groin heals. You may shower - please let soap and water run over the groins, no scrubbing, and pat the area dry. Please place band-aid on groins daily for up to five days, but you may remove sooner if no issues are noted.     If you notice ongoing bleeding, swelling, or firm lumps in groin near ablation incision, please contact Dr. Butler' office - [phone].

## 2024-09-23 NOTE — ANESTHESIA POSTPROCEDURE EVALUATION
Post-Op Assessment Note    CV Status:  Stable  Pain Score: 0    Pain management: adequate       Mental Status:  Alert and awake   Hydration Status:  Euvolemic   PONV Controlled:  Controlled   Airway Patency:  Patent     Post Op Vitals Reviewed: Yes    No anethesia notable event occurred.    Staff: CRNA, Anesthesiologist               BP      Temp  BP   96/55   Temp      Pulse  127   Resp   16   SpO2   98         Pulse     Resp      SpO2

## 2024-09-23 NOTE — ANESTHESIA PREPROCEDURE EVALUATION
Procedure:  Cardiac eps/svt ablation (Chest)    Relevant Problems   ANESTHESIA (within normal limits)      CARDIO   (+) Chronic hypertension in obstetric context in third trimester   (+) Essential hypertension, benign (BB taken today)   (+) Inappropriate sinus tachycardia   (+) SVT (supraventricular tachycardia)      ENDO (within normal limits)      GI/HEPATIC   (+) Acid reflux      /RENAL (within normal limits)      GYN (within normal limits)      HEMATOLOGY (within normal limits)      MUSCULOSKELETAL (within normal limits)      NEURO/PSYCH (within normal limits)      PULMONARY (within normal limits)      Cardiovascular/Peripheral Vascular   (+) POTS (postural orthostatic tachycardia syndrome)        Physical Exam    Airway    Mallampati score: I  TM Distance: >3 FB  Neck ROM: full     Dental   No notable dental hx     Cardiovascular  Rhythm: regular, Rate: normal    Pulmonary   Breath sounds clear to auscultation    Other Findings  post-pubertal.      Anesthesia Plan  ASA Score- 3     Anesthesia Type- IV sedation with anesthesia with ASA Monitors.         Additional Monitors:     Airway Plan:            Plan Factors-Exercise tolerance (METS): >4 METS.    Chart reviewed.        Patient is not a current smoker.      Obstructive sleep apnea risk education given perioperatively.        Induction- intravenous.    Postoperative Plan- Plan for postoperative opioid use.     Perioperative Resuscitation Plan - Level 1 - Full Code.       Informed Consent- Anesthetic plan and risks discussed with patient.  I personally reviewed this patient with the CRNA. Discussed and agreed on the Anesthesia Plan with the CRNA..

## 2024-09-24 ENCOUNTER — TELEPHONE (OUTPATIENT)
Age: 29
End: 2024-09-24

## 2024-09-24 NOTE — TELEPHONE ENCOUNTER
Pt had an ablation yesterday and the AVS recommended calling to schedule a loop.   Please call [phone]

## 2024-09-25 ENCOUNTER — PREP FOR PROCEDURE (OUTPATIENT)
Dept: CARDIOLOGY CLINIC | Facility: CLINIC | Age: 29
End: 2024-09-25

## 2024-09-25 ENCOUNTER — TELEPHONE (OUTPATIENT)
Dept: CARDIOLOGY CLINIC | Facility: CLINIC | Age: 29
End: 2024-09-25

## 2024-09-25 DIAGNOSIS — I47.10 SVT (SUPRAVENTRICULAR TACHYCARDIA) (HCC): Primary | ICD-10-CM

## 2024-09-25 NOTE — TELEPHONE ENCOUNTER
"PEC,    Patient already wore ZIO previously in 2023.     ZIO done: 6 days - 7/26/23 - 8/2/23                  14 days - 10/23/23 - 11/6/23    Can you submit for auth to make sure ZIO is not needed again?       Thanks,  Darlyn \"Noemi\" Dru     "

## 2024-09-25 NOTE — TELEPHONE ENCOUNTER
"Patient is scheduled for LOOP Recorder Implant on 10/23/24 at [facility] with .     Patient aware of all general instructions.    Instructions sent to patient through [employer].      Medication holds:   N/A    Blood Thinners:   N/A    Blood work to be done on:  N/A  (Patient did BMP / CBC on 9/23/24)      Thank you,  Darlyn \"Nomei\" Dru      " 1. Talk with your psychiatrist about counseling  2. No need for lab testing  3. Repeat DEXA in 2021

## 2024-09-25 NOTE — TELEPHONE ENCOUNTER
----- Message from Eloina Das PA-C sent at 9/23/2024 12:40 PM EDT -----  This patient underwent an EPS this morning by Dr. Butler, however it was negative. He is now recommending a loop recorder implantation, and the patient is agreeable. Please see if she needs a monitor or something beforehand (she has already worn them in the past), and contact her to arrange.     Thanks!

## 2024-09-26 PROBLEM — O10.913 CHRONIC HYPERTENSION IN OBSTETRIC CONTEXT IN THIRD TRIMESTER: Status: RESOLVED | Noted: 2024-03-19 | Resolved: 2024-09-26

## 2024-09-27 DIAGNOSIS — F41.8 SITUATIONAL ANXIETY: Primary | ICD-10-CM

## 2024-09-27 RX ORDER — LORAZEPAM 0.5 MG/1
0.5 TABLET ORAL EVERY 8 HOURS PRN
Qty: 4 TABLET | Refills: 0 | Status: SHIPPED | OUTPATIENT
Start: 2024-09-27

## 2024-10-04 VITALS
TEMPERATURE: 98 F | OXYGEN SATURATION: 97 % | BODY MASS INDEX: 31.04 KG/M2 | HEART RATE: 94 BPM | SYSTOLIC BLOOD PRESSURE: 144 MMHG | HEIGHT: 64 IN | WEIGHT: 181.8 LBS | DIASTOLIC BLOOD PRESSURE: 80 MMHG | RESPIRATION RATE: 16 BRPM

## 2024-10-23 ENCOUNTER — HOSPITAL ENCOUNTER (OUTPATIENT)
Facility: HOSPITAL | Age: 29
Setting detail: OUTPATIENT SURGERY
Discharge: HOME | End: 2024-10-23
Attending: INTERNAL MEDICINE | Admitting: INTERNAL MEDICINE
Payer: COMMERCIAL

## 2024-10-23 VITALS
RESPIRATION RATE: 20 BRPM | HEART RATE: 131 BPM | SYSTOLIC BLOOD PRESSURE: 166 MMHG | DIASTOLIC BLOOD PRESSURE: 79 MMHG | TEMPERATURE: 98.6 F | OXYGEN SATURATION: 99 %

## 2024-10-23 DIAGNOSIS — I47.10 SVT (SUPRAVENTRICULAR TACHYCARDIA) (HCC): ICD-10-CM

## 2024-10-23 PROCEDURE — 33285 INSJ SUBQ CAR RHYTHM MNTR: CPT | Performed by: PHYSICIAN ASSISTANT

## 2024-10-23 PROCEDURE — NC001 PR NO CHARGE: Performed by: PHYSICIAN ASSISTANT

## 2024-10-23 PROCEDURE — 33285 INSJ SUBQ CAR RHYTHM MNTR: CPT | Performed by: INTERNAL MEDICINE

## 2024-10-23 PROCEDURE — C1764 EVENT RECORDER, CARDIAC: HCPCS | Performed by: INTERNAL MEDICINE

## 2024-10-23 DEVICE — LOOP RECORDER REVEAL LINQ II SYS DEVICE ONLY: Type: IMPLANTABLE DEVICE | Status: FUNCTIONAL

## 2024-10-23 RX ORDER — LIDOCAINE HYDROCHLORIDE 10 MG/ML
INJECTION, SOLUTION EPIDURAL; INFILTRATION; INTRACAUDAL; PERINEURAL
Status: DISCONTINUED
Start: 2024-10-23 | End: 2024-10-23 | Stop reason: HOSPADM

## 2024-10-23 RX ORDER — LIDOCAINE HYDROCHLORIDE 10 MG/ML
INJECTION, SOLUTION EPIDURAL; INFILTRATION; INTRACAUDAL; PERINEURAL CODE/TRAUMA/SEDATION MEDICATION
Status: DISCONTINUED | OUTPATIENT
Start: 2024-10-23 | End: 2024-10-23 | Stop reason: HOSPADM

## 2024-10-23 NOTE — H&P
Patient presents to [facility] for loop recorder implant. Patient was seen by Dr. Butler and SVT ablation attempted however nothing was inducible. Loop will be implanted for long term rhythm monitoring.

## 2024-10-23 NOTE — DISCHARGE INSTR - AVS FIRST PAGE
Post Procedure Care instructions:     Pain management   Tylenol (acetaminophen) and ice        First 7 days:  Bandage must remain on wound for first 48 hours then you may take it off  Do not use lotions, powders, creams, or ointments on incision  Bathing:    Place waterproof bandage over top when showering   Avoid water directly hitting bandage   Do not soak incision   After 7 days:  If glue is present:  Avoid picking at the glue or peeling it off, the glue will come off on its own  If stiches present:   Leave in place, they will be removed at your 2 week follow up appointment      When to call clinic:    Redness or swelling at incision site   Opening and/or drainage from the incision   Temperature >100.4 F     If you have any questions:   [phone] 8:30am-5:30pm  [phone] After hours  [phone] Appointments     Information about your device:     WHAT YOU SHOULD KNOW:    A cardiac loop recorder is a device used to diagnose heart rhythm problems, such as a fast or irregular heartbeat. It is implanted in your left chest, just under the skin. The device records a pattern of your heart's rhythm, called an EKG. Your device records automatic EKGs, depending on how your caregiver programs it. You may also receive a handheld controller. You press a button on the controller when you have symptoms, such as dizziness, lightheadedness, or palpitations. The device will record an EKG at that moment. The recording can help your caregiver see if your symptoms may be caused by heart rhythm problems. Your caregiver will remove the device after it has collected enough data. You may need the device for up to 3 years. The procedure to remove the device is similar to the procedure used to implant it.

## 2024-11-01 ENCOUNTER — HOSPITAL ENCOUNTER (EMERGENCY)
Facility: HOSPITAL | Age: 29
Discharge: HOME | End: 2024-11-01
Attending: EMERGENCY MEDICINE
Payer: COMMERCIAL

## 2024-11-01 ENCOUNTER — APPOINTMENT (EMERGENCY)
Dept: RADIOLOGY | Facility: HOSPITAL | Age: 29
End: 2024-11-01
Payer: COMMERCIAL

## 2024-11-01 VITALS
SYSTOLIC BLOOD PRESSURE: 116 MMHG | OXYGEN SATURATION: 98 % | RESPIRATION RATE: 18 BRPM | TEMPERATURE: 98.2 F | DIASTOLIC BLOOD PRESSURE: 72 MMHG | HEART RATE: 85 BPM

## 2024-11-01 DIAGNOSIS — G90.A POTS (POSTURAL ORTHOSTATIC TACHYCARDIA SYNDROME): Primary | Chronic | ICD-10-CM

## 2024-11-01 LAB
ALBUMIN SERPL BCG-MCNC: 4.4 G/DL (ref 3.5–5)
ALP SERPL-CCNC: 109 U/L (ref 34–104)
ALT SERPL W P-5'-P-CCNC: 16 U/L (ref 7–52)
ANION GAP SERPL CALCULATED.3IONS-SCNC: 4 MMOL/L (ref 4–13)
ANION GAP SERPL CALCULATED.3IONS-SCNC: 9 MMOL/L (ref 4–13)
AST SERPL W P-5'-P-CCNC: 17 U/L (ref 13–39)
ATRIAL RATE: 128 BPM
ATRIAL RATE: 129 BPM
BASOPHILS # BLD AUTO: 0.06 THOUSANDS/ΜL (ref 0–0.1)
BASOPHILS NFR BLD AUTO: 1 % (ref 0–1)
BILIRUB SERPL-MCNC: 0.27 MG/DL (ref 0.2–1)
BUN SERPL-MCNC: 11 MG/DL (ref 5–25)
BUN SERPL-MCNC: 9 MG/DL (ref 5–25)
CALCIUM SERPL-MCNC: 8.8 MG/DL (ref 8.4–10.2)
CALCIUM SERPL-MCNC: 9.1 MG/DL (ref 8.4–10.2)
CARDIAC TROPONIN I PNL SERPL HS: <2 NG/L
CHLORIDE SERPL-SCNC: 104 MMOL/L (ref 96–108)
CHLORIDE SERPL-SCNC: 104 MMOL/L (ref 96–108)
CO2 SERPL-SCNC: 27 MMOL/L (ref 21–32)
CO2 SERPL-SCNC: 29 MMOL/L (ref 21–32)
CREAT SERPL-MCNC: 0.76 MG/DL (ref 0.6–1.3)
CREAT SERPL-MCNC: 0.84 MG/DL (ref 0.6–1.3)
EOSINOPHIL # BLD AUTO: 0.13 THOUSAND/ΜL (ref 0–0.61)
EOSINOPHIL NFR BLD AUTO: 1 % (ref 0–6)
ERYTHROCYTE [DISTWIDTH] IN BLOOD BY AUTOMATED COUNT: 12.2 % (ref 11.6–15.1)
EXT PREGNANCY TEST URINE: NEGATIVE
EXT. CONTROL: NORMAL
GFR SERPL CREATININE-BSD FRML MDRD: 105 ML/MIN/1.73SQ M
GFR SERPL CREATININE-BSD FRML MDRD: 93 ML/MIN/1.73SQ M
GLUCOSE SERPL-MCNC: 111 MG/DL (ref 65–140)
GLUCOSE SERPL-MCNC: 89 MG/DL (ref 65–140)
HCT VFR BLD AUTO: 43.1 % (ref 34.8–46.1)
HGB BLD-MCNC: 15 G/DL (ref 11.5–15.4)
IMM GRANULOCYTES # BLD AUTO: 0.03 THOUSAND/UL (ref 0–0.2)
IMM GRANULOCYTES NFR BLD AUTO: 0 % (ref 0–2)
LYMPHOCYTES # BLD AUTO: 2.01 THOUSANDS/ΜL (ref 0.6–4.47)
LYMPHOCYTES NFR BLD AUTO: 21 % (ref 14–44)
MAGNESIUM SERPL-MCNC: 1.6 MG/DL (ref 1.9–2.7)
MAGNESIUM SERPL-MCNC: 5.2 MG/DL (ref 1.9–2.7)
MCH RBC QN AUTO: 29.8 PG (ref 26.8–34.3)
MCHC RBC AUTO-ENTMCNC: 34.8 G/DL (ref 31.4–37.4)
MCV RBC AUTO: 86 FL (ref 82–98)
MONOCYTES # BLD AUTO: 0.85 THOUSAND/ΜL (ref 0.17–1.22)
MONOCYTES NFR BLD AUTO: 9 % (ref 4–12)
NEUTROPHILS # BLD AUTO: 6.38 THOUSANDS/ΜL (ref 1.85–7.62)
NEUTS SEG NFR BLD AUTO: 68 % (ref 43–75)
NRBC BLD AUTO-RTO: 0 /100 WBCS
P AXIS: 64 DEGREES
P AXIS: 67 DEGREES
PHOSPHATE SERPL-MCNC: 3.2 MG/DL (ref 2.7–4.5)
PLATELET # BLD AUTO: 223 THOUSANDS/UL (ref 149–390)
PMV BLD AUTO: 11.5 FL (ref 8.9–12.7)
POTASSIUM SERPL-SCNC: 3.3 MMOL/L (ref 3.5–5.3)
POTASSIUM SERPL-SCNC: 3.8 MMOL/L (ref 3.5–5.3)
PR INTERVAL: 144 MS
PR INTERVAL: 152 MS
PROT SERPL-MCNC: 7.4 G/DL (ref 6.4–8.4)
QRS AXIS: 59 DEGREES
QRS AXIS: 63 DEGREES
QRSD INTERVAL: 72 MS
QRSD INTERVAL: 76 MS
QT INTERVAL: 320 MS
QT INTERVAL: 396 MS
QTC INTERVAL: 467 MS
QTC INTERVAL: 591 MS
RBC # BLD AUTO: 5.03 MILLION/UL (ref 3.81–5.12)
SODIUM SERPL-SCNC: 137 MMOL/L (ref 135–147)
SODIUM SERPL-SCNC: 140 MMOL/L (ref 135–147)
T WAVE AXIS: 48 DEGREES
T WAVE AXIS: 49 DEGREES
TSH SERPL DL<=0.05 MIU/L-ACNC: 3.75 UIU/ML (ref 0.45–4.5)
VENTRICULAR RATE: 128 BPM
VENTRICULAR RATE: 134 BPM
WBC # BLD AUTO: 9.46 THOUSAND/UL (ref 4.31–10.16)

## 2024-11-01 PROCEDURE — 81025 URINE PREGNANCY TEST: CPT

## 2024-11-01 PROCEDURE — 84100 ASSAY OF PHOSPHORUS: CPT

## 2024-11-01 PROCEDURE — 96367 TX/PROPH/DG ADDL SEQ IV INF: CPT

## 2024-11-01 PROCEDURE — 80053 COMPREHEN METABOLIC PANEL: CPT

## 2024-11-01 PROCEDURE — 84484 ASSAY OF TROPONIN QUANT: CPT

## 2024-11-01 PROCEDURE — 96366 THER/PROPH/DIAG IV INF ADDON: CPT

## 2024-11-01 PROCEDURE — 96365 THER/PROPH/DIAG IV INF INIT: CPT

## 2024-11-01 PROCEDURE — 36415 COLL VENOUS BLD VENIPUNCTURE: CPT

## 2024-11-01 PROCEDURE — 83735 ASSAY OF MAGNESIUM: CPT

## 2024-11-01 PROCEDURE — 93005 ELECTROCARDIOGRAM TRACING: CPT

## 2024-11-01 PROCEDURE — 80048 BASIC METABOLIC PNL TOTAL CA: CPT | Performed by: INTERNAL MEDICINE

## 2024-11-01 PROCEDURE — 99285 EMERGENCY DEPT VISIT HI MDM: CPT | Performed by: EMERGENCY MEDICINE

## 2024-11-01 PROCEDURE — 83735 ASSAY OF MAGNESIUM: CPT | Performed by: INTERNAL MEDICINE

## 2024-11-01 PROCEDURE — 85025 COMPLETE CBC W/AUTO DIFF WBC: CPT

## 2024-11-01 PROCEDURE — 71046 X-RAY EXAM CHEST 2 VIEWS: CPT

## 2024-11-01 PROCEDURE — 93010 ELECTROCARDIOGRAM REPORT: CPT | Performed by: INTERNAL MEDICINE

## 2024-11-01 PROCEDURE — 99244 OFF/OP CNSLTJ NEW/EST MOD 40: CPT | Performed by: INTERNAL MEDICINE

## 2024-11-01 PROCEDURE — 99285 EMERGENCY DEPT VISIT HI MDM: CPT

## 2024-11-01 PROCEDURE — 84443 ASSAY THYROID STIM HORMONE: CPT

## 2024-11-01 RX ORDER — SODIUM CHLORIDE, SODIUM GLUCONATE, SODIUM ACETATE, POTASSIUM CHLORIDE, MAGNESIUM CHLORIDE, SODIUM PHOSPHATE, DIBASIC, AND POTASSIUM PHOSPHATE .53; .5; .37; .037; .03; .012; .00082 G/100ML; G/100ML; G/100ML; G/100ML; G/100ML; G/100ML; G/100ML
1000 INJECTION, SOLUTION INTRAVENOUS ONCE
Status: COMPLETED | OUTPATIENT
Start: 2024-11-01 | End: 2024-11-01

## 2024-11-01 RX ORDER — POTASSIUM CHLORIDE 1500 MG/1
40 TABLET, EXTENDED RELEASE ORAL ONCE
Status: COMPLETED | OUTPATIENT
Start: 2024-11-01 | End: 2024-11-01

## 2024-11-01 RX ORDER — MAGNESIUM SULFATE HEPTAHYDRATE 40 MG/ML
2 INJECTION, SOLUTION INTRAVENOUS ONCE
Status: COMPLETED | OUTPATIENT
Start: 2024-11-01 | End: 2024-11-01

## 2024-11-01 RX ORDER — MAGNESIUM SULFATE HEPTAHYDRATE 40 MG/ML
2 INJECTION, SOLUTION INTRAVENOUS ONCE
Status: DISCONTINUED | OUTPATIENT
Start: 2024-11-01 | End: 2024-11-01

## 2024-11-01 RX ADMIN — POTASSIUM CHLORIDE 40 MEQ: 1500 TABLET, EXTENDED RELEASE ORAL at 08:27

## 2024-11-01 RX ADMIN — POTASSIUM CHLORIDE 40 MEQ: 1500 TABLET, EXTENDED RELEASE ORAL at 14:06

## 2024-11-01 RX ADMIN — SODIUM CHLORIDE, SODIUM GLUCONATE, SODIUM ACETATE, POTASSIUM CHLORIDE, MAGNESIUM CHLORIDE, SODIUM PHOSPHATE, DIBASIC, AND POTASSIUM PHOSPHATE 1000 ML: .53; .5; .37; .037; .03; .012; .00082 INJECTION, SOLUTION INTRAVENOUS at 07:06

## 2024-11-01 RX ADMIN — MAGNESIUM SULFATE HEPTAHYDRATE 2 G: 40 INJECTION, SOLUTION INTRAVENOUS at 08:27

## 2024-11-01 RX ADMIN — MAGNESIUM SULFATE HEPTAHYDRATE 2 G: 40 INJECTION, SOLUTION INTRAVENOUS at 14:07

## 2024-11-01 NOTE — ASSESSMENT & PLAN NOTE
Has history of hypokalemia followed outpatient by Nephrology as she has had this since she was a child. K 3.3 on presentation, currently being repleted. Takes 20 mEq TID at home and amiloride. Reports baseline K is about 3.3-3.5. She notes she usually becomes HR symptomatic when K is ~3.0-3.1.     Plan:  Replete K in the ED  Continue potassium supplementation and amiloride at home, will increase K supplementation to 40 BID  Continue outpatient electrolyte monitoring with extra repletion as necessary

## 2024-11-01 NOTE — CONSULTS
Consultation - Cardiology   Name: Julita Berry 29 y.o. female I MRN: [id number]  Unit/Bed#: QCB I Date of Admission: 11/1/2024   Date of Service: 11/1/2024 I Hospital Day: 0   Consult to cardiology  Consult performed by: Suzanne Delong DO  Consult ordered by: Kelsey Jung MD      Physician Requesting Evaluation: Kelsey Jung MD   Reason for Evaluation / Principal Problem: tachycardia, POTS    Assessment & Plan  POTS (postural orthostatic tachycardia syndrome)  Patient follows with Cardiology outpatient d/t tachycardia present since she was a teenager that has been described as POTS (congruent symptoms and HPI although normal tilt-table test when performed in her early/mid 20s, inappropriate sinus tachycardia, and SVT. She follows with [facility] outpatient and is maintained on metoprolol XL 50mg AM and XL 75mg at night. Ablation was attempted in 9/2023 although SVT not able to be induced at that time and no intervention was performed. She then underwent loop recorder implantation 10/23/24 with plan to keep that in place for 2 years and potentially perform ablation if significant SVT events occur.     She is presenting to ER this morning as she was noted last night to have high heart rate by her Apple Watch and loop recorder that was symptomatic and has persisted into this morning. Noted ST/SVT on loop recorder 10/27. Her symptoms are likely related to IST/POTS at this time. If there is clear trigger to acute worsening of tachycardia it is unclear. EKG shows NSR intermixed with sinus tach and PVCs. No recent illness or diarrhea that could affect electrolytes. PE is unlikely and CXR is clear. TSH is within normal limits today. Troponin negative. Current workup suggests can rule out ACS, PE, thyroid etiology, pericarditis, myocarditis.     Plan:  Device interrogated bedside, no SVT seen from 10/27 or of recent. Appears to be episodes of sinus tachycardia with average HR range of 120  "bpm  Continue metoprolol XL 50mg AM + 75mg PM  Continue to use additional metoprolol 25mg as needed  Has follow-up appointment with EP on 11/8 for device check and stitch removal  SVT (supraventricular tachycardia) (HCC)  Has history of SVT in the past and most recently in 1/2024 prior to recent loop recorder alert that said \"ST/SVT detected\" on 10/27/24. EP attempted ablation in September 2023 but were unable to induce SVT rhythm for ablation procedure. No other intervention performed at that time. Currently has loop recorder in place to document additional SVT burden for potential future ablation.   Essential hypertension, benign  Has history of essential hypertension. Maintained on non-medical regimen including exercise and avoidance of salt as followed by Nephrology outpatient. Can consider spironolactone in outpatient setting for potassium support.  Hypomagnesemia  Mg noted to be 1.6 on presentation, has been noted to be as low as 1.5 in the past. Baseline Mg is ~1.8-1.9 per patient. Takes Mag Glycinate 200mg BID at home. Higher doses than this and alternate magnesium forms have caused significant diarrhea in the past.    Plan:  Continue outpatient mag supplementation  Continue outpatient electrolyte monitoring with extra repletion as necessary  Hypokalemia  Has history of hypokalemia followed outpatient by Nephrology as she has had this since she was a child. K 3.3 on presentation, currently being repleted. Takes 20 mEq TID at home and amiloride. Reports baseline K is about 3.3-3.5. She notes she usually becomes HR symptomatic when K is ~3.0-3.1.     Plan:  Replete K in the ED  Continue potassium supplementation and amiloride at home, will increase K supplementation to 40 BID  Continue outpatient electrolyte monitoring with extra repletion as necessary      History of Present Illness   Julita Berry is a 29 y.o. female who presents with tachycardia. She is presenting to ER this morning as she was noted " "last night to have high heart rate by her Apple Watch and loop recorder. She felt symptomatic with minimal movements with palpitations that are similar to previous episodes. At one point she checked her HR to be 172 and was concerned she was back in SVT. Per loop recorder alert on 10/27, there was evidence of documented \"ST/SVT with frequent PVCs\". Unclear how long this lasted but she did have palpitations at that time that resolved. She was pregnant within the past year but is not actively breastfeeding or pumping.     Review of Systems   Constitutional:  Negative for chills and fever.   HENT:  Negative for ear pain and sore throat.    Eyes:  Negative for pain and visual disturbance.   Respiratory:  Negative for cough, chest tightness and shortness of breath.    Cardiovascular:  Positive for palpitations. Negative for chest pain.   Gastrointestinal:  Negative for abdominal pain and vomiting.   Genitourinary:  Negative for dysuria and hematuria.   Musculoskeletal:  Negative for arthralgias and back pain.   Skin:  Negative for color change and rash.   Neurological:  Negative for dizziness, seizures, syncope, weakness, light-headedness and headaches.   All other systems reviewed and are negative.      Objective :  Temp:  [98.2 °F (36.8 °C)] 98.2 °F (36.8 °C)  HR:  [] 98  BP: (129-168)/(82-99) 129/82  Resp:  [18] 18  SpO2:  [97 %] 97 %  O2 Device: None (Room air)  Orthostatic Blood Pressures      Flowsheet Row Most Recent Value   Blood Pressure 129/82 filed at 11/01/2024 0756   Patient Position - Orthostatic VS Lying filed at 11/01/2024 0635          First Weight:  There were no vitals filed for this visit.    Physical Exam  Vitals and nursing note reviewed.   Constitutional:       General: She is not in acute distress.     Appearance: She is well-developed.   HENT:      Head: Normocephalic and atraumatic.   Eyes:      Conjunctiva/sclera: Conjunctivae normal.   Cardiovascular:      Rate and Rhythm: Normal rate " and regular rhythm.      Heart sounds: No murmur heard.     Comments: Occasional extra beat appreciated  Pulmonary:      Effort: Pulmonary effort is normal. No respiratory distress.      Breath sounds: Normal breath sounds.   Abdominal:      Palpations: Abdomen is soft.      Tenderness: There is no abdominal tenderness.   Musculoskeletal:      Cervical back: Neck supple.      Right lower leg: No edema.      Left lower leg: No edema.   Skin:     General: Skin is warm and dry.      Capillary Refill: Capillary refill takes less than 2 seconds.   Neurological:      General: No focal deficit present.      Mental Status: She is alert and oriented to person, place, and time.   Psychiatric:         Mood and Affect: Mood normal.           Lab Results: I have reviewed the following results:CBC/BMP:   .     11/01/24  0705   WBC 9.46   HGB 15.0   HCT 43.1      SODIUM 140   K 3.3*      CO2 27   BUN 11   CREATININE 0.84   GLUC 89   MG 1.6*   PHOS 3.2      Results from last 7 days   Lab Units 11/01/24  0705   WBC Thousand/uL 9.46   HEMOGLOBIN g/dL 15.0   HEMATOCRIT % 43.1   PLATELETS Thousands/uL 223     Results from last 7 days   Lab Units 11/01/24  0705   POTASSIUM mmol/L 3.3*   CHLORIDE mmol/L 104   CO2 mmol/L 27   BUN mg/dL 11   CREATININE mg/dL 0.84   CALCIUM mg/dL 9.1         Lab Results   Component Value Date    HGBA1C 4.9 08/16/2024     Lab Results   Component Value Date    CKTOTAL 67 07/29/2015       Suzanne Delong DO  Internal Medicine Residency, PGY-2  Cardiology Team 2

## 2024-11-01 NOTE — ASSESSMENT & PLAN NOTE
Patient follows with Cardiology outpatient d/t tachycardia present since she was a teenager that has been described as POTS (congruent symptoms and HPI although normal tilt-table test when performed in her early/mid 20s, inappropriate sinus tachycardia, and SVT. She follows with [facility] outpatient and is maintained on metoprolol XL 50mg AM and XL 75mg at night. Ablation was attempted in 9/2023 although SVT not able to be induced at that time and no intervention was performed. She then underwent loop recorder implantation 10/23/24 with plan to keep that in place for 2 years and potentially perform ablation if significant SVT events occur.     She is presenting to ER this morning as she was noted last night to have high heart rate by her Apple Watch and loop recorder that was symptomatic and has persisted into this morning. Noted ST/SVT on loop recorder 10/27. Her symptoms are likely related to IST/POTS at this time. If there is clear trigger to acute worsening of tachycardia it is unclear. EKG shows NSR intermixed with sinus tach and PVCs. No recent illness or diarrhea that could affect electrolytes. PE is unlikely and CXR is clear. TSH is within normal limits today. Troponin negative. Current workup suggests can rule out ACS, PE, thyroid etiology, pericarditis, myocarditis.     Plan:  Device interrogated bedside, no SVT seen from 10/27 or of recent. Appears to be episodes of sinus tachycardia with average HR range of 120 bpm  Continue metoprolol XL 50mg AM + 75mg PM  Continue to use additional metoprolol 25mg as needed  Has follow-up appointment with EP on 11/8 for device check and stitch removal

## 2024-11-01 NOTE — ASSESSMENT & PLAN NOTE
Mg noted to be 1.6 on presentation, has been noted to be as low as 1.5 in the past. Baseline Mg is ~1.8-1.9 per patient. Takes Mag Glycinate 200mg BID at home. Higher doses than this and alternate magnesium forms have caused significant diarrhea in the past.    Plan:  Continue outpatient mag supplementation  Continue outpatient electrolyte monitoring with extra repletion as necessary

## 2024-11-01 NOTE — ED ATTENDING ATTESTATION
11/1/2024  I, Kelsey Jung MD, saw and evaluated the patient. I have discussed the patient with the resident/non-physician practitioner and agree with the resident's/non-physician practitioner's findings, Plan of Care, and MDM as documented in the resident's/non-physician practitioner's note, except where noted. All available labs and Radiology studies were reviewed.  I was present for key portions of any procedure(s) performed by the resident/non-physician practitioner and I was immediately available to provide assistance.       At this point I agree with the current assessment done in the Emergency Department.  I have conducted an independent evaluation of this patient a history and physical is as follows:    This is an evaluation of a 2-year-old female with past medical history notable for SVT, RA as well as anxiety who presents to the emergency department with episode of of tachycardia/SVT.  Patient states that over the past few days she has had intermittent episodes of palpitations.  Symptoms seem to intensify last evening and again upon awakening this morning.  Recently had an admission for ablation however there was no focus that cardiology could ablate so loop recorder was placed.  Patient did note in her diary for loop recorder that she was feeling symptoms and it was reported as having an episode of PVCs with SVT.  Compliance with medications and last took her medication this morning.  Denies any associated shortness of breath.  No abdominal pain.  Does occasionally feel nauseous but without vomiting.  Currently denies any lightheadedness dizziness or syncopal like symptoms.  No focal numbness weakness or tingling.  No associated chest pain.  Patient is not currently on birth control.  Denies any other PE risk factors.  Symptoms are similar to previous episodes.  On exam patient is nontoxic.  Heart rate currently in the high 90s when laying supine however when she does stand her heart rate does  "increase to 120s.  Appears to be sinus tachycardia.  Vital signs otherwise within normal limits.  HEENT is normocephalic and atraumatic with moist mucous membranes clear sclera and conjunctive.  Neck is supple full range of motion.  No murmurs.  Lungs are clear no wheezing rhonchi or rales.  Abdomen is soft positive bowel sounds nontender.  No calf tenderness to palpation.  No lower extremity edema.  Intact distal pulses.  Capillary for less than 2 seconds.  Awake alert oriented and appropriate.  Assessment and plan 29-year-old female with known history of SVT sinus tachycardia with recent failed cardiac ablation who presents with ongoing symptoms.  Will do labs including TSH electrolytes.  Will give IV fluid hydration.  Will monitor on telemetry.  Will do orthostatics and discussed with cardiology..    Portions of the record may have been created with voice recognition software.  Occasional wrong word or “sound-a-like\" substitutions may have occurred due to the inherent limitations of voice recognition software.  Review chart carefully and recognize, using context, where substitutions have occurred.     ED Course     Cardiology at bedside. Asking for additional electrolyte replacement. Pt without complaints. HR WNL. Cardiology recommends dc to f/u. Pt feels comfortable with plan for dc.     Critical Care Time  Procedures      "

## 2024-11-01 NOTE — DISCHARGE INSTRUCTIONS
You have been seen in the emergency department for evaluation of a rapid heart rate.  Your workup today was significant for electrolyte abnormalities that may be contributing to your symptoms.  As recommended by cardiology and EP, you should increase daily magnesium to 200mg twice daily and increase potassium to 40mg twice daily.    Return for worsening of condition or repeat symptoms.  Please follow-up with cardiology as directed.

## 2024-11-01 NOTE — ASSESSMENT & PLAN NOTE
Has history of essential hypertension. Maintained on non-medical regimen including exercise and avoidance of salt as followed by Nephrology outpatient. Can consider spironolactone in outpatient setting for potassium support.

## 2024-11-01 NOTE — ASSESSMENT & PLAN NOTE
"Has history of SVT in the past and most recently in 1/2024 prior to recent loop recorder alert that said \"ST/SVT detected\" on 10/27/24. EP attempted ablation in September 2023 but were unable to induce SVT rhythm for ablation procedure. No other intervention performed at that time. Currently has loop recorder in place to document additional SVT burden for potential future ablation.   "

## 2024-11-01 NOTE — ED PROVIDER NOTES
Time reflects when diagnosis was documented in both MDM as applicable and the Disposition within this note       Time User Action Codes Description Comment    11/1/2024  8:11 AM Tiffanie Broderick Add [G90.A] POTS (postural orthostatic tachycardia syndrome)           ED Disposition       ED Disposition   Discharge    Condition   Stable    Date/Time   Fri Nov 1, 2024  2:22 PM    Comment   Julita Berry discharge to home/self care.                   Assessment & Plan       Medical Decision Making  Amount and/or Complexity of Data Reviewed  Labs: ordered. Decision-making details documented in ED Course.  Radiology: ordered and independent interpretation performed. Decision-making details documented in ED Course.    Risk  Prescription drug management.        ED Course as of 11/01/24 2021 Fri Nov 01, 2024   0646 Patient seen and evaluated by me  Ddx: Arrhythmia, electrolyte disturbance, thyroid dysfunction, doubt ACS.  Workup and plan: CBC, CMP, magnesium, phosphorus, TSH, troponin   0646 EKG done at 0645 interpreted by me   rate 128  rhythm sinus tachycardia   axis normal  QRS complexes are normal  Intervals are normal  ST segments showing no ischemic changes      0723 CBC and differential  WNL   0752 MAGNESIUM(!): 1.6   0752 Potassium(!): 3.3   0752 hs TnI 0hr: <2   0752 TSH 3RD GENERATON: 3.745   0915 Pending cardiology consult   0943 XR chest 2 views  Normal on my interpretation   1322 Pending EP consult   1423 Patient discharged after electrolyte repletion per cardiology recs.  Given return precautions and follow-up information.  Instructed on how to take home electrolyte supplementation per cardiology recs.  Patient reports understanding, all questions answered.       Medications   multi-electrolyte (ISOLYTE-S PH 7.4) bolus 1,000 mL (0 mL Intravenous Stopped 11/1/24 0827)   magnesium sulfate 2 g/50 mL IVPB (premix) 2 g (0 g Intravenous Stopped 11/1/24 1159)   potassium chloride (Klor-Con M20) CR tablet 40 mEq  "(40 mEq Oral Given 11/1/24 0827)   potassium chloride (Klor-Con M20) CR tablet 40 mEq (40 mEq Oral Given 11/1/24 1406)   magnesium sulfate 2 g/50 mL IVPB (premix) 2 g (0 g Intravenous Stopped 11/1/24 1516)       ED Risk Strat Scores                           SBIRT 22yo+      Flowsheet Row Most Recent Value   Initial Alcohol Screen: US AUDIT-C     1. How often do you have a drink containing alcohol? 0 Filed at: 11/01/2024 0654   2. How many drinks containing alcohol do you have on a typical day you are drinking?  0 Filed at: 11/01/2024 0654   3b. FEMALE Any Age, or MALE 65+: How often do you have 4 or more drinks on one occassion? 0 Filed at: 11/01/2024 0654   Audit-C Score 0 Filed at: 11/01/2024 0654   RAZ: How many times in the past year have you...    Used an illegal drug or used a prescription medication for non-medical reasons? Never Filed at: 11/01/2024 0654                            History of Present Illness       Chief Complaint   Patient presents with    Rapid Heart Rate     Pt has hx of SVT; on her way to work pts loop recorder alerted her that her heart rate was high. C/o dizziness & palpitations       Past Medical History:   Diagnosis Date    Anxiety     Eczema     Exercises 5 to 6 times per week     per pt prior to knee issue -enjoyed running and horseback riding    Family history of reaction to anesthesia     per pt--\"Mom also gets high anxiety with surgeries and wakes up nasty from anesthesia\"    Gastroparesis 2012    Hypertension     last assessed 3/12/14    Hypokalemia     per pt per doctors possibly related renal problem    Hypomagnesemia     Irritable bowel syndrome     with diarrhea    Rheumatoid arthritis (HCC) 2022    Sinus tachycardia     related to dehydration    Tooth missing     front upper tooth retainer-    Wears glasses     for computer use      Past Surgical History:   Procedure Laterality Date    CARDIAC ELECTROPHYSIOLOGY PROCEDURE N/A 9/23/2024    Procedure: Cardiac eps/svt " ablation;  Surgeon: Gino Butler MD;  Location:  CARDIAC CATH LAB;  Service: Cardiology    CARDIAC ELECTROPHYSIOLOGY PROCEDURE N/A 10/23/2024    Procedure: Cardiac loop recorder implant;  Surgeon: Brice Boyle MD;  Location:  CARDIAC CATH LAB;  Service: Cardiology    CO ARTHROSCOPY KNEE LATERAL RELEASE Right 04/13/2023    Procedure: ARTHROSCOPIC RELEASE RETINACULAR;  Surgeon: Ilya Brizuela DO;  Location:  MAIN OR;  Service: Orthopedics    CO EGD TRANSORAL BIOPSY SINGLE/MULTIPLE N/A 05/04/2016    Procedure: ESOPHAGOGASTRODUODENOSCOPY (EGD);  Surgeon: Raji Monzon MD;  Location:  GI LAB;  Service: Gastroenterology    CO RCNSTJ DISLC PATELLA W/XTNSR RELIGNMT&/MUSC RL Right 04/13/2023    Procedure: ARTHROSCOPIC REALIGNMENT PATELLA;  Surgeon: Ilya Brizuela DO;  Location:  MAIN OR;  Service: Orthopedics    WISDOM TOOTH EXTRACTION        Family History   Problem Relation Age of Onset    Hypokalemia Mother     Hypotension Mother     Anxiety disorder Mother         NOS    Lung cancer Father     Skin cancer Father     Crohn's disease Father     Schizoaffective Disorder  Father     Alcohol abuse Father     Drug abuse Father     No Known Problems Sister     No Known Problems Sister     No Known Problems Brother     Coronary artery disease Maternal Grandmother     Heart disease Maternal Grandmother     Alzheimer's disease Maternal Grandmother     Arthritis Paternal Grandmother     Rheum arthritis Paternal Grandmother     Hypertension Paternal Grandmother     COPD Paternal Grandfather       Social History     Tobacco Use    Smoking status: Never    Smokeless tobacco: Never   Vaping Use    Vaping status: Never Used   Substance Use Topics    Alcohol use: Not Currently     Alcohol/week: 1.0 standard drink of alcohol     Types: 1 Standard drinks or equivalent per week    Drug use: No      E-Cigarette/Vaping    E-Cigarette Use Never User       E-Cigarette/Vaping Substances    Nicotine No     THC No     CBD No      Flavoring No     Other No     Unknown No       I have reviewed and agree with the history as documented.     Patient is a 29yoF, past medical history as per SVT, POTS, hypertension, presenting today for evaluation of rapid heart rate.  Patient states that she woke up this morning and felt like her heart was beating fast.  Her loop recorder alerted her this morning that her heart rate was elevated. She reports associated light headedness and palpitations. Chest pressure but not pain. Worse with exertion. No associated shortness of breath, fevers, recent illnesses. Patient denies leg pain, leg swelling, recent travel or immobility, recent hospitalization/surgery, history of cancer, hemoptysis, or history of PE/DVT.  No associated weakness, sensation changes, or blurry vision.  Patient reports that this feels like her previous episodes.  She has been admitted twice recently for SVT, they attempted an ablation which she.  She states that she has been here in the ED for this and discharged after electrolyte repletion in the past.          Review of Systems   Constitutional:  Negative for chills, fatigue and fever.   HENT:  Negative for congestion.    Respiratory:  Negative for cough, chest tightness and shortness of breath.    Cardiovascular:  Positive for palpitations. Negative for chest pain.   Gastrointestinal:  Negative for abdominal pain, diarrhea, nausea and vomiting.   Genitourinary:  Negative for difficulty urinating.   Skin:  Negative for color change.   Neurological:  Positive for light-headedness. Negative for dizziness, syncope, speech difficulty, weakness, numbness and headaches.           Objective       ED Triage Vitals   Temperature Pulse Blood Pressure Respirations SpO2 Patient Position - Orthostatic VS   11/01/24 0645 11/01/24 0635 11/01/24 0635 11/01/24 0635 11/01/24 0635 11/01/24 0635   98.2 °F (36.8 °C) (!) 139 168/99 18 97 % Lying      Temp Source Heart Rate Source BP Location FiO2 (%) Pain Score     11/01/24 0645 11/01/24 0635 11/01/24 0635 -- --    Tympanic Monitor Right arm        Vitals      Date and Time Temp Pulse SpO2 Resp BP Pain Score FACES Pain Rating User   11/01/24 1530 -- 85 98 % 18 116/72 -- -- CS   11/01/24 0940 -- 89 99 % 20 153/83 -- -- CS   11/01/24 0935 -- 96 99 % 20 140/87 -- -- CS   11/01/24 0756 -- -- -- -- 129/82 -- -- AS   11/01/24 0731 -- 98 -- -- -- -- -- AS   11/01/24 0645 98.2 °F (36.8 °C) -- -- -- -- -- -- PT   11/01/24 0635 -- 139 97 % 18 168/99 -- -- PT            Physical Exam  Vitals and nursing note reviewed.   Constitutional:       General: She is not in acute distress.     Appearance: Normal appearance. She is not ill-appearing or toxic-appearing.   HENT:      Head: Normocephalic and atraumatic.   Eyes:      General: No scleral icterus.     Extraocular Movements: Extraocular movements intact.   Cardiovascular:      Rate and Rhythm: Regular rhythm. Tachycardia present.      Pulses: Normal pulses.      Heart sounds: Normal heart sounds. No murmur heard.  Pulmonary:      Effort: Pulmonary effort is normal. No respiratory distress.      Breath sounds: Normal breath sounds. No wheezing or rhonchi.   Abdominal:      General: Abdomen is flat. There is no distension.      Palpations: Abdomen is soft.      Tenderness: There is no abdominal tenderness.   Musculoskeletal:         General: Normal range of motion.      Cervical back: Normal range of motion.      Right lower leg: No edema.      Left lower leg: No edema.   Skin:     General: Skin is warm.      Capillary Refill: Capillary refill takes less than 2 seconds.   Neurological:      General: No focal deficit present.      Mental Status: She is alert.      Cranial Nerves: No cranial nerve deficit.      Sensory: No sensory deficit.      Motor: No weakness.      Gait: Gait normal.   Psychiatric:         Mood and Affect: Mood normal.         Behavior: Behavior normal.         Results Reviewed       Procedure Component Value Units  Date/Time    Magnesium [[id number]]  (Abnormal) Collected: 11/01/24 1520    Lab Status: Final result Specimen: Blood from Arm, Right Updated: 11/01/24 1557     Magnesium 5.2 mg/dL     Basic metabolic panel [[id number]] Collected: 11/01/24 1520    Lab Status: Final result Specimen: Blood from Arm, Right Updated: 11/01/24 1557     Sodium 137 mmol/L      Potassium 3.8 mmol/L      Chloride 104 mmol/L      CO2 29 mmol/L      ANION GAP 4 mmol/L      BUN 9 mg/dL      Creatinine 0.76 mg/dL      Glucose 111 mg/dL      Calcium 8.8 mg/dL      eGFR 105 ml/min/1.73sq m     Narrative:      National Kidney Disease Foundation guidelines for Chronic Kidney Disease (CKD):     Stage 1 with normal or high GFR (GFR > 90 mL/min/1.73 square meters)    Stage 2 Mild CKD (GFR = 60-89 mL/min/1.73 square meters)    Stage 3A Moderate CKD (GFR = 45-59 mL/min/1.73 square meters)    Stage 3B Moderate CKD (GFR = 30-44 mL/min/1.73 square meters)    Stage 4 Severe CKD (GFR = 15-29 mL/min/1.73 square meters)    Stage 5 End Stage CKD (GFR <15 mL/min/1.73 square meters)  Note: GFR calculation is accurate only with a steady state creatinine    POCT pregnancy, urine [[id number]]  (Normal) Resulted: 11/01/24 1042    Lab Status: Final result Updated: 11/01/24 1042     EXT Preg Test, Ur Negative     Control Valid    TSH, 3rd generation with Free T4 reflex [[id number]]  (Normal) Collected: 11/01/24 0705    Lab Status: Final result Specimen: Blood from Arm, Right Updated: 11/01/24 0750     TSH 3RD GENERATON 3.745 uIU/mL     HS Troponin 0hr (reflex protocol) [[id number]]  (Normal) Collected: 11/01/24 0705    Lab Status: Final result Specimen: Blood from Arm, Right Updated: 11/01/24 0742     hs TnI 0hr <2 ng/L     Comprehensive metabolic panel [[id number]]  (Abnormal) Collected: 11/01/24 0705    Lab Status: Final result Specimen: Blood from Arm, Right Updated: 11/01/24 0736     Sodium 140 mmol/L      Potassium 3.3 mmol/L      Chloride 104 mmol/L      CO2 27 mmol/L       ANION GAP 9 mmol/L      BUN 11 mg/dL      Creatinine 0.84 mg/dL      Glucose 89 mg/dL      Calcium 9.1 mg/dL      AST 17 U/L      ALT 16 U/L      Alkaline Phosphatase 109 U/L      Total Protein 7.4 g/dL      Albumin 4.4 g/dL      Total Bilirubin 0.27 mg/dL      eGFR 93 ml/min/1.73sq m     Narrative:      National Kidney Disease Foundation guidelines for Chronic Kidney Disease (CKD):     Stage 1 with normal or high GFR (GFR > 90 mL/min/1.73 square meters)    Stage 2 Mild CKD (GFR = 60-89 mL/min/1.73 square meters)    Stage 3A Moderate CKD (GFR = 45-59 mL/min/1.73 square meters)    Stage 3B Moderate CKD (GFR = 30-44 mL/min/1.73 square meters)    Stage 4 Severe CKD (GFR = 15-29 mL/min/1.73 square meters)    Stage 5 End Stage CKD (GFR <15 mL/min/1.73 square meters)  Note: GFR calculation is accurate only with a steady state creatinine    Magnesium [[id number]]  (Abnormal) Collected: 11/01/24 0705    Lab Status: Final result Specimen: Blood from Arm, Right Updated: 11/01/24 0736     Magnesium 1.6 mg/dL     Phosphorus [[id number]]  (Normal) Collected: 11/01/24 0705    Lab Status: Final result Specimen: Blood from Arm, Right Updated: 11/01/24 0736     Phosphorus 3.2 mg/dL     CBC and differential [[id number]] Collected: 11/01/24 0705    Lab Status: Final result Specimen: Blood from Arm, Right Updated: 11/01/24 0717     WBC 9.46 Thousand/uL      RBC 5.03 Million/uL      Hemoglobin 15.0 g/dL      Hematocrit 43.1 %      MCV 86 fL      MCH 29.8 pg      MCHC 34.8 g/dL      RDW 12.2 %      MPV 11.5 fL      Platelets 223 Thousands/uL      nRBC 0 /100 WBCs      Segmented % 68 %      Immature Grans % 0 %      Lymphocytes % 21 %      Monocytes % 9 %      Eosinophils Relative 1 %      Basophils Relative 1 %      Absolute Neutrophils 6.38 Thousands/µL      Absolute Immature Grans 0.03 Thousand/uL      Absolute Lymphocytes 2.01 Thousands/µL      Absolute Monocytes 0.85 Thousand/µL      Eosinophils Absolute 0.13 Thousand/µL      Basophils  Absolute 0.06 Thousands/µL             XR chest 2 views   ED Interpretation by Tiffanie Broderick MD (11/01 0943)   No acute cardiopulmonary disease.      Final Interpretation by Bao Kc MD (11/01 1621)      No acute cardiopulmonary disease.            Workstation performed: [id number]             Procedures    ED Medication and Procedure Management   Prior to Admission Medications   Prescriptions Last Dose Informant Patient Reported? Taking?   AMILoride 5 mg tablet  Self Yes No   Sig: Take 5 mg by mouth 2 (two) times a day 2 in the morning.   3 at night   LORazepam (Ativan) 0.5 mg tablet   No No   Sig: Take 1 tablet (0.5 mg total) by mouth every 8 (eight) hours as needed for anxiety   Magnesium Glycinate 100 MG CAPS  Self Yes No   Sig: Take 200 mg by mouth 2 (two) times a day   metoprolol succinate (TOPROL-XL) 50 mg 24 hr tablet   No No   Sig: Take 75 mg in the AM, and 50 mg in the PM   metoprolol tartrate (LOPRESSOR) 25 mg tablet  Self No No   Sig: Take 1 tablet (25 mg total) by mouth every 6 (six) hours as needed (palpitatins)   potassium chloride (Klor-Con M20) 20 mEq tablet   Yes No   Sig: Take 20 mEq by mouth 3 (three) times a day      Facility-Administered Medications: None     Discharge Medication List as of 11/1/2024  2:23 PM        CONTINUE these medications which have NOT CHANGED    Details   AMILoride 5 mg tablet Take 5 mg by mouth 2 (two) times a day 2 in the morning.   3 at night, Historical Med      LORazepam (Ativan) 0.5 mg tablet Take 1 tablet (0.5 mg total) by mouth every 8 (eight) hours as needed for anxiety, Starting Fri 9/27/2024, Normal      Magnesium Glycinate 100 MG CAPS Take 200 mg by mouth 2 (two) times a day, Historical Med      metoprolol succinate (TOPROL-XL) 50 mg 24 hr tablet Take 75 mg in the AM, and 50 mg in the PM, No Print      metoprolol tartrate (LOPRESSOR) 25 mg tablet Take 1 tablet (25 mg total) by mouth every 6 (six) hours as needed (palpitatins), Starting Thu  10/12/2023, Normal      potassium chloride (Klor-Con M20) 20 mEq tablet Take 20 mEq by mouth 3 (three) times a day, Historical Med           No discharge procedures on file.  ED SEPSIS DOCUMENTATION   Time reflects when diagnosis was documented in both MDM as applicable and the Disposition within this note       Time User Action Codes Description Comment    11/1/2024  8:11 AM Tiffanie Broderick Add [G90.A] POTS (postural orthostatic tachycardia syndrome)                  Tiffanie Broderick MD  11/01/24 2021

## 2024-11-05 NOTE — ASSESSMENT & PLAN NOTE
09/23/24 an EPS was performed by Dr. Butler - there were no inducible rhythms during the procedure and ablation was not performed  She had a loop implanted 10/23/24 for ongoing monitoring   Maintained on metoprolol as above

## 2024-11-05 NOTE — ASSESSMENT & PLAN NOTE
Known prior hx of POTS/inappropriate sinus tachycardia  Maintained on metoprolol succinate 75mg qAM and 50mg qPM

## 2024-11-08 ENCOUNTER — IN-CLINIC DEVICE VISIT (OUTPATIENT)
Dept: CARDIOLOGY CLINIC | Facility: CLINIC | Age: 29
End: 2024-11-08
Payer: COMMERCIAL

## 2024-11-08 ENCOUNTER — OFFICE VISIT (OUTPATIENT)
Dept: CARDIOLOGY CLINIC | Facility: CLINIC | Age: 29
End: 2024-11-08
Payer: COMMERCIAL

## 2024-11-08 VITALS
DIASTOLIC BLOOD PRESSURE: 88 MMHG | HEART RATE: 87 BPM | SYSTOLIC BLOOD PRESSURE: 132 MMHG | BODY MASS INDEX: 31.26 KG/M2 | WEIGHT: 183.1 LBS | HEIGHT: 64 IN

## 2024-11-08 DIAGNOSIS — Z95.818 PRESENCE OF CARDIAC DEVICE: ICD-10-CM

## 2024-11-08 DIAGNOSIS — I10 ESSENTIAL HYPERTENSION, BENIGN: Chronic | ICD-10-CM

## 2024-11-08 DIAGNOSIS — I47.11 INAPPROPRIATE SINUS TACHYCARDIA (HCC): Primary | ICD-10-CM

## 2024-11-08 DIAGNOSIS — G90.A POTS (POSTURAL ORTHOSTATIC TACHYCARDIA SYNDROME): Primary | Chronic | ICD-10-CM

## 2024-11-08 DIAGNOSIS — I47.10 SVT (SUPRAVENTRICULAR TACHYCARDIA) (HCC): ICD-10-CM

## 2024-11-08 DIAGNOSIS — R00.2 PALPITATION: ICD-10-CM

## 2024-11-08 PROCEDURE — 93000 ELECTROCARDIOGRAM COMPLETE: CPT

## 2024-11-08 PROCEDURE — 99214 OFFICE O/P EST MOD 30 MIN: CPT

## 2024-11-08 PROCEDURE — 93291 INTERROG DEV EVAL SCRMS IP: CPT | Performed by: INTERNAL MEDICINE

## 2024-11-08 NOTE — PROGRESS NOTES
Results for orders placed or performed in visit on 11/08/24   Cardiac EP device report    Narrative    MDT LNQ22 ILR/ ACTIVE SYSTEM IS MRI CONDITIONAL  [facility]. WOUND CHECK: INCISION CLEAN AND DRY WITH EDGES APPROXIMATED; SUTURES REMOVED; WOUND CARE AND RESTRICTIONS REVIEWED WITH PATIENT.  BATTERY VOLTAGE ADEQUATE.  PRESENTING RHYTHM SR, 91 BPM. R WAVE 0.47 MV.  AF 0% PVC'S <1%.  NO PATIENT OR DEVICE ACTIVATED EPISODES.  NO PROGRAMMING CHANGES MADE TO DEVICE PARAMETERS.  NORMAL DEVICE FUNCTION. PAS

## 2024-11-12 ENCOUNTER — HOSPITAL ENCOUNTER (EMERGENCY)
Facility: HOSPITAL | Age: 29
Discharge: HOME | End: 2024-11-12
Attending: EMERGENCY MEDICINE
Payer: COMMERCIAL

## 2024-11-12 ENCOUNTER — APPOINTMENT (EMERGENCY)
Dept: RADIOLOGY | Facility: HOSPITAL | Age: 29
End: 2024-11-12
Payer: COMMERCIAL

## 2024-11-12 ENCOUNTER — OFFICE VISIT (OUTPATIENT)
Dept: URGENT CARE | Facility: CLINIC | Age: 29
End: 2024-11-12
Payer: COMMERCIAL

## 2024-11-12 VITALS
RESPIRATION RATE: 20 BRPM | HEART RATE: 150 BPM | OXYGEN SATURATION: 99 % | BODY MASS INDEX: 30.19 KG/M2 | WEIGHT: 181.2 LBS | HEIGHT: 65 IN | DIASTOLIC BLOOD PRESSURE: 82 MMHG | TEMPERATURE: 100.1 F | SYSTOLIC BLOOD PRESSURE: 124 MMHG

## 2024-11-12 VITALS
DIASTOLIC BLOOD PRESSURE: 66 MMHG | OXYGEN SATURATION: 98 % | TEMPERATURE: 100.7 F | HEIGHT: 65 IN | RESPIRATION RATE: 20 BRPM | WEIGHT: 181 LBS | HEART RATE: 103 BPM | SYSTOLIC BLOOD PRESSURE: 111 MMHG | BODY MASS INDEX: 30.16 KG/M2

## 2024-11-12 DIAGNOSIS — I47.11 INAPPROPRIATE SINUS TACHYCARDIA (HCC): Primary | ICD-10-CM

## 2024-11-12 DIAGNOSIS — R06.02 SOB (SHORTNESS OF BREATH): ICD-10-CM

## 2024-11-12 DIAGNOSIS — J18.9 PNEUMONIA: Primary | ICD-10-CM

## 2024-11-12 DIAGNOSIS — E87.6 HYPOKALEMIA: ICD-10-CM

## 2024-11-12 DIAGNOSIS — R50.9 FEVER, UNSPECIFIED FEVER CAUSE: ICD-10-CM

## 2024-11-12 DIAGNOSIS — R11.2 NAUSEA & VOMITING: ICD-10-CM

## 2024-11-12 LAB
ANION GAP SERPL CALCULATED.3IONS-SCNC: 11 MMOL/L (ref 4–13)
ATRIAL RATE: 124 BPM
ATRIAL RATE: 140 BPM
BACTERIA UR QL AUTO: ABNORMAL /HPF
BASOPHILS # BLD AUTO: 0.03 THOUSANDS/ÂΜL (ref 0–0.1)
BASOPHILS NFR BLD AUTO: 0 % (ref 0–1)
BILIRUB UR QL STRIP: NEGATIVE
BUN SERPL-MCNC: 9 MG/DL (ref 5–25)
CALCIUM SERPL-MCNC: 9 MG/DL (ref 8.4–10.2)
CHLORIDE SERPL-SCNC: 98 MMOL/L (ref 96–108)
CLARITY UR: CLEAR
CO2 SERPL-SCNC: 26 MMOL/L (ref 21–32)
COLOR UR: ABNORMAL
CREAT SERPL-MCNC: 0.84 MG/DL (ref 0.6–1.3)
EOSINOPHIL # BLD AUTO: 0 THOUSAND/ÂΜL (ref 0–0.61)
EOSINOPHIL NFR BLD AUTO: 0 % (ref 0–6)
ERYTHROCYTE [DISTWIDTH] IN BLOOD BY AUTOMATED COUNT: 12.1 % (ref 11.6–15.1)
FLUAV AG UPPER RESP QL IA.RAPID: NEGATIVE
FLUBV AG UPPER RESP QL IA.RAPID: NEGATIVE
GFR SERPL CREATININE-BSD FRML MDRD: 93 ML/MIN/1.73SQ M
GLUCOSE SERPL-MCNC: 175 MG/DL (ref 65–140)
GLUCOSE UR STRIP-MCNC: NEGATIVE MG/DL
HCT VFR BLD AUTO: 45.5 % (ref 34.8–46.1)
HGB BLD-MCNC: 15.7 G/DL (ref 11.5–15.4)
HGB UR QL STRIP.AUTO: NEGATIVE
IMM GRANULOCYTES # BLD AUTO: 0.03 THOUSAND/UL (ref 0–0.2)
IMM GRANULOCYTES NFR BLD AUTO: 0 % (ref 0–2)
KETONES UR STRIP-MCNC: NEGATIVE MG/DL
LEUKOCYTE ESTERASE UR QL STRIP: ABNORMAL
LYMPHOCYTES # BLD AUTO: 0.81 THOUSANDS/ÂΜL (ref 0.6–4.47)
LYMPHOCYTES NFR BLD AUTO: 9 % (ref 14–44)
MCH RBC QN AUTO: 29.8 PG (ref 26.8–34.3)
MCHC RBC AUTO-ENTMCNC: 34.5 G/DL (ref 31.4–37.4)
MCV RBC AUTO: 87 FL (ref 82–98)
MONOCYTES # BLD AUTO: 0.67 THOUSAND/ÂΜL (ref 0.17–1.22)
MONOCYTES NFR BLD AUTO: 8 % (ref 4–12)
NEUTROPHILS # BLD AUTO: 7.42 THOUSANDS/ÂΜL (ref 1.85–7.62)
NEUTS SEG NFR BLD AUTO: 83 % (ref 43–75)
NITRITE UR QL STRIP: NEGATIVE
NON-SQ EPI CELLS URNS QL MICRO: ABNORMAL /HPF
NRBC BLD AUTO-RTO: 0 /100 WBCS
P AXIS: 46 DEGREES
P AXIS: 57 DEGREES
PH UR STRIP.AUTO: 5.5 [PH]
PLATELET # BLD AUTO: 217 THOUSANDS/UL (ref 149–390)
PMV BLD AUTO: 11.7 FL (ref 8.9–12.7)
POTASSIUM SERPL-SCNC: 3.2 MMOL/L (ref 3.5–5.3)
PR INTERVAL: 120 MS
PR INTERVAL: 146 MS
PROT UR STRIP-MCNC: ABNORMAL MG/DL
QRS AXIS: 53 DEGREES
QRS AXIS: 69 DEGREES
QRSD INTERVAL: 66 MS
QRSD INTERVAL: 72 MS
QT INTERVAL: 306 MS
QT INTERVAL: 366 MS
QTC INTERVAL: 439 MS
QTC INTERVAL: 559 MS
RBC # BLD AUTO: 5.26 MILLION/UL (ref 3.81–5.12)
RBC #/AREA URNS AUTO: ABNORMAL /HPF
SARS-COV+SARS-COV-2 AG RESP QL IA.RAPID: NEGATIVE
SODIUM SERPL-SCNC: 135 MMOL/L (ref 135–147)
SP GR UR STRIP.AUTO: 1.02 (ref 1–1.03)
T WAVE AXIS: 22 DEGREES
T WAVE AXIS: 46 DEGREES
UROBILINOGEN UR STRIP-ACNC: <2 MG/DL
VENTRICULAR RATE: 124 BPM
VENTRICULAR RATE: 140 BPM
WBC # BLD AUTO: 8.96 THOUSAND/UL (ref 4.31–10.16)
WBC #/AREA URNS AUTO: ABNORMAL /HPF

## 2024-11-12 PROCEDURE — 80048 BASIC METABOLIC PNL TOTAL CA: CPT | Performed by: STUDENT IN AN ORGANIZED HEALTH CARE EDUCATION/TRAINING PROGRAM

## 2024-11-12 PROCEDURE — 87804 INFLUENZA ASSAY W/OPTIC: CPT | Performed by: STUDENT IN AN ORGANIZED HEALTH CARE EDUCATION/TRAINING PROGRAM

## 2024-11-12 PROCEDURE — 99285 EMERGENCY DEPT VISIT HI MDM: CPT

## 2024-11-12 PROCEDURE — 87086 URINE CULTURE/COLONY COUNT: CPT | Performed by: STUDENT IN AN ORGANIZED HEALTH CARE EDUCATION/TRAINING PROGRAM

## 2024-11-12 PROCEDURE — 36415 COLL VENOUS BLD VENIPUNCTURE: CPT | Performed by: STUDENT IN AN ORGANIZED HEALTH CARE EDUCATION/TRAINING PROGRAM

## 2024-11-12 PROCEDURE — 71046 X-RAY EXAM CHEST 2 VIEWS: CPT

## 2024-11-12 PROCEDURE — 85025 COMPLETE CBC W/AUTO DIFF WBC: CPT | Performed by: STUDENT IN AN ORGANIZED HEALTH CARE EDUCATION/TRAINING PROGRAM

## 2024-11-12 PROCEDURE — 99213 OFFICE O/P EST LOW 20 MIN: CPT

## 2024-11-12 PROCEDURE — 93010 ELECTROCARDIOGRAM REPORT: CPT | Performed by: INTERNAL MEDICINE

## 2024-11-12 PROCEDURE — 99285 EMERGENCY DEPT VISIT HI MDM: CPT | Performed by: EMERGENCY MEDICINE

## 2024-11-12 PROCEDURE — 93005 ELECTROCARDIOGRAM TRACING: CPT

## 2024-11-12 PROCEDURE — 96365 THER/PROPH/DIAG IV INF INIT: CPT

## 2024-11-12 PROCEDURE — 81001 URINALYSIS AUTO W/SCOPE: CPT | Performed by: STUDENT IN AN ORGANIZED HEALTH CARE EDUCATION/TRAINING PROGRAM

## 2024-11-12 PROCEDURE — 87811 SARS-COV-2 COVID19 W/OPTIC: CPT | Performed by: STUDENT IN AN ORGANIZED HEALTH CARE EDUCATION/TRAINING PROGRAM

## 2024-11-12 RX ORDER — DOXYCYCLINE 100 MG/1
100 CAPSULE ORAL ONCE
Status: COMPLETED | OUTPATIENT
Start: 2024-11-12 | End: 2024-11-12

## 2024-11-12 RX ORDER — ONDANSETRON 4 MG/1
4 TABLET, ORALLY DISINTEGRATING ORAL ONCE
Status: COMPLETED | OUTPATIENT
Start: 2024-11-12 | End: 2024-11-12

## 2024-11-12 RX ORDER — IBUPROFEN 600 MG/1
600 TABLET, FILM COATED ORAL ONCE
Status: COMPLETED | OUTPATIENT
Start: 2024-11-12 | End: 2024-11-12

## 2024-11-12 RX ORDER — ONDANSETRON 4 MG/1
4 TABLET, FILM COATED ORAL EVERY 6 HOURS
Qty: 12 TABLET | Refills: 0 | Status: SHIPPED | OUTPATIENT
Start: 2024-11-12

## 2024-11-12 RX ORDER — SODIUM CHLORIDE, SODIUM GLUCONATE, SODIUM ACETATE, POTASSIUM CHLORIDE, MAGNESIUM CHLORIDE, SODIUM PHOSPHATE, DIBASIC, AND POTASSIUM PHOSPHATE .53; .5; .37; .037; .03; .012; .00082 G/100ML; G/100ML; G/100ML; G/100ML; G/100ML; G/100ML; G/100ML
1000 INJECTION, SOLUTION INTRAVENOUS ONCE
Status: COMPLETED | OUTPATIENT
Start: 2024-11-12 | End: 2024-11-12

## 2024-11-12 RX ORDER — DOXYCYCLINE 100 MG/1
100 CAPSULE ORAL 2 TIMES DAILY
Qty: 10 CAPSULE | Refills: 0 | Status: SHIPPED | OUTPATIENT
Start: 2024-11-12 | End: 2024-11-17

## 2024-11-12 RX ADMIN — SODIUM CHLORIDE, SODIUM GLUCONATE, SODIUM ACETATE, POTASSIUM CHLORIDE, MAGNESIUM CHLORIDE, SODIUM PHOSPHATE, DIBASIC, AND POTASSIUM PHOSPHATE 1000 ML: .53; .5; .37; .037; .03; .012; .00082 INJECTION, SOLUTION INTRAVENOUS at 10:20

## 2024-11-12 RX ADMIN — DOXYCYCLINE 100 MG: 100 CAPSULE ORAL at 11:35

## 2024-11-12 RX ADMIN — IBUPROFEN 600 MG: 600 TABLET, FILM COATED ORAL at 10:18

## 2024-11-12 RX ADMIN — ONDANSETRON 4 MG: 4 TABLET, ORALLY DISINTEGRATING ORAL at 10:18

## 2024-11-12 NOTE — Clinical Note
Julita Berry was seen and treated in our emergency department on 11/12/2024.                Diagnosis:     Julita  .    She may return on this date: 11/18/2024    May return to work if fever free for 24 hours.      If you have any questions or concerns, please don't hesitate to call.      Vijay Samuels MD    ______________________________           _______________          _______________  Hospital Representative                              Date                                Time

## 2024-11-12 NOTE — PROGRESS NOTES
[facility]        NAME: Julita Berry is a 29 y.o. female  : 1995    MRN: [id number]  DATE: 2024  TIME: 8:35 AM    Assessment and Plan   Inappropriate sinus tachycardia (HCC) [I47.11]  1. Inappropriate sinus tachycardia (HCC)  Transfer to other facility      2. SOB (shortness of breath)  Transfer to other facility      3. Fever, unspecified fever cause  Transfer to other facility      EKG shows sinus tachycardia with heart rate of 140 no ST elevation or T wave inversion.  Patient needs further evaluation she does have a history of sinus tachycardia and sees cardiology      Patient Instructions       Follow up with PCP in 3-5 days.  Proceed to  ER if symptoms worsen.    If tests have been performed at [facility], our office will contact you with results if changes need to be made to the care plan discussed with you at the visit.  You can review your full results on [facility].    Chief Complaint     Chief Complaint   Patient presents with    Cough     Started Wednesday, last night her fever was tmax: 103.4, difficulty catching her breath, and she's been taking Tylenol and Motrin for relief          History of Present Illness       Is a 29-year-old female who presents today wit cough since Wednesday.  Friday she started with a fever of 100.8 she has been taking Tylenol and Motrin.  She states she has a headache from coughing.  Last night her fever was 103.2 orally.  She states that she cannot get a deep breath and she has a headache.  Patient states that she has a history of having tachycardia.  She does see a cardiologist she takes metoprolol XR on a regular basis and has shorter acting when she gets tachycardic she has been taking the short acting metoprolol.  Patient denies any nasal congestion postnasal drip sore throat ear pain.  We discussed that she needs further evaluation in the emergency room because her heart rate is elevated and because of the  fever.    Cough  Associated symptoms include chills, a fever, headaches and shortness of breath. Pertinent negatives include no chest pain, postnasal drip or sore throat.       Review of Systems   Review of Systems   Constitutional:  Positive for chills and fever. Negative for diaphoresis and fatigue.   HENT:  Negative for congestion, postnasal drip and sore throat.    Respiratory:  Positive for cough and shortness of breath.    Cardiovascular:  Positive for palpitations. Negative for chest pain and leg swelling.   Gastrointestinal: Negative.    Genitourinary: Negative.    Musculoskeletal: Negative.    Neurological:  Positive for headaches.         Current Medications       Current Outpatient Medications:     AMILoride 5 mg tablet, Take 5 mg by mouth 2 (two) times a day 2 in the morning.  3 at night, Disp: , Rfl:     LORazepam (Ativan) 0.5 mg tablet, Take 1 tablet (0.5 mg total) by mouth every 8 (eight) hours as needed for anxiety, Disp: 4 tablet, Rfl: 0    Magnesium Glycinate 100 MG CAPS, Take 200 mg by mouth 2 (two) times a day, Disp: , Rfl:     metoprolol succinate (TOPROL-XL) 50 mg 24 hr tablet, Take 75 mg in the AM, and 50 mg in the PM, Disp: , Rfl:     metoprolol tartrate (LOPRESSOR) 25 mg tablet, Take 1 tablet (25 mg total) by mouth every 6 (six) hours as needed (palpitatins), Disp: 90 tablet, Rfl: 3    potassium chloride (Klor-Con M20) 20 mEq tablet, Take 20 mEq by mouth 3 (three) times a day, Disp: , Rfl:     Current Allergies     Allergies as of 11/12/2024 - Reviewed 11/12/2024   Allergen Reaction Noted    Prednisone Hyperactivity 05/03/2016    Serotonin reuptake inhibitors (ssris) Anaphylaxis and Hives 05/03/2016    Augmentin [amoxicillin-pot clavulanate] Hives and Rash 05/03/2016    Clindamycin Rash 12/15/2016    Penicillins Rash 05/03/2016    Sulfa antibiotics Rash 05/03/2016    Azithromycin Rash 05/31/2018            The following portions of the patient's history were reviewed and updated as  "appropriate: allergies, current medications, past family history, past medical history, past social history, past surgical history and problem list.     Past Medical History:   Diagnosis Date    Anxiety     Eczema     Exercises 5 to 6 times per week     per pt prior to knee issue -enjoyed running and horseback riding    Family history of reaction to anesthesia     per pt--\"Mom also gets high anxiety with surgeries and wakes up nasty from anesthesia\"    Gastroparesis 2012    Hypertension     last assessed 3/12/14    Hypokalemia     per pt per doctors possibly related renal problem    Hypomagnesemia     Irritable bowel syndrome     with diarrhea    Rheumatoid arthritis (HCC) 2022    Sinus tachycardia     related to dehydration    Tooth missing     front upper tooth retainer-    Wears glasses     for computer use       Past Surgical History:   Procedure Laterality Date    CARDIAC ELECTROPHYSIOLOGY PROCEDURE N/A 9/23/2024    Procedure: Cardiac eps/svt ablation;  Surgeon: Gino Butler MD;  Location:  CARDIAC CATH LAB;  Service: Cardiology    CARDIAC ELECTROPHYSIOLOGY PROCEDURE N/A 10/23/2024    Procedure: Cardiac loop recorder implant;  Surgeon: Brice Boyle MD;  Location:  CARDIAC CATH LAB;  Service: Cardiology    NV ARTHROSCOPY KNEE LATERAL RELEASE Right 04/13/2023    Procedure: ARTHROSCOPIC RELEASE RETINACULAR;  Surgeon: Ilya Brizuela DO;  Location:  MAIN OR;  Service: Orthopedics    NV EGD TRANSORAL BIOPSY SINGLE/MULTIPLE N/A 05/04/2016    Procedure: ESOPHAGOGASTRODUODENOSCOPY (EGD);  Surgeon: Raji Monzon MD;  Location:  GI LAB;  Service: Gastroenterology    NV RCNSTJ DISLC PATELLA W/XTNSR RELIGNMT&/MUSC RL Right 04/13/2023    Procedure: ARTHROSCOPIC REALIGNMENT PATELLA;  Surgeon: Ilya Brizuela DO;  Location:  MAIN OR;  Service: Orthopedics    WISDOM TOOTH EXTRACTION         Family History   Problem Relation Age of Onset    Hypokalemia Mother     Hypotension Mother     Anxiety disorder Mother       " "  NOS    Lung cancer Father     Skin cancer Father     Crohn's disease Father     Schizoaffective Disorder  Father     Alcohol abuse Father     Drug abuse Father     No Known Problems Sister     No Known Problems Sister     No Known Problems Brother     Coronary artery disease Maternal Grandmother     Heart disease Maternal Grandmother     Alzheimer's disease Maternal Grandmother     Arthritis Paternal Grandmother     Rheum arthritis Paternal Grandmother     Hypertension Paternal Grandmother     COPD Paternal Grandfather          Medications have been verified.        Objective   /82   Pulse (!) 150   Temp 100.1 °F (37.8 °C)   Resp 20   Ht 5' 5\" (1.651 m)   Wt 82.2 kg (181 lb 3.2 oz)   LMP 10/20/2024   SpO2 99%   BMI 30.15 kg/m²   Patient's last menstrual period was 10/20/2024.       Physical Exam     Physical Exam  Constitutional:       Appearance: Normal appearance.   HENT:      Head: Normocephalic and atraumatic.      Right Ear: Tympanic membrane, ear canal and external ear normal. There is no impacted cerumen.      Left Ear: Tympanic membrane, ear canal and external ear normal. There is no impacted cerumen.      Nose: Nose normal. No congestion.      Mouth/Throat:      Mouth: Mucous membranes are moist.      Pharynx: Oropharynx is clear. Posterior oropharyngeal erythema present.   Eyes:      Conjunctiva/sclera: Conjunctivae normal.      Pupils: Pupils are equal, round, and reactive to light.   Cardiovascular:      Rate and Rhythm: Tachycardia present.      Pulses: Normal pulses.      Heart sounds: Normal heart sounds.   Pulmonary:      Effort: Pulmonary effort is normal. No respiratory distress.      Breath sounds: Normal breath sounds. No wheezing, rhonchi or rales.   Abdominal:      General: Abdomen is flat. Bowel sounds are normal.   Musculoskeletal:         General: Normal range of motion.      Cervical back: Normal range of motion and neck supple.   Skin:     General: Skin is warm and dry.     "  Capillary Refill: Capillary refill takes less than 2 seconds.   Neurological:      General: No focal deficit present.      Mental Status: She is alert. Mental status is at baseline.   Psychiatric:         Mood and Affect: Mood normal.         Thought Content: Thought content normal.         Judgment: Judgment normal.

## 2024-11-12 NOTE — ED ATTENDING ATTESTATION
11/12/2024  I, Kayleigh Palma MD, saw and evaluated the patient. I have discussed the patient with the resident/non-physician practitioner and agree with the resident's/non-physician practitioner's findings, Plan of Care, and MDM as documented in the resident's/non-physician practitioner's note, except where noted. All available labs and Radiology studies were reviewed.  I was present for key portions of any procedure(s) performed by the resident/non-physician practitioner and I was immediately available to provide assistance.       At this point I agree with the current assessment done in the Emergency Department.  I have conducted an independent evaluation of this patient a history and physical is as follows:  29-year-old female with history of inappropriate tachycardia, POTS syndrome, postpartum 4 months, here with fever, chills, cough that started over the weekend.  Patient states that she has a dry cough that is coarse, some dyspnea on exertion, as well as now nausea and vomiting.  No diarrhea.  No urinary symptoms.  Patient has been fevers as high as 103.  States that when she takes Motrin, her fever rebounds within a few hours.  Last took DayQuil at 230 this morning.  Patient states that she feels rundown.  Patient states her chest hurts a little when she coughs, but not otherwise.  No PE risk factors.  No cardiac risk factors.  Patient is otherwise healthy.  She has not been immunized for flu yet this year, but did receive 3 vaccinations for COVID at the beginning of the pandemic.  Her review of systems otherwise -12 systems reviewed.  On exam the patient awake and alert.  She has normal work of breathing, normal perfusion, normal mentation.  Her vital signs are remarkable for tachycardia with a heart rate of 122.  She is sinus on the monitor with periodic PVCs.  She is febrile.  She has adequate blood pressure and oxygen saturation of 97%.  On secondary survey, the patient does not have pallor.  Her  mucous membranes are moist.  Her neck is supple and nontender with no adenopathy or subcutaneous air.  Her heart is regular tachycardic without murmurs, rubs, or gallops.  Her lungs are clear bilaterally with good air entry.  Her abdomen is soft and nontender with no masses, rebound, guarding.  Her extremities are intact.  She has no lateralizing edema.  She is neurologically nonfocal with normal skin and back exam. MEDICAL DECISION MAKING    Number and Complexity of Problems  Differential diagnosis: Viral illness, flulike syndrome, pneumonia, positive SIRS criteria, concern for occult sepsis    Medical Decision Making Data  External documents reviewed:   My EKG interpretation: Sinus tachycardia, narrow complex, occasional PVCs, flattened T waves laterally which were present previously  My CT interpretation:   My X-ray interpretation: Right upper lobe pneumonia  My ultrasound interpretation:     XR chest 2 views   Final Result   Right upper lobe airspace disease suspicious for pneumonia.            Workstation performed: [id number]             Labs Reviewed   UA W REFLEX TO MICROSCOPIC WITH REFLEX TO CULTURE - Abnormal       Result Value Ref Range Status    Color, UA Light Yellow   Final    Clarity, UA Clear   Final    Specific Gravity, UA 1.020  1.003 - 1.030 Final    pH, UA 5.5  4.5, 5.0, 5.5, 6.0, 6.5, 7.0, 7.5, 8.0 Final    Leukocytes, UA Moderate (*) Negative Final    Nitrite, UA Negative  Negative Final    Protein, UA Trace (*) Negative mg/dl Final    Glucose, UA Negative  Negative mg/dl Final    Ketones, UA Negative  Negative mg/dl Final    Urobilinogen, UA <2.0  <2.0 mg/dl mg/dl Final    Bilirubin, UA Negative  Negative Final    Occult Blood, UA Negative  Negative Final   CBC AND DIFFERENTIAL - Abnormal    WBC 8.96  4.31 - 10.16 Thousand/uL Final    RBC 5.26 (*) 3.81 - 5.12 Million/uL Final    Hemoglobin 15.7 (*) 11.5 - 15.4 g/dL Final    Hematocrit 45.5  34.8 - 46.1 % Final    MCV 87  82 - 98 fL Final    MCH  29.8  26.8 - 34.3 pg Final    MCHC 34.5  31.4 - 37.4 g/dL Final    RDW 12.1  11.6 - 15.1 % Final    MPV 11.7  8.9 - 12.7 fL Final    Platelets 217  149 - 390 Thousands/uL Final    nRBC 0  /100 WBCs Final    Segmented % 83 (*) 43 - 75 % Final    Immature Grans % 0  0 - 2 % Final    Lymphocytes % 9 (*) 14 - 44 % Final    Monocytes % 8  4 - 12 % Final    Eosinophils Relative 0  0 - 6 % Final    Basophils Relative 0  0 - 1 % Final    Absolute Neutrophils 7.42  1.85 - 7.62 Thousands/µL Final    Absolute Immature Grans 0.03  0.00 - 0.20 Thousand/uL Final    Absolute Lymphocytes 0.81  0.60 - 4.47 Thousands/µL Final    Absolute Monocytes 0.67  0.17 - 1.22 Thousand/µL Final    Eosinophils Absolute 0.00  0.00 - 0.61 Thousand/µL Final    Basophils Absolute 0.03  0.00 - 0.10 Thousands/µL Final   BASIC METABOLIC PANEL - Abnormal    Sodium 135  135 - 147 mmol/L Final    Potassium 3.2 (*) 3.5 - 5.3 mmol/L Final    Chloride 98  96 - 108 mmol/L Final    CO2 26  21 - 32 mmol/L Final    ANION GAP 11  4 - 13 mmol/L Final    BUN 9  5 - 25 mg/dL Final    Creatinine 0.84  0.60 - 1.30 mg/dL Final    Comment: Standardized to IDMS reference method    Glucose 175 (*) 65 - 140 mg/dL Final    Comment: If the patient is fasting, the ADA then defines impaired fasting glucose as > 100 mg/dL and diabetes as > or equal to 123 mg/dL.    Calcium 9.0  8.4 - 10.2 mg/dL Final    eGFR 93  ml/min/1.73sq m Final    Narrative:     National Kidney Disease Foundation guidelines for Chronic Kidney Disease (CKD):     Stage 1 with normal or high GFR (GFR > 90 mL/min/1.73 square meters)    Stage 2 Mild CKD (GFR = 60-89 mL/min/1.73 square meters)    Stage 3A Moderate CKD (GFR = 45-59 mL/min/1.73 square meters)    Stage 3B Moderate CKD (GFR = 30-44 mL/min/1.73 square meters)    Stage 4 Severe CKD (GFR = 15-29 mL/min/1.73 square meters)    Stage 5 End Stage CKD (GFR <15 mL/min/1.73 square meters)  Note: GFR calculation is accurate only with a steady state creatinine    URINE MICROSCOPIC - Abnormal    RBC, UA 2-4 (*) None Seen, 1-2 /hpf Final    WBC, UA 10-20 (*) None Seen, 1-2 /hpf Final    Epithelial Cells Occasional  None Seen, Occasional /hpf Final    Bacteria, UA Occasional  None Seen, Occasional /hpf Final   COVID-19/INFLUENZA A/B RAPID ANTIGEN (30 MIN.TAT) - Normal    SARS COV Rapid Antigen Negative  Negative Final    Influenza A Rapid Antigen Negative  Negative Final    Influenza B Rapid Antigen Negative  Negative Final    Narrative:     This test has been performed using the [facility] 2 FLU+SARS Antigen test under the Emergency Use Authorization (EUA). This test has been validated by the  and verified by the performing laboratory. The [employer] uses lateral flow immunofluorescent sandwich assay to detect SARS-COV, Influenza A and Influenza B Antigen.     The [facility] 2 SARS Antigen test does not differentiate between SARS-CoV and SARS-CoV-2.     Negative results are presumptive and may be confirmed with a molecular assay, if necessary, for patient management. Negative results do not rule out SARS-CoV-2 or influenza infection and should not be used as the sole basis for treatment or patient management decisions. A negative test result may occur if the level of antigen in a sample is below the limit of detection of this test.     Positive results are indicative of the presence of viral antigens, but do not rule out bacterial infection or co-infection with other viruses.     All test results should be used as an adjunct to clinical observations and other information available to the provider.    FOR PEDIATRIC PATIENTS - copy/paste COVID Guidelines URL to browser: [email or web address]   URINE CULTURE       Labs reviewed by me are significant for: Normal white blood cell count    Clinical decision rules/scores are significant for:     Discussed case with:   Considered admission for:     Treatment and  Disposition  ED course: Patient seen and examined, patient with community-acquired pneumonia.  Will plan to give dose of antibiotics, discharged home with symptomatic care, antibiotics, close return precautions  Shared decision making:   Code status:     ED Course         Critical Care Time  Procedures

## 2024-11-12 NOTE — DISCHARGE INSTRUCTIONS
Follow-up with your PCP in 1 week.  Take your medicine as prescribed, finish the course even if you feel better.  Take Tylenol and or Motrin for pain/fever as needed, do not exceed the recommended dose.  Take Zofran as needed for nausea/pain.    Return to the emergency department if you experience severe shortness of breath, chest pain, or any other concerning symptoms.

## 2024-11-12 NOTE — Clinical Note
Julita Berry was seen and treated in our emergency department on 11/12/2024.                Diagnosis:     Julita  .    She may return on this date: 11/15/2024         If you have any questions or concerns, please don't hesitate to call.      Vijay Samuels MD    ______________________________           _______________          _______________  Hospital Representative                              Date                                Time

## 2024-11-13 LAB — BACTERIA UR CULT: NORMAL

## 2024-11-13 NOTE — ED PROVIDER NOTES
"Time reflects when diagnosis was documented in both MDM as applicable and the Disposition within this note       Time User Action Codes Description Comment    11/12/2024 11:14 AM Vijay Samuels [J18.9] Pneumonia     11/12/2024 11:21 AM Vijay Samuels Add [R11.2] Nausea & vomiting     11/12/2024 11:21 AM Vijay Samuels [E87.6] Hypokalemia           ED Disposition       ED Disposition   Discharge    Condition   Stable    Date/Time   Tue Nov 12, 2024 11:15 AM    Comment   Julita eBrry discharge to home/self care.                   Assessment & Plan       Medical Decision Making  /66 (BP Location: Right arm)   Pulse 103   Temp (!) 100.7 °F (38.2 °C) (Oral)   Resp 20   Ht 5' 5\" (1.651 m)   Wt 82.1 kg (181 lb)   LMP 10/20/2024   SpO2 98%   BMI 30.12 kg/m²   OB Status Having periods   Smoking Status Never   BSA 1.9 m²  .  Previous records reviewed.    DDX includes but not limited to: PNA/PNX, Flu-like illness, occult sepsis (pt meets SIRS criteria)     Plan:   Labs:  - CBC for evaluation of anemia and leukocytosis, ect.  - CMP for evaluation of electrolytes, renal function,  ect.    Imaging:   -Chest x-ray for evaluation of PNA, PNX, pleural effusion, cardiomegaly    Medications:  - Pain control: Motrin, Zofran, 1L isolyte     Significant findings and further discussion on patient workup noted in ED course above.     Upon re-evaluation HR 90, pt feels better. Still has cough.    All labs reviewed and utilized in the medical decision making process  All radiology studies independently viewed by me and interpreted by the radiologist.  - CXR c/w PNA, will put on doxy for typical/atypical coverage.       Disposition:   I reviewed labs and imaging results with patient and family member who expressed understanding and agrees with plan for abx, supportive care, follow up with PCP  Strict return precaution discussed.  All questions were answered at this time.    Portions of the record may have been " "created with voice recognition software. Occasional wrong word or \"sound a like\" substitutions may have occurred due to the inherent limitations of voice recognition software. Read the chart carefully and recognize, using context, where substitutions have occurred.     Amount and/or Complexity of Data Reviewed  Labs: ordered.  Radiology: ordered.    Risk  Prescription drug management.             Medications   ibuprofen (MOTRIN) tablet 600 mg (600 mg Oral Given 11/12/24 1018)   ondansetron (ZOFRAN-ODT) dispersible tablet 4 mg (4 mg Oral Given 11/12/24 1018)   multi-electrolyte (ISOLYTE-S PH 7.4) bolus 1,000 mL (0 mL Intravenous Stopped 11/12/24 1138)   doxycycline hyclate (VIBRAMYCIN) capsule 100 mg (100 mg Oral Given 11/12/24 1135)       ED Risk Strat Scores                           SBIRT 20yo+      Flowsheet Row Most Recent Value   Initial Alcohol Screen: US AUDIT-C     1. How often do you have a drink containing alcohol? 0 Filed at: 11/12/2024 0935   2. How many drinks containing alcohol do you have on a typical day you are drinking?  0 Filed at: 11/12/2024 0935   3b. FEMALE Any Age, or MALE 65+: How often do you have 4 or more drinks on one occassion? 0 Filed at: 11/12/2024 0935   Audit-C Score 0 Filed at: 11/12/2024 0935   RAZ: How many times in the past year have you...    Used an illegal drug or used a prescription medication for non-medical reasons? Never Filed at: 11/12/2024 0935                            History of Present Illness       Chief Complaint   Patient presents with    Rapid Heart Rate     Per pt she was at urgent care this morning for a fever x4 days, also HR was in 160s, urgent care concerned of sepsis.        Past Medical History:   Diagnosis Date    Anxiety     Eczema     Exercises 5 to 6 times per week     per pt prior to knee issue -enjoyed running and horseback riding    Family history of reaction to anesthesia     per pt--\"Mom also gets high anxiety with surgeries and wakes up nasty " "from anesthesia\"    Gastroparesis 2012    Hypertension     last assessed 3/12/14    Hypokalemia     per pt per doctors possibly related renal problem    Hypomagnesemia     Irritable bowel syndrome     with diarrhea    Rheumatoid arthritis (HCC) 2022    Sinus tachycardia     related to dehydration    Tooth missing     front upper tooth retainer-    Wears glasses     for computer use      Past Surgical History:   Procedure Laterality Date    CARDIAC ELECTROPHYSIOLOGY PROCEDURE N/A 9/23/2024    Procedure: Cardiac eps/svt ablation;  Surgeon: Gino Butler MD;  Location:  CARDIAC CATH LAB;  Service: Cardiology    CARDIAC ELECTROPHYSIOLOGY PROCEDURE N/A 10/23/2024    Procedure: Cardiac loop recorder implant;  Surgeon: Brice Boyle MD;  Location:  CARDIAC CATH LAB;  Service: Cardiology    OH ARTHROSCOPY KNEE LATERAL RELEASE Right 04/13/2023    Procedure: ARTHROSCOPIC RELEASE RETINACULAR;  Surgeon: Ilya Brizuela DO;  Location:  MAIN OR;  Service: Orthopedics    OH EGD TRANSORAL BIOPSY SINGLE/MULTIPLE N/A 05/04/2016    Procedure: ESOPHAGOGASTRODUODENOSCOPY (EGD);  Surgeon: Raji Monzon MD;  Location:  GI LAB;  Service: Gastroenterology    OH RCNSTJ DISLC PATELLA W/XTNSR RELIGNMT&/MUSC RL Right 04/13/2023    Procedure: ARTHROSCOPIC REALIGNMENT PATELLA;  Surgeon: Ilya Brizuela DO;  Location:  MAIN OR;  Service: Orthopedics    WISDOM TOOTH EXTRACTION        Family History   Problem Relation Age of Onset    Hypokalemia Mother     Hypotension Mother     Anxiety disorder Mother         NOS    Lung cancer Father     Skin cancer Father     Crohn's disease Father     Schizoaffective Disorder  Father     Alcohol abuse Father     Drug abuse Father     No Known Problems Sister     No Known Problems Sister     No Known Problems Brother     Coronary artery disease Maternal Grandmother     Heart disease Maternal Grandmother     Alzheimer's disease Maternal Grandmother     Arthritis Paternal Grandmother     Rheum arthritis " Paternal Grandmother     Hypertension Paternal Grandmother     COPD Paternal Grandfather       Social History     Tobacco Use    Smoking status: Never    Smokeless tobacco: Never   Vaping Use    Vaping status: Never Used   Substance Use Topics    Alcohol use: Not Currently     Alcohol/week: 1.0 standard drink of alcohol     Types: 1 Standard drinks or equivalent per week    Drug use: No      E-Cigarette/Vaping    E-Cigarette Use Never User       E-Cigarette/Vaping Substances    Nicotine No     THC No     CBD No     Flavoring No     Other No     Unknown No       I have reviewed and agree with the history as documented.     28 yo F w/PMHx of POTS, inappropriate tachycardia, presents for evaluation of fevers/chills, N/V, and cough that started over the weekend. Cough is not productive, mild DUPONT. She has been treating sxs with OTC meds, last took DayQuil early this am. Some chest pain when she coughs.         Review of Systems   Constitutional:  Positive for chills, fatigue and fever.   Respiratory:  Positive for cough.    Gastrointestinal:  Positive for nausea and vomiting.           Objective       ED Triage Vitals [11/12/24 0930]   Temperature Pulse Blood Pressure Respirations SpO2 Patient Position - Orthostatic VS   (!) 100.7 °F (38.2 °C) (!) 122 159/91 20 100 % Lying      Temp Source Heart Rate Source BP Location FiO2 (%) Pain Score    Oral Monitor Right arm -- 3      Vitals      Date and Time Temp Pulse SpO2 Resp BP Pain Score FACES Pain Rating User   11/12/24 1140 -- 103 98 % 20 111/66 -- -- AM   11/12/24 0930 100.7 °F (38.2 °C) 122 100 % 20 159/91 3 -- AM            Physical Exam  Vitals reviewed.   Constitutional:       Appearance: Normal appearance. She is normal weight.   HENT:      Head: Normocephalic and atraumatic.      Right Ear: External ear normal.      Left Ear: External ear normal.      Nose: Nose normal.      Mouth/Throat:      Mouth: Mucous membranes are moist.      Pharynx: Oropharynx is clear.    Eyes:      Extraocular Movements: Extraocular movements intact.      Conjunctiva/sclera: Conjunctivae normal.      Pupils: Pupils are equal, round, and reactive to light.   Cardiovascular:      Rate and Rhythm: Regular rhythm. Tachycardia present.      Pulses: Normal pulses.      Heart sounds: Normal heart sounds.   Pulmonary:      Effort: Pulmonary effort is normal.      Breath sounds: Normal breath sounds.   Abdominal:      Palpations: Abdomen is soft.      Tenderness: There is no abdominal tenderness.   Musculoskeletal:         General: Normal range of motion.   Skin:     General: Skin is warm and dry.      Capillary Refill: Capillary refill takes less than 2 seconds.   Neurological:      General: No focal deficit present.      Mental Status: She is alert and oriented to person, place, and time.         Results Reviewed       Procedure Component Value Units Date/Time    Urine culture [[id number]] Collected: 11/12/24 1022    Lab Status: Final result Specimen: Urine, Clean Catch Updated: 11/13/24 1120     Urine Culture 30,000-39,000 cfu/ml    Urine Microscopic [[id number]]  (Abnormal) Collected: 11/12/24 1022    Lab Status: Final result Specimen: Urine, Clean Catch Updated: 11/12/24 1126     RBC, UA 2-4 /hpf      WBC, UA 10-20 /hpf      Epithelial Cells Occasional /hpf      Bacteria, UA Occasional /hpf     UA w Reflex to Microscopic w Reflex to Culture [[id number]]  (Abnormal) Collected: 11/12/24 1022    Lab Status: Final result Specimen: Urine, Clean Catch Updated: 11/12/24 1119     Color, UA Light Yellow     Clarity, UA Clear     Specific Gravity, UA 1.020     pH, UA 5.5     Leukocytes, UA Moderate     Nitrite, UA Negative     Protein, UA Trace mg/dl      Glucose, UA Negative mg/dl      Ketones, UA Negative mg/dl      Urobilinogen, UA <2.0 mg/dl      Bilirubin, UA Negative     Occult Blood, UA Negative    COVID-19/ Infleunza A/B Rapid Anitgen(30 min. TAT) [[id number]]  (Normal) Collected: 11/12/24 1022    Lab  Status: Final result Specimen: Nares from Nose Updated: 11/12/24 1111     SARS COV Rapid Antigen Negative     Influenza A Rapid Antigen Negative     Influenza B Rapid Antigen Negative    Narrative:      This test has been performed using the [employer] 2 FLU+SARS Antigen test under the Emergency Use Authorization (EUA). This test has been validated by the  and verified by the performing laboratory. The [employer] uses lateral flow immunofluorescent sandwich assay to detect SARS-COV, Influenza A and Influenza B Antigen.     The [employer] 2 SARS Antigen test does not differentiate between SARS-CoV and SARS-CoV-2.     Negative results are presumptive and may be confirmed with a molecular assay, if necessary, for patient management. Negative results do not rule out SARS-CoV-2 or influenza infection and should not be used as the sole basis for treatment or patient management decisions. A negative test result may occur if the level of antigen in a sample is below the limit of detection of this test.     Positive results are indicative of the presence of viral antigens, but do not rule out bacterial infection or co-infection with other viruses.     All test results should be used as an adjunct to clinical observations and other information available to the provider.    FOR PEDIATRIC PATIENTS - copy/paste COVID Guidelines URL to browser: [email or web address]    Basic metabolic panel [[id number]]  (Abnormal) Collected: 11/12/24 1021    Lab Status: Final result Specimen: Blood from Arm, Right Updated: 11/12/24 1107     Sodium 135 mmol/L      Potassium 3.2 mmol/L      Chloride 98 mmol/L      CO2 26 mmol/L      ANION GAP 11 mmol/L      BUN 9 mg/dL      Creatinine 0.84 mg/dL      Glucose 175 mg/dL      Calcium 9.0 mg/dL      eGFR 93 ml/min/1.73sq m     Narrative:      National Kid[employer] Disease Foundation guidelines for Chronic Kidney Disease (CKD):     Stage 1 with  normal or high GFR (GFR > 90 mL/min/1.73 square meters)    Stage 2 Mild CKD (GFR = 60-89 mL/min/1.73 square meters)    Stage 3A Moderate CKD (GFR = 45-59 mL/min/1.73 square meters)    Stage 3B Moderate CKD (GFR = 30-44 mL/min/1.73 square meters)    Stage 4 Severe CKD (GFR = 15-29 mL/min/1.73 square meters)    Stage 5 End Stage CKD (GFR <15 mL/min/1.73 square meters)  Note: GFR calculation is accurate only with a steady state creatinine    CBC and differential [[id number]]  (Abnormal) Collected: 11/12/24 1021    Lab Status: Final result Specimen: Blood from Arm, Right Updated: 11/12/24 1054     WBC 8.96 Thousand/uL      RBC 5.26 Million/uL      Hemoglobin 15.7 g/dL      Hematocrit 45.5 %      MCV 87 fL      MCH 29.8 pg      MCHC 34.5 g/dL      RDW 12.1 %      MPV 11.7 fL      Platelets 217 Thousands/uL      nRBC 0 /100 WBCs      Segmented % 83 %      Immature Grans % 0 %      Lymphocytes % 9 %      Monocytes % 8 %      Eosinophils Relative 0 %      Basophils Relative 0 %      Absolute Neutrophils 7.42 Thousands/µL      Absolute Immature Grans 0.03 Thousand/uL      Absolute Lymphocytes 0.81 Thousands/µL      Absolute Monocytes 0.67 Thousand/µL      Eosinophils Absolute 0.00 Thousand/µL      Basophils Absolute 0.03 Thousands/µL             XR chest 2 views   Final Interpretation by Edwin Yoo MD (11/12 1040)   Right upper lobe airspace disease suspicious for pneumonia.            Workstation performed: [id number]             Procedures    ED Medication and Procedure Management   Prior to Admission Medications   Prescriptions Last Dose Informant Patient Reported? Taking?   AMILoride 5 mg tablet  Self Yes No   Sig: Take 5 mg by mouth 2 (two) times a day 2 in the morning.   3 at night   LORazepam (Ativan) 0.5 mg tablet  Self No No   Sig: Take 1 tablet (0.5 mg total) by mouth every 8 (eight) hours as needed for anxiety   Magnesium Glycinate 100 MG CAPS  Self Yes No   Sig: Take 200 mg by mouth 2 (two) times a day    metoprolol succinate (TOPROL-XL) 50 mg 24 hr tablet  Self No No   Sig: Take 75 mg in the AM, and 50 mg in the PM   metoprolol tartrate (LOPRESSOR) 25 mg tablet  Self No No   Sig: Take 1 tablet (25 mg total) by mouth every 6 (six) hours as needed (palpitatins)   potassium chloride (Klor-Con M20) 20 mEq tablet  Self Yes No   Sig: Take 20 mEq by mouth 3 (three) times a day      Facility-Administered Medications: None     Discharge Medication List as of 11/12/2024 11:37 AM        START taking these medications    Details   doxycycline hyclate (VIBRAMYCIN) 100 mg capsule Take 1 capsule (100 mg total) by mouth 2 (two) times a day for 5 days, Starting Tue 11/12/2024, Until Sun 11/17/2024, Normal      ondansetron (ZOFRAN) 4 mg tablet Take 1 tablet (4 mg total) by mouth every 6 (six) hours, Starting Tue 11/12/2024, Normal           CONTINUE these medications which have NOT CHANGED    Details   AMILoride 5 mg tablet Take 5 mg by mouth 2 (two) times a day 2 in the morning.   3 at night, Historical Med      LORazepam (Ativan) 0.5 mg tablet Take 1 tablet (0.5 mg total) by mouth every 8 (eight) hours as needed for anxiety, Starting Fri 9/27/2024, Normal      Magnesium Glycinate 100 MG CAPS Take 200 mg by mouth 2 (two) times a day, Historical Med      metoprolol succinate (TOPROL-XL) 50 mg 24 hr tablet Take 75 mg in the AM, and 50 mg in the PM, No Print      metoprolol tartrate (LOPRESSOR) 25 mg tablet Take 1 tablet (25 mg total) by mouth every 6 (six) hours as needed (palpitatins), Starting Thu 10/12/2023, Normal      potassium chloride (Klor-Con M20) 20 mEq tablet Take 20 mEq by mouth 3 (three) times a day, Historical Med           No discharge procedures on file.  ED SEPSIS DOCUMENTATION   Time reflects when diagnosis was documented in both MDM as applicable and the Disposition within this note       Time User Action Codes Description Comment    11/12/2024 11:14 AM Vijay Samuels Add [J18.9] Pneumonia     11/12/2024 11:21 AM  Vijay Samuels Add [R11.2] Nausea & vomiting     11/12/2024 11:21 AM Vijay Samuels Add [E87.6] Hypokalemia                  Vijay Samuels MD  11/13/24 9795

## 2024-11-17 LAB
ATRIAL RATE: 140 BPM
P AXIS: 46 DEGREES
PR INTERVAL: 120 MS
QRS AXIS: 53 DEGREES
QRSD INTERVAL: 66 MS
QT INTERVAL: 366 MS
QTC INTERVAL: 559 MS
T WAVE AXIS: 46 DEGREES
VENTRICULAR RATE: 140 BPM

## 2024-11-17 PROCEDURE — 93010 ELECTROCARDIOGRAM REPORT: CPT | Performed by: INTERNAL MEDICINE

## 2024-11-18 DIAGNOSIS — E87.6 HYPOKALEMIA: ICD-10-CM

## 2024-11-18 DIAGNOSIS — E83.42 HYPOMAGNESEMIA: Primary | ICD-10-CM

## 2024-11-25 ENCOUNTER — OFFICE VISIT (OUTPATIENT)
Dept: FAMILY MEDICINE CLINIC | Facility: CLINIC | Age: 29
End: 2024-11-25
Payer: COMMERCIAL

## 2024-11-25 DIAGNOSIS — G90.A POTS (POSTURAL ORTHOSTATIC TACHYCARDIA SYNDROME): Chronic | ICD-10-CM

## 2024-11-25 DIAGNOSIS — I10 ESSENTIAL HYPERTENSION, BENIGN: Primary | Chronic | ICD-10-CM

## 2024-11-25 DIAGNOSIS — Z23 ENCOUNTER FOR IMMUNIZATION: ICD-10-CM

## 2024-11-25 DIAGNOSIS — I47.11 INAPPROPRIATE SINUS TACHYCARDIA (HCC): ICD-10-CM

## 2024-11-25 PROCEDURE — 99214 OFFICE O/P EST MOD 30 MIN: CPT | Performed by: NURSE PRACTITIONER

## 2024-11-25 PROCEDURE — 90471 IMMUNIZATION ADMIN: CPT

## 2024-11-25 PROCEDURE — 90673 RIV3 VACCINE NO PRESERV IM: CPT

## 2024-11-25 NOTE — PROGRESS NOTES
FAMILY PRACTICE OFFICE VISIT       NAME: Julita Berry  AGE: 29 y.o. SEX: female       : 1995        MRN: [id number]    DATE: 2024  TIME: 2:06 PM    Assessment and Plan   1. Essential hypertension, benign  Assessment & Plan:  Stable  Cont meds    2. Inappropriate sinus tachycardia (HCC)  Assessment & Plan:  Cont meds    3. POTS (postural orthostatic tachycardia syndrome)  Assessment & Plan:  Cont f/u with cardiology and nephrology    4. Encounter for immunization  -     influenza vaccine, recombinant, PF, 0.5 mL IM (Flublok)       There are no Patient Instructions on file for this visit.        Chief Complaint     Chief Complaint   Patient presents with    Cough     Pt being seen for an ongoing cough       History of Present Illness   Julita Berry is a 29 y.o.-year-old female who is here for f/u to chronic medical conditions  Feeling well  Cough continues  Started one month ago and not resolving  No chest pain or sob  No fevers  No gerd symptoms  No pnd      Review of Systems   Review of Systems   Constitutional:  Negative for fatigue and fever.   HENT:  Negative for congestion, postnasal drip and rhinorrhea.    Eyes:  Negative for photophobia and visual disturbance.   Respiratory:  Negative for cough, shortness of breath and wheezing.    Cardiovascular:  Negative for chest pain and palpitations.   Gastrointestinal:  Negative for constipation, diarrhea, nausea and vomiting.   Genitourinary:  Negative for dysuria and frequency.   Musculoskeletal:  Negative for arthralgias and myalgias.   Skin:  Negative for rash.   Neurological:  Negative for dizziness, light-headedness and headaches.   Hematological:  Negative for adenopathy.   Psychiatric/Behavioral:  Negative for dysphoric mood and sleep disturbance. The patient is not nervous/anxious.        Active Problem List     Patient Active Problem List   Diagnosis    Essential hypertension, benign    POTS (postural orthostatic tachycardia syndrome)  "   Hypomagnesemia    Vitamin D deficiency    Acid reflux    Gastroparesis    Irritable bowel syndrome with diarrhea    Histone antibody positive    Eczema    Undifferentiated connective tissue disease (HCC)    Heart palpitations    Hypophosphatemia    Celiac artery stenosis (HCC)    Mass of soft tissue of lower leg    Patellofemoral disorder of right knee    Patella ayesha    Chronic pain of both ankles    Pes anserine bursitis    Patellar instability of right knee    Patellar dislocation, right, initial encounter    SVT (supraventricular tachycardia) (HCC)    Inappropriate sinus tachycardia (HCC)    Hypokalemia     (spontaneous vaginal delivery)         Past Medical History:  Past Medical History:   Diagnosis Date    Anxiety     Eczema     Exercises 5 to 6 times per week     per pt prior to knee issue -enjoyed running and horseback riding    Family history of reaction to anesthesia     per pt--\"Mom also gets high anxiety with surgeries and wakes up nasty from anesthesia\"    Gastroparesis     Hypertension     last assessed 3/12/14    Hypokalemia     per pt per doctors possibly related renal problem    Hypomagnesemia     Irritable bowel syndrome     with diarrhea    Rheumatoid arthritis (HCC)     Sinus tachycardia     related to dehydration    Tooth missing     front upper tooth retainer-    Wears glasses     for computer use       Past Surgical History:  Past Surgical History:   Procedure Laterality Date    CARDIAC ELECTROPHYSIOLOGY PROCEDURE N/A 2024    Procedure: Cardiac eps/svt ablation;  Surgeon: Gino Butler MD;  Location: [facility];  Service: Cardiology    CARDIAC ELECTROPHYSIOLOGY PROCEDURE N/A 10/23/2024    Procedure: Cardiac loop recorder implant;  Surgeon: Brice Boyle MD;  Location: [facility];  Service: Cardiology    [place] ARTHROSCOPY KNEE LATERAL RELEASE Right 2023    Procedure: ARTHROSCOPIC RELEASE RETINACULAR;  Surgeon: Ilya Brizuela DO;  Location:  MAIN OR;  " Service: Orthopedics    [place] EGD TRANSORAL BIOPSY SINGLE/MULTIPLE N/A 05/04/2016    Procedure: ESOPHAGOGASTRODUODENOSCOPY (EGD);  Surgeon: Raji Monzon MD;  Location:  GI LAB;  Service: Gastroenterology    [place] RCNSTJ DISLC PATELLA W/XTNSR RELIGNMT&/MUSC RL Right 04/13/2023    Procedure: ARTHROSCOPIC REALIGNMENT PATELLA;  Surgeon: Ilya Brizuela DO;  Location:  MAIN OR;  Service: Orthopedics    WISDOM TOOTH EXTRACTION         Family History:  Family History   Problem Relation Age of Onset    Hypokalemia Mother     Hypotension Mother     Anxiety disorder Mother         NOS    Lung cancer Father     Skin cancer Father     Crohn's disease Father     Schizoaffective Disorder  Father     Alcohol abuse Father     Drug abuse Father     No Known Problems Sister     No Known Problems Sister     No Known Problems Brother     Coronary artery disease Maternal Grandmother     Heart disease Maternal Grandmother     Alzheimer's disease Maternal Grandmother     Arthritis Paternal Grandmother     Rheum arthritis Paternal Grandmother     Hypertension Paternal Grandmother     COPD Paternal Grandfather        Social History:  Social History     Socioeconomic History    Marital status: /Civil Union     Spouse name: Not on file    Number of children: Not on file    Years of education: Not on file    Highest education level: Not on file   Occupational History    Occupation: medical assistant with nephrology    Tobacco Use    Smoking status: Never    Smokeless tobacco: Never   Vaping Use    Vaping status: Never Used   Substance and Sexual Activity    Alcohol use: Not Currently     Alcohol/week: 1.0 standard drink of alcohol     Types: 1 Standard drinks or equivalent per week    Drug use: No    Sexual activity: Yes     Partners: Male     Birth control/protection: None   Other Topics Concern    Not on file   Social History Narrative    Activities - horseback riding    Caffeine use    Drinks coffee- 1 a wk    Exercise - walking     Exercising regularly     Social Drivers of Health     Financial Resource Strain: Not on file   Food Insecurity: No Food Insecurity (6/21/2024)    Nursing - Inadequate Food Risk Classification     Worried About Running Out of Food in the Last Year: Never true     Ran Out of Food in the Last Year: Never true     Ran Out of Food in the Last Year: Not on file   Transportation Needs: No Transportation Needs (6/21/2024)    PRAPARE - Transportation     Lack of Transportation (Medical): No     Lack of Transportation (Non-Medical): No   Physical Activity: Not on file   Stress: Not on file   Social Connections: Not on file   Intimate Partner Violence: Not on file   Housing Stability: Low Risk  (6/21/2024)    Housing Stability Vital Sign     Unable to Pay for Housing in the Last Year: No     Number of Times Moved in the Last Year: 0     Homeless in the Last Year: No       Objective     Vitals:    11/25/24 1026   BP: 122/84   Pulse: 91   Resp: 18   Temp: 97.7 °F (36.5 °C)   SpO2: 98%     Wt Readings from Last 3 Encounters:   11/25/24 82.6 kg (182 lb)   11/12/24 82.1 kg (181 lb)   11/12/24 82.2 kg (181 lb 3.2 oz)       Physical Exam  Vitals and nursing note reviewed.   Constitutional:       Appearance: Normal appearance.   HENT:      Head: Normocephalic and atraumatic.      Right Ear: Tympanic membrane, ear canal and external ear normal.      Left Ear: Tympanic membrane, ear canal and external ear normal.      Nose: Nose normal.      Mouth/Throat:      Mouth: Mucous membranes are moist.   Eyes:      Extraocular Movements: Extraocular movements intact.      Conjunctiva/sclera: Conjunctivae normal.   Cardiovascular:      Rate and Rhythm: Normal rate and regular rhythm.      Heart sounds: Normal heart sounds.   Pulmonary:      Effort: Pulmonary effort is normal.      Breath sounds: Normal breath sounds.   Abdominal:      General: Bowel sounds are normal.      Palpations: Abdomen is soft.   Musculoskeletal:         General: Normal  "range of motion.      Cervical back: Normal range of motion and neck supple.   Skin:     General: Skin is warm.      Capillary Refill: Capillary refill takes less than 2 seconds.   Neurological:      General: No focal deficit present.      Mental Status: She is alert and oriented to person, place, and time.   Psychiatric:         Mood and Affect: Mood normal.         Behavior: Behavior normal.         Thought Content: Thought content normal.         Judgment: Judgment normal.         Pertinent Laboratory/Diagnostic Studies:  Lab Results   Component Value Date    GLUCOSE 96 01/07/2016    BUN 9 11/12/2024    CREATININE 0.84 11/12/2024    CALCIUM 9.0 11/12/2024     (L) 01/07/2016    K 3.2 (L) 11/12/2024    CO2 26 11/12/2024    CL 98 11/12/2024     Lab Results   Component Value Date    ALT 16 11/01/2024    AST 17 11/01/2024    GGT 8 10/28/2019    ALKPHOS 109 (H) 11/01/2024    BILITOT 0.33 12/28/2015       Lab Results   Component Value Date    WBC 8.96 11/12/2024    HGB 15.7 (H) 11/12/2024    HCT 45.5 11/12/2024    MCV 87 11/12/2024     11/12/2024       No results found for: \"TSH\"    Lab Results   Component Value Date    CHOL 195 08/27/2015     Lab Results   Component Value Date    TRIG 144 08/16/2024     Lab Results   Component Value Date    HDL 47 (L) 08/16/2024     Lab Results   Component Value Date    LDLCALC 123 (H) 08/16/2024     Lab Results   Component Value Date    HGBA1C 4.9 08/16/2024       Results for orders placed or performed during the hospital encounter of 11/12/24   ECG 12 lead    Collection Time: 11/12/24  9:38 AM   Result Value Ref Range    Ventricular Rate 124 BPM    Atrial Rate 124 BPM    MS Interval 146 ms    QRSD Interval 72 ms    QT Interval 306 ms    QTC Interval 439 ms    P [place] 57 degrees    QRS Axis 69 degrees    T Wave Axis 22 degrees   CBC and differential    Collection Time: 11/12/24 10:21 AM   Result Value Ref Range    WBC 8.96 4.31 - 10.16 Thousand/uL    RBC 5.26 (H) 3.81 - " 5.12 Million/uL    Hemoglobin 15.7 (H) 11.5 - 15.4 g/dL    Hematocrit 45.5 34.8 - 46.1 %    MCV 87 82 - 98 fL    MCH 29.8 26.8 - 34.3 pg    MCHC 34.5 31.4 - 37.4 g/dL    RDW 12.1 11.6 - 15.1 %    MPV 11.7 8.9 - 12.7 fL    Platelets 217 149 - 390 Thousands/uL    nRBC 0 /100 WBCs    Segmented % 83 (H) 43 - 75 %    Immature Grans % 0 0 - 2 %    Lymphocytes % 9 (L) 14 - 44 %    Monocytes % 8 4 - 12 %    Eosinophils Relative 0 0 - 6 %    Basophils Relative 0 0 - 1 %    Absolute Neutrophils 7.42 1.85 - 7.62 Thousands/µL    Absolute Immature Grans 0.03 0.00 - 0.20 Thousand/uL    Absolute Lymphocytes 0.81 0.60 - 4.47 Thousands/µL    Absolute Monocytes 0.67 0.17 - 1.22 Thousand/µL    Eosinophils Absolute 0.00 0.00 - 0.61 Thousand/µL    Basophils Absolute 0.03 0.00 - 0.10 Thousands/µL   Basic metabolic panel    Collection Time: 11/12/24 10:21 AM   Result Value Ref Range    Sodium 135 135 - 147 mmol/L    Potassium 3.2 (L) 3.5 - 5.3 mmol/L    Chloride 98 96 - 108 mmol/L    CO2 26 21 - 32 mmol/L    ANION GAP 11 4 - 13 mmol/L    BUN 9 5 - 25 mg/dL    Creatinine 0.84 0.60 - 1.30 mg/dL    Glucose 175 (H) 65 - 140 mg/dL    Calcium 9.0 8.4 - 10.2 mg/dL    eGFR 93 ml/min/1.73sq m   COVID-19/ Infleunza A/B Rapid Anitgen(30 min. TAT)    Collection Time: 11/12/24 10:22 AM    Specimen: Nose; Nares   Result Value Ref Range    SARS COV Rapid Antigen Negative Negative    Influenza A Rapid Antigen Negative Negative    Influenza B Rapid Antigen Negative Negative   Urine culture    Collection Time: 11/12/24 10:22 AM    Specimen: Urine, Clean Catch   Result Value Ref Range    Urine Culture 30,000-39,000 cfu/ml    UA w Reflex to Microscopic w Reflex to Culture    Collection Time: 11/12/24 10:22 AM    Specimen: Urine, Clean Catch   Result Value Ref Range    Color, UA Light Yellow     Clarity, UA Clear     Specific Gravity, UA 1.020 1.003 - 1.030    pH, UA 5.5 4.5, 5.0, 5.5, 6.0, 6.5, 7.0, 7.5, 8.0    Leukocytes, UA Moderate (A) Negative     Nitrite, UA Negative Negative    Protein, UA Trace (A) Negative mg/dl    Glucose, UA Negative Negative mg/dl    Ketones, UA Negative Negative mg/dl    Urobilinogen, UA <2.0 <2.0 mg/dl mg/dl    Bilirubin, UA Negative Negative    Occult Blood, UA Negative Negative   Urine Microscopic    Collection Time: 11/12/24 10:22 AM   Result Value Ref Range    RBC, UA 2-4 (A) None Seen, 1-2 /hpf    WBC, UA 10-20 (A) None Seen, 1-2 /hpf    Epithelial Cells Occasional None Seen, Occasional /hpf    Bacteria, UA Occasional None Seen, Occasional /hpf     *Note: Due to a large number of results and/or encounters for the requested time period, some results have not been displayed. A complete set of results can be found in Results Review.       Orders Placed This Encounter   Procedures    influenza vaccine, recombinant, PF, 0.5 mL IM (Flublok)       ALLERGIES:  Allergies   Allergen Reactions    Prednisone Hyperactivity     Medrol dose samantha only    Serotonin Reuptake Inhibitors (Ssris) Anaphylaxis and Hives    Augmentin [Amoxicillin-Pot Clavulanate] Hives and Rash    Clindamycin Rash    Penicillins Rash    Sulfa Antibiotics Rash    Azithromycin Rash       Current Medications     Current Outpatient Medications   Medication Sig Dispense Refill    AMILoride 5 mg tablet Take 5 mg by mouth 2 (two) times a day 2 in the morning.   3 at night      Magnesium Glycinate 100 MG CAPS Take 200 mg by mouth 2 (two) times a day      metoprolol succinate (TOPROL-XL) 50 mg 24 hr tablet Take 75 mg in the AM, and 50 mg in the PM      metoprolol tartrate (LOPRESSOR) 25 mg tablet Take 1 tablet (25 mg total) by mouth every 6 (six) hours as needed (palpitatins) 90 tablet 3    ondansetron (ZOFRAN) 4 mg tablet Take 1 tablet (4 mg total) by mouth every 6 (six) hours 12 tablet 0    potassium chloride (Klor-Con M20) 20 mEq tablet Take 20 mEq by mouth 3 (three) times a day       No current facility-administered medications for this visit.         Health Maintenance      Health Maintenance   Topic Date Due    Annual Physical  12/02/2023    COVID-19 Vaccine (4 - 2024-25 season) 03/01/2025 (Originally 9/1/2024)    Depression Screening  09/10/2025    Cervical Cancer Screening  04/07/2027    DTaP,Tdap,and Td Vaccines (4 - Td or Tdap) 05/13/2034    Zoster Vaccine (1 of 2) 03/13/2044    RSV Vaccine Age 60+ Years (1 - 1-dose 75+ series) 03/13/2069    HIV Screening  Completed    Hepatitis C Screening  Completed    Influenza Vaccine  Completed    RSV Vaccine age 0-20 Months  Aged Out    Pneumococcal Vaccine: Pediatrics (0 to 5 Years) and At-Risk Patients (6 to 64 Years)  Aged Out    HIB Vaccine  Aged Out    IPV Vaccine  Aged Out    Hepatitis A Vaccine  Aged Out    Meningococcal ACWY Vaccine  Aged Out    HPV Vaccine  Aged Out     Immunization History   Administered Date(s) Administered    COVID-19 PFIZER VACCINE 0.3 ML IM 02/27/2021, 03/20/2021, 12/29/2021    Hep B, Adolescent or Pediatric [birth year], [birth year]20/1995    INFLUENZA 10/06/2014, 10/08/2015, 10/05/2016, 10/03/2017, 10/11/2018, 10/14/2019, 06/30/2021, 10/21/2021, 11/10/2022    Influenza Recombinant Preservative Free Im 11/25/2024    Influenza, seasonal, injectable, preservative free 10/14/2019    Tdap 06/14/2013, 04/25/2017, 05/13/2024        BMI Counseling: Body mass index is 30.29 kg/m². The BMI is above normal. Nutrition recommendations include reducing portion sizes, decreasing overall calorie intake, 3-5 servings of fruits/vegetables daily, reducing fast food intake, consuming healthier snacks, decreasing soda and/or juice intake, moderation in carbohydrate intake, increasing intake of lean protein, reducing intake of saturated fat and trans fat, and reducing intake of cholesterol. Exercise recommendations include moderate aerobic physical activity for 150 minutes/week, exercising 3-5 times per week, and strength training exercises.   ENMA Echavarria

## 2024-12-01 VITALS
DIASTOLIC BLOOD PRESSURE: 84 MMHG | TEMPERATURE: 97.7 F | OXYGEN SATURATION: 98 % | WEIGHT: 182 LBS | HEART RATE: 91 BPM | SYSTOLIC BLOOD PRESSURE: 122 MMHG | BODY MASS INDEX: 30.32 KG/M2 | HEIGHT: 65 IN | RESPIRATION RATE: 18 BRPM

## 2024-12-01 PROBLEM — O21.9 NAUSEA AND VOMITING IN PREGNANCY: Status: RESOLVED | Noted: 2023-07-20 | Resolved: 2024-12-01

## 2024-12-01 PROBLEM — R50.9 FEVER: Status: RESOLVED | Noted: 2024-11-12 | Resolved: 2024-12-01

## 2024-12-01 PROBLEM — R06.02 SOB (SHORTNESS OF BREATH): Status: RESOLVED | Noted: 2024-11-12 | Resolved: 2024-12-01

## 2024-12-01 PROBLEM — Z34.90 ENCOUNTER FOR INDUCTION OF LABOR: Status: RESOLVED | Noted: 2024-06-18 | Resolved: 2024-12-01

## 2024-12-02 ENCOUNTER — APPOINTMENT (OUTPATIENT)
Dept: LAB | Facility: CLINIC | Age: 29
End: 2024-12-02
Payer: COMMERCIAL

## 2024-12-02 DIAGNOSIS — E83.42 HYPOMAGNESEMIA: ICD-10-CM

## 2024-12-02 DIAGNOSIS — E87.6 HYPOKALEMIA: ICD-10-CM

## 2024-12-02 LAB
ANION GAP SERPL CALCULATED.3IONS-SCNC: 8 MMOL/L (ref 4–13)
BUN SERPL-MCNC: 13 MG/DL (ref 5–25)
CALCIUM SERPL-MCNC: 8.9 MG/DL (ref 8.4–10.2)
CHLORIDE SERPL-SCNC: 104 MMOL/L (ref 96–108)
CO2 SERPL-SCNC: 26 MMOL/L (ref 21–32)
CREAT SERPL-MCNC: 0.73 MG/DL (ref 0.6–1.3)
GFR SERPL CREATININE-BSD FRML MDRD: 110 ML/MIN/1.73SQ M
GLUCOSE SERPL-MCNC: 86 MG/DL (ref 65–140)
MAGNESIUM SERPL-MCNC: 1.9 MG/DL (ref 1.9–2.7)
POTASSIUM SERPL-SCNC: 3.6 MMOL/L (ref 3.5–5.3)
SODIUM SERPL-SCNC: 138 MMOL/L (ref 135–147)

## 2024-12-02 PROCEDURE — 83735 ASSAY OF MAGNESIUM: CPT

## 2024-12-02 PROCEDURE — 36415 COLL VENOUS BLD VENIPUNCTURE: CPT

## 2024-12-02 PROCEDURE — 80048 BASIC METABOLIC PNL TOTAL CA: CPT

## 2024-12-03 ENCOUNTER — RESULTS FOLLOW-UP (OUTPATIENT)
Dept: OTHER | Facility: HOSPITAL | Age: 29
End: 2024-12-03

## 2024-12-03 ENCOUNTER — OFFICE VISIT (OUTPATIENT)
Dept: CARDIOLOGY CLINIC | Facility: CLINIC | Age: 29
End: 2024-12-03
Payer: COMMERCIAL

## 2024-12-03 VITALS
WEIGHT: 184 LBS | HEART RATE: 119 BPM | DIASTOLIC BLOOD PRESSURE: 82 MMHG | SYSTOLIC BLOOD PRESSURE: 138 MMHG | HEIGHT: 65 IN | BODY MASS INDEX: 30.66 KG/M2

## 2024-12-03 DIAGNOSIS — I49.3 PVC (PREMATURE VENTRICULAR CONTRACTION): Primary | ICD-10-CM

## 2024-12-03 DIAGNOSIS — I10 ESSENTIAL HYPERTENSION, BENIGN: Chronic | ICD-10-CM

## 2024-12-03 DIAGNOSIS — E83.42 HYPOMAGNESEMIA: ICD-10-CM

## 2024-12-03 DIAGNOSIS — I47.11 INAPPROPRIATE SINUS TACHYCARDIA (HCC): ICD-10-CM

## 2024-12-03 DIAGNOSIS — E87.6 HYPOKALEMIA: ICD-10-CM

## 2024-12-03 DIAGNOSIS — G90.A POTS (POSTURAL ORTHOSTATIC TACHYCARDIA SYNDROME): Chronic | ICD-10-CM

## 2024-12-03 DIAGNOSIS — I47.10 SVT (SUPRAVENTRICULAR TACHYCARDIA) (HCC): ICD-10-CM

## 2024-12-03 PROCEDURE — 93000 ELECTROCARDIOGRAM COMPLETE: CPT | Performed by: STUDENT IN AN ORGANIZED HEALTH CARE EDUCATION/TRAINING PROGRAM

## 2024-12-03 PROCEDURE — 99214 OFFICE O/P EST MOD 30 MIN: CPT | Performed by: STUDENT IN AN ORGANIZED HEALTH CARE EDUCATION/TRAINING PROGRAM

## 2024-12-03 NOTE — PROGRESS NOTES
She is not HEART [facility]  CARDIAC ELECTROPHYSIOLOGY   [facility]  [facility]    Outpatient New Consult   Follow-up for SVT in pregnancy  Today's Date: 12/03/24         Patient name: Julita Berry  YOB: 1995  Sex: female         Chief Complaint: As above      ASSESSMENT:  Problem List Items Addressed This Visit       Essential hypertension, benign (Chronic)    POTS (postural orthostatic tachycardia syndrome) (Chronic)    Hypomagnesemia    SVT (supraventricular tachycardia) (HCC) - Primary    Inappropriate sinus tachycardia (HCC)    Hypokalemia     Other Visit Diagnoses         PVC (premature ventricular contraction)                  PLAN:  SVT/POTS/inappropriate sinus tach  -Increase to metoprolol succinate 75mg qAM and 50 mg p.o. PM  -Continue with metoprolol tartrate 25 mg as needed up to a daily dose of 200mg daily  -Continues to be responsive to metoprolol/vagal maneuvers  -Continue to monitor loop recorder    Hypokalemia, chronic  -Last BMP on December 2, 2024 revealed potassium of 3.6 mmol/L  -Currently taking potassium chloride 20 mEq 3 times daily     Hypomagnesemia  -Most recent labs revealed magnesium within normal limits at 1.9mg/dL  -continue current therapy    PVCs  -Patient has not had PVCs evidenced on loop recorder     Hypertension  -Blood pressure 138/84  -Continue management as per PCP     History of PVCs  -No PVCs on EKG  -Continue metoprolol as described above         Follow up in: 3 months    No orders of the defined types were placed in this encounter.    There are no discontinued medications.      .............................................................................................    HPI/Subjective:     Ms. Julita Berry is a 28 year old female G1, P1with a history of hypokalemia, essential hypertension, history of SVT controlled metoprolol.  Per EMR, her last episode of SVT was around January 24, 2024 for which she did not have  to take an extra dose of metoprolol.  She is followed by [facility].  She was seen in outpatient electrophysiology clinic for palpitations.  She noted that she was doing well at that time.  She had a planned induction for delivery in June and did well without intervention.    She has been having  an increasing frequency in her SVT episodes.  She states that mostly last about 10 to 15 minutes and are responsive to vagal maneuvers and or extra dose of metoprolol.  She currently denies chest pain, palpitations, shortness of breath, dizziness, lightheadedness, syncope, near syncope.    Ultimately, an EP study was performed with no arrhythmia induced.  She underwent loop recorder implant for further monitoring.    Last device interrogation on November 8, 2024 revealed no arrhythmias.    Today, she still notes palpitations and lightheadedness although improvement.  Review of her labs reviewed revealed that her last potassium level was 3.6 mmol/L.  While within normal limits, still below 4 mmol/L.  She has had no chest pain, dyspnea, syncope, near syncope.    We discussed options for management including increasing metoprolol or starting ivabradine.  After consideration, she noted she would be willing to increase Toprol.  Will increase metoprolol to 75 mg every morning, keeping a 50 mg every afternoon dose.  She will monitor her blood pressures and heart rates with this increase to ensure she tolerates it.  She continues to hydrate as best she can, and her electrolytes are being closely watched by her nephrologist.    Complete 12 point ROS reviewed and otherwise non pertinent or negative except as per HPI pertinent positives in Cardiovascular and Respiratory emphasized. Please see paper chart for outpatient clinic patients where the patient completed the 12 point ROS survey.           Past Medical History:   Diagnosis Date    Anxiety     Eczema     Exercises 5 to 6 times per week     per pt prior to knee issue -enjoyed running  "and horseback riding    Family history of reaction to anesthesia     per pt--\"Mom also gets high anxiety with surgeries and wakes up nasty from anesthesia\"    Gastroparesis 2012    Hypertension     last assessed 3/12/14    Hypokalemia     per pt per doctors possibly related renal problem    Hypomagnesemia     Irritable bowel syndrome     with diarrhea    Rheumatoid arthritis (HCC) 2022    Sinus tachycardia     related to dehydration    Tooth missing     front upper tooth retainer-    Wears glasses     for computer use       Allergies   Allergen Reactions    Prednisone Hyperactivity     Medrol dose samantha only    Serotonin Reuptake Inhibitors (Ssris) Anaphylaxis and Hives    Augmentin [Amoxicillin-Pot Clavulanate] Hives and Rash    Clindamycin Rash    Penicillins Rash    Sulfa Antibiotics Rash    Azithromycin Rash     I reviewed the Home Medication list and Allergies in the chart.   Scheduled Meds:  Current Outpatient Medications   Medication Sig Dispense Refill    AMILoride 5 mg tablet Take 5 mg by mouth 2 (two) times a day 2 in the morning.   3 at night      Magnesium Glycinate 100 MG CAPS Take 200 mg by mouth 2 (two) times a day      metoprolol succinate (TOPROL-XL) 50 mg 24 hr tablet Take 75 mg in the AM, and 50 mg in the PM      metoprolol tartrate (LOPRESSOR) 25 mg tablet Take 1 tablet (25 mg total) by mouth every 6 (six) hours as needed (palpitatins) 90 tablet 3    ondansetron (ZOFRAN) 4 mg tablet Take 1 tablet (4 mg total) by mouth every 6 (six) hours 12 tablet 0    potassium chloride (Klor-Con M20) 20 mEq tablet Take 20 mEq by mouth 3 (three) times a day       No current facility-administered medications for this visit.     PRN Meds:.        Family History   Problem Relation Age of Onset    Hypokalemia Mother     Hypotension Mother     Anxiety disorder Mother         NOS    Lung cancer Father     Skin cancer Father     Crohn's disease Father     Schizoaffective Disorder  Father     Alcohol abuse Father     Drug " abuse Father     No Known Problems Sister     No Known Problems Sister     No Known Problems Brother     Coronary artery disease Maternal Grandmother     Heart disease Maternal Grandmother     Alzheimer's disease Maternal Grandmother     Arthritis Paternal Grandmother     Rheum arthritis Paternal Grandmother     Hypertension Paternal Grandmother     COPD Paternal Grandfather        Social History     Socioeconomic History    Marital status: /Civil Union     Spouse name: Not on file    Number of children: Not on file    Years of education: Not on file    Highest education level: Not on file   Occupational History    Occupation: medical assistant with nephrology    Tobacco Use    Smoking status: Never    Smokeless tobacco: Never   Vaping Use    Vaping status: Never Used   Substance and Sexual Activity    Alcohol use: Not Currently     Alcohol/week: 1.0 standard drink of alcohol     Types: 1 Standard drinks or equivalent per week    Drug use: No    Sexual activity: Yes     Partners: Male     Birth control/protection: None   Other Topics Concern    Not on file   Social History Narrative    Activities - horseback riding    Caffeine use    Drinks coffee- 1 a wk    Exercise - walking    Exercising regularly     Social Drivers of Health     Financial Resource Strain: Not on file   Food Insecurity: No Food Insecurity (6/21/2024)    Nursing - Inadequate Food Risk Classification     Worried About Running Out of Food in the Last Year: Never true     Ran Out of Food in the Last Year: Never true     Ran Out of Food in the Last Year: Not on file   Transportation Needs: No Transportation Needs (6/21/2024)    PRAPARE - Transportation     Lack of Transportation (Medical): No     Lack of Transportation (Non-Medical): No   Physical Activity: Not on file   Stress: Not on file   Social Connections: Not on file   Intimate Partner Violence: Not on file   Housing Stability: Low Risk  (6/21/2024)    Housing Stability Vital Sign      Unable to Pay for Housing in the Last Year: No     Number of Times Moved in the Last Year: 0     Homeless in the Last Year: No         OBJECTIVE:    LMP 10/20/2024 (Approximate)   There were no vitals filed for this visit.      GEN: No acute distress, Alert and oriented, well appearing  HEENT: Normocephalic, atraumatic.  CARDIOVASCULAR:  RRR, No murmur, rub, gallops S1,S2  LUNGS: Clear To auscultation bilaterally, normal effort, no rales, rhonchi, crackles   ABDOMEN: soft, nontender  EXTREMITIES/VASCULAR:  No edema. warm an well perfused.  PSYCH: Normal Affect,  linear speech pattern without evidence of psychosis.   NEURO: Grossly intact, moving all extremiteis equal, face symmetric, alert and responsive, no obvious focal defecits   GAIT:  Ambulates normally without difficulty  HEME: No bleeding, bruising, petechia, purpura   SKIN: No significant rashes on visibile skin, warm, no diaphoresis or pallor.     Lab Results:       LABS:      Chemistry        Component Value Date/Time     (L) 01/07/2016 1654    K 3.6 12/02/2024 1050    K 3.3 (L) 01/07/2016 1654     12/02/2024 1050    CL 93 (L) 01/07/2016 1654    CO2 26 12/02/2024 1050    CO2 23.5 01/07/2016 1654    BUN 13 12/02/2024 1050    BUN 15 01/07/2016 1654    CREATININE 0.73 12/02/2024 1050    CREATININE 0.93 01/07/2016 1654        Component Value Date/Time    CALCIUM 8.9 12/02/2024 1050    CALCIUM 8.1 (L) 01/07/2016 1654    ALKPHOS 109 (H) 11/01/2024 0705    ALKPHOS 56 12/28/2015 1739    AST 17 11/01/2024 0705    AST 16 12/28/2015 1739    ALT 16 11/01/2024 0705    ALT 17 12/28/2015 1739    BILITOT 0.33 12/28/2015 1739            Lab Results   Component Value Date    CHOL 195 08/27/2015    CHOL 176 06/05/2015    CHOL 188 04/20/2015     Lab Results   Component Value Date    HDL 47 (L) 08/16/2024    HDL 52 06/05/2023    HDL 60 10/12/2022     Lab Results   Component Value Date    LDLCALC 123 (H) 08/16/2024    LDLCALC 89 06/05/2023    LDLCALC 95 10/12/2022  "    Lab Results   Component Value Date    TRIG 144 08/16/2024    TRIG 122 06/05/2023    TRIG 100 10/12/2022     No results found for: \"CHOLHDL\"    IMAGING: No results found.     Cardiac testing:           I reviewed and interpreted the following LABS/EKG/TELE/IMAGING and below is summary of my interpretation (if data available):    LABS:    Current EKG performed at today's visit and read by me personally reveals sinus tachycardia      ECHO 7/21/2023  Left Ventricle Left ventricular cavity size is normal. Wall thickness is normal. The left ventricular ejection fraction is 55%. Systolic function is normal.  Wall motion is normal. Diastolic function is normal.   Right Ventricle Right ventricular cavity size is normal. Systolic function is normal. Wall thickness is normal.   Left Atrium The atrium is normal in size.   Right Atrium The atrium is normal in size.   Aortic Valve The aortic valve is trileaflet. The leaflets are not thickened. The leaflets are not calcified. The leaflets exhibit normal mobility. There is no evidence of regurgitation. The aortic valve has no significant stenosis.   Mitral Valve Mitral valve structure is normal. There is trace regurgitation. There is no evidence of stenosis.   Tricuspid Valve Tricuspid valve structure is normal. There is trace to mild regurgitation. There is no evidence of stenosis. The right ventricular systolic pressure is normal. The estimated right ventricular systolic pressure is 22.00 mmHg.   Pulmonic Valve Pulmonic valve structure is normal. There is trace regurgitation. There is no evidence of stenosis.   Ascending Aorta The aortic root is normal in size.   IVC/SVC The right atrial pressure is estimated at 5.0 mmHg. The inferior vena cava is normal in size.   Pericardium There is no pericardial effusion. The pericardium is normal in appearance.           "

## 2024-12-03 NOTE — RESULT ENCOUNTER NOTE
Labs reviewed and stable.  Results to be reviewed at scheduled follow-up appointment in 2 weeks with Dr. Covarrubias.

## 2024-12-05 NOTE — ASSESSMENT & PLAN NOTE
Acute on chronic, now worse with ongoing vomiting/diarrhea and poor PO intake    Plan:  Continue aggressive replenishment IV until able to take PO  Monitor on telemetry   Holding amiloride in setting of dehydration   no

## 2024-12-15 ENCOUNTER — HOSPITAL ENCOUNTER (EMERGENCY)
Facility: HOSPITAL | Age: 29
Discharge: HOME | End: 2024-12-16
Attending: EMERGENCY MEDICINE
Payer: COMMERCIAL

## 2024-12-15 ENCOUNTER — APPOINTMENT (EMERGENCY)
Dept: CT IMAGING | Facility: HOSPITAL | Age: 29
End: 2024-12-15
Payer: COMMERCIAL

## 2024-12-15 DIAGNOSIS — K52.9 GASTROENTERITIS: Primary | ICD-10-CM

## 2024-12-15 DIAGNOSIS — N39.0 UTI (URINARY TRACT INFECTION): ICD-10-CM

## 2024-12-15 DIAGNOSIS — R79.89 ABNORMAL CBC: ICD-10-CM

## 2024-12-15 LAB
ALBUMIN SERPL BCG-MCNC: 4.7 G/DL (ref 3.5–5)
ALP SERPL-CCNC: 106 U/L (ref 34–104)
ALT SERPL W P-5'-P-CCNC: 17 U/L (ref 7–52)
ANION GAP SERPL CALCULATED.3IONS-SCNC: 12 MMOL/L (ref 4–13)
ANISOCYTOSIS BLD QL SMEAR: PRESENT
AST SERPL W P-5'-P-CCNC: 16 U/L (ref 13–39)
BACTERIA UR QL AUTO: ABNORMAL /HPF
BASOPHILS # BLD MANUAL: 0 THOUSAND/UL (ref 0–0.1)
BASOPHILS NFR MAR MANUAL: 0 % (ref 0–1)
BILIRUB SERPL-MCNC: 0.73 MG/DL (ref 0.2–1)
BILIRUB UR QL STRIP: NEGATIVE
BUN SERPL-MCNC: 16 MG/DL (ref 5–25)
CALCIUM SERPL-MCNC: 9.8 MG/DL (ref 8.4–10.2)
CHLORIDE SERPL-SCNC: 103 MMOL/L (ref 96–108)
CLARITY UR: ABNORMAL
CO2 SERPL-SCNC: 26 MMOL/L (ref 21–32)
COLOR UR: ABNORMAL
CREAT SERPL-MCNC: 0.86 MG/DL (ref 0.6–1.3)
DACRYOCYTES BLD QL SMEAR: PRESENT
EOSINOPHIL # BLD MANUAL: 0 THOUSAND/UL (ref 0–0.4)
EOSINOPHIL NFR BLD MANUAL: 0 % (ref 0–6)
ERYTHROCYTE [DISTWIDTH] IN BLOOD BY AUTOMATED COUNT: 12.1 % (ref 11.6–15.1)
EXT PREGNANCY TEST URINE: NEGATIVE
EXT. CONTROL: NORMAL
GFR SERPL CREATININE-BSD FRML MDRD: 90 ML/MIN/1.73SQ M
GLUCOSE SERPL-MCNC: 134 MG/DL (ref 65–140)
GLUCOSE UR STRIP-MCNC: NEGATIVE MG/DL
HCT VFR BLD AUTO: 44.5 % (ref 34.8–46.1)
HGB BLD-MCNC: 15.9 G/DL (ref 11.5–15.4)
HGB UR QL STRIP.AUTO: ABNORMAL
KETONES UR STRIP-MCNC: ABNORMAL MG/DL
LEUKOCYTE ESTERASE UR QL STRIP: ABNORMAL
LIPASE SERPL-CCNC: 16 U/L (ref 11–82)
LYMPHOCYTES # BLD AUTO: 0.36 THOUSAND/UL (ref 0.6–4.47)
LYMPHOCYTES # BLD AUTO: 2 % (ref 14–44)
MCH RBC QN AUTO: 29.5 PG (ref 26.8–34.3)
MCHC RBC AUTO-ENTMCNC: 35.7 G/DL (ref 31.4–37.4)
MCV RBC AUTO: 83 FL (ref 82–98)
MONOCYTES # BLD AUTO: 0.73 THOUSAND/UL (ref 0–1.22)
MONOCYTES NFR BLD: 4 % (ref 4–12)
MUCOUS THREADS UR QL AUTO: ABNORMAL
NEUTROPHILS # BLD MANUAL: 17.06 THOUSAND/UL (ref 1.85–7.62)
NEUTS BAND NFR BLD MANUAL: 2 % (ref 0–8)
NEUTS SEG NFR BLD AUTO: 92 % (ref 43–75)
NITRITE UR QL STRIP: NEGATIVE
NON-SQ EPI CELLS URNS QL MICRO: ABNORMAL /HPF
PH UR STRIP.AUTO: 7.5 [PH]
PLATELET # BLD AUTO: 215 THOUSANDS/UL (ref 149–390)
PLATELET BLD QL SMEAR: ADEQUATE
PMV BLD AUTO: 11.6 FL (ref 8.9–12.7)
POTASSIUM SERPL-SCNC: 3.3 MMOL/L (ref 3.5–5.3)
PROT SERPL-MCNC: 8.1 G/DL (ref 6.4–8.4)
PROT UR STRIP-MCNC: ABNORMAL MG/DL
RBC # BLD AUTO: 5.39 MILLION/UL (ref 3.81–5.12)
RBC #/AREA URNS AUTO: ABNORMAL /HPF
RBC MORPH BLD: PRESENT
SODIUM SERPL-SCNC: 141 MMOL/L (ref 135–147)
SP GR UR STRIP.AUTO: 1.03 (ref 1–1.03)
UROBILINOGEN UR STRIP-ACNC: <2 MG/DL
WBC # BLD AUTO: 18.15 THOUSAND/UL (ref 4.31–10.16)
WBC #/AREA URNS AUTO: ABNORMAL /HPF

## 2024-12-15 PROCEDURE — 85007 BL SMEAR W/DIFF WBC COUNT: CPT

## 2024-12-15 PROCEDURE — 74177 CT ABD & PELVIS W/CONTRAST: CPT

## 2024-12-15 PROCEDURE — 99284 EMERGENCY DEPT VISIT MOD MDM: CPT

## 2024-12-15 PROCEDURE — 85027 COMPLETE CBC AUTOMATED: CPT

## 2024-12-15 PROCEDURE — 80053 COMPREHEN METABOLIC PANEL: CPT

## 2024-12-15 PROCEDURE — 93005 ELECTROCARDIOGRAM TRACING: CPT

## 2024-12-15 PROCEDURE — 99285 EMERGENCY DEPT VISIT HI MDM: CPT

## 2024-12-15 PROCEDURE — 81025 URINE PREGNANCY TEST: CPT

## 2024-12-15 PROCEDURE — 83690 ASSAY OF LIPASE: CPT

## 2024-12-15 PROCEDURE — 96366 THER/PROPH/DIAG IV INF ADDON: CPT

## 2024-12-15 PROCEDURE — 96368 THER/DIAG CONCURRENT INF: CPT

## 2024-12-15 PROCEDURE — 96375 TX/PRO/DX INJ NEW DRUG ADDON: CPT

## 2024-12-15 PROCEDURE — 81001 URINALYSIS AUTO W/SCOPE: CPT

## 2024-12-15 PROCEDURE — 87086 URINE CULTURE/COLONY COUNT: CPT

## 2024-12-15 PROCEDURE — 36415 COLL VENOUS BLD VENIPUNCTURE: CPT

## 2024-12-15 PROCEDURE — 96365 THER/PROPH/DIAG IV INF INIT: CPT

## 2024-12-15 RX ORDER — DICYCLOMINE HCL 20 MG
20 TABLET ORAL ONCE
Status: COMPLETED | OUTPATIENT
Start: 2024-12-15 | End: 2024-12-15

## 2024-12-15 RX ORDER — KETOROLAC TROMETHAMINE 30 MG/ML
15 INJECTION, SOLUTION INTRAMUSCULAR; INTRAVENOUS ONCE
Status: COMPLETED | OUTPATIENT
Start: 2024-12-15 | End: 2024-12-15

## 2024-12-15 RX ORDER — MAGNESIUM SULFATE HEPTAHYDRATE 40 MG/ML
2 INJECTION, SOLUTION INTRAVENOUS ONCE
Status: COMPLETED | OUTPATIENT
Start: 2024-12-15 | End: 2024-12-16

## 2024-12-15 RX ORDER — FAMOTIDINE 10 MG/ML
20 INJECTION, SOLUTION INTRAVENOUS ONCE
Status: COMPLETED | OUTPATIENT
Start: 2024-12-15 | End: 2024-12-15

## 2024-12-15 RX ORDER — POTASSIUM CHLORIDE 1500 MG/1
40 TABLET, EXTENDED RELEASE ORAL ONCE
Status: COMPLETED | OUTPATIENT
Start: 2024-12-15 | End: 2024-12-15

## 2024-12-15 RX ORDER — ONDANSETRON 2 MG/ML
4 INJECTION INTRAMUSCULAR; INTRAVENOUS ONCE
Status: COMPLETED | OUTPATIENT
Start: 2024-12-15 | End: 2024-12-15

## 2024-12-15 RX ORDER — LORAZEPAM 2 MG/ML
0.5 INJECTION INTRAMUSCULAR EVERY 6 HOURS PRN
Status: DISCONTINUED | OUTPATIENT
Start: 2024-12-15 | End: 2024-12-16 | Stop reason: HOSPADM

## 2024-12-15 RX ADMIN — LORAZEPAM 0.5 MG: 2 INJECTION INTRAMUSCULAR; INTRAVENOUS at 22:54

## 2024-12-15 RX ADMIN — ONDANSETRON 4 MG: 2 INJECTION INTRAMUSCULAR; INTRAVENOUS at 21:52

## 2024-12-15 RX ADMIN — SODIUM CHLORIDE, SODIUM LACTATE, POTASSIUM CHLORIDE, AND CALCIUM CHLORIDE 1000 ML: .6; .31; .03; .02 INJECTION, SOLUTION INTRAVENOUS at 21:52

## 2024-12-15 RX ADMIN — POTASSIUM CHLORIDE 40 MEQ: 1500 TABLET, EXTENDED RELEASE ORAL at 22:31

## 2024-12-15 RX ADMIN — CEFTRIAXONE 1000 MG: 10 INJECTION, POWDER, FOR SOLUTION INTRAVENOUS at 23:40

## 2024-12-15 RX ADMIN — MORPHINE SULFATE 2 MG: 2 INJECTION, SOLUTION INTRAMUSCULAR; INTRAVENOUS at 22:24

## 2024-12-15 RX ADMIN — KETOROLAC TROMETHAMINE 15 MG: 30 INJECTION, SOLUTION INTRAMUSCULAR; INTRAVENOUS at 21:52

## 2024-12-15 RX ADMIN — FAMOTIDINE 20 MG: 10 INJECTION, SOLUTION INTRAVENOUS at 21:51

## 2024-12-15 RX ADMIN — DICYCLOMINE HYDROCHLORIDE 20 MG: 20 TABLET ORAL at 21:53

## 2024-12-15 RX ADMIN — IOHEXOL 100 ML: 350 INJECTION, SOLUTION INTRAVENOUS at 23:13

## 2024-12-15 RX ADMIN — SODIUM CHLORIDE, SODIUM LACTATE, POTASSIUM CHLORIDE, AND CALCIUM CHLORIDE 1000 ML: .6; .31; .03; .02 INJECTION, SOLUTION INTRAVENOUS at 22:24

## 2024-12-15 RX ADMIN — SODIUM CHLORIDE, SODIUM LACTATE, POTASSIUM CHLORIDE, AND CALCIUM CHLORIDE 1000 ML: .6; .31; .03; .02 INJECTION, SOLUTION INTRAVENOUS at 22:25

## 2024-12-15 RX ADMIN — MAGNESIUM SULFATE HEPTAHYDRATE 2 G: 40 INJECTION, SOLUTION INTRAVENOUS at 23:25

## 2024-12-15 NOTE — Clinical Note
Julita Berry was seen and treated in our emergency department on 12/15/2024.                Diagnosis:     Julita  may return to work on return date.    She may return on this date: 12/18/2024         If you have any questions or concerns, please don't hesitate to call.      Christine Bowman PA-C    ______________________________           _______________          _______________  Hospital Representative                              Date                                Time

## 2024-12-16 VITALS
DIASTOLIC BLOOD PRESSURE: 55 MMHG | BODY MASS INDEX: 29.79 KG/M2 | TEMPERATURE: 98 F | WEIGHT: 179.01 LBS | RESPIRATION RATE: 20 BRPM | OXYGEN SATURATION: 98 % | HEART RATE: 83 BPM | SYSTOLIC BLOOD PRESSURE: 100 MMHG

## 2024-12-16 LAB
ATRIAL RATE: 120 BPM
ATRIAL RATE: 131 BPM
ATRIAL RATE: 133 BPM
P AXIS: 70 DEGREES
P AXIS: 73 DEGREES
P AXIS: 78 DEGREES
PR INTERVAL: 144 MS
PR INTERVAL: 152 MS
PR INTERVAL: 164 MS
QRS AXIS: 70 DEGREES
QRS AXIS: 72 DEGREES
QRS AXIS: 72 DEGREES
QRSD INTERVAL: 68 MS
QRSD INTERVAL: 70 MS
QRSD INTERVAL: 72 MS
QT INTERVAL: 306 MS
QT INTERVAL: 320 MS
QT INTERVAL: 388 MS
QTC INTERVAL: 441 MS
QTC INTERVAL: 452 MS
QTC INTERVAL: 572 MS
T WAVE AXIS: 23 DEGREES
T WAVE AXIS: 28 DEGREES
T WAVE AXIS: 63 DEGREES
VENTRICULAR RATE: 120 BPM
VENTRICULAR RATE: 125 BPM
VENTRICULAR RATE: 131 BPM

## 2024-12-16 PROCEDURE — 96366 THER/PROPH/DIAG IV INF ADDON: CPT

## 2024-12-16 PROCEDURE — 96375 TX/PRO/DX INJ NEW DRUG ADDON: CPT

## 2024-12-16 PROCEDURE — 93005 ELECTROCARDIOGRAM TRACING: CPT

## 2024-12-16 PROCEDURE — 96376 TX/PRO/DX INJ SAME DRUG ADON: CPT

## 2024-12-16 PROCEDURE — 93010 ELECTROCARDIOGRAM REPORT: CPT

## 2024-12-16 RX ORDER — ONDANSETRON 4 MG/1
4 TABLET, ORALLY DISINTEGRATING ORAL EVERY 6 HOURS PRN
Qty: 15 TABLET | Refills: 0 | Status: CANCELLED | OUTPATIENT
Start: 2024-12-16

## 2024-12-16 RX ORDER — METOCLOPRAMIDE 10 MG/1
10 TABLET ORAL EVERY 6 HOURS
Qty: 15 TABLET | Refills: 0 | Status: SHIPPED | OUTPATIENT
Start: 2024-12-16

## 2024-12-16 RX ORDER — CEPHALEXIN 500 MG/1
500 CAPSULE ORAL EVERY 6 HOURS SCHEDULED
Qty: 28 CAPSULE | Refills: 0 | Status: SHIPPED | OUTPATIENT
Start: 2024-12-16 | End: 2024-12-23

## 2024-12-16 RX ORDER — METOPROLOL TARTRATE 25 MG/1
25 TABLET, FILM COATED ORAL ONCE
Status: COMPLETED | OUTPATIENT
Start: 2024-12-16 | End: 2024-12-16

## 2024-12-16 RX ORDER — MAGNESIUM HYDROXIDE/ALUMINUM HYDROXICE/SIMETHICONE 120; 1200; 1200 MG/30ML; MG/30ML; MG/30ML
30 SUSPENSION ORAL ONCE
Status: COMPLETED | OUTPATIENT
Start: 2024-12-16 | End: 2024-12-16

## 2024-12-16 RX ORDER — DROPERIDOL 2.5 MG/ML
0.62 INJECTION, SOLUTION INTRAMUSCULAR; INTRAVENOUS ONCE
Status: COMPLETED | OUTPATIENT
Start: 2024-12-16 | End: 2024-12-16

## 2024-12-16 RX ORDER — DICYCLOMINE HCL 20 MG
20 TABLET ORAL 2 TIMES DAILY
Qty: 20 TABLET | Refills: 0 | Status: SHIPPED | OUTPATIENT
Start: 2024-12-16

## 2024-12-16 RX ORDER — FAMOTIDINE 20 MG/1
20 TABLET, FILM COATED ORAL 2 TIMES DAILY
Qty: 30 TABLET | Refills: 0 | Status: SHIPPED | OUTPATIENT
Start: 2024-12-16

## 2024-12-16 RX ORDER — ALUMINUM HYDROXIDE, MAGNESIUM HYDROXIDE, SIMETHICONE 400; 400; 40 MG/10ML; MG/10ML; MG/10ML
30 SUSPENSION ORAL
Qty: 3600 ML | Refills: 0 | Status: SHIPPED | OUTPATIENT
Start: 2024-12-16

## 2024-12-16 RX ORDER — ONDANSETRON 2 MG/ML
4 INJECTION INTRAMUSCULAR; INTRAVENOUS ONCE
Status: COMPLETED | OUTPATIENT
Start: 2024-12-16 | End: 2024-12-16

## 2024-12-16 RX ADMIN — METOPROLOL TARTRATE 25 MG: 25 TABLET, FILM COATED ORAL at 03:31

## 2024-12-16 RX ADMIN — ALUMINUM HYDROXIDE, MAGNESIUM HYDROXIDE, AND SIMETHICONE 30 ML: 200; 200; 20 SUSPENSION ORAL at 01:41

## 2024-12-16 RX ADMIN — DROPERIDOL 0.62 MG: 2.5 INJECTION, SOLUTION INTRAMUSCULAR; INTRAVENOUS at 01:41

## 2024-12-16 RX ADMIN — ONDANSETRON 4 MG: 2 INJECTION INTRAMUSCULAR; INTRAVENOUS at 01:19

## 2024-12-16 NOTE — DISCHARGE INSTRUCTIONS
Take Keflex as prescribed, four times a day, for one week    Take Reglan as prescribed as needed for nausea/vomiting.  You do not have to take this if you are not having nausea/vomiting    Take Bentyl as prescribed as needed for abdominal pain    Take Maalox as prescribed as needed for abdominal pain    Take Pepcid as prescribed as needed for abdominal pain    Follow up with your PCP as soon as possible    Return for worsening symptoms, severe abdominal pain, chest pain, trouble breathing, passing out, or any other new/concerning symptoms

## 2024-12-16 NOTE — PROGRESS NOTES
RENAL FOLLOW UP NOTE:.td    ASSESSMENT AND PLAN:  1.  Hypertension/hypokalemia/hyponatremia:  Negative secondary workup.  Genetic workup was negative. She had been seen by Dr. Malu Augustin as a 2nd opinion.     Blood pressure goal:  Less than 135/80     Current  Blood pressures:    No formal readings but she will send them in 110-120/70-80     Recommendations:  Nonmedical regimen including isotonic exercise and avoidance of salt  Medication changes today:  No changes pending home readings     Regarding diet:  Continue high potassium diet     In regards to labs: Patient has required infusions for both potassium magnesium  Potassium: 3.4 during acute illness recheck at this time continue 40 mill equivalents twice a day  creatinine normal at  0.78 improved despite viral syndrome  Sodium normal 141  MBD:    Hypomagnesemia: 1.8 acceptable on supplementation  Vitamin-D from 5/9/2024 was 26.0 replete and recheck next visit   lipid profile:  LDL 95/HDL 60/triglycerides 100 at goal from prior  Hemoglobin normal at 14.3  UA: Moderate leukocytes/trace proteinuria/2-4 RBCs/10-20 WBCs from 11/12/2024 symptoms: Follow-up UA large blood large leukocytes 1+ proteinuria innumerable RBCs WBCs occasional bacteria urine culture with mixed contaminants  Urine protein creatinine ratio 0.13 g at goal      Patient considering pregnancy again after April so I will continue to remain off spironolactone        2.  SVT/POTS followed by Dr. Boyle     3.  Mixed connective tissue disease:  Followed by Rheumatology as needed:  Clearly now with rheumatoid arthritis being treated with Plaquenil.  Now off ibuprofen.     4. GI:  GERD/IBS/idiopathic gastroparesis followed by GI Dr. Amador     5. Celiac artery stenosis:  Based on arterial duplex 07/28/2022 for hypertensive workup:  No further workup recommended or intervention unless she were to develop symptoms soon after eating    6.  Recent ER visit 12/15/2024 with abdominal discomfort CT scan with  few loops of nondilated fluid-filled small bowel otherwise unremarkable/possible UTI treated with Keflex        GI health maintenance: Patient followed by [facility] to young for requiring colonoscopy       PATIENT INSTRUCTIONS:    Patient Instructions   Visit summary:  - Overall I think you doing well we just have to recheck labs.  Will also make sure that you have no urine infections I will have you go back for a UA Monday which would be the December 30 for all labs    - For your potassium and magnesium no changes at the moment till we get repeat labs        1. Medication changes today:  No changes pending repeat labs    2.  General instructions:  Avoid salt  When you are able to exercise as discussed    3.  Please go for  fasting  lab work December 30    4.  Please take 1 week a blood pressure readings at this time    AS FOLLOWS  MORNING AND EVENING, SITTING AND STANDING as follows:  TAKE THE MORNING READINGS BEFORE ANY MEDICATIONS AND WHEN YOU ARE RELAXED FOR SEVERAL MINUTES  TAKE THE EVENING READINGS:  BETWEEN 7-10 P.M.; PRIOR TO ANY MEDICATIONS; AT LEAST IN OUR  FROM DINNER; AND CERTAINLY AFTER RELAXING FOR A FEW MINUTES  PLEASE INCLUDE HEART RATE WITH YOUR BLOOD PRESSURE READINGS  When taking standing readings, keep your arm supported at heart level and not dangling  Make sure you are sitting with your back supported and feet on the ground and do not cross your legs or feet  Make sure you have not taken any coffee or caffeine products or exercised or smoke cigarettes at least 30 minutes before taking your blood pressure  Then please mail these readings into the office      5.  In 3 months:  Please go for nonfasting lab work but in the morning  Please take 1 week a blood pressure readings as outlined above and mail those into the office      6.  Follow-up in 6 months  Please bring in 1 week a blood pressure readings morning evening, sitting and standing is outlined above  PLEASE BRING AN YOUR BLOOD PRESSURE  MACHINE TO CORRELATE WITH THE OFFICE MACHINE AT THIS NEXT SCHEDULED VISIT  Please go for fasting lab work 1-2 weeks prior to your appointment      7.  General non medical recommendations:  AVOID SALT BUT NOT ADDING AN READING LABELS TO MAKE SURE THERE IS LOW-SALT IN THE FOOD THAT YOU ARE EATING  Goal is less than 2 g of sodium intake or less than 5 g of sodium chloride intake per day    Avoid nonsteroidal anti-inflammatory drugs such as Naprosyn, ibuprofen, Aleve, Advil, Celebrex, Meloxicam (Mobic) etc.  You can use Tylenol as needed if you do not have any liver condition to be concerned about    Avoid medications such as Sudafed or decongestants and antihistamines that contained the D component which is the decongestant.  You can take antihistamines without the decongestant or D component.    Try to avoid medications such as pantoprazole or  Protonix/Nexium or Esomeprazole)/Prilosec or omeprazole/Prevacid or lansoprazole/AcipHex or Rabeprazole.  If you are able to, use Pepcid as this is safer for your kidneys.    Try to exercise at least 30 minutes 3 days a week to begin with with an ultimate goal of 5 days a week for at least 30 minutes    Please do not drink more than 2 glasses of alcohol/wine on a daily basis as this may contribute to your high blood pressure.            Subjective:     The patient overall is feeling well  Except for recent GI symptoms nausea vomiting diarrhea just improved couple days ago.  No fevers, chills, or cough or colds except for recent viral syndrome also had a UTI.  Good appetite and good energy  No hematuria, some mild dysuria and no frequency no foamy urine at the moment.  Recent UTI please see above.  No gastrointestinal symptoms at the time.  See above  No cardiovascular symptoms including swelling of the legs  No headaches, only dizziness or lightheadedness if heart rate increases related to POTS  Blood pressure medications:  Amiloride 15 mg in the morning 10 mg in the  "evening  Toprol-XL 50 mg twice a day  Metoprolol tartrate 25 mg every 6 hours as needed for  tachycardia    Renal pertinent medications:  Magnesium glycinate 240 mg twice a day  Klor-Con 40 mill equivalents twice a day    ROS:  See HPI, otherwise review of systems as completely reviewed with the patient are negative    Past Medical History:   Diagnosis Date    Anxiety     Eczema     Exercises 5 to 6 times per week     per pt prior to knee issue -enjoyed running and horseback riding    Family history of reaction to anesthesia     per pt--\"Mom also gets high anxiety with surgeries and wakes up nasty from anesthesia\"    Gastroparesis 2012    Hypertension     last assessed 3/12/14    Hypokalemia     per pt per doctors possibly related renal problem    Hypomagnesemia     Irritable bowel syndrome     with diarrhea    Rheumatoid arthritis (HCC) 2022    Sinus tachycardia     related to dehydration    Tooth missing     front upper tooth retainer-    Wears glasses     for computer use     Past Surgical History:   Procedure Laterality Date    CARDIAC ELECTROPHYSIOLOGY PROCEDURE N/A 9/23/2024    Procedure: Cardiac eps/svt ablation;  Surgeon: Gino Butler MD;  Location:  CARDIAC CATH LAB;  Service: Cardiology    CARDIAC ELECTROPHYSIOLOGY PROCEDURE N/A 10/23/2024    Procedure: Cardiac loop recorder implant;  Surgeon: Brice Boyle MD;  Location:  CARDIAC CATH LAB;  Service: Cardiology    SD ARTHROSCOPY KNEE LATERAL RELEASE Right 04/13/2023    Procedure: ARTHROSCOPIC RELEASE RETINACULAR;  Surgeon: Ilya Brizuela DO;  Location:  MAIN OR;  Service: Orthopedics    SD EGD TRANSORAL BIOPSY SINGLE/MULTIPLE N/A 05/04/2016    Procedure: ESOPHAGOGASTRODUODENOSCOPY (EGD);  Surgeon: Raji Monzon MD;  Location:  GI LAB;  Service: Gastroenterology    SD RCNSTJ DISLC PATELLA W/XTNSR RELIGNMT&/MUSC RL Right 04/13/2023    Procedure: ARTHROSCOPIC REALIGNMENT PATELLA;  Surgeon: Ilya Brizuela DO;  Location:  MAIN OR;  Service: " Orthopedics    WISDOM TOOTH EXTRACTION       Family History   Problem Relation Age of Onset    Hypokalemia Mother     Hypotension Mother     Anxiety disorder Mother         NOS    Lung cancer Father     Skin cancer Father     Crohn's disease Father     Schizoaffective Disorder  Father     Alcohol abuse Father     Drug abuse Father     No Known Problems Sister     No Known Problems Sister     No Known Problems Brother     Coronary artery disease Maternal Grandmother     Heart disease Maternal Grandmother     Alzheimer's disease Maternal Grandmother     Arthritis Paternal Grandmother     Rheum arthritis Paternal Grandmother     Hypertension Paternal Grandmother     COPD Paternal Grandfather       reports that she has never smoked. She has never used smokeless tobacco. She reports current alcohol use of about 1.0 standard drink of alcohol per week. She reports that she does not use drugs.    I COMPLETELY REVIEWED THE PAST MEDICAL HISTORY/PAST SURGICAL HISTORY/SOCIAL HISTORY/FAMILY HISTORY/AND MEDICATIONS  AND UPDATED ALL    Objective:     Vitals:   BP sitting on left: 128/86 with a heart rate of 76 and regular  BP standing on left: 128/92 with a heart rate of 76 and regular    Weight (last 2 days)       Date/Time Weight    12/26/24 1431 81.5 (179.6)          Wt Readings from Last 3 Encounters:   12/26/24 81.5 kg (179 lb 9.6 oz)   12/15/24 81.2 kg (179 lb 0.2 oz)   12/03/24 83.5 kg (184 lb)       Body mass index is 29.89 kg/m².    Physical Exam: General:  No acute distress  Skin:  No acute rash  Eyes:  No scleral icterus, noninjected, no discharge from eyes  ENT:  Moist mucous membranes  Neck:  Supple, no jugular venous distention, trachea is midline, no lymphadenopathy and no thyromegaly  Back   No CVAT  Chest:  Clear to auscultation and percussion, good respiratory effort  CVS:  Regular rate and rhythm without a rub, or gallops or murmurs  Abdomen:  Soft and nontender with normal bowel sounds  Extremities:  No  cyanosis and no edema, no arthritic changes, normal range of motion  Neuro:  Grossly intact  Psych:  Alert, oriented x3 and appropriate      Medications:    Current Outpatient Medications:     AMILoride 5 mg tablet, Take 5 mg by mouth 2 (two) times a day 2 in the morning.  3 at night, Disp: , Rfl:     Magnesium Glycinate 100 MG CAPS, Take 200 mg by mouth 2 (two) times a day (Patient taking differently: Take 240 mg by mouth 2 (two) times a day), Disp: , Rfl:     metoprolol succinate (TOPROL-XL) 50 mg 24 hr tablet, Take 75 mg in the AM, and 50 mg in the PM (Patient taking differently: Take 50 mg by mouth 2 (two) times a day Take 75 mg in the AM, and 50 mg in the PM), Disp: , Rfl:     metoprolol tartrate (LOPRESSOR) 25 mg tablet, Take 1 tablet (25 mg total) by mouth every 6 (six) hours as needed (palpitatins), Disp: 90 tablet, Rfl: 3    ondansetron (ZOFRAN) 4 mg tablet, Take 1 tablet (4 mg total) by mouth every 6 (six) hours, Disp: 12 tablet, Rfl: 0    potassium chloride (Klor-Con M20) 20 mEq tablet, Take 20 mEq by mouth 3 (three) times a day, Disp: , Rfl:     aluminum-magnesium hydroxide-simethicone (MAALOX) 200-200-20 MG/5ML SUSP, Take 30 mL by mouth 4 (four) times a day (before meals and at bedtime) (Patient not taking: Reported on 12/26/2024), Disp: 3600 mL, Rfl: 0    dicyclomine (BENTYL) 20 mg tablet, Take 1 tablet (20 mg total) by mouth 2 (two) times a day (Patient not taking: Reported on 12/26/2024), Disp: 20 tablet, Rfl: 0    famotidine (PEPCID) 20 mg tablet, Take 1 tablet (20 mg total) by mouth 2 (two) times a day (Patient not taking: Reported on 12/26/2024), Disp: 30 tablet, Rfl: 0    metoclopramide (Reglan) 10 mg tablet, Take 1 tablet (10 mg total) by mouth every 6 (six) hours (Patient not taking: Reported on 12/26/2024), Disp: 15 tablet, Rfl: 0    Lab, Imaging and other studies: I have personally reviewed pertinent labs.  Laboratory Results:  Results for orders placed or performed in visit on 12/19/24    Basic metabolic panel    Collection Time: 12/19/24 10:58 AM   Result Value Ref Range    Sodium 141 135 - 147 mmol/L    Potassium 3.4 (L) 3.5 - 5.3 mmol/L    Chloride 103 96 - 108 mmol/L    CO2 27 21 - 32 mmol/L    ANION GAP 11 4 - 13 mmol/L    BUN 10 5 - 25 mg/dL    Creatinine 0.78 0.60 - 1.30 mg/dL    Glucose 112 65 - 140 mg/dL    Calcium 9.3 8.4 - 10.2 mg/dL    eGFR 102 ml/min/1.73sq m   Magnesium    Collection Time: 12/19/24 10:58 AM   Result Value Ref Range    Magnesium 1.8 (L) 1.9 - 2.7 mg/dL   CBC and differential    Collection Time: 12/19/24 10:58 AM   Result Value Ref Range    WBC 4.80 4.31 - 10.16 Thousand/uL    RBC 4.75 3.81 - 5.12 Million/uL    Hemoglobin 14.3 11.5 - 15.4 g/dL    Hematocrit 40.4 34.8 - 46.1 %    MCV 85 82 - 98 fL    MCH 30.1 26.8 - 34.3 pg    MCHC 35.4 31.4 - 37.4 g/dL    RDW 12.1 11.6 - 15.1 %    MPV 12.1 8.9 - 12.7 fL    Platelets 223 149 - 390 Thousands/uL   Vitamin B12    Collection Time: 12/19/24 10:58 AM   Result Value Ref Range    Vitamin B-12 527 180 - 914 pg/mL   Folate    Collection Time: 12/19/24 10:58 AM   Result Value Ref Range    Folate >22.3 >5.9 ng/mL   Leukemia/Lymphoma flow cytometry    Collection Time: 12/19/24 10:58 AM   Result Value Ref Range    Scan Result SEE WRITTEN REPORT    TIBC Panel (incl. Iron, TIBC, % Iron Saturation)    Collection Time: 12/19/24 10:58 AM   Result Value Ref Range    Iron Saturation 22 15 - 50 %    TIBC 327.6 250 - 450 ug/dL    Iron 72 50 - 212 ug/dL    Transferrin 234 203 - 362 mg/dL    UIBC 256 155 - 355 ug/dL   Ferritin    Collection Time: 12/19/24 10:58 AM   Result Value Ref Range    Ferritin 61 11 - 307 ng/mL   Manual Differential(PHLEBS Do Not Order)    Collection Time: 12/19/24 10:58 AM   Result Value Ref Range    Segmented % 63 43 - 75 %    Lymphocytes % 19 14 - 44 %    Monocytes % 4 4 - 12 %    Eosinophils % 4 0 - 6 %    Basophils % 1 0 - 1 %    Atypical Lymphocytes % 9 (H) <=0 %    Absolute Neutrophils 3.02 1.85 - 7.62 Thousand/uL  "   Absolute Lymphocytes 1.34 0.60 - 4.47 Thousand/uL    Absolute Monocytes 0.19 0.00 - 1.22 Thousand/uL    Absolute Eosinophils 0.19 0.00 - 0.40 Thousand/uL    Absolute Basophils 0.05 0.00 - 0.10 Thousand/uL    Total Counted      RBC Morphology Normal     Platelet Estimate Adequate Adequate    Clumped Platelets Present      *Note: Due to a large number of results and/or encounters for the requested time period, some results have not been displayed. A complete set of results can be found in Results Review.             Invalid input(s): \"ALBUMIN\"        Radiology review:   chest X-ray    Ultrasound      Portions of the record may have been created with voice recognition software.  Occasional wrong word or \"sound a like\" substitutions may have occurred due to the inherent limitations of voice recognition software.  Read the chart carefully and recognize, using context, where substitutions have occurred.                    "

## 2024-12-16 NOTE — ED PROVIDER NOTES
Time reflects when diagnosis was documented in both MDM as applicable and the Disposition within this note       Time User Action Codes Description Comment    12/16/2024 12:31 AM Christine Bowman [K52.9] Gastroenteritis     12/16/2024 12:31 AM Christine Bowman [N39.0] UTI (urinary tract infection)     12/16/2024 12:31 AM Christine Bowman [R79.89] Abnormal CBC           ED Disposition       ED Disposition   Discharge    Condition   Stable    Date/Time   Mon Dec 16, 2024 12:31 AM    Comment   Julita Berry discharge to home/self care.                   Assessment & Plan       Medical Decision Making  DDx including but not limited to: appendicitis, gastroenteritis, gastritis, PUD, GERD, gastroparesis, hepatitis, pancreatitis, colitis, enteritis, food poisoning, mesenteric adenitis, IBD, IBS, ileus, bowel obstruction, intussusception, volvulus, cholecystitis, biliary colic, choledocholithiasis, perforated viscus, splenic etiology, constipation, pelvic pathology, renal colic, pyelonephritis, UTI.    Will obtain EKG given palpitations.  Will obtain CBC to evaluate for leukocytosis, anemia.  Will obtain CMP to evaluate kidney function, for electrolyte disturbance.  Will obtain UA to evaluate for UTI.  Will obtain lipase to evaluate for pancreatitis.  Will obtain urine pregnancy.     Patient with elevated white blood cell count, red blood cell count, hemoglobin.  Suspect hemoconcentration secondary to volume loss.  IV fluids given with improvement in tachycardia.  UA questionable UTI, shared decision making used with patient regarding treatment.  Will give Rocephin given CVA tenderness.  SIRS was met, however patient does have a history of inappropriate tachycardia, POTS, and suspect leukocytosis related to hemoconcentration.  Following following IV fluids, tachycardia has improved.  Patient was unable to take her PM Metoprolol- will give 25 mg in ED. Patient did have occasional PVC on telemetry.  Discussed with patient incidental findings CBC including tear cells,anisocytosis. Patient agreeable to follow up with her PCP for repeat CBC.    On re-examination, patient reports resolution of symptoms. VSS, she remains without tachycardia. Will dc    At the time of discharge, the patient is in no acute distress. I discussed with the patient the diagnosis, treatment plan, follow-up, return precautions, and discharge instructions; they were given the opportunity to ask questions and verbalized understanding.    Problems Addressed:  Abnormal CBC: acute illness or injury  Gastroenteritis: acute illness or injury  UTI (urinary tract infection): acute illness or injury    Amount and/or Complexity of Data Reviewed  External Data Reviewed: labs, radiology, ECG and notes.  Labs: ordered. Decision-making details documented in ED Course.  Radiology: ordered. Decision-making details documented in ED Course.  ECG/medicine tests: ordered and independent interpretation performed. Decision-making details documented in ED Course.    Risk  OTC drugs.  Prescription drug management.        ED Course as of 12/16/24 0628   Sun Dec 15, 2024   2200 Hemoglobin(!): 15.9   2200 RBC(!): 5.39   2200 WBC(!): 18.15  SIRS met however suspect viral syndrome most likely etiology at this time   2331 Reexamination, patient reports improvement in abdominal pain, now 4 out of 10.  Nausea also improved.  Patient appears improved, more comfortable.  Heart rate is less than 110 BPM on telemetry. Discussed findings with patient. Given questionable UA, discussed again UTI symptoms. Patient does note having some mild back discomfort recently, and dysuria though she typically has this at the start of her menstruation. We discussed risks/benefits of tx for pyelo given symptoms, labs, L CVA TTP; she is agreeable with tx. Will give Rocephin (pt has tolerated keflex, ancef in the past)   Mon Dec 16, 2024   0022 Pulse: 89   0022 Blood Pressure: 115/57   0115 PO  challenge and patient developed nausea- additional Zofran ordered   0136 On re-examination, patient with additional vomiting, abdominal pain, tearful,  BPM. Droperidol ordered   0400 On re-examination, patient reports improvement in symptoms. She is tolerating PO intake without additional nausea/vomiting.      EKG: ST at 131 BPM, normal axis, , QTc 572, no ST elevation or depression as interpreted by me    Repeat EKG: ST at 120 BPM, normal axis, , QTc 452, no ST elevation or depression as interpreted by me    Medications   LORazepam (ATIVAN) injection 0.5 mg (0.5 mg Intravenous Given 12/15/24 2254)   lactated ringers bolus 1,000 mL (0 mL Intravenous Stopped 12/16/24 0012)   ketorolac (TORADOL) injection 15 mg (15 mg Intravenous Given 12/15/24 2152)   dicyclomine (BENTYL) tablet 20 mg (20 mg Oral Given 12/15/24 2153)   ondansetron (ZOFRAN) injection 4 mg (4 mg Intravenous Given 12/15/24 2152)   Famotidine (PF) (PEPCID) injection 20 mg (20 mg Intravenous Given 12/15/24 2151)   lactated ringers bolus 1,000 mL (0 mL Intravenous Stopped 12/16/24 0013)   morphine injection 2 mg (2 mg Intravenous Given 12/15/24 2224)   potassium chloride (Klor-Con M20) CR tablet 40 mEq (40 mEq Oral Given 12/15/24 2231)   iohexol (OMNIPAQUE) 350 MG/ML injection (MULTI-DOSE) 100 mL (100 mL Intravenous Given 12/15/24 2313)   magnesium sulfate 2 g/50 mL IVPB (premix) 2 g (0 g Intravenous Stopped 12/16/24 0110)   ceftriaxone (ROCEPHIN) 1 g/50 mL in dextrose IVPB (0 mg Intravenous Stopped 12/16/24 0013)   ondansetron (ZOFRAN) injection 4 mg (4 mg Intravenous Given 12/16/24 0119)   aluminum-magnesium hydroxide-simethicone (MAALOX) oral suspension 30 mL (30 mL Oral Given 12/16/24 0141)   droperidol (INAPSINE) injection 0.625 mg (0.625 mg Intravenous Given 12/16/24 0141)   metoprolol tartrate (LOPRESSOR) tablet 25 mg (25 mg Oral Given 12/16/24 0331)       ED Risk Strat Scores                          SBIRT 20yo+      Flowsheet Row  "Most Recent Value   Initial Alcohol Screen: US AUDIT-C     1. How often do you have a drink containing alcohol? 0 Filed at: 12/15/2024 2126   2. How many drinks containing alcohol do you have on a typical day you are drinking?  0 Filed at: 12/15/2024 2126   3b. FEMALE Any Age, or MALE 65+: How often do you have 4 or more drinks on one occassion? 0 Filed at: 12/15/2024 2126   Audit-C Score 0 Filed at: 12/15/2024 2126   RAZ: How many times in the past year have you...    Used an illegal drug or used a prescription medication for non-medical reasons? Never Filed at: 12/15/2024 2126                            History of Present Illness       Chief Complaint   Patient presents with    Abdominal Pain     Pt with abd cramping x 3 hours, nausea, vomiting and diarrhea.        Past Medical History:   Diagnosis Date    Anxiety     Eczema     Exercises 5 to 6 times per week     per pt prior to knee issue -enjoyed running and horseback riding    Family history of reaction to anesthesia     per pt--\"Mom also gets high anxiety with surgeries and wakes up nasty from anesthesia\"    Gastroparesis 2012    Hypertension     last assessed 3/12/14    Hypokalemia     per pt per doctors possibly related renal problem    Hypomagnesemia     Irritable bowel syndrome     with diarrhea    Rheumatoid arthritis (HCC) 2022    Sinus tachycardia     related to dehydration    Tooth missing     front upper tooth retainer-    Wears glasses     for computer use      Past Surgical History:   Procedure Laterality Date    CARDIAC ELECTROPHYSIOLOGY PROCEDURE N/A 9/23/2024    Procedure: Cardiac eps/svt ablation;  Surgeon: Gino Butler MD;  [facility]: [facility];  Service: Cardiology    CARDIAC ELECTROPHYSIOLOGY PROCEDURE N/A 10/23/2024    Procedure: Cardiac loop recorder implant;  Surgeon: Brice Boyle MD;  Location: [facility];  Service: Cardiology    OH ARTHROSCOPY KNEE LATERAL RELEASE Right 04/13/2023    Procedure: ARTHROSCOPIC RELEASE " RETINACULAR;  Surgeon: Ilya Brizuela DO;  Location:  MAIN OR;  Service: Orthopedics    HI EGD TRANSORAL BIOPSY SINGLE/MULTIPLE N/A 05/04/2016    Procedure: ESOPHAGOGASTRODUODENOSCOPY (EGD);  Surgeon: Raji Monzon MD;  Location:  GI LAB;  Service: Gastroenterology    HI RCNSTJ DISLC PATELLA W/XTNSR RELIGNMT&/MUSC RL Right 04/13/2023    Procedure: ARTHROSCOPIC REALIGNMENT PATELLA;  Surgeon: Ilya Brizuela DO;  Location:  MAIN OR;  Service: Orthopedics    WISDOM TOOTH EXTRACTION        Family History   Problem Relation Age of Onset    Hypokalemia Mother     Hypotension Mother     Anxiety disorder Mother         NOS    Lung cancer Father     Skin cancer Father     Crohn's disease Father     Schizoaffective Disorder  Father     Alcohol abuse Father     Drug abuse Father     No Known Problems Sister     No Known Problems Sister     No Known Problems Brother     Coronary artery disease Maternal Grandmother     Heart disease Maternal Grandmother     Alzheimer's disease Maternal Grandmother     Arthritis Paternal Grandmother     Rheum arthritis Paternal Grandmother     Hypertension Paternal Grandmother     COPD Paternal Grandfather       Social History     Tobacco Use    Smoking status: Never    Smokeless tobacco: Never   Vaping Use    Vaping status: Never Used   Substance Use Topics    Alcohol use: Yes     Alcohol/week: 1.0 standard drink of alcohol     Types: 1 Standard drinks or equivalent per week     Comment: rarely    Drug use: No      E-Cigarette/Vaping    E-Cigarette Use Never User       E-Cigarette/Vaping Substances    Nicotine No     THC No     CBD No     Flavoring No     Other No     Unknown No       I have reviewed and agree with the history as documented.     The patient is a 29-year-old female with history of anxiety, hypertension, gastroparesis, POTS, SVT, hypomagnesia, hypokalemia who presents to the ED for evaluation of 3-hour history of generalized abdominal cramping, nausea, nonbloody vomiting,  nonbloody diarrhea.  She reports several episodes of dry heaving as well.  She reports her abdominal cramping is a stabbing, cramping sensation, but she has never had abdominal pain this severe.  She is currently menstruating.  She does work as an ED RN, suspect she was exposed to a GI bug.  She does note palpitations.  She otherwise denies fever, vaginal discharge, melena, hematochezia, chest pain, dyspnea, dysuria, hematuria.        Review of Systems   Constitutional:  Negative for chills and fever.   HENT:  Negative for congestion and rhinorrhea.    Respiratory:  Negative for cough and shortness of breath.    Cardiovascular:  Positive for palpitations. Negative for chest pain and leg swelling.   Gastrointestinal:  Positive for abdominal pain, diarrhea, nausea and vomiting. Negative for constipation.   Genitourinary:  Positive for vaginal bleeding. Negative for dysuria, flank pain and vaginal discharge.   Musculoskeletal:  Negative for arthralgias and myalgias.   Skin:  Negative for rash and wound.   Neurological:  Positive for light-headedness. Negative for weakness, numbness and headaches.           Objective       ED Triage Vitals   Temperature Pulse Blood Pressure Respirations SpO2 Patient Position - Orthostatic VS   12/15/24 2120 12/15/24 2120 12/15/24 2120 12/15/24 2120 12/15/24 2231 12/15/24 2120   97.8 °F (36.6 °C) (!) 141 134/90 19 97 % Sitting      Temp Source Heart Rate Source BP Location FiO2 (%) Pain Score    12/16/24 0020 12/15/24 2120 12/15/24 2120 -- 12/15/24 2152    Oral Monitor Right arm  6      Vitals      Date and Time Temp Pulse SpO2 Resp BP Pain Score FACES Pain Rating User   12/16/24 0600 -- 83 98 % 20 100/55 -- --    12/16/24 0500 -- 96 97 % 17 101/56 -- --    12/16/24 0430 -- 99 98 % 18 115/56 -- --    12/16/24 0400 -- 108 97 % 21 123/68 -- --    12/16/24 0331 -- 123 -- -- 125/73 -- --    12/16/24 0250 -- 110 98 % 20 111/59 -- --    12/16/24 0020 98 °F (36.7 °C) 89 99 % 20  115/57 -- --    12/15/24 2231 -- -- 97 % -- -- -- --    12/15/24 2224 -- -- -- -- -- 5 --    12/15/24 2152 -- -- -- -- -- 6 --    12/15/24 2120 97.8 °F (36.6 °C) 141 -- 19 -- -- -- AS   12/15/24 2120 -- -- -- -- 134/90 -- --             Physical Exam  Vitals and nursing note reviewed.   Constitutional:       General: She is not in acute distress.     Appearance: She is well-developed. She is ill-appearing. She is not toxic-appearing.   HENT:      Head: Normocephalic and atraumatic.      Mouth/Throat:      Mouth: Mucous membranes are dry.   Eyes:      Conjunctiva/sclera: Conjunctivae normal.   Cardiovascular:      Rate and Rhythm: Regular rhythm. Tachycardia present.      Heart sounds: No murmur heard.  Pulmonary:      Effort: Pulmonary effort is normal. No respiratory distress.      Breath sounds: Normal breath sounds.   Abdominal:      Palpations: Abdomen is soft.      Tenderness: There is abdominal tenderness in the right lower quadrant, suprapubic area and left lower quadrant. There is left CVA tenderness. There is no right CVA tenderness, guarding or rebound.   Musculoskeletal:         General: No swelling.      Cervical back: Neck supple.   Skin:     General: Skin is warm and dry.      Capillary Refill: Capillary refill takes less than 2 seconds.   Neurological:      Mental Status: She is alert.   Psychiatric:         Mood and Affect: Mood normal.         Results Reviewed       Procedure Component Value Units Date/Time    RBC Morphology Reflex Test [[id number]] Collected: 12/15/24 2154    Lab Status: Final result Specimen: Blood from Arm, Right Updated: 12/15/24 2301    Urine Microscopic [[id number]]  (Abnormal) Collected: 12/15/24 2227    Lab Status: Final result Specimen: Urine, Clean Catch Updated: 12/15/24 2257     RBC, UA Innumerable /hpf      WBC, UA Innumerable /hpf      Epithelial Cells Occasional /hpf      Bacteria, UA Occasional /hpf      MUCUS THREADS Moderate    Urine culture [[id number]]  Collected: 12/15/24 2227    Lab Status: In process Specimen: Urine, Clean Catch Updated: 12/15/24 2257    UA w Reflex to Microscopic w Reflex to Culture [[id number]]  (Abnormal) Collected: 12/15/24 2227    Lab Status: Final result Specimen: Urine, Clean Catch Updated: 12/15/24 2252     Color, UA Light Orange     Clarity, UA Extra Turbid     Specific Gravity, UA 1.030     pH, UA 7.5     Leukocytes, UA Large     Nitrite, UA Negative     Protein, UA 70 (1+) mg/dl      Glucose, UA Negative mg/dl      Ketones, UA 40 (2+) mg/dl      Urobilinogen, UA <2.0 mg/dl      Bilirubin, UA Negative     Occult Blood, UA Large    CBC and differential [[id number]]  (Abnormal) Collected: 12/15/24 2154    Lab Status: Final result Specimen: Blood from Arm, Right Updated: 12/15/24 2237     WBC 18.15 Thousand/uL      RBC 5.39 Million/uL      Hemoglobin 15.9 g/dL      Hematocrit 44.5 %      MCV 83 fL      MCH 29.5 pg      MCHC 35.7 g/dL      RDW 12.1 %      MPV 11.6 fL      Platelets 215 Thousands/uL     Narrative:      This is an appended report.  These results have been appended to a previously verified report.    Manual Differential(PHLEBS Do Not Order) [[id number]]  (Abnormal) Collected: 12/15/24 2154    Lab Status: Final result Specimen: Blood from Arm, Right Updated: 12/15/24 2237     Segmented % 92 %      Bands % 2 %      Lymphocytes % 2 %      Monocytes % 4 %      Eosinophils % 0 %      Basophils % 0 %      Absolute Neutrophils 17.06 Thousand/uL      Absolute Lymphocytes 0.36 Thousand/uL      Absolute Monocytes 0.73 Thousand/uL      Absolute Eosinophils 0.00 Thousand/uL      Absolute Basophils 0.00 Thousand/uL      Total Counted --     RBC Morphology Present     Platelet Estimate Adequate     Anisocytosis Present     Tear Drop Cells Present    POCT pregnancy, urine [[id number]]  (Normal) Collected: 12/15/24 2237    Lab Status: Final result Updated: 12/15/24 2237     EXT Preg Test, Ur Negative     Control Valid    Comprehensive metabolic  panel [[id number]]  (Abnormal) Collected: 12/15/24 2154    Lab Status: Final result Specimen: Blood from Arm, Right Updated: 12/15/24 2217     Sodium 141 mmol/L      Potassium 3.3 mmol/L      Chloride 103 mmol/L      CO2 26 mmol/L      ANION GAP 12 mmol/L      BUN 16 mg/dL      Creatinine 0.86 mg/dL      Glucose 134 mg/dL      Calcium 9.8 mg/dL      AST 16 U/L      ALT 17 U/L      Alkaline Phosphatase 106 U/L      Total Protein 8.1 g/dL      Albumin 4.7 g/dL      Total Bilirubin 0.73 mg/dL      eGFR 90 ml/min/1.73sq m     Narrative:      National [employer] guidelines for Chronic Kidney Disease (CKD):     Stage 1 with normal or high GFR (GFR > 90 mL/min/1.73 square meters)    Stage 2 Mild CKD (GFR = 60-89 mL/min/1.73 square meters)    Stage 3A Moderate CKD (GFR = 45-59 mL/min/1.73 square meters)    Stage 3B Moderate CKD (GFR = 30-44 mL/min/1.73 square meters)    Stage 4 Severe CKD (GFR = 15-29 mL/min/1.73 square meters)    Stage 5 End Stage CKD (GFR <15 mL/min/1.73 square meters)  Note: GFR calculation is accurate only with a steady state creatinine    Lipase [[id number]]  (Normal) Collected: 12/15/24 2154    Lab Status: Final result Specimen: Blood from Arm, Right Updated: 12/15/24 2217     Lipase 16 u/L             CT abdomen pelvis with contrast   Final Interpretation by Vasquez Elliott MD (12/15 2319)      A few loops of nondilated fluid-filled small bowel as well as a small amount of liquid stool within the colon suggestive of a gastroenteritis/diarrhea.      No large or small bowel obstruction.      Normal appendix visualized.               Workstation performed: [id number]             Procedures    ED Medication and Procedure Management   Prior to Admission Medications   Prescriptions Last Dose Informant Patient Reported? Taking?   AMILoride 5 mg tablet  Self Yes Yes   Sig: Take 5 mg by mouth 2 (two) times a day 2 in the morning.   3 at night   Magnesium Glycinate 100 MG CAPS  Self Yes Yes    Sig: Take 200 mg by mouth 2 (two) times a day   metoprolol succinate (TOPROL-XL) 50 mg 24 hr tablet  Self No Yes   Sig: Take 75 mg in the AM, and 50 mg in the PM   metoprolol tartrate (LOPRESSOR) 25 mg tablet  Self No Yes   Sig: Take 1 tablet (25 mg total) by mouth every 6 (six) hours as needed (palpitatins)   ondansetron (ZOFRAN) 4 mg tablet  Self No Yes   Sig: Take 1 tablet (4 mg total) by mouth every 6 (six) hours   potassium chloride (Klor-Con M20) 20 mEq tablet  Self Yes Yes   Sig: Take 20 mEq by mouth 3 (three) times a day      Facility-Administered Medications: None     Patient's Medications   Discharge Prescriptions    ALUMINUM-MAGNESIUM HYDROXIDE-SIMETHICONE (MAALOX) 200-200-20 MG/5ML SUSP    Take 30 mL by mouth 4 (four) times a day (before meals and at bedtime)       Start Date: 12/16/2024End Date: --       Order Dose: 30 mL       Quantity: 3600 mL    Refills: 0    CEPHALEXIN (KEFLEX) 500 MG CAPSULE    Take 1 capsule (500 mg total) by mouth every 6 (six) hours for 7 days       Start Date: 12/16/2024End Date: 12/23/2024       Order Dose: 500 mg       Quantity: 28 capsule    Refills: 0    DICYCLOMINE (BENTYL) 20 MG TABLET    Take 1 tablet (20 mg total) by mouth 2 (two) times a day       Start Date: 12/16/2024End Date: --       Order Dose: 20 mg       Quantity: 20 tablet    Refills: 0    FAMOTIDINE (PEPCID) 20 MG TABLET    Take 1 tablet (20 mg total) by mouth 2 (two) times a day       Start Date: 12/16/2024End Date: --       Order Dose: 20 mg       Quantity: 30 tablet    Refills: 0    METOCLOPRAMIDE (REGLAN) 10 MG TABLET    Take 1 tablet (10 mg total) by mouth every 6 (six) hours       Start Date: 12/16/2024End Date: --       Order Dose: 10 mg       Quantity: 15 tablet    Refills: 0     No discharge procedures on file.  ED SEPSIS DOCUMENTATION   Time reflects when diagnosis was documented in both MDM as applicable and the Disposition within this note       Time User Action Codes Description Comment     12/16/2024 12:31 AM Christine Bowman [K52.9] Gastroenteritis     12/16/2024 12:31 AM Christine Bowman [N39.0] UTI (urinary tract infection)     12/16/2024 12:31 AM Christine Bowman [R79.89] Abnormal CBC            Initial Sepsis Screening       Row Name 12/15/24 2200                Is the patient's history suggestive of a new or worsening infection? No  -DIMITRY                  User Key  (r) = Recorded By, (t) = Taken By, (c) = Cosigned By      Initials Name Provider Type    DIMITRY Bowman PA-C Physician Assistant                       Christine Bowman PA-C  12/16/24 4557

## 2024-12-16 NOTE — ED NOTES
Patient transported to CT     Junior Garrison RN  12/15/24 3986    
Pt experiencing an episode of vomiting followed by yelling and screaming. Eloina ROLLE at bedside.      Junior Garrison RN  12/16/24 3528    
Pt given IV zofran, RN will retry PO challenge at a later time.      Junior Garrison RN  12/16/24 0120    
Pt sleeping at this time. No outward signs of distress.      Junior Garrison RN  12/16/24 8722    
Pt stated she was okay with receiving Toradol without having the POCT pregnancy test performed prior. Provider made aware.      Junior Garrison RN  12/15/24 0772    
Pt with episode of nausea after sips of water and a saltine cracker.     Toshia Francis RN  12/16/24 0051    
RN unable to obtain SpO2 reading at this time. Will attempt to obtain a reading at a later time.      Junior Garrison RN  12/15/24 2269    
Unable to obtain bp x 2      Toshia Francis, SINDHU  12/15/24 4121    
0

## 2024-12-17 LAB — BACTERIA UR CULT: NORMAL

## 2024-12-18 DIAGNOSIS — E53.8 VITAMIN B12 DEFICIENCY: ICD-10-CM

## 2024-12-19 ENCOUNTER — APPOINTMENT (OUTPATIENT)
Dept: LAB | Facility: CLINIC | Age: 29
End: 2024-12-19
Payer: COMMERCIAL

## 2024-12-19 DIAGNOSIS — E83.42 HYPOMAGNESEMIA: ICD-10-CM

## 2024-12-19 DIAGNOSIS — E87.6 HYPOKALEMIA: ICD-10-CM

## 2024-12-19 DIAGNOSIS — E53.8 VITAMIN B12 DEFICIENCY: ICD-10-CM

## 2024-12-19 LAB
ANION GAP SERPL CALCULATED.3IONS-SCNC: 11 MMOL/L (ref 4–13)
BASOPHILS # BLD MANUAL: 0.05 THOUSAND/UL (ref 0–0.1)
BASOPHILS NFR MAR MANUAL: 1 % (ref 0–1)
BUN SERPL-MCNC: 10 MG/DL (ref 5–25)
CALCIUM SERPL-MCNC: 9.3 MG/DL (ref 8.4–10.2)
CHLORIDE SERPL-SCNC: 103 MMOL/L (ref 96–108)
CO2 SERPL-SCNC: 27 MMOL/L (ref 21–32)
CREAT SERPL-MCNC: 0.78 MG/DL (ref 0.6–1.3)
EOSINOPHIL # BLD MANUAL: 0.19 THOUSAND/UL (ref 0–0.4)
EOSINOPHIL NFR BLD MANUAL: 4 % (ref 0–6)
ERYTHROCYTE [DISTWIDTH] IN BLOOD BY AUTOMATED COUNT: 12.1 % (ref 11.6–15.1)
FERRITIN SERPL-MCNC: 61 NG/ML (ref 11–307)
FOLATE SERPL-MCNC: >22.3 NG/ML
GFR SERPL CREATININE-BSD FRML MDRD: 102 ML/MIN/1.73SQ M
GLUCOSE SERPL-MCNC: 112 MG/DL (ref 65–140)
HCT VFR BLD AUTO: 40.4 % (ref 34.8–46.1)
HGB BLD-MCNC: 14.3 G/DL (ref 11.5–15.4)
IRON SATN MFR SERPL: 22 % (ref 15–50)
IRON SERPL-MCNC: 72 UG/DL (ref 50–212)
LYMPHOCYTES # BLD AUTO: 1.34 THOUSAND/UL (ref 0.6–4.47)
LYMPHOCYTES # BLD AUTO: 19 % (ref 14–44)
MAGNESIUM SERPL-MCNC: 1.8 MG/DL (ref 1.9–2.7)
MCH RBC QN AUTO: 30.1 PG (ref 26.8–34.3)
MCHC RBC AUTO-ENTMCNC: 35.4 G/DL (ref 31.4–37.4)
MCV RBC AUTO: 85 FL (ref 82–98)
MONOCYTES # BLD AUTO: 0.19 THOUSAND/UL (ref 0–1.22)
MONOCYTES NFR BLD: 4 % (ref 4–12)
NEUTROPHILS # BLD MANUAL: 3.02 THOUSAND/UL (ref 1.85–7.62)
NEUTS SEG NFR BLD AUTO: 63 % (ref 43–75)
PLATELET # BLD AUTO: 223 THOUSANDS/UL (ref 149–390)
PLATELET BLD QL SMEAR: ADEQUATE
PLATELET CLUMP BLD QL SMEAR: PRESENT
PMV BLD AUTO: 12.1 FL (ref 8.9–12.7)
POTASSIUM SERPL-SCNC: 3.4 MMOL/L (ref 3.5–5.3)
RBC # BLD AUTO: 4.75 MILLION/UL (ref 3.81–5.12)
RBC MORPH BLD: NORMAL
SODIUM SERPL-SCNC: 141 MMOL/L (ref 135–147)
TIBC SERPL-MCNC: 327.6 UG/DL (ref 250–450)
TRANSFERRIN SERPL-MCNC: 234 MG/DL (ref 203–362)
UIBC SERPL-MCNC: 256 UG/DL (ref 155–355)
VARIANT LYMPHS # BLD AUTO: 9 %
VIT B12 SERPL-MCNC: 527 PG/ML (ref 180–914)
WBC # BLD AUTO: 4.8 THOUSAND/UL (ref 4.31–10.16)

## 2024-12-19 PROCEDURE — 83550 IRON BINDING TEST: CPT

## 2024-12-19 PROCEDURE — 83540 ASSAY OF IRON: CPT

## 2024-12-19 PROCEDURE — 82746 ASSAY OF FOLIC ACID SERUM: CPT

## 2024-12-19 PROCEDURE — 88184 FLOWCYTOMETRY/ TC 1 MARKER: CPT

## 2024-12-19 PROCEDURE — 83735 ASSAY OF MAGNESIUM: CPT

## 2024-12-19 PROCEDURE — 80048 BASIC METABOLIC PNL TOTAL CA: CPT

## 2024-12-19 PROCEDURE — 85027 COMPLETE CBC AUTOMATED: CPT

## 2024-12-19 PROCEDURE — 82607 VITAMIN B-12: CPT

## 2024-12-19 PROCEDURE — 88185 FLOWCYTOMETRY/TC ADD-ON: CPT | Performed by: NURSE PRACTITIONER

## 2024-12-19 PROCEDURE — 85007 BL SMEAR W/DIFF WBC COUNT: CPT

## 2024-12-19 PROCEDURE — 82728 ASSAY OF FERRITIN: CPT

## 2024-12-19 PROCEDURE — 36415 COLL VENOUS BLD VENIPUNCTURE: CPT

## 2024-12-20 DIAGNOSIS — E83.42 HYPOMAGNESEMIA: Primary | ICD-10-CM

## 2024-12-20 DIAGNOSIS — E87.6 HYPOKALEMIA: ICD-10-CM

## 2024-12-24 LAB — SCAN RESULT: NORMAL

## 2024-12-26 ENCOUNTER — OFFICE VISIT (OUTPATIENT)
Dept: NEPHROLOGY | Facility: CLINIC | Age: 29
End: 2024-12-26
Payer: COMMERCIAL

## 2024-12-26 ENCOUNTER — TELEPHONE (OUTPATIENT)
Dept: NEPHROLOGY | Facility: CLINIC | Age: 29
End: 2024-12-26

## 2024-12-26 VITALS — BODY MASS INDEX: 29.92 KG/M2 | HEIGHT: 65 IN | WEIGHT: 179.6 LBS

## 2024-12-26 DIAGNOSIS — G90.A POTS (POSTURAL ORTHOSTATIC TACHYCARDIA SYNDROME): Chronic | ICD-10-CM

## 2024-12-26 DIAGNOSIS — E83.42 HYPOMAGNESEMIA: ICD-10-CM

## 2024-12-26 DIAGNOSIS — E55.9 VITAMIN D DEFICIENCY: ICD-10-CM

## 2024-12-26 DIAGNOSIS — I10 ESSENTIAL HYPERTENSION, BENIGN: Primary | Chronic | ICD-10-CM

## 2024-12-26 DIAGNOSIS — E87.6 HYPOKALEMIA: ICD-10-CM

## 2024-12-26 PROCEDURE — 99214 OFFICE O/P EST MOD 30 MIN: CPT | Performed by: INTERNAL MEDICINE

## 2024-12-26 NOTE — PATIENT INSTRUCTIONS
Visit summary:  - Overall I think you doing well we just have to recheck labs.  Will also make sure that you have no urine infections I will have you go back for a UA Monday which would be the December 30 for all labs    - For your potassium and magnesium no changes at the moment till we get repeat labs        1. Medication changes today:  No changes pending repeat labs    2.  General instructions:  Avoid salt  When you are able to exercise as discussed    3.  Please go for  fasting  lab work December 30    4.  Please take 1 week a blood pressure readings at this time    AS FOLLOWS  MORNING AND EVENING, SITTING AND STANDING as follows:  TAKE THE MORNING READINGS BEFORE ANY MEDICATIONS AND WHEN YOU ARE RELAXED FOR SEVERAL MINUTES  TAKE THE EVENING READINGS:  BETWEEN 7-10 P.M.; PRIOR TO ANY MEDICATIONS; AT LEAST IN OUR  FROM DINNER; AND CERTAINLY AFTER RELAXING FOR A FEW MINUTES  PLEASE INCLUDE HEART RATE WITH YOUR BLOOD PRESSURE READINGS  When taking standing readings, keep your arm supported at heart level and not dangling  Make sure you are sitting with your back supported and feet on the ground and do not cross your legs or feet  Make sure you have not taken any coffee or caffeine products or exercised or smoke cigarettes at least 30 minutes before taking your blood pressure  Then please mail these readings into the office      5.  In 3 months:  Please go for nonfasting lab work but in the morning  Please take 1 week a blood pressure readings as outlined above and mail those into the office      6.  Follow-up in 6 months  Please bring in 1 week a blood pressure readings morning evening, sitting and standing is outlined above  PLEASE BRING AN YOUR BLOOD PRESSURE MACHINE TO CORRELATE WITH THE OFFICE MACHINE AT THIS NEXT SCHEDULED VISIT  Please go for fasting lab work 1-2 weeks prior to your appointment      7.  General non medical recommendations:  AVOID SALT BUT NOT ADDING AN READING LABELS TO MAKE SURE  THERE IS LOW-SALT IN THE FOOD THAT YOU ARE EATING  Goal is less than 2 g of sodium intake or less than 5 g of sodium chloride intake per day    Avoid nonsteroidal anti-inflammatory drugs such as Naprosyn, ibuprofen, Aleve, Advil, Celebrex, Meloxicam (Mobic) etc.  You can use Tylenol as needed if you do not have any liver condition to be concerned about    Avoid medications such as Sudafed or decongestants and antihistamines that contained the D component which is the decongestant.  You can take antihistamines without the decongestant or D component.    Try to avoid medications such as pantoprazole or  Protonix/Nexium or Esomeprazole)/Prilosec or omeprazole/Prevacid or lansoprazole/AcipHex or Rabeprazole.  If you are able to, use Pepcid as this is safer for your kidneys.    Try to exercise at least 30 minutes 3 days a week to begin with with an ultimate goal of 5 days a week for at least 30 minutes    Please do not drink more than 2 glasses of alcohol/wine on a daily basis as this may contribute to your high blood pressure.

## 2024-12-26 NOTE — TELEPHONE ENCOUNTER
LVM for pt if she would like to come in earlier given Dr. Covarrubias had some openings this morning. Asked pt to please give us a call back.

## 2024-12-26 NOTE — LETTER
December 26, 2024     ENMA Echavarria  [address] [address]    Patient: Julita Berry   YOB: 1995   Date of Visit: 12/26/2024       Dear Dr. Covarrubias:    Thank you for referring Julita Berry to me for evaluation. Below are my notes for this consultation.    If you have questions, please do not hesitate to call me. I look forward to following your patient along with you.         Sincerely,        Abelardo Covarrubias MD        CC: No Recipients    Abelardo Covarrubias MD  12/26/2024  2:54 PM  Sign when Signing Visit  RENAL FOLLOW UP NOTE:.td    ASSESSMENT AND PLAN:  1.  Hypertension/hypokalemia/hyponatremia:  Negative secondary workup.  Genetic workup was negative. She had been seen by Dr. Malu Augustin as a 2nd opinion.     Blood pressure goal:  Less than 135/80     Current  Blood pressures:    No formal readings but she will send them in 110-120/70-80     Recommendations:  Nonmedical regimen including isotonic exercise and avoidance of salt  Medication changes today:  No changes pending home readings     Regarding diet:  Continue high potassium diet     In regards to labs: Patient has required infusions for both potassium magnesium  Potassium: 3.4 during acute illness recheck at this time continue 40 mill equivalents twice a day  creatinine normal at  0.78 improved despite viral syndrome  Sodium normal 141  MBD:    Hypomagnesemia: 1.8 acceptable on supplementation  Vitamin-D from 5/9/2024 was 26.0 replete and recheck next visit   lipid profile:  LDL 95/HDL 60/triglycerides 100 at goal from prior  Hemoglobin normal at 14.3  UA: Moderate leukocytes/trace proteinuria/2-4 RBCs/10-20 WBCs from 11/12/2024 symptoms: Follow-up UA large blood large leukocytes 1+ proteinuria innumerable RBCs WBCs occasional bacteria urine culture with mixed contaminants  Urine protein creatinine ratio 0.13 g at goal      Patient considering pregnancy again after April so I will continue to remain  off spironolactone        2.  SVT/POTS followed by Dr. Boyle     3.  Mixed connective tissue disease:  Followed by Rheumatology as needed:  Clearly now with rheumatoid arthritis being treated with Plaquenil.  Now off ibuprofen.     4. GI:  GERD/IBS/idiopathic gastroparesis followed by GI Dr. Amador     5. Celiac artery stenosis:  Based on arterial duplex 07/28/2022 for hypertensive workup:  No further workup recommended or intervention unless she were to develop symptoms soon after eating    6.  Recent ER visit 12/15/2024 with abdominal discomfort CT scan with few loops of nondilated fluid-filled small bowel otherwise unremarkable/possible UTI treated with Keflex        GI health maintenance: Patient followed by GI to young for requiring colonoscopy       PATIENT INSTRUCTIONS:    Patient Instructions   Visit summary:  - Overall I think you doing well we just have to recheck labs.  Will also make sure that you have no urine infections I will have you go back for a UA Monday which would be the December 30 for all labs    - For your potassium and magnesium no changes at the moment till we get repeat labs        1. Medication changes today:  No changes pending repeat labs    2.  General instructions:  Avoid salt  When you are able to exercise as discussed    3.  Please go for  fasting  lab work December 30    4.  Please take 1 week a blood pressure readings at this time    AS FOLLOWS  MORNING AND EVENING, SITTING AND STANDING as follows:  TAKE THE MORNING READINGS BEFORE ANY MEDICATIONS AND WHEN YOU ARE RELAXED FOR SEVERAL MINUTES  TAKE THE EVENING READINGS:  BETWEEN 7-10 P.M.; PRIOR TO ANY MEDICATIONS; AT LEAST IN OUR  FROM DINNER; AND CERTAINLY AFTER RELAXING FOR A FEW MINUTES  PLEASE INCLUDE HEART RATE WITH YOUR BLOOD PRESSURE READINGS  When taking standing readings, keep your arm supported at heart level and not dangling  Make sure you are sitting with your back supported and feet on the ground and do not  cross your legs or feet  Make sure you have not taken any coffee or caffeine products or exercised or smoke cigarettes at least 30 minutes before taking your blood pressure  Then please mail these readings into the office      5.  In 3 months:  Please go for nonfasting lab work but in the morning  Please take 1 week a blood pressure readings as outlined above and mail those into the office      6.  Follow-up in 6 months  Please bring in 1 week a blood pressure readings morning evening, sitting and standing is outlined above  PLEASE BRING AN YOUR BLOOD PRESSURE MACHINE TO CORRELATE WITH THE OFFICE MACHINE AT THIS NEXT SCHEDULED VISIT  Please go for fasting lab work 1-2 weeks prior to your appointment      7.  General non medical recommendations:  AVOID SALT BUT NOT ADDING AN READING LABELS TO MAKE SURE THERE IS LOW-SALT IN THE FOOD THAT YOU ARE EATING  Goal is less than 2 g of sodium intake or less than 5 g of sodium chloride intake per day    Avoid nonsteroidal anti-inflammatory drugs such as Naprosyn, ibuprofen, Aleve, Advil, Celebrex, Meloxicam (Mobic) etc.  You can use Tylenol as needed if you do not have any liver condition to be concerned about    Avoid medications such as Sudafed or decongestants and antihistamines that contained the D component which is the decongestant.  You can take antihistamines without the decongestant or D component.    Try to avoid medications such as pantoprazole or  Protonix/Nexium or Esomeprazole)/Prilosec or omeprazole/Prevacid or lansoprazole/AcipHex or Rabeprazole.  If you are able to, use Pepcid as this is safer for your kidneys.    Try to exercise at least 30 minutes 3 days a week to begin with with an ultimate goal of 5 days a week for at least 30 minutes    Please do not drink more than 2 glasses of alcohol/wine on a daily basis as this may contribute to your high blood pressure.            Subjective:     The patient overall is feeling well  Except for recent GI symptoms  "nausea vomiting diarrhea just improved couple days ago.  No fevers, chills, or cough or colds except for recent viral syndrome also had a UTI.  Good appetite and good energy  No hematuria, some mild dysuria and no frequency no foamy urine at the moment.  Recent UTI please see above.  No gastrointestinal symptoms at the time.  See above  No cardiovascular symptoms including swelling of the legs  No headaches, only dizziness or lightheadedness if heart rate increases related to POTS  Blood pressure medications:  Amiloride 15 mg in the morning 10 mg in the evening  Toprol-XL 50 mg twice a day  Metoprolol tartrate 25 mg every 6 hours as needed for  tachycardia    Renal pertinent medications:  Magnesium glycinate 240 mg twice a day  Klor-Con 40 mill equivalents twice a day    ROS:  See HPI, otherwise review of systems as completely reviewed with the patient are negative    Past Medical History:   Diagnosis Date   • Anxiety    • Eczema    • Exercises 5 to 6 times per week     per pt prior to knee issue -enjoyed running and horseback riding   • Family history of reaction to anesthesia     per pt--\"Mom also gets high anxiety with surgeries and wakes up nasty from anesthesia\"   • Gastroparesis 2012   • Hypertension     last assessed 3/12/14   • Hypokalemia     per pt per doctors possibly related renal problem   • Hypomagnesemia    • Irritable bowel syndrome     with diarrhea   • Rheumatoid arthritis (HCC) 2022   • Sinus tachycardia     related to dehydration   • Tooth missing     front upper tooth retainer-   • Wears glasses     for computer use     Past Surgical History:   Procedure Laterality Date   • CARDIAC ELECTROPHYSIOLOGY PROCEDURE N/A 9/23/2024    Procedure: Cardiac eps/svt ablation;  Surgeon: Gino Butler MD;  Location: [facility];  Service: Cardiology   • CARDIAC ELECTROPHYSIOLOGY PROCEDURE N/A 10/23/2024    Procedure: Cardiac loop recorder implant;  Surgeon: Brice Boyle MD;  Location: [facility];  Service: Cardiology   • MN ARTHROSCOPY KNEE LATERAL RELEASE Right 04/13/2023    Procedure: ARTHROSCOPIC RELEASE RETINACULAR;  Surgeon: Ilya Brizuela DO;  Location:  MAIN OR;  Service: Orthopedics   • MN EGD TRANSORAL BIOPSY SINGLE/MULTIPLE N/A 05/04/2016    Procedure: ESOPHAGOGASTRODUODENOSCOPY (EGD);  Surgeon: Raji Monzon MD;  Location:  GI LAB;  Service: Gastroenterology   • MN RCNSTJ DISLC PATELLA W/XTNSR RELIGNMT&/MUSC RL Right 04/13/2023    Procedure: ARTHROSCOPIC REALIGNMENT PATELLA;  Surgeon: Ilya Brizuela DO;  Location:  MAIN OR;  Service: Orthopedics   • WISDOM TOOTH EXTRACTION       Family History   Problem Relation Age of Onset   • Hypokalemia Mother    • Hypotension Mother    • Anxiety disorder Mother         NOS   • Lung cancer Father    • Skin cancer Father    • Crohn's disease Father    • Schizoaffective Disorder  Father    • Alcohol abuse Father    • Drug abuse Father    • No Known Problems Sister    • No Known Problems Sister    • No Known Problems Brother    • Coronary artery disease Maternal Grandmother    • Heart disease Maternal Grandmother    • Alzheimer's disease Maternal Grandmother    • Arthritis Paternal Grandmother    • Rheum arthritis Paternal Grandmother    • Hypertension Paternal Grandmother    • COPD Paternal Grandfather       reports that she has never smoked. She has never used smokeless tobacco. She reports current alcohol use of about 1.0 standard drink of alcohol per week. She reports that she does not use drugs.    I COMPLETELY REVIEWED THE PAST MEDICAL HISTORY/PAST SURGICAL HISTORY/SOCIAL HISTORY/FAMILY HISTORY/AND MEDICATIONS  AND UPDATED ALL    Objective:     Vitals:   BP sitting on left: 128/86 with a heart rate of 76 and regular  BP standing on left: 128/92 with a heart rate of 76 and regular    Weight (last 2 days)       Date/Time Weight    12/26/24 1431 81.5 (179.6)          Wt Readings from Last 3 Encounters:   12/26/24 81.5 kg (179 lb 9.6 oz)   12/15/24  81.2 kg (179 lb 0.2 oz)   12/03/24 83.5 kg (184 lb)       Body mass index is 29.89 kg/m².    Physical Exam: General:  No acute distress  Skin:  No acute rash  Eyes:  No scleral icterus, noninjected, no discharge from eyes  ENT:  Moist mucous membranes  Neck:  Supple, no jugular venous distention, trachea is midline, no lymphadenopathy and no thyromegaly  Back   No CVAT  Chest:  Clear to auscultation and percussion, good respiratory effort  CVS:  Regular rate and rhythm without a rub, or gallops or murmurs  Abdomen:  Soft and nontender with normal bowel sounds  Extremities:  No cyanosis and no edema, no arthritic changes, normal range of motion  Neuro:  Grossly intact  Psych:  Alert, oriented x3 and appropriate      Medications:    Current Outpatient Medications:   •  AMILoride 5 mg tablet, Take 5 mg by mouth 2 (two) times a day 2 in the morning.  3 at night, Disp: , Rfl:   •  Magnesium Glycinate 100 MG CAPS, Take 200 mg by mouth 2 (two) times a day (Patient taking differently: Take 240 mg by mouth 2 (two) times a day), Disp: , Rfl:   •  metoprolol succinate (TOPROL-XL) 50 mg 24 hr tablet, Take 75 mg in the AM, and 50 mg in the PM (Patient taking differently: Take 50 mg by mouth 2 (two) times a day Take 75 mg in the AM, and 50 mg in the PM), Disp: , Rfl:   •  metoprolol tartrate (LOPRESSOR) 25 mg tablet, Take 1 tablet (25 mg total) by mouth every 6 (six) hours as needed (palpitatins), Disp: 90 tablet, Rfl: 3  •  ondansetron (ZOFRAN) 4 mg tablet, Take 1 tablet (4 mg total) by mouth every 6 (six) hours, Disp: 12 tablet, Rfl: 0  •  potassium chloride (Klor-Con M20) 20 mEq tablet, Take 20 mEq by mouth 3 (three) times a day, Disp: , Rfl:   •  aluminum-magnesium hydroxide-simethicone (MAALOX) 200-200-20 MG/5ML SUSP, Take 30 mL by mouth 4 (four) times a day (before meals and at bedtime) (Patient not taking: Reported on 12/26/2024), Disp: 3600 mL, Rfl: 0  •  dicyclomine (BENTYL) 20 mg tablet, Take 1 tablet (20 mg total) by  mouth 2 (two) times a day (Patient not taking: Reported on 12/26/2024), Disp: 20 tablet, Rfl: 0  •  famotidine (PEPCID) 20 mg tablet, Take 1 tablet (20 mg total) by mouth 2 (two) times a day (Patient not taking: Reported on 12/26/2024), Disp: 30 tablet, Rfl: 0  •  metoclopramide (Reglan) 10 mg tablet, Take 1 tablet (10 mg total) by mouth every 6 (six) hours (Patient not taking: Reported on 12/26/2024), Disp: 15 tablet, Rfl: 0    Lab, Imaging and other studies: I have personally reviewed pertinent labs.  Laboratory Results:  Results for orders placed or performed in visit on 12/19/24   Basic metabolic panel    Collection Time: 12/19/24 10:58 AM   Result Value Ref Range    Sodium 141 135 - 147 mmol/L    Potassium 3.4 (L) 3.5 - 5.3 mmol/L    Chloride 103 96 - 108 mmol/L    CO2 27 21 - 32 mmol/L    ANION GAP 11 4 - 13 mmol/L    BUN 10 5 - 25 mg/dL    Creatinine 0.78 0.60 - 1.30 mg/dL    Glucose 112 65 - 140 mg/dL    Calcium 9.3 8.4 - 10.2 mg/dL    eGFR 102 ml/min/1.73sq m   Magnesium    Collection Time: 12/19/24 10:58 AM   Result Value Ref Range    Magnesium 1.8 (L) 1.9 - 2.7 mg/dL   CBC and differential    Collection Time: 12/19/24 10:58 AM   Result Value Ref Range    WBC 4.80 4.31 - 10.16 Thousand/uL    RBC 4.75 3.81 - 5.12 Million/uL    Hemoglobin 14.3 11.5 - 15.4 g/dL    Hematocrit 40.4 34.8 - 46.1 %    MCV 85 82 - 98 fL    MCH 30.1 26.8 - 34.3 pg    MCHC 35.4 31.4 - 37.4 g/dL    RDW 12.1 11.6 - 15.1 %    MPV 12.1 8.9 - 12.7 fL    Platelets 223 149 - 390 Thousands/uL   Vitamin B12    Collection Time: 12/19/24 10:58 AM   Result Value Ref Range    Vitamin B-12 527 180 - 914 pg/mL   Folate    Collection Time: 12/19/24 10:58 AM   Result Value Ref Range    Folate >22.3 >5.9 ng/mL   Leukemia/Lymphoma flow cytometry    Collection Time: 12/19/24 10:58 AM   Result Value Ref Range    Scan Result SEE WRITTEN REPORT    TIBC Panel (incl. Iron, TIBC, % Iron Saturation)    Collection Time: 12/19/24 10:58 AM   Result Value Ref  "Range    Iron Saturation 22 15 - 50 %    TIBC 327.6 250 - 450 ug/dL    Iron 72 50 - 212 ug/dL    Transferrin 234 203 - 362 mg/dL    UIBC 256 155 - 355 ug/dL   Ferritin    Collection Time: 12/19/24 10:58 AM   Result Value Ref Range    Ferritin 61 11 - 307 ng/mL   Manual Differential(PHLEBS Do Not Order)    Collection Time: 12/19/24 10:58 AM   Result Value Ref Range    Segmented % 63 43 - 75 %    Lymphocytes % 19 14 - 44 %    Monocytes % 4 4 - 12 %    Eosinophils % 4 0 - 6 %    Basophils % 1 0 - 1 %    Atypical Lymphocytes % 9 (H) <=0 %    Absolute Neutrophils 3.02 1.85 - 7.62 Thousand/uL    Absolute Lymphocytes 1.34 0.60 - 4.47 Thousand/uL    Absolute Monocytes 0.19 0.00 - 1.22 Thousand/uL    Absolute Eosinophils 0.19 0.00 - 0.40 Thousand/uL    Absolute Basophils 0.05 0.00 - 0.10 Thousand/uL    Total Counted      RBC Morphology Normal     Platelet Estimate Adequate Adequate    Clumped Platelets Present      *Note: Due to a large number of results and/or encounters for the requested time period, some results have not been displayed. A complete set of results can be found in Results Review.             Invalid input(s): \"ALBUMIN\"        Radiology review:   chest X-ray    Ultrasound      Portions of the record may have been created with voice recognition software.  Occasional wrong word or \"sound a like\" substitutions may have occurred due to the inherent limitations of voice recognition software.  Read the chart carefully and recognize, using context, where substitutions have occurred.                    "

## 2025-01-02 DIAGNOSIS — Z00.6 ENCOUNTER FOR EXAMINATION FOR NORMAL COMPARISON OR CONTROL IN CLINICAL RESEARCH PROGRAM: ICD-10-CM

## 2025-01-06 ENCOUNTER — APPOINTMENT (OUTPATIENT)
Dept: LAB | Facility: CLINIC | Age: 30
End: 2025-01-06
Payer: COMMERCIAL

## 2025-01-06 DIAGNOSIS — E83.42 HYPOMAGNESEMIA: ICD-10-CM

## 2025-01-06 DIAGNOSIS — Z00.6 ENCOUNTER FOR EXAMINATION FOR NORMAL COMPARISON OR CONTROL IN CLINICAL RESEARCH PROGRAM: ICD-10-CM

## 2025-01-06 DIAGNOSIS — G90.A POTS (POSTURAL ORTHOSTATIC TACHYCARDIA SYNDROME): Chronic | ICD-10-CM

## 2025-01-06 DIAGNOSIS — E55.9 VITAMIN D DEFICIENCY: ICD-10-CM

## 2025-01-06 DIAGNOSIS — I10 ESSENTIAL HYPERTENSION, BENIGN: Chronic | ICD-10-CM

## 2025-01-06 DIAGNOSIS — E87.6 HYPOKALEMIA: ICD-10-CM

## 2025-01-06 LAB
ANION GAP SERPL CALCULATED.3IONS-SCNC: 9 MMOL/L (ref 4–13)
BILIRUB UR QL STRIP: NEGATIVE
BUN SERPL-MCNC: 14 MG/DL (ref 5–25)
CALCIUM SERPL-MCNC: 9.5 MG/DL (ref 8.4–10.2)
CHLORIDE SERPL-SCNC: 105 MMOL/L (ref 96–108)
CLARITY UR: CLEAR
CO2 SERPL-SCNC: 26 MMOL/L (ref 21–32)
COLOR UR: NORMAL
CREAT SERPL-MCNC: 0.82 MG/DL (ref 0.6–1.3)
CREAT UR-MCNC: 154.3 MG/DL
ERYTHROCYTE [DISTWIDTH] IN BLOOD BY AUTOMATED COUNT: 12.4 % (ref 11.6–15.1)
GFR SERPL CREATININE-BSD FRML MDRD: 96 ML/MIN/1.73SQ M
GLUCOSE SERPL-MCNC: 82 MG/DL (ref 65–140)
GLUCOSE UR STRIP-MCNC: NEGATIVE MG/DL
HCT VFR BLD AUTO: 44.1 % (ref 34.8–46.1)
HGB BLD-MCNC: 15.1 G/DL (ref 11.5–15.4)
HGB UR QL STRIP.AUTO: NEGATIVE
KETONES UR STRIP-MCNC: NEGATIVE MG/DL
LEUKOCYTE ESTERASE UR QL STRIP: NEGATIVE
MAGNESIUM SERPL-MCNC: 2.1 MG/DL (ref 1.9–2.7)
MCH RBC QN AUTO: 29.3 PG (ref 26.8–34.3)
MCHC RBC AUTO-ENTMCNC: 34.2 G/DL (ref 31.4–37.4)
MCV RBC AUTO: 86 FL (ref 82–98)
NITRITE UR QL STRIP: NEGATIVE
PH UR STRIP.AUTO: 6 [PH]
PLATELET # BLD AUTO: 228 THOUSANDS/UL (ref 149–390)
PMV BLD AUTO: 12.5 FL (ref 8.9–12.7)
POTASSIUM SERPL-SCNC: 3.9 MMOL/L (ref 3.5–5.3)
PROT UR STRIP-MCNC: NEGATIVE MG/DL
PROT UR-MCNC: 6.4 MG/DL
PROT/CREAT UR: 0 MG/G{CREAT} (ref 0–0.1)
RBC # BLD AUTO: 5.15 MILLION/UL (ref 3.81–5.12)
SODIUM SERPL-SCNC: 140 MMOL/L (ref 135–147)
SP GR UR STRIP.AUTO: 1.02 (ref 1–1.03)
UROBILINOGEN UR STRIP-ACNC: <2 MG/DL
WBC # BLD AUTO: 7.82 THOUSAND/UL (ref 4.31–10.16)

## 2025-01-06 PROCEDURE — 81003 URINALYSIS AUTO W/O SCOPE: CPT

## 2025-01-06 PROCEDURE — 85027 COMPLETE CBC AUTOMATED: CPT

## 2025-01-06 PROCEDURE — 36415 COLL VENOUS BLD VENIPUNCTURE: CPT

## 2025-01-06 PROCEDURE — 83735 ASSAY OF MAGNESIUM: CPT

## 2025-01-06 PROCEDURE — 80048 BASIC METABOLIC PNL TOTAL CA: CPT

## 2025-01-07 ENCOUNTER — RESULTS FOLLOW-UP (OUTPATIENT)
Dept: NEPHROLOGY | Facility: CLINIC | Age: 30
End: 2025-01-07

## 2025-01-07 DIAGNOSIS — E87.6 HYPOKALEMIA: ICD-10-CM

## 2025-01-07 DIAGNOSIS — E83.42 HYPOMAGNESEMIA: Primary | ICD-10-CM

## 2025-01-07 NOTE — TELEPHONE ENCOUNTER
Pt aware    ----- Message from Abelardo Covarrubias MD sent at 1/7/2025  6:36 AM EST -----  Labs look awesome no changes  Repeat a BMP/magnesium in 3 months  ----- Message -----  From: Lab, Background User  Sent: 1/6/2025   8:28 PM EST  To: Abelardo Covarrubias MD

## 2025-01-08 LAB — SCAN RESULT: NORMAL

## 2025-01-17 LAB
APOB+LDLR+PCSK9 GENE MUT ANL BLD/T: NOT DETECTED
BRCA1+BRCA2 DEL+DUP + FULL MUT ANL BLD/T: NOT DETECTED
MLH1+MSH2+MSH6+PMS2 GN DEL+DUP+FUL M: NOT DETECTED

## (undated) DEVICE — Device: Brand: REFERENCE PATCH CARTO 3

## (undated) DEVICE — SOUNDSTAR ECO 8F G CATHETER: Brand: SOUNDSTAR

## (undated) DEVICE — Device: Brand: DECANAV

## (undated) DEVICE — PINNACLE INTRODUCER SHEATH: Brand: PINNACLE

## (undated) DEVICE — CATH EP  INQUIRY 6F 2-5-2MM  MED CRV STERABLE  DECAPOLAR 110CM

## (undated) DEVICE — Device: Brand: SMARTABLATE

## (undated) DEVICE — CATH EP 5FR QUAD SUPREME JSN

## (undated) DEVICE — OCTA,GALAXY,3-3-3-3-3,D-CURVE: Brand: OCTARAY MAPPING CATHETER

## (undated) DEVICE — Device: Brand: THERMOCOOL SMARTTOUCH SF